# Patient Record
Sex: FEMALE | Race: BLACK OR AFRICAN AMERICAN | Employment: UNEMPLOYED | URBAN - METROPOLITAN AREA
[De-identification: names, ages, dates, MRNs, and addresses within clinical notes are randomized per-mention and may not be internally consistent; named-entity substitution may affect disease eponyms.]

---

## 2017-01-13 ENCOUNTER — HOSPITAL ENCOUNTER (OUTPATIENT)
Dept: BONE DENSITY | Age: 62
Discharge: HOME | End: 2017-01-13
Attending: ORTHOPAEDIC SURGERY
Payer: MEDICAID

## 2017-01-13 DIAGNOSIS — M81.0 OSTEOPOROSIS: ICD-10-CM

## 2017-01-13 PROCEDURE — 77080 DXA BONE DENSITY AXIAL: CPT

## 2017-02-02 ENCOUNTER — OFFICE VISIT (OUTPATIENT)
Dept: VASCULAR SURGERY | Age: 62
End: 2017-02-02

## 2017-02-02 VITALS
HEIGHT: 64 IN | DIASTOLIC BLOOD PRESSURE: 62 MMHG | RESPIRATION RATE: 20 BRPM | SYSTOLIC BLOOD PRESSURE: 140 MMHG | BODY MASS INDEX: 27.66 KG/M2 | WEIGHT: 162 LBS | HEART RATE: 78 BPM

## 2017-02-02 DIAGNOSIS — I82.220 INFERIOR VENA CAVA OCCLUSION (HCC): Primary | ICD-10-CM

## 2017-02-02 DIAGNOSIS — M79.605 PAIN IN BOTH LOWER EXTREMITIES: ICD-10-CM

## 2017-02-02 DIAGNOSIS — R60.0 BILATERAL EDEMA OF LOWER EXTREMITY: ICD-10-CM

## 2017-02-02 DIAGNOSIS — M79.604 PAIN IN BOTH LOWER EXTREMITIES: ICD-10-CM

## 2017-02-02 DIAGNOSIS — I82.4Y3 DEEP VEIN THROMBOSIS (DVT) OF PROXIMAL VEIN OF BOTH LOWER EXTREMITIES, UNSPECIFIED CHRONICITY (HCC): ICD-10-CM

## 2017-02-02 DIAGNOSIS — S22.000B: ICD-10-CM

## 2017-02-02 DIAGNOSIS — Z86.711 HISTORY OF PULMONARY EMBOLISM: ICD-10-CM

## 2017-02-02 RX ORDER — GABAPENTIN 400 MG/1
300 CAPSULE ORAL 3 TIMES DAILY
COMMUNITY

## 2017-02-02 NOTE — PROGRESS NOTES
Reviewed and agree with PA note. Eventually performing removal of IVC filter and stenting could be beneficial but need to make sure far enough out from spine surgery that it will be okay to perform. Also want to make sure that she is mobile prior to performing any other procedures.

## 2017-02-02 NOTE — MR AVS SNAPSHOT
Visit Information Date & Time Provider Department Dept. Phone Encounter #  
 2/2/2017  9:15  [address], [address] Vein/Vascular [address] [facility] [id number] Your Appointments 3/31/2017  1:10 PM  
Follow Up with Rohan Turner MD  
[facility] [address]) Appt Note: 1 yr sx fu sx was4/2016 no copay [address] [facility] [address]  
[phone]  
  
   
 [address] [facility] [address] 5/3/2017 10:15 AM  
Office Visit with [address] PA  
[facility] ([address]) Appt Note: ov  3 month follow up with no studies per arminda [address] [facility] [address] [address]  
[phone]  
  
   
 [address] [facility] [address] [place] [id number] Upcoming Health Maintenance Date Due Hepatitis C Screening 1955 DTaP/Tdap/Td series (1 - Tdap) 12/16/1974 PAP AKA CERVICAL CYTOLOGY 12/16/1974 FOBT Q 1 YEAR AGE 50-75 12/16/2003 ZOSTER VACCINE AGE 60> 12/16/2013 BREAST CANCER SCRN MAMMOGRAM 6/11/2015 INFLUENZA AGE 9 TO ADULT 8/1/2016 Allergies as of 2/2/2017  Review Complete On: 12/13/2016 By: Em Thapa LPN Severity Noted Reaction Type Reactions Iodinated Contrast Media - Oral And Iv Dye  02/02/2017    Other (comments) Patient states will pass out. Current Immunizations  Reviewed on 5/29/2016 No immunizations on file. Not reviewed this visit Vitals BP Pulse Resp Height(growth percentile) Weight(growth percentile) BMI  
 140/62 (BP 1 Location: Right arm, BP Patient Position: Sitting) 78 20 5' 4\" (1.626 m) 162 lb (73.5 kg) 27.81 kg/m2 Smoking Status Former Smoker Vitals History BMI and BSA Data Body Mass Index Body Surface Area  
 27.81 kg/m 2 1.82 m 2 Preferred Pharmacy Pharmacy Name Phone [address] [place] [address] Drive, [address] [phone] Your Updated Medication List  
  
   
This list is accurate as of: 2/2/17  9:59 AM.  Always use your most recent med list. amLODIPine 10 mg tablet Commonly known as:  Lissette Croon Take 1 Tab by mouth daily. atorvastatin 40 mg tablet Commonly known as:  LIPITOR Take 1 Tab by mouth nightly. bisacodyl 10 mg suppository Commonly known as:  DULCOLAX Insert 10 mg into rectum as needed. enoxaparin 80 mg/0.8 mL injection Commonly known as:  LOVENOX 80 mg by SubCUTAneous route every twelve (12) hours every twelve (12) hours. famotidine 20 mg tablet Commonly known as:  PEPCID Take 1 Tab by mouth every twelve (12) hours every twelve (12) hours. ferrous sulfate 325 mg (65 mg iron) tablet Take 1 Tab by mouth two (2) times daily (with meals). folic acid 1 mg tablet Commonly known as:  Google Take 1 Tab by mouth daily. gabapentin 400 mg capsule Commonly known as:  NEURONTIN Take 400 mg by mouth three (3) times daily. hydrALAZINE 100 mg tablet Commonly known as:  APRESOLINE Take 1 Tab by mouth three (3) times daily. isoniazid 300 mg tablet Commonly known as:  NYDRAZID Take 1 Tab by mouth daily. LASIX 20 mg tablet Generic drug:  furosemide Take  by mouth daily. mirtazapine 7.5 mg tablet Commonly known as:  Wilnette Lawrence Take 1 Tab by mouth nightly as needed. nystatin powder Commonly known as:  MYCOSTATIN Apply  to affected area two (2) times a day. ondansetron 4 mg disintegrating tablet Commonly known as:  ZOFRAN ODT Take 1 Tab by mouth every eight (8) hours as needed. oxybutynin 5 mg tablet Commonly known as:  KXHQEGQI Take 1 Tab by mouth three (3) times daily. oxyCODONE-acetaminophen  mg per tablet Commonly known as:  PERCOCET 10  
 Take 1 Tab by mouth every four (4) hours as needed. Max Daily Amount: 6 Tabs. polyethylene glycol 17 gram packet Commonly known as:  Abbie Marjorie Take 1 Packet by mouth daily. potassium chloride 20 mEq packet Commonly known as:  KLOR-CON Take 2 Packets by mouth daily. pyridoxine (vitamin B6) 25 mg tablet Commonly known as:  VITAMIN B-6 Take 1 Tab by mouth daily. Indications: DRUG-INDUCED PYRIDOXINE DEFICIENCY  
  
 rifAMPin 300 mg capsule Commonly known as:  RIFADIN Take 1 Cap by mouth every twelve (12) hours. senna-docusate 8.6-50 mg per tablet Commonly known as:  Zack Rogelio Take 2 Tabs by mouth daily. warfarin 7.5 mg tablet Commonly known as:  COUMADIN Take 1 Tab by mouth daily. Introducing [facility]! Andria Garcia introduces [employer] patient portal. Now you can access parts of your medical record, email your doctor's office, and request medication refills online. 1. In your internet browser, go to [email or web address] 2. Click on the First Time User? Click Here link in the Sign In box. You will see the New Member Sign Up page. 3. Enter your [employer] Access Code exactly as it appears below. You will not need to use this code after youve completed the sign-up process. If you do not sign up before the expiration date, you must request a new code. · [employer] Access Code: [email or web address] Expires: 4/11/2017  4:01 PM 
 
4. Enter the last four digits of your Social Security Number (xxxx) and Date of Birth (mm/dd/yyyy) as indicated and click Submit. You will be taken to the next sign-up page. 5. Create a [employer] ID. This will be your [employer] login ID and cannot be changed, so think of one that is secure and easy to remember. 6. Create a [employer] password. You can change your password at any time. 7. Enter your Password Reset Question and Answer. This can be used at a later time if you forget your password. 8. Enter your e-mail address. You will receive e-mail notification when new information is available in [address]. 9. Click Sign Up. You can now view and download portions of your medical record. 10. Click the Download Summary menu link to download a portable copy of your medical information. If you have questions, please visit the Frequently Asked Questions section of the [employer] website. Remember, [employer] is NOT to be used for urgent needs. For medical emergencies, dial 911. Now available from your iPhone and Android! Please provide this summary of care documentation to your next provider. Your primary care clinician is listed as TESSA BARRIOS. If you have any questions after today's visit, please call [phone].

## 2017-02-02 NOTE — PROGRESS NOTES
Zahraa Deras    Chief Complaint   Patient presents with    Leg Pain       History and Physical    Zahraa Deras is a 59 y.o. female who presents today in follow-up. She was admitted to [facility] back in April of last year after a fall resulting in spinal fracture. She was down for approximately 36 hours prior to EMS being called and admitted to the hospital.  She did develop a pulmonary embolism and an IVC filter was placed prior to her spine surgery. 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 She remains on Coumadin for anticoagulation. She states she remains at a nursing facility due to her pain and persistent weakness. She does participate with physical therapy and states that she is starting to mobilize more and her pain continues to improve. Unfortunately she still has significant swelling of the bilateral lower extremities. She does state that she elevates her legs as often as able and this helps but only minimally. She does have gentle compression with teds hose on bilaterally which are knee-high. She did have a recent ultrasound of her right lower extremity which does show persistent thrombus which is nonocclusive. She denies any skin changes or ulcers. She does have bilateral leg pain which she states she has been told this is due to neuropathy. No fevers or chills.         Past Medical History   Diagnosis Date    Arthritis      Past Surgical History   Procedure Laterality Date    Pr neurological procedure unlisted  04/27/2016     T2-L2 Decomprssion and Fusion/T12 Corpetomy     Patient Active Problem List   Diagnosis Code    Thoracic vertebral fracture ([facility] [address] S22.009A    Abnormal CT of the abdomen R93.5    S/P thoracic spinal fusion Z98.1    Osteoporosis M81.0     Current Outpatient Prescriptions Medication Sig Dispense Refill    gabapentin (NEURONTIN) 400 mg capsule Take 400 mg by mouth three (3) times daily.  furosemide (LASIX) 20 mg tablet Take  by mouth daily.  amLODIPine (NORVASC) 10 mg tablet Take 1 Tab by mouth daily. 30 Tab 1    atorvastatin (LIPITOR) 40 mg tablet Take 1 Tab by mouth nightly. 30 Tab 1    bisacodyl (DULCOLAX) 10 mg suppository Insert 10 mg into rectum as needed. 30 Suppository 1    enoxaparin (LOVENOX) 80 mg/0.8 mL injection 80 mg by SubCUTAneous route every twelve (12) hours every twelve (12) hours. 10 Syringe 0    famotidine (PEPCID) 20 mg tablet Take 1 Tab by mouth every twelve (12) hours every twelve (12) hours. 60 Tab 1    ferrous sulfate 325 mg (65 mg iron) tablet Take 1 Tab by mouth two (2) times daily (with meals). 60 Tab 1    folic acid (FOLVITE) 1 mg tablet Take 1 Tab by mouth daily. 30 Tab 3    hydrALAZINE (APRESOLINE) 100 mg tablet Take 1 Tab by mouth three (3) times daily. 90 Tab 3    isoniazid (NYDRAZID) 300 mg tablet Take 1 Tab by mouth daily. 30 Tab 3    mirtazapine (REMERON) 7.5 mg tablet Take 1 Tab by mouth nightly as needed. 30 Tab 1    nystatin (MYCOSTATIN) powder Apply  to affected area two (2) times a day. 1 Bottle 3    ondansetron (ZOFRAN ODT) 4 mg disintegrating tablet Take 1 Tab by mouth every eight (8) hours as needed. 40 Tab 0    oxybutynin (DITROPAN) 5 mg tablet Take 1 Tab by mouth three (3) times daily. 90 Tab 1    polyethylene glycol (MIRALAX) 17 gram packet Take 1 Packet by mouth daily. 30 Packet 1    potassium chloride (KLOR-CON) 20 mEq packet Take 2 Packets by mouth daily. 60 Packet 1    pyridoxine, vitamin B6, (VITAMIN B-6) 25 mg tablet Take 1 Tab by mouth daily. Indications: DRUG-INDUCED PYRIDOXINE DEFICIENCY 30 Tab 3    rifampin (RIFADIN) 300 mg capsule Take 1 Cap by mouth every twelve (12) hours. 60 Cap 1    senna-docusate (PERICOLACE) 8.6-50 mg per tablet Take 2 Tabs by mouth daily.  60 Tab 1    warfarin (COUMADIN) 7.5 mg tablet Take 1 Tab by mouth daily. 30 Tab 1    oxyCODONE-acetaminophen (PERCOCET 10)  mg per tablet Take 1 Tab by mouth every four (4) hours as needed. Max Daily Amount: 6 Tabs. 40 Tab 0     Allergies   Allergen Reactions    Iodinated Contrast Media - Oral And Iv Dye Other (comments)     Patient states will pass out. Physical Exam:    Visit Vitals    /62 (BP 1 Location: Right arm, BP Patient Position: Sitting)    Pulse 78    Resp 20    Ht 5' 4\" (1.626 m)    Wt 162 lb (73.5 kg)    BMI 27.81 kg/m2      General: Well-appearing female in no acute distress . Patient is in a wheelchair  HEENT: EOMI, no scleral icterus is noted. Pulmonary: No increased work or breathing is noted. Abdomen:  nondistended. Extremities: Warm and well perfused bilaterally. Pt has significant 3-4+ pitting edema in the bilateral lower extremities. Lower legs are tender to palpation. Neuro: Cranial nerves II through XII are grossly intact   Integument: No ulcerations are identified visibly      Impression and Plan:  Mary Mi is a 59 y.o. 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 She did undergo AngioJet with lysis procedure. She does have persistent bilateral lower extremity edema. I discussed the nature of DVT and that after her extensive clot burden she will likely always have a certain degree of leg swelling. I also discussed the nature of post thrombophlebitic syndrome. I also discussed the importance of compression therapy and leg elevation. I discussed that although some compression is better than nothing she would certainly benefit from a higher level compression stocking of at least 20-30 mmHg but would likely best benefit from a 30-40 mmHg compression stocking as her issues are in the deep system of veins. I also discussed that she would best benefit from a thigh-high stocking.   She is understanding and states that she believes thigh-high stockings have been ordered per her nursing home. I also encouraged her to continue to participate with physical therapy and mobilize as much as able. She will continue anticoagulation with Coumadin. She will follow-up in 2-3 months to reassess. Educational material was given today in the office as well as prescription for 20-30 thigh-high compression stockings. Plan was discussed. Patient expresses understanding and agrees. Rolena  Maynor  [phone]        PLEASE NOTE:  This document has been produced using voice recognition software. Unrecognized errors in transcription may be present.

## 2017-03-31 ENCOUNTER — OFFICE VISIT (OUTPATIENT)
Dept: ORTHOPEDIC SURGERY | Age: 62
End: 2017-03-31

## 2017-03-31 VITALS
BODY MASS INDEX: 31.92 KG/M2 | SYSTOLIC BLOOD PRESSURE: 120 MMHG | RESPIRATION RATE: 18 BRPM | HEIGHT: 64 IN | DIASTOLIC BLOOD PRESSURE: 68 MMHG | HEART RATE: 60 BPM | WEIGHT: 187 LBS | TEMPERATURE: 98.2 F

## 2017-03-31 DIAGNOSIS — Z98.1 S/P SPINAL FUSION: Primary | ICD-10-CM

## 2017-03-31 NOTE — Clinical Note
[facility] [address] 
[address], [address] Phone: [phone] Fax: [phone] PROGRESS NOTE Patient: Jose Whitaker                MRN: [id number]       SSN: [id number] YOB: 1955        AGE: 59 y.o. SEX: female Body mass index is 32.1 kg/(m^2). PCP: Luis Tay MD 
03/31/17 Chief Complaint Patient presents with  Back Pain  
  follow up HISTORY OF PRESENT ILLNESS, RADIOGRAPHS, and PLAN:  
 
HISTORY:  Ms. Kate Garza returns today. She is about one year status post her fusion of T7 to L2 for pathologic fracture, cord compression, and paraplegia. She uses a wheelchair when she is out and about in the community needing to get around long distances, but she is a household ambulator without a walker and gets around the nursing home with a walker. She has minimal pain requiring no pain medications. She is quite pleased with her progress. ASSESSMENT/PLAN: At this point, one year out from surgery, I am quite pleased by her outcome. I have no further interventions I would recommend I would be happy to see her again as-needed but given her stability to date, I do not need to followup with her on a routine basis. She has no further complaints of pain or issues. She is not requiring medications. I will see her back again as-needed. cc: [address] [place] [address],[address] Rohan Mohan M.D. Past Medical History:  
Diagnosis Date  Arthritis History reviewed. No pertinent family history. Current Outpatient Prescriptions Medication Sig Dispense Refill  gabapentin (NEURONTIN) 400 mg capsule Take 400 mg by mouth three (3) times daily.  furosemide (LASIX) 20 mg tablet Take  by mouth daily.  amLODIPine (NORVASC) 10 mg tablet Take 1 Tab by mouth daily. 30 Tab 1  
 atorvastatin (LIPITOR) 40 mg tablet Take 1 Tab by mouth nightly.  30 Tab 1  
  bisacodyl (DULCOLAX) 10 mg suppository Insert 10 mg into rectum as needed. 30 Suppository 1  
 famotidine (PEPCID) 20 mg tablet Take 1 Tab by mouth every twelve (12) hours every twelve (12) hours. 60 Tab 1  
 ferrous sulfate 325 mg (65 mg iron) tablet Take 1 Tab by mouth two (2) times daily (with meals). 60 Tab 1  
 folic acid (FOLVITE) 1 mg tablet Take 1 Tab by mouth daily. 30 Tab 3  
 hydrALAZINE (APRESOLINE) 100 mg tablet Take 1 Tab by mouth three (3) times daily. 90 Tab 3  
 isoniazid (NYDRAZID) 300 mg tablet Take 1 Tab by mouth daily. 30 Tab 3  
 mirtazapine (REMERON) 7.5 mg tablet Take 1 Tab by mouth nightly as needed. 30 Tab 1  
 nystatin (MYCOSTATIN) powder Apply  to affected area two (2) times a day. 1 Bottle 3  
 ondansetron (ZOFRAN ODT) 4 mg disintegrating tablet Take 1 Tab by mouth every eight (8) hours as needed. 40 Tab 0  
 oxybutynin (DITROPAN) 5 mg tablet Take 1 Tab by mouth three (3) times daily. 90 Tab 1  polyethylene glycol (MIRALAX) 17 gram packet Take 1 Packet by mouth daily. 30 Packet 1  
 potassium chloride (KLOR-CON) 20 mEq packet Take 2 Packets by mouth daily. [address]  pyridoxine, vitamin B6, (VITAMIN B-6) 25 mg tablet Take 1 Tab by mouth daily. Indications: DRUG-INDUCED PYRIDOXINE DEFICIENCY 30 Tab 3  
 rifampin (RIFADIN) 300 mg capsule Take 1 Cap by mouth every twelve (12) hours. 60 Cap 1  
 senna-docusate (PERICOLACE) 8.6-50 mg per tablet Take 2 Tabs by mouth daily. 60 Tab 1  
 warfarin (COUMADIN) 7.5 mg tablet Take 1 Tab by mouth daily. 30 Tab 1  
 oxyCODONE-acetaminophen (PERCOCET 10)  mg per tablet Take 1 Tab by mouth every four (4) hours as needed. Max Daily Amount: 6 Tabs. 40 Tab 0  
 enoxaparin (LOVENOX) 80 mg/0.8 mL injection 80 mg by SubCUTAneous route every twelve (12) hours every twelve (12) hours. 10 Syringe 0 Allergies Allergen Reactions  Iodinated Contrast Media - Oral And Iv Dye Other (comments) Patient states will pass out. Past Surgical History:  
Procedure Laterality Date  NEUROLOGICAL PROCEDURE UNLISTED  04/27/2016 T2-L2 Decomprssion and Fusion/T12 Corpetomy Past Medical History:  
Diagnosis Date  Arthritis Social History Social History  Marital status: SINGLE Spouse name: N/A  
 Number of children: N/A  
 Years of education: N/A Occupational History  Not on file. Social History Main Topics  Smoking status: Former Smoker  Smokeless tobacco: Former User Quit date: 4/1/2016  Alcohol use Yes  Drug use: No  
 Sexual activity: Not on file Other Topics Concern  Not on file Social History Narrative WORK STATUS: Pt is not currently employed. REVIEW OF SYSTEMS: 
 CONSTITUTIONAL SYMPTOMS:  Negative. EYES:  Negative. EARS, NOSE, THROAT AND MOUTH:  Negative. CARDIOVASCULAR:  Negative. RESPIRATORY:  Negative. GENITOURINARY: Negative. GASTROINTESTINAL:  Negative. INTEGUMENTARY (SKIN AND/OR BREAST):  Negative. MUSCULOSKELETAL: Per HPI. ENDOCRINE/RHEUMATOLOGIC:  Negative. NEUROLOGICAL:  Per HPI. HEMATOLOGIC/LYMPHATIC:  Negative. ALLERGIC/IMMUNOLOGIC:  Negative. PSYCHIATRIC:  Negative. PHYSICAL EXAMINATION:  
Visit Vitals  /68  Pulse 60  Temp 98.2 °F (36.8 °C) (Oral)  Resp 18  Ht 5' 4\" (1.626 m)  Wt 187 lb (84.8 kg)  BMI 32.1 kg/m2 PAIN SCALE: 5/10 CONSTITUTIONAL: The patient is in no apparent distress and is alert and oriented x 3. Overweight. HEENT: Normocephalic. Hearing grossly intact. NECK: Supple and symmetric. no tenderness, or masses were felt. RESPIRATORY: No labored breathing. CARDIOVASCULAR: The carotid pulses were normal. Peripheral pulses were 2+. CHEST: Normal AP diameter and normal contour without any kyphoscoliosis. LYMPHATIC: No lymphadenopathy was appreciated in the neck, axillae or groin.  
SKIN: Negative for scars, rashes, lesions, or ulcers on the right upper, right lower, left upper, left lower and trunk. NEUROLOGICAL: Alert and oriented x 3. Pt presents to the office today using a wheelchair. Ambulation with walker. FWB. EXTREMITIES: See musculoskeletal. 
MUSCULOSKELETAL: 
? Spine, Ribs and Pelvis: Inspection, percussion and palpation preformed. Negative for misalignment, asymmetry, crepitation, defects, tenderness masses or effusions. ? Left Lower Extremity: Inspection, percussion and palpation preformed. Negative straight leg raise. ? Right Lower Extremity: Inspection, percussion and palpation preformed. Negative straight leg raise. SPINE EXAM:  
 
Lumbar spine: No rash, ecchymosis, or gross obliquity. No focal atrophy is noted. ASSESSMENT 
  ICD-10-CM ICD-9-CM 1. S/P thoracic spinal fusion Z98.1 V45.4 Written by Dariela Turner, as dictated by Odalis Galvez MD.

## 2017-03-31 NOTE — PROGRESS NOTES
[facility]  [address], [address] [facility] [address] 200  [place] [address] [address]  Phone: [phone]  Fax: [phone]  PROGRESS NOTE  Patient: Boo Ledesma                MRN: [id number]       SSN: [id number]  YOB: 1955        AGE: 59 y.o. SEX: female  Body mass index is 32.1 kg/(m^2). PCP: Nichelle Villarreal MD  03/31/17    Chief Complaint   Patient presents with    Back Pain     follow up       [facility], RADIOGRAPHS, and PLAN:     Dictation #1  [id number]  ANC:[id number]      Past Medical History:   Diagnosis Date    Arthritis        History reviewed. No pertinent family history. Current Outpatient Prescriptions   Medication Sig Dispense Refill    gabapentin (NEURONTIN) 400 mg capsule Take 400 mg by mouth three (3) times daily.  furosemide (LASIX) 20 mg tablet Take  by mouth daily.  amLODIPine (NORVASC) 10 mg tablet Take 1 Tab by mouth daily. 30 Tab 1    atorvastatin (LIPITOR) 40 mg tablet Take 1 Tab by mouth nightly. 30 Tab 1    bisacodyl (DULCOLAX) 10 mg suppository Insert 10 mg into rectum as needed. 30 Suppository 1    famotidine (PEPCID) 20 mg tablet Take 1 Tab by mouth every twelve (12) hours every twelve (12) hours. 60 Tab 1    ferrous sulfate 325 mg (65 mg iron) tablet Take 1 Tab by mouth two (2) times daily (with meals). 60 Tab 1    folic acid (FOLVITE) 1 mg tablet Take 1 Tab by mouth daily. 30 Tab 3    hydrALAZINE (APRESOLINE) 100 mg tablet Take 1 Tab by mouth three (3) times daily. 90 Tab 3    isoniazid (NYDRAZID) 300 mg tablet Take 1 Tab by mouth daily. 30 Tab 3    mirtazapine (REMERON) 7.5 mg tablet Take 1 Tab by mouth nightly as needed. 30 Tab 1    nystatin (MYCOSTATIN) powder Apply  to affected area two (2) times a day. 1 Bottle 3    ondansetron (ZOFRAN ODT) 4 mg disintegrating tablet Take 1 Tab by mouth every eight (8) hours as needed.  40 Tab 0    oxybutynin (DITROPAN) 5 mg tablet Take 1 Tab by mouth three (3) times daily. 90 Tab 1    polyethylene glycol (MIRALAX) 17 gram packet Take 1 Packet by mouth daily. 30 Packet 1    potassium chloride (KLOR-CON) 20 mEq packet Take 2 Packets by mouth daily. 60 Packet 1    pyridoxine, vitamin B6, (VITAMIN B-6) 25 mg tablet Take 1 Tab by mouth daily. Indications: DRUG-INDUCED PYRIDOXINE DEFICIENCY 30 Tab 3    rifampin (RIFADIN) 300 mg capsule Take 1 Cap by mouth every twelve (12) hours. 60 Cap 1    senna-docusate (PERICOLACE) 8.6-50 mg per tablet Take 2 Tabs by mouth daily. 60 Tab 1    warfarin (COUMADIN) 7.5 mg tablet Take 1 Tab by mouth daily. 30 Tab 1    oxyCODONE-acetaminophen (PERCOCET 10)  mg per tablet Take 1 Tab by mouth every four (4) hours as needed. Max Daily Amount: 6 Tabs. 40 Tab 0    enoxaparin (LOVENOX) 80 mg/0.8 mL injection 80 mg by SubCUTAneous route every twelve (12) hours every twelve (12) hours. 10 Syringe 0       Allergies   Allergen Reactions    Iodinated Contrast Media - Oral And Iv Dye Other (comments)     Patient states will pass out. Past Surgical History:   Procedure Laterality Date    NEUROLOGICAL PROCEDURE UNLISTED  04/27/2016    T2-L2 Decomprssion and Fusion/T12 Corpetomy       Past Medical History:   Diagnosis Date    Arthritis        Social History     Social History    Marital status: SINGLE     Spouse name: N/A    Number of children: N/A    Years of education: N/A     Occupational History    Not on file. Social History Main Topics    Smoking status: Former Smoker    Smokeless tobacco: Former User     Quit date: 4/1/2016    Alcohol use Yes    Drug use: No    Sexual activity: Not on file     Other Topics Concern    Not on file     Social History Narrative       WORK STATUS: Pt is not currently employed. REVIEW OF SYSTEMS:   CONSTITUTIONAL SYMPTOMS:  Negative. EYES:  Negative. EARS, NOSE, THROAT AND MOUTH:  Negative. CARDIOVASCULAR:  Negative. RESPIRATORY:  Negative.    GENITOURINARY: Negative. GASTROINTESTINAL:  Negative. INTEGUMENTARY (SKIN AND/OR BREAST):  Negative. MUSCULOSKELETAL: Per HPI.   ENDOCRINE/RHEUMATOLOGIC:  Negative. NEUROLOGICAL:  Per HPI. HEMATOLOGIC/LYMPHATIC:  Negative. ALLERGIC/IMMUNOLOGIC:  Negative. PSYCHIATRIC:  Negative. PHYSICAL EXAMINATION:   Visit Vitals    /68    Pulse 60    Temp 98.2 °F (36.8 °C) (Oral)    Resp 18    Ht 5' 4\" (1.626 m)    Wt 187 lb (84.8 kg)    BMI 32.1 kg/m2    PAIN SCALE: 5/10    CONSTITUTIONAL: The patient is in no apparent distress and is alert and oriented x 3. Overweight. HEENT: Normocephalic. Hearing grossly intact. NECK: Supple and symmetric. no tenderness, or masses were felt. RESPIRATORY: No labored breathing. CARDIOVASCULAR: The carotid pulses were normal. Peripheral pulses were 2+. CHEST: Normal AP diameter and normal contour without any kyphoscoliosis. LYMPHATIC: No lymphadenopathy was appreciated in the neck, axillae or groin. SKIN: Negative for scars, rashes, lesions, or ulcers on the right upper, right lower, left upper, left lower and trunk. NEUROLOGICAL: Alert and oriented x 3. Pt presents to the office today using a wheelchair. Ambulation with walker. FWB. EXTREMITIES: See musculoskeletal.  MUSCULOSKELETAL:   Spine, Ribs and Pelvis: Inspection, percussion and palpation preformed. Negative for misalignment, asymmetry, crepitation, defects, tenderness masses or effusions.  Left Lower Extremity: Inspection, percussion and palpation preformed. Negative straight leg raise.  Right Lower Extremity: Inspection, percussion and palpation preformed. Negative straight leg raise. SPINE EXAM:     Lumbar spine: No rash, ecchymosis, or gross obliquity. No focal atrophy is noted. ASSESSMENT    ICD-10-CM ICD-9-CM    1.  S/P thoracic spinal fusion Z98.1 V45.4        Written by Anson Briggs, as dictated by Jose Juan Snow MD.

## 2017-03-31 NOTE — MR AVS SNAPSHOT
Visit Information Date & Time Provider Department Dept. Phone Encounter #  
 3/31/2017  1:10 PM Kevin Cervantes MD [facility] [id number] Follow-up Instructions Return if symptoms worsen or fail to improve. Follow-up and Disposition History Your Appointments 5/3/2017 10:15 AM  
Office Visit with [address] [place] MICHELLE Mojica  
[facility] ([facility]) Appt Note: ov  3 month follow up with no studies per arminda [address] [facility] [address] [address]  
[phone]  
  
   
 [address] [facility] [address] [address] Upcoming Health Maintenance Date Due Hepatitis C Screening 1955 DTaP/Tdap/Td series (1 - Tdap) 12/16/1974 PAP AKA CERVICAL CYTOLOGY 12/16/1974 FOBT Q 1 YEAR AGE 50-75 12/16/2003 ZOSTER VACCINE AGE 60> 12/16/2013 BREAST CANCER SCRN MAMMOGRAM 6/11/2015 INFLUENZA AGE 9 TO ADULT 8/1/2016 Allergies as of 3/31/2017  Review Complete On: 3/31/2017 By: Kevin Cervantes MD  
  
 Severity Noted Reaction Type Reactions Iodinated Contrast Media - Oral And Iv Dye  02/02/2017    Other (comments) Patient states will pass out. Current Immunizations  Reviewed on 5/29/2016 No immunizations on file. Not reviewed this visit You Were Diagnosed With   
  
 Codes Comments S/P spinal fusion    -  Primary ICD-10-CM: Z98.1 ICD-9-CM: V45.4 Vitals BP Pulse Temp Resp Height(growth percentile) Weight(growth percentile) 120/68 60 98.2 °F (36.8 °C) (Oral) 18 5' 4\" (1.626 m) 187 lb (84.8 kg) BMI Smoking Status 32.1 kg/m2 Former Smoker BMI and BSA Data Body Mass Index Body Surface Area  
 32.1 kg/m 2 1.96 m 2 Preferred Pharmacy Pharmacy Name Phone [address], [address] [phone] Your Updated Medication List  
  
   
 This list is accurate as of: 3/31/17  3:19 PM.  Always use your most recent med list. amLODIPine 10 mg tablet Commonly known as:  Akil Alstrom Take 1 Tab by mouth daily. atorvastatin 40 mg tablet Commonly known as:  LIPITOR Take 1 Tab by mouth nightly. bisacodyl 10 mg suppository Commonly known as:  DULCOLAX Insert 10 mg into rectum as needed. enoxaparin 80 mg/0.8 mL injection Commonly known as:  LOVENOX 80 mg by SubCUTAneous route every twelve (12) hours every twelve (12) hours. famotidine 20 mg tablet Commonly known as:  PEPCID Take 1 Tab by mouth every twelve (12) hours every twelve (12) hours. ferrous sulfate 325 mg (65 mg iron) tablet Take 1 Tab by mouth two (2) times daily (with meals). folic acid 1 mg tablet Commonly known as:  Google Take 1 Tab by mouth daily. gabapentin 400 mg capsule Commonly known as:  NEURONTIN Take 400 mg by mouth three (3) times daily. hydrALAZINE 100 mg tablet Commonly known as:  APRESOLINE Take 1 Tab by mouth three (3) times daily. isoniazid 300 mg tablet Commonly known as:  NYDRAZID Take 1 Tab by mouth daily. LASIX 20 mg tablet Generic drug:  furosemide Take  by mouth daily. mirtazapine 7.5 mg tablet Commonly known as:  Cooper Pleva Take 1 Tab by mouth nightly as needed. nystatin powder Commonly known as:  MYCOSTATIN Apply  to affected area two (2) times a day. ondansetron 4 mg disintegrating tablet Commonly known as:  ZOFRAN ODT Take 1 Tab by mouth every eight (8) hours as needed. oxybutynin 5 mg tablet Commonly known as:  DJYPUPET Take 1 Tab by mouth three (3) times daily. oxyCODONE-acetaminophen  mg per tablet Commonly known as:  PERCOCET 10 Take 1 Tab by mouth every four (4) hours as needed. Max Daily Amount: 6 Tabs. polyethylene glycol 17 gram packet Commonly known as:  Mazin Sanchez Take 1 Packet by mouth daily. potassium chloride 20 mEq packet Commonly known as:  KLOR-CON Take 2 Packets by mouth daily. pyridoxine (vitamin B6) 25 mg tablet Commonly known as:  VITAMIN B-6 Take 1 Tab by mouth daily. Indications: DRUG-INDUCED PYRIDOXINE DEFICIENCY  
  
 rifAMPin 300 mg capsule Commonly known as:  RIFADIN Take 1 Cap by mouth every twelve (12) hours. senna-docusate 8.6-50 mg per tablet Commonly known as:  Tamra Anon Take 2 Tabs by mouth daily. warfarin 7.5 mg tablet Commonly known as:  COUMADIN Take 1 Tab by mouth daily. Follow-up Instructions Return if symptoms worsen or fail to improve. Introducing [facility]! [employer] introduces [employer] patient portal. Now you can access parts of your medical record, email your doctor's office, and request medication refills online. 1. In your internet browser, go to [email or web address] 2. Click on the First Time User? Click Here link in the Sign In box. You will see the New Member Sign Up page. 3. Enter your [employer] Access Code exactly as it appears below. You will not need to use this code after youve completed the sign-up process. If you do not sign up before the expiration date, you must request a new code. · [employer] Access Code: [email or web address] Expires: 4/11/2017  5:01 PM 
 
4. Enter the last four digits of your Social Security Number (xxxx) and Date of Birth (mm/dd/yyyy) as indicated and click Submit. You will be taken to the next sign-up page. 5. Create a [employer] ID. This will be your [employer] login ID and cannot be changed, so think of one that is secure and easy to remember. 6. Create a [employer] password. You can change your password at any time. 7. Enter your Password Reset Question and Answer. This can be used at a later time if you forget your password. 8. Enter your e-mail address.  You will receive e-mail notification when new information is available in [employer]. 9. Click Sign Up. You can now view and download portions of your medical record. 10. Click the Download Summary menu link to download a portable copy of your medical information. If you have questions, please visit the Frequently Asked Questions section of the [employer] website. Remember, [employer] is NOT to be used for urgent needs. For medical emergencies, dial 911. Now available from your iPhone and Android! Please provide this summary of care documentation to your next provider. Your primary care clinician is listed as TESSA BARRIOS. If you have any questions after today's visit, please call [phone].

## 2017-03-31 NOTE — PROGRESS NOTES
HISTORY:  Ms. Nichol Mccall returns today. She is about one year status post her fusion of T7 to L2 for pathologic fracture, cord compression, and paraplegia. She uses a wheelchair when she is out and about in the community needing to get around long distances, but she is a household ambulator without a walker and gets around the nursing home with a walker. She has minimal pain requiring no pain medications. She is quite pleased with her progress. ASSESSMENT/PLAN: At this point, one year out from surgery, I am quite pleased by her outcome. I have no further interventions I would recommend I would be happy to see her again as-needed but given her stability to date, I do not need to followup with her on a routine basis. She has no further complaints of pain or issues. She is not requiring medications. I will see her back again as-needed.      cc: Lori Ravi M.D.

## 2017-05-03 ENCOUNTER — OFFICE VISIT (OUTPATIENT)
Dept: VASCULAR SURGERY | Age: 62
End: 2017-05-03

## 2017-05-03 VITALS
RESPIRATION RATE: 18 BRPM | SYSTOLIC BLOOD PRESSURE: 124 MMHG | HEART RATE: 68 BPM | BODY MASS INDEX: 31.92 KG/M2 | HEIGHT: 64 IN | DIASTOLIC BLOOD PRESSURE: 72 MMHG | WEIGHT: 187 LBS

## 2017-05-03 DIAGNOSIS — Z95.828 PRESENCE OF IVC FILTER: ICD-10-CM

## 2017-05-03 DIAGNOSIS — R60.0 BILATERAL EDEMA OF LOWER EXTREMITY: Primary | ICD-10-CM

## 2017-05-03 DIAGNOSIS — Z98.1 S/P SPINAL FUSION: ICD-10-CM

## 2017-05-03 DIAGNOSIS — I82.4Y3 DEEP VEIN THROMBOSIS (DVT) OF PROXIMAL VEIN OF BOTH LOWER EXTREMITIES, UNSPECIFIED CHRONICITY (HCC): ICD-10-CM

## 2017-05-03 DIAGNOSIS — R29.898 LEG FATIGUE: ICD-10-CM

## 2017-05-03 NOTE — MR AVS SNAPSHOT
Visit Information Date & Time Provider Department Dept. Phone Encounter #  
 5/3/2017 10:15 AM MARIO Martell [facility] [phone] [id number] Upcoming Health Maintenance Date Due Hepatitis C Screening 1955 DTaP/Tdap/Td series (1 - Tdap) 12/16/1974 PAP AKA CERVICAL CYTOLOGY 12/16/1974 FOBT Q 1 YEAR AGE 50-75 12/16/2003 ZOSTER VACCINE AGE 60> 12/16/2013 BREAST CANCER SCRN MAMMOGRAM 6/11/2015 INFLUENZA AGE 9 TO ADULT 8/1/2017 Allergies as of 5/3/2017  Review Complete On: 5/3/2017 By: Jammie Cage RN Severity Noted Reaction Type Reactions Iodinated Contrast Media - Oral And Iv Dye  02/02/2017    Other (comments) Patient states will pass out. Current Immunizations  Reviewed on 5/29/2016 No immunizations on file. Not reviewed this visit You Were Diagnosed With   
  
 Codes Comments Bilateral edema of lower extremity    -  Primary ICD-10-CM: R60.0 ICD-9-CM: 832. 3 Deep vein thrombosis (DVT) of proximal vein of both lower extremities, unspecified chronicity (HCC)     ICD-10-CM: I82.4Y3 ICD-9-CM: 453.41 Presence of IVC filter     ICD-10-CM: D39.133 ICD-9-CM: V45.89 Vitals BP Pulse Resp Height(growth percentile) Weight(growth percentile) BMI  
 124/72 (BP 1 Location: Left arm, BP Patient Position: Sitting) 68 18 5' 4\" (1.626 m) 187 lb (84.8 kg) 32.1 kg/m2 Smoking Status Former Smoker BMI and BSA Data Body Mass Index Body Surface Area  
 32.1 kg/m 2 1.96 m 2 Preferred Pharmacy Pharmacy Name Phone [address], [address] [place] [address] [phone] Your Updated Medication List  
  
   
This list is accurate as of: 5/3/17 12:19 PM.  Always use your most recent med list. amLODIPine 10 mg tablet Commonly known as:  Midland Cradle Take 1 Tab by mouth daily. atorvastatin 40 mg tablet Commonly known as:  LIPITOR Take 1 Tab by mouth nightly. bisacodyl 10 mg suppository Commonly known as:  DULCOLAX Insert 10 mg into rectum as needed. enoxaparin 80 mg/0.8 mL injection Commonly known as:  LOVENOX 80 mg by SubCUTAneous route every twelve (12) hours every twelve (12) hours. famotidine 20 mg tablet Commonly known as:  PEPCID Take 1 Tab by mouth every twelve (12) hours every twelve (12) hours. ferrous sulfate 325 mg (65 mg iron) tablet Take 1 Tab by mouth two (2) times daily (with meals). folic acid 1 mg tablet Commonly known as:  Google Take 1 Tab by mouth daily. gabapentin 400 mg capsule Commonly known as:  NEURONTIN Take 400 mg by mouth three (3) times daily. hydrALAZINE 100 mg tablet Commonly known as:  APRESOLINE Take 1 Tab by mouth three (3) times daily. isoniazid 300 mg tablet Commonly known as:  NYDRAZID Take 1 Tab by mouth daily. LASIX 20 mg tablet Generic drug:  furosemide Take  by mouth daily. mirtazapine 7.5 mg tablet Commonly known as:  Olga Pierini Take 1 Tab by mouth nightly as needed. nystatin powder Commonly known as:  MYCOSTATIN Apply  to affected area two (2) times a day. ondansetron 4 mg disintegrating tablet Commonly known as:  ZOFRAN ODT Take 1 Tab by mouth every eight (8) hours as needed. oxybutynin 5 mg tablet Commonly known as:  GAWMYTXM Take 1 Tab by mouth three (3) times daily. oxyCODONE-acetaminophen  mg per tablet Commonly known as:  PERCOCET 10 Take 1 Tab by mouth every four (4) hours as needed. Max Daily Amount: 6 Tabs. polyethylene glycol 17 gram packet Commonly known as:  Layla Bentley Take 1 Packet by mouth daily. potassium chloride 20 mEq packet Commonly known as:  KLOR-CON Take 2 Packets by mouth daily. pyridoxine (vitamin B6) 25 mg tablet Commonly known as:  VITAMIN B-6  
 Take 1 Tab by mouth daily. Indications: DRUG-INDUCED PYRIDOXINE DEFICIENCY  
  
 rifAMPin 300 mg capsule Commonly known as:  RIFADIN Take 1 Cap by mouth every twelve (12) hours. senna-docusate 8.6-50 mg per tablet Commonly known as:  Vero Riches Take 2 Tabs by mouth daily. warfarin 7.5 mg tablet Commonly known as:  COUMADIN Take 1 Tab by mouth daily. To-Do List   
 05/19/2017 Imaging:  DUPLEX IVC COMPLETE AMB   
  
 05/19/2017 Imaging:  DUPLEX LOWER EXT VENOUS BILAT AMB Please provide this summary of care documentation to your next provider. Your primary care clinician is listed as TESSA BARRIOS. If you have any questions after today's visit, please call [phone].

## 2017-05-03 NOTE — PROGRESS NOTES
Angeline Ortega    Chief Complaint   Patient presents with    Leg Pain    Swelling       History and Physical    Angeline Ortega is a 59 y.o. female who presents today in follow-up. She was admitted to [facility] back in April of last year after a fall resulting in spinal fracture. She was down for approximately 36 hours prior to EMS being called and admitted to the hospital.  She did develop a pulmonary embolism and an IVC filter was placed prior to her spine surgery. 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 She remains on Coumadin for anticoagulation. She remains at a nursing facility due to her pain and persistent weakness. She does participate with physical therapy and states that she was doing very well and starting to mobilize more, unfortunately she states that her BLE swelling has recently started worsening. She c/o edema up to her thighs bilaterally and states that her legs are so heavy and uncomfortable that she is unable to walk and feels like she is sliding backwards with her therapy. She is very frustrated with her situation. She believes that her INR is mostly therapeutic but does report that it was 1.7 last week. She remains compliant with compression therapy and has SONALI hose on in office today. She denies any skin changes or ulcers. She does also have bilateral lower extremity neuropathy as well. No fevers or chills.         Past Medical History:   Diagnosis Date    Arthritis      Past Surgical History:   Procedure Laterality Date    NEUROLOGICAL PROCEDURE UNLISTED  04/27/2016    T2-L2 Decomprssion and Fusion/T12 Corpetomy     Patient Active Problem List   Diagnosis Code    Thoracic vertebral fracture ([facility] [address] S22.009A    Abnormal CT of the abdomen R93.5    S/P thoracic spinal fusion Z98.1    Osteoporosis M81.0     Current Outpatient Prescriptions   Medication Sig Dispense Refill    gabapentin (NEURONTIN) 400 mg capsule Take 400 mg by mouth three (3) times daily.  furosemide (LASIX) 20 mg tablet Take  by mouth daily.  amLODIPine (NORVASC) 10 mg tablet Take 1 Tab by mouth daily. 30 Tab 1    atorvastatin (LIPITOR) 40 mg tablet Take 1 Tab by mouth nightly. 30 Tab 1    bisacodyl (DULCOLAX) 10 mg suppository Insert 10 mg into rectum as needed. 30 Suppository 1    enoxaparin (LOVENOX) 80 mg/0.8 mL injection 80 mg by SubCUTAneous route every twelve (12) hours every twelve (12) hours. 10 Syringe 0    famotidine (PEPCID) 20 mg tablet Take 1 Tab by mouth every twelve (12) hours every twelve (12) hours. 60 Tab 1    ferrous sulfate 325 mg (65 mg iron) tablet Take 1 Tab by mouth two (2) times daily (with meals). 60 Tab 1    folic acid (FOLVITE) 1 mg tablet Take 1 Tab by mouth daily. 30 Tab 3    hydrALAZINE (APRESOLINE) 100 mg tablet Take 1 Tab by mouth three (3) times daily. 90 Tab 3    isoniazid (NYDRAZID) 300 mg tablet Take 1 Tab by mouth daily. 30 Tab 3    mirtazapine (REMERON) 7.5 mg tablet Take 1 Tab by mouth nightly as needed. 30 Tab 1    nystatin (MYCOSTATIN) powder Apply  to affected area two (2) times a day. 1 Bottle 3    ondansetron (ZOFRAN ODT) 4 mg disintegrating tablet Take 1 Tab by mouth every eight (8) hours as needed. 40 Tab 0    oxybutynin (DITROPAN) 5 mg tablet Take 1 Tab by mouth three (3) times daily. 90 Tab 1    polyethylene glycol (MIRALAX) 17 gram packet Take 1 Packet by mouth daily. 30 Packet 1    potassium chloride (KLOR-CON) 20 mEq packet Take 2 Packets by mouth daily. 60 Packet 1    pyridoxine, vitamin B6, (VITAMIN B-6) 25 mg tablet Take 1 Tab by mouth daily. Indications: DRUG-INDUCED PYRIDOXINE DEFICIENCY 30 Tab 3    rifampin (RIFADIN) 300 mg capsule Take 1 Cap by mouth every twelve (12) hours.  60 Cap 1    senna-docusate (PERICOLACE) 8.6-50 mg per tablet Take 2 Tabs by mouth daily. 60 Tab 1    warfarin (COUMADIN) 7.5 mg tablet Take 1 Tab by mouth daily. 30 Tab 1    oxyCODONE-acetaminophen (PERCOCET 10)  mg per tablet Take 1 Tab by mouth every four (4) hours as needed. Max Daily Amount: 6 Tabs. 40 Tab 0     Allergies   Allergen Reactions    Iodinated Contrast Media - Oral And Iv Dye Other (comments)     Patient states will pass out. Physical Exam:    Visit Vitals    /72 (BP 1 Location: Left arm, BP Patient Position: Sitting)    Pulse 68    Resp 18    Ht 5' 4\" (1.626 m)    Wt 187 lb (84.8 kg)    BMI 32.1 kg/m2      General: Well-appearing female in no acute distress . Patient is in a wheelchair  HEENT: EOMI, no scleral icterus is noted. Pulmonary: No increased work or breathing is noted. Abdomen:  nondistended. Extremities: Warm and well perfused bilaterally. Pt has significant 3-4+ pitting edema in the bilateral lower extremities up to the thighs. Lower legs are tender to palpation. Neuro: Cranial nerves II through XII are grossly intact   Integument: No ulcerations are identified visibly      Impression and Plan:  Toya Parra is a 59 y.o. 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 She did undergo AngioJet with lysis procedure. She does have persistent bilateral lower extremity edema. I again discussed the nature of DVT and that after her extensive clot burden she will likely always have a certain degree of leg swelling. I also discussed the nature of post thrombophlebitic syndrome. I also discussed the importance of compression therapy and leg elevation. I discussed that although some compression is better than nothing she would certainly benefit from a higher level compression stocking of at least 20-30 mmHg but would likely best benefit from a 30-40 mmHg compression stocking as her issues are in the deep system of veins. I also discussed that she would best benefit from a thigh-high stocking.   She expresses understanding but states that her symptoms are worsening and she is scared she may have developed new clot. Discussed that we will obtain bilateral venous reflux studies as well as duplex to assess the IVC and iliac veins. She will f/u afterwards. I also discussed possibly trying to obtain a lymphedema pump pending her US results. I also encouraged her to continue to participate with physical therapy and mobilize as much as able. Eventually performing removal of IVC filter and stenting could be beneficial. Plan was discussed. Patient expresses understanding and agrees. Iman Mcguire  [phone]        PLEASE NOTE:  This document has been produced using voice recognition software. Unrecognized errors in transcription may be present.

## 2017-05-12 ENCOUNTER — OFFICE VISIT (OUTPATIENT)
Dept: VASCULAR SURGERY | Age: 62
End: 2017-05-12

## 2017-05-12 DIAGNOSIS — Z95.828 PRESENCE OF IVC FILTER: ICD-10-CM

## 2017-05-12 DIAGNOSIS — R60.0 BILATERAL EDEMA OF LOWER EXTREMITY: ICD-10-CM

## 2017-05-12 DIAGNOSIS — I82.4Y3 DEEP VEIN THROMBOSIS (DVT) OF PROXIMAL VEIN OF BOTH LOWER EXTREMITIES, UNSPECIFIED CHRONICITY (HCC): ICD-10-CM

## 2017-05-12 NOTE — PROCEDURES
[facility]   *** FINAL REPORT ***    Name: Surya Jaime  MRN: [id number]       Outpatient  : 16 Dec 1955  HIS Order #: [id number]  [id number] [facility] Visit #: [id number]  Date: 12 May 2017    TYPE OF TEST: Abdominal Venous Duplex    REASON FOR TEST  Caval thrombosis    Inferior Vena Cava: Normal  Portal vein:          Diameter:  mm  Splenic vein:  Superior mesenteric    Hepatic vein: Right                Mid                Left    Hepatic artery        PSV:  cm/s  Right renal vein:  Left renal vein:                        Right                Left   Common Iliac:  External Iliac:  Common Femoral:    INTERPRETATION/FINDINGS  Duplex images were obtained using 2-D gray scale, color flow and  spectral doppler analysis. Duplex ultrasound of the inferior vena cava:  1. Patent inferior vena cava in the segments that could be assessed  proximal and mid. 2. Excessive bowel gas prevented imaging of the iliac veins and distal   IVC. 3. Inferior vena cava filter could not be visualized. ADDITIONAL COMMENTS    I have personally reviewed the data relevant to the interpretation of  this  study. TECHNOLOGIST: Roxanne Dai RVT, NIRALIMS  Signed: 2017 10:01 AM    PHYSICIAN: Tracey Long D.O.   Signed: 2017 02:13 PM

## 2017-05-12 NOTE — PROCEDURES
[facility]   *** FINAL REPORT ***    Name: Rohan Lemus  MRN: [id number]       Outpatient  : 16 Dec 1955  HIS Order #: [id number]  [id number] [facility] Visit #: [id number]  Date: 12 May 2017    TYPE OF TEST: Peripheral Venous Testing    REASON FOR TEST  Venous Insufficiency    Right Leg:-  Deep venous thrombosis:           Yes  Proximal extent of thrombus:      Mid Femoral  Superficial venous thrombosis:    No  Deep venous insufficiency:        Yes  Superficial venous insufficiency: Yes    Left Leg:-  Deep venous thrombosis:           No  Superficial venous thrombosis:    No  Deep venous insufficiency:        Yes  Superficial venous insufficiency: No    Abnormal Valve Closure Times (seconds):    Right Common Femoral: 1.0    Right Femoral:        3.0    Right SFJ:            2.8    Right GSV proximal:   0.9    Left Common Femoral:  1.0    Left Femoral:         0.6    Left Deep Femoral:    2.2    Vein Mapping:    Diam.   Depth  (mm)    Right Great Saphenous Vein:    High Thigh:      9.4    Mid Thigh:       3.5    Low Thigh:       3.7    Knee:            3.4    High Calf:    Low Calf: Ankle:    Right Small Saphenous Vein:    SPJ:    Mid Calf: Ankle:    Giacomini:  Accessory saph.:  Malik :  Manning :    Left Great Saphenous Vein:    High Thigh:    Mid Thigh:    Low Thigh:    Knee:    High Calf:    Low Calf: Ankle:    Left Small Saphenous Vein:    SPJ:    Mid Calf: Ankle:    Giacomini:  Accessory saph.:  Malik :  Manning :      INTERPRETATION/FINDINGS  Duplex images were obtained using 2-D gray scale, color flow and  spectral doppler analysis. The reflux exam was performed in the reverse   trendelenburg position. Bilateral reflux:  1. Chronic nonocclusive thrombus in the right mid femoral vein. 2. No evidence of acute deep vein thrombosis in the common femoral,  proximal deep femoral, femoral, popliteal, posterior tibial and  peroneal veins bilaterally.   3. Deep venous reflux in the common femoral veins bilaterally, femoral   veins bilaterally and in the left deep femoral and left peroneal  veins. 4. Right great saphenous vein reflux at the sapheno-femoral junction  and proximal thigh. 5. No evidence of  left great saphenous vein reflux from the  sapheno-femoral junction to proximal calf. 6. No evidence of small saphenous vein reflux bilaterally in the  proximal, mid and distal segments. 7. Biphasic right dorsalis pedis artery and biphasic left posterior  tibial artery flow. ADDITIONAL COMMENTS    I have personally reviewed the data relevant to the interpretation of  this  study. TECHNOLOGIST: Danielle Kumar RVT, RDMS  Signed: 05/12/2017 10:10 AM    PHYSICIAN: Viktoria Lehman D.O.   Signed: 05/12/2017 02:13 PM

## 2017-05-15 ENCOUNTER — OFFICE VISIT (OUTPATIENT)
Dept: VASCULAR SURGERY | Age: 62
End: 2017-05-15

## 2017-05-15 VITALS
BODY MASS INDEX: 31.92 KG/M2 | HEIGHT: 64 IN | RESPIRATION RATE: 18 BRPM | HEART RATE: 66 BPM | DIASTOLIC BLOOD PRESSURE: 74 MMHG | SYSTOLIC BLOOD PRESSURE: 128 MMHG | WEIGHT: 187 LBS

## 2017-05-15 DIAGNOSIS — Z98.1 S/P SPINAL FUSION: ICD-10-CM

## 2017-05-15 DIAGNOSIS — Z95.828 S/P IVC FILTER: ICD-10-CM

## 2017-05-15 DIAGNOSIS — I82.220 IVC THROMBOSIS (HCC): ICD-10-CM

## 2017-05-15 DIAGNOSIS — I87.2 VENOUS (PERIPHERAL) INSUFFICIENCY: ICD-10-CM

## 2017-05-15 DIAGNOSIS — I82.512 CHRONIC DEEP VEIN THROMBOSIS (DVT) OF FEMORAL VEIN OF LEFT LOWER EXTREMITY (HCC): Primary | ICD-10-CM

## 2017-05-15 NOTE — PROGRESS NOTES
The following are bilateral leg measurements this visit:  Right Leg  Inseam: 81.0 cm  Ankle: 25.0 cm  Calf: 46.0 cm  Knee: 55.0 cm  Thigh: 62.0 cm    Left Leg  Inseam: 81.0 cm  Ankle: 26.0 cm  Calf: 45.0 cm  Knee: 53.0 cm  Thigh: 60.5 cm

## 2017-05-15 NOTE — PROGRESS NOTES
Irina Gaytan    Chief Complaint   Patient presents with    Leg Pain    Swelling       History and Physical    Irina Gaytan is a 59 y.o. female who presents today in follow-up after venous reflux study. She was admitted to [facility] back in April of last year after a fall resulting in spinal fracture. She was down for approximately 36 hours prior to EMS being called and admitted to the hospital.  She developed a pulmonary embolism and an IVC filter was placed prior to her spine surgery. 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 She remains on chronic Coumadin for anticoagulation. She remains at a nursing facility due to her pain and persistent weakness. She does participate with physical therapy but states this is very difficult as her BLE swelling has recently worsened. She c/o edema up to her thighs bilaterally and states that her legs are so heavy and uncomfortable that she is unable to walk and feels like she is sliding backwards with her therapy. She has been compliant with compression therapy using SONALI hose since April of last year. Unfortunately she has difficulty tolerating the stockings as they are so tight and cause pain. She states that she can wear the stockings for a few hours at a time and then must take a break. She does elevate the legs as often as able. She also takes Lasix for diuretic which does not seem to help too much with her swelling. She denies any skin changes or ulcers. She does also have bilateral lower extremity neuropathy as well. No fevers or chills.         Past Medical History:   Diagnosis Date    Arthritis      Past Surgical History:   Procedure Laterality Date    NEUROLOGICAL PROCEDURE UNLISTED  04/27/2016    T2-L2 Decomprssion and Fusion/T12 Corpetomy     Patient Active Problem List Diagnosis Code    Thoracic vertebral fracture (HCC) S22.009A    Abnormal CT of the abdomen R93.5    S/P thoracic spinal fusion Z98.1    Osteoporosis M81.0     Current Outpatient Prescriptions   Medication Sig Dispense Refill    gabapentin (NEURONTIN) 400 mg capsule Take 400 mg by mouth three (3) times daily.  furosemide (LASIX) 20 mg tablet Take  by mouth daily.  amLODIPine (NORVASC) 10 mg tablet Take 1 Tab by mouth daily. 30 Tab 1    atorvastatin (LIPITOR) 40 mg tablet Take 1 Tab by mouth nightly. 30 Tab 1    bisacodyl (DULCOLAX) 10 mg suppository Insert 10 mg into rectum as needed. 30 Suppository 1    enoxaparin (LOVENOX) 80 mg/0.8 mL injection 80 mg by SubCUTAneous route every twelve (12) hours every twelve (12) hours. 10 Syringe 0    famotidine (PEPCID) 20 mg tablet Take 1 Tab by mouth every twelve (12) hours every twelve (12) hours. 60 Tab 1    ferrous sulfate 325 mg (65 mg iron) tablet Take 1 Tab by mouth two (2) times daily (with meals). 60 Tab 1    folic acid (FOLVITE) 1 mg tablet Take 1 Tab by mouth daily. 30 Tab 3    hydrALAZINE (APRESOLINE) 100 mg tablet Take 1 Tab by mouth three (3) times daily. 90 Tab 3    isoniazid (NYDRAZID) 300 mg tablet Take 1 Tab by mouth daily. 30 Tab 3    mirtazapine (REMERON) 7.5 mg tablet Take 1 Tab by mouth nightly as needed. 30 Tab 1    nystatin (MYCOSTATIN) powder Apply  to affected area two (2) times a day. 1 Bottle 3    ondansetron (ZOFRAN ODT) 4 mg disintegrating tablet Take 1 Tab by mouth every eight (8) hours as needed. 40 Tab 0    oxybutynin (DITROPAN) 5 mg tablet Take 1 Tab by mouth three (3) times daily. 90 Tab 1    polyethylene glycol (MIRALAX) 17 gram packet Take 1 Packet by mouth daily. 30 Packet 1    potassium chloride (KLOR-CON) 20 mEq packet Take 2 Packets by mouth daily. 60 Packet 1    pyridoxine, vitamin B6, (VITAMIN B-6) 25 mg tablet Take 1 Tab by mouth daily.  Indications: DRUG-INDUCED PYRIDOXINE DEFICIENCY 30 Tab 3    rifampin (RIFADIN) 300 mg capsule Take 1 Cap by mouth every twelve (12) hours. 60 Cap 1    senna-docusate (PERICOLACE) 8.6-50 mg per tablet Take 2 Tabs by mouth daily. 60 Tab 1    warfarin (COUMADIN) 7.5 mg tablet Take 1 Tab by mouth daily. 30 Tab 1    oxyCODONE-acetaminophen (PERCOCET 10)  mg per tablet Take 1 Tab by mouth every four (4) hours as needed. Max Daily Amount: 6 Tabs. 40 Tab 0     Allergies   Allergen Reactions    Iodinated Contrast Media - Oral And Iv Dye Other (comments)     Patient states will pass out. Physical Exam:    Visit Vitals    /74 (BP 1 Location: Left arm, BP Patient Position: Sitting)    Pulse 66    Resp 18    Ht 5' 4\" (1.626 m)    Wt 187 lb (84.8 kg)    BMI 32.1 kg/m2      General: Well-appearing female in no acute distress . Patient is in a wheelchair  HEENT: EOMI, no scleral icterus is noted. Pulmonary: No increased work or breathing is noted. Abdomen:  nondistended. Extremities: Warm and well perfused bilaterally. +BLE edema    Neuro: Cranial nerves II through XII are grossly intact   Integument: No ulcerations are identified visibly    INTERPRETATION/FINDINGS  Duplex images were obtained using 2-D gray scale, color flow and  spectral doppler analysis. Duplex ultrasound of the inferior vena cava:  1. Patent inferior vena cava in the segments that could be assessed  proximal and mid. 2. Excessive bowel gas prevented imaging of the iliac veins and distal  IVC. 3. Inferior vena cava filter could not be visualized.       INTERPRETATION/FINDINGS  Duplex images were obtained using 2-D gray scale, color flow and  spectral doppler analysis. The reflux exam was performed in the reverse  trendelenburg position. Bilateral reflux:  1. Chronic nonocclusive thrombus in the right mid femoral vein. 2. No evidence of acute deep vein thrombosis in the common femoral,  proximal deep femoral, femoral, popliteal, posterior tibial and  peroneal veins bilaterally.   3. Deep venous reflux in the common femoral veins bilaterally, femoral  veins bilaterally and in the left deep femoral and left peroneal  veins. 4. Right great saphenous vein reflux at the sapheno-femoral junction  and proximal thigh. 5. No evidence of left great saphenous vein reflux from the  sapheno-femoral junction to proximal calf. 6. No evidence of small saphenous vein reflux bilaterally in the  proximal, mid and distal segments. 7. Biphasic right dorsalis pedis artery and biphasic left posterior  tibial artery flow.       Impression and Plan:  Raysa Rodríguez is a 59 y.o. 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 She did undergo AngioJet with lysis procedure. She does have persistent bilateral lower extremity edema. Imaging was reviewed with patient in office today. No acute/new thrombus. She does have significant deep system reflux likely secondary to her history of DVT. Imaging reviewed along with Dr Nancy De La Fuente and patient in office today. We have recommended removal of filter to see how she does. If she has good improvement may not need any further intervention however if swelling does not improve would recommend following up with venogram and possible stenting. I again discussed the nature of DVT and that after her extensive clot burden she will likely always have a certain degree of leg swelling. I also discussed the nature of post thrombophlebitic syndrome. I also discussed the importance of compression therapy and leg elevation. I discussed that although some compression is better than nothing she would certainly benefit from a higher level compression stocking of at least 20-30 mmHg but would likely best benefit from a 30-40 mmHg compression stocking as her issues are in the deep system of veins. I also discussed that she would best benefit from a thigh-high stocking.  I also discussed possibly trying to obtain a lymphedema pump and measurements were taken in the office today. I also encouraged her to continue to participate with physical therapy and mobilize as much as able. Plan was discussed. Patient expresses understanding and agrees. [facility]  [phone]        PLEASE NOTE:  This document has been produced using voice recognition software. Unrecognized errors in transcription may be present.

## 2017-05-15 NOTE — MR AVS SNAPSHOT
Visit Information Date & Time Provider Department Dept. Phone Encounter #  
 5/15/2017  1:00 PM [facility] [phone] [id number] Your Appointments 6/15/2017 11:00 AM  
Office Visit with [facility] ([facility]) Appt Note: 4 week office visit [address] [facility] [place] [address] [facility] [address]  
[phone] [address] [facility] [place], [place] [address] [facility] [address] Upcoming Health Maintenance Date Due Hepatitis C Screening 1955 DTaP/Tdap/Td series (1 - Tdap) 12/16/1974 PAP AKA CERVICAL CYTOLOGY 12/16/1974 FOBT Q 1 YEAR AGE 50-75 12/16/2003 ZOSTER VACCINE AGE 60> 12/16/2013 BREAST CANCER SCRN MAMMOGRAM 6/11/2015 INFLUENZA AGE 9 TO ADULT 8/1/2017 Allergies as of 5/15/2017  Review Complete On: 5/15/2017 By: MICHELLE Pennington Severity Noted Reaction Type Reactions Iodinated Contrast Media - Oral And Iv Dye  02/02/2017    Other (comments) Patient states will pass out. Current Immunizations  Reviewed on 5/29/2016 No immunizations on file. Not reviewed this visit Vitals BP Pulse Resp Height(growth percentile) Weight(growth percentile) BMI  
 128/74 (BP 1 Location: Left arm, BP Patient Position: Sitting) 66 18 5' 4\" (1.626 m) 187 lb (84.8 kg) 32.1 kg/m2 Smoking Status Former Smoker Vitals History BMI and BSA Data Body Mass Index Body Surface Area  
 32.1 kg/m 2 1.96 m 2 Preferred Pharmacy Pharmacy Name Phone [address] [facility], [address] [place] [address] [phone] Your Updated Medication List  
  
   
This list is accurate as of: 5/15/17  1:35 PM.  Always use your most recent med list. amLODIPine 10 mg tablet Commonly known as:  [employer] Take 1 Tab by mouth daily. atorvastatin 40 mg tablet Commonly known as:  LIPITOR Take 1 Tab by mouth nightly. bisacodyl 10 mg suppository Commonly known as:  DULCOLAX Insert 10 mg into rectum as needed. enoxaparin 80 mg/0.8 mL injection Commonly known as:  LOVENOX 80 mg by SubCUTAneous route every twelve (12) hours every twelve (12) hours. famotidine 20 mg tablet Commonly known as:  PEPCID Take 1 Tab by mouth every twelve (12) hours every twelve (12) hours. ferrous sulfate 325 mg (65 mg iron) tablet Take 1 Tab by mouth two (2) times daily (with meals). folic acid 1 mg tablet Commonly known as:  [employer] Take 1 Tab by mouth daily. gabapentin 400 mg capsule Commonly known as:  NEURONTIN Take 400 mg by mouth three (3) times daily. hydrALAZINE 100 mg tablet Commonly known as:  APRESOLINE Take 1 Tab by mouth three (3) times daily. isoniazid 300 mg tablet Commonly known as:  NYDRAZID Take 1 Tab by mouth daily. LASIX 20 mg tablet Generic drug:  furosemide Take  by mouth daily. mirtazapine 7.5 mg tablet Commonly known as:  Sharyle Henriquez Take 1 Tab by mouth nightly as needed. nystatin powder Commonly known as:  MYCOSTATIN Apply  to affected area two (2) times a day. ondansetron 4 mg disintegrating tablet Commonly known as:  ZOFRAN ODT Take 1 Tab by mouth every eight (8) hours as needed. oxybutynin 5 mg tablet Commonly known as:  YPSLYHRN Take 1 Tab by mouth three (3) times daily. oxyCODONE-acetaminophen  mg per tablet Commonly known as:  PERCOCET 10 Take 1 Tab by mouth every four (4) hours as needed. Max Daily Amount: 6 Tabs. polyethylene glycol 17 gram packet Commonly known as:  [place] Take 1 Packet by mouth daily. potassium chloride 20 mEq packet Commonly known as:  KLOR-CON Take 2 Packets by mouth daily. pyridoxine (vitamin B6) 25 mg tablet Commonly known as:  VITAMIN B-6  
 Take 1 Tab by mouth daily. Indications: DRUG-INDUCED PYRIDOXINE DEFICIENCY  
  
 rifAMPin 300 mg capsule Commonly known as:  RIFADIN Take 1 Cap by mouth every twelve (12) hours. senna-docusate 8.6-50 mg per tablet Commonly known as:  Rosie Romero Take 2 Tabs by mouth daily. warfarin 7.5 mg tablet Commonly known as:  COUMADIN Take 1 Tab by mouth daily. Please provide this summary of care documentation to your next provider. Your primary care clinician is listed as TESSA BARRIOS. If you have any questions after today's visit, please call [phone].

## 2017-05-18 ENCOUNTER — HOSPITAL ENCOUNTER (OUTPATIENT)
Dept: CARDIAC CATH/INVASIVE PROCEDURES | Age: 62
Discharge: HOME | End: 2017-05-18
Attending: SURGERY | Admitting: SURGERY
Payer: MEDICAID

## 2017-05-18 VITALS
WEIGHT: 197 LBS | HEART RATE: 54 BPM | OXYGEN SATURATION: 97 % | DIASTOLIC BLOOD PRESSURE: 77 MMHG | HEIGHT: 64 IN | BODY MASS INDEX: 33.63 KG/M2 | SYSTOLIC BLOOD PRESSURE: 157 MMHG | RESPIRATION RATE: 14 BRPM

## 2017-05-18 PROCEDURE — 77030004565 HC CATH ANGI DX TMPO CARD -B

## 2017-05-18 PROCEDURE — 77030004530 HC CATH ANGI DX IMGR BSC -A

## 2017-05-18 PROCEDURE — 37193 REM ENDOVAS VENA CAVA FILTER: CPT

## 2017-05-18 PROCEDURE — C1769 GUIDE WIRE: HCPCS

## 2017-05-18 PROCEDURE — 74011000250 HC RX REV CODE- 250: Performed by: SURGERY

## 2017-05-18 PROCEDURE — C1773 RET DEV, INSERTABLE: HCPCS

## 2017-05-18 PROCEDURE — 76937 US GUIDE VASCULAR ACCESS: CPT

## 2017-05-18 PROCEDURE — 74011250636 HC RX REV CODE- 250/636

## 2017-05-18 PROCEDURE — 74011250636 HC RX REV CODE- 250/636: Performed by: SURGERY

## 2017-05-18 PROCEDURE — 99152 MOD SED SAME PHYS/QHP 5/>YRS: CPT

## 2017-05-18 PROCEDURE — C1713 ANCHOR/SCREW BN/BN,TIS/BN: HCPCS

## 2017-05-18 PROCEDURE — 99153 MOD SED SAME PHYS/QHP EA: CPT

## 2017-05-18 PROCEDURE — 74011636320 HC RX REV CODE- 636/320: Performed by: SURGERY

## 2017-05-18 RX ORDER — SODIUM CHLORIDE 0.9 % (FLUSH) 0.9 %
5-10 SYRINGE (ML) INJECTION AS NEEDED
Status: DISCONTINUED | OUTPATIENT
Start: 2017-05-18 | End: 2017-05-18 | Stop reason: HOSPADM

## 2017-05-18 RX ORDER — MIDAZOLAM HYDROCHLORIDE 1 MG/ML
.5-2 INJECTION, SOLUTION INTRAMUSCULAR; INTRAVENOUS
Status: DISCONTINUED | OUTPATIENT
Start: 2017-05-18 | End: 2017-05-18 | Stop reason: HOSPADM

## 2017-05-18 RX ORDER — ONDANSETRON 2 MG/ML
4 INJECTION INTRAMUSCULAR; INTRAVENOUS
Status: DISCONTINUED | OUTPATIENT
Start: 2017-05-18 | End: 2017-05-18 | Stop reason: HOSPADM

## 2017-05-18 RX ORDER — SODIUM CHLORIDE 0.9 % (FLUSH) 0.9 %
5-10 SYRINGE (ML) INJECTION EVERY 8 HOURS
Status: DISCONTINUED | OUTPATIENT
Start: 2017-05-18 | End: 2017-05-18 | Stop reason: HOSPADM

## 2017-05-18 RX ORDER — ACETAMINOPHEN 325 MG/1
650 TABLET ORAL
Status: DISCONTINUED | OUTPATIENT
Start: 2017-05-18 | End: 2017-05-18 | Stop reason: HOSPADM

## 2017-05-18 RX ORDER — HEPARIN SODIUM 200 [USP'U]/100ML
500 INJECTION, SOLUTION INTRAVENOUS ONCE
Status: COMPLETED | OUTPATIENT
Start: 2017-05-18 | End: 2017-05-18

## 2017-05-18 RX ORDER — DIPHENHYDRAMINE HYDROCHLORIDE 50 MG/ML
12.5 INJECTION, SOLUTION INTRAMUSCULAR; INTRAVENOUS
Status: DISCONTINUED | OUTPATIENT
Start: 2017-05-18 | End: 2017-05-18 | Stop reason: HOSPADM

## 2017-05-18 RX ORDER — FENTANYL CITRATE 50 UG/ML
12.5-1 INJECTION, SOLUTION INTRAMUSCULAR; INTRAVENOUS
Status: DISCONTINUED | OUTPATIENT
Start: 2017-05-18 | End: 2017-05-18 | Stop reason: HOSPADM

## 2017-05-18 RX ORDER — OXYCODONE AND ACETAMINOPHEN 5; 325 MG/1; MG/1
1 TABLET ORAL
Status: DISCONTINUED | OUTPATIENT
Start: 2017-05-18 | End: 2017-05-18 | Stop reason: HOSPADM

## 2017-05-18 RX ORDER — LIDOCAINE HYDROCHLORIDE 10 MG/ML
1-30 INJECTION, SOLUTION EPIDURAL; INFILTRATION; INTRACAUDAL; PERINEURAL
Status: DISCONTINUED | OUTPATIENT
Start: 2017-05-18 | End: 2017-05-18 | Stop reason: HOSPADM

## 2017-05-18 RX ORDER — DIPHENHYDRAMINE HYDROCHLORIDE 50 MG/ML
INJECTION, SOLUTION INTRAMUSCULAR; INTRAVENOUS
Status: COMPLETED
Start: 2017-05-18 | End: 2017-05-18

## 2017-05-18 RX ORDER — DIPHENHYDRAMINE HYDROCHLORIDE 50 MG/ML
25 INJECTION, SOLUTION INTRAMUSCULAR; INTRAVENOUS ONCE
Status: COMPLETED | OUTPATIENT
Start: 2017-05-18 | End: 2017-05-18

## 2017-05-18 RX ORDER — MORPHINE SULFATE 10 MG/ML
1 INJECTION, SOLUTION INTRAMUSCULAR; INTRAVENOUS
Status: DISCONTINUED | OUTPATIENT
Start: 2017-05-18 | End: 2017-05-18 | Stop reason: HOSPADM

## 2017-05-18 RX ADMIN — HEPARIN SODIUM 1000 UNITS: 200 INJECTION, SOLUTION INTRAVENOUS at 12:50

## 2017-05-18 RX ADMIN — LIDOCAINE HYDROCHLORIDE 6 ML: 10 INJECTION, SOLUTION EPIDURAL; INFILTRATION; INTRACAUDAL; PERINEURAL at 12:48

## 2017-05-18 RX ADMIN — DIPHENHYDRAMINE HYDROCHLORIDE 25 MG: 50 INJECTION, SOLUTION INTRAMUSCULAR; INTRAVENOUS at 12:27

## 2017-05-18 RX ADMIN — FENTANYL CITRATE 50 MCG: 50 INJECTION INTRAMUSCULAR; INTRAVENOUS at 12:43

## 2017-05-18 RX ADMIN — IOPAMIDOL 10 ML: 612 INJECTION, SOLUTION INTRAVENOUS at 13:12

## 2017-05-18 RX ADMIN — METHYLPREDNISOLONE SODIUM SUCCINATE 125 MG: 125 INJECTION, POWDER, FOR SOLUTION INTRAMUSCULAR; INTRAVENOUS at 12:26

## 2017-05-18 RX ADMIN — MIDAZOLAM HYDROCHLORIDE 1 MG: 1 INJECTION, SOLUTION INTRAMUSCULAR; INTRAVENOUS at 12:43

## 2017-05-18 NOTE — ROUTINE PROCESS
TRANSFER - OUT REPORT:    Verbal report given to CARLOS DIAZ RN(name) on Angeline Ortega  being transferred to [facility](unit) for routine progression of care       Report consisted of patients Situation, Background, Assessment and   Recommendations(SBAR). Information from the following report(s) SBAR, Procedure Summary and MAR was reviewed with the receiving nurse. Lines:       Opportunity for questions and clarification was provided.       Patient transported with:   [employer]

## 2017-05-18 NOTE — INTERVAL H&P NOTE
H&P Update:  Angeline Ortega was seen and examined. History and physical has been reviewed. The patient has been examined.  There have been no significant clinical changes since the completion of the originally dated History and Physical.    Signed By: Jose Nicolas MD     May 18, 2017 12:05 PM

## 2017-05-18 NOTE — PROGRESS NOTES
Cath holding summary    Patient escorted to cath holding from waiting area in her personal wheelchair, alert and oriented x 4, voicing no complaints. Changed into gown and placed on monitor. NPO since MN. Lab results, med rec and H&P reviewed on chart. PIV x 1 inserted without difficulty.

## 2017-05-18 NOTE — ROUTINE PROCESS
TRANSFER - IN REPORT:    Verbal report received from Jacqueline Mirandalolita Burris (name) on Irma Coumann  being received from [facility] (unit) for routine progression of care      Report consisted of patients Situation, Background, Assessment and   Recommendations(SBAR). Information from the following report(s) SBAR, Procedure Summary and MAR was reviewed with the receiving nurse. Opportunity for questions and clarification was provided. Assessment completed upon patients arrival to unit and care assumed.

## 2017-05-18 NOTE — DISCHARGE INSTRUCTIONS
[facility] DISCHARGE SUMMARY from Nurse    The following personal items are in your possession at time of discharge:       Visual Aid: Glasses                            PATIENT INSTRUCTIONS:    After general anesthesia or intravenous sedation, for 24 hours or while taking prescription Narcotics:  · Limit your activities  · Do not drive and operate hazardous machinery  · Do not make important personal or business decisions  · Do  not drink alcoholic beverages  · If you have not urinated within 8 hours after discharge, please contact your surgeon on call. Report the following to your surgeon:  · Excessive pain, swelling, redness or odor of or around the surgical area  · Temperature over 100.5  · Nausea and vomiting lasting longer than 4 hours or if unable to take medications  · Any signs of decreased circulation or nerve impairment to extremity: change in color, persistent  numbness, tingling, coldness or increase pain  · Any questions        What to do at Home:  Recommended activity: Activity as tolerated and no driving for today,     These are general instructions for a healthy lifestyle:    No smoking/ No tobacco products/ Avoid exposure to second hand smoke    Surgeon General's Warning:  Quitting smoking now greatly reduces serious risk to your health. Obesity, smoking, and sedentary lifestyle greatly increases your risk for illness    A healthy diet, regular physical exercise & weight monitoring are important for maintaining a healthy lifestyle    You may be retaining fluid if you have a history of heart failure or if you experience any of the following symptoms:  Weight gain of 3 pounds or more overnight or 5 pounds in a week, increased swelling in our hands or feet or shortness of breath while lying flat in bed. Please call your doctor as soon as you notice any of these symptoms; do not wait until your next office visit.     Recognize signs and symptoms of STROKE:    F-face looks uneven    A-arms unable to move or move unevenly    S-speech slurred or non-existent    T-time-call 911 as soon as signs and symptoms begin-DO NOT go       Back to bed or wait to see if you get better-TIME IS BRAIN. Warning Signs of HEART ATTACK     Call 911 if you have these symptoms:   Chest discomfort. Most heart attacks involve discomfort in the center of the chest that lasts more than a few minutes, or that goes away and comes back. It can feel like uncomfortable pressure, squeezing, fullness, or pain.  Discomfort in other areas of the upper body. Symptoms can include pain or discomfort in one or both arms, the back, neck, jaw, or stomach.  Shortness of breath with or without chest discomfort.  Other signs may include breaking out in a cold sweat, nausea, or lightheadedness. Don't wait more than five minutes to call 911 - MINUTES MATTER! Fast action can save your life. Calling 911 is almost always the fastest way to get lifesaving treatment. Emergency Medical Services staff can begin treatment when they arrive -- up to an hour sooner than if someone gets to the hospital by car. The discharge information has been reviewed with the patient. The patient verbalized understanding. Discharge medications reviewed with the patient and appropriate educational materials and side effects teaching were provided. Inferior Vena Cava Filter Removal Discharge Instructions    General Information:   Today you had an inferior vena cava filter removed. This was removed because it was determined that you no longer needed this filter. Home Care Instructions: You can resume your regular diet and medication regimen. Do not drink alcohol, drive, or make any important legal decisions in the next 24 hours. Do not lift anything heavier than a gallon of milk, or do anything strenuous for the next 24 hours. You will notice a dressing on your neck or groin.  This was the insertion site for the retrieval of the device. Keep the site covered until the puncture site has totally healed. You make take a shower with the bandage, but do cover it with plastic wrap. Do not immerse yourself in water until the wound has totally healed. After your puncture wound heals, there is no special care that is needed. You may resume your normal level of activity slowly, after about one week of light activity. Call If:   You should call your Physician and/or the Radiology Nurse if you have any bleeding other than a small spot on your bandage. Please call if you have any signs of infection; fever, swelling, or increased pain at the site. Call if you have any questions of how to take care of your wound. Follow-Up Instructions: Please see your ordering doctor as he/she has requested.      To Reach Us:      Patient Signature:  Date: 5/18/2017  Discharging Nurse: Barbara Membreno RN

## 2017-05-18 NOTE — IP AVS SNAPSHOT
Roula Zuniga 
 
 
 [address] Patient: Hai Allen MRN: [id number] :1955 You are allergic to the following Allergen Reactions Iodinated Contrast Media - Oral And Iv Dye Other (comments) Patient states will pass out. Recent Documentation Height Weight Breastfeeding? BMI Smoking Status 1.626 m 89.4 kg No 33.81 kg/m2 Former Smoker Emergency Contacts Name Discharge Info Relation Home Work Mobile Jose Cardenas  Other Relative [6] [phone] [facility] Franck CAREGIVER [3] Sister [23] [phone] About your hospitalization You were admitted on:  May 18, 2017 You last received care in the:  [facility] You were discharged on:  May 18, 2017 Unit phone number:  [phone] Why you were hospitalized Your primary diagnosis was:  Not on File Providers Seen During Your Hospitalizations Provider Role Specialty Primary office phone Ren Juan MD Attending Provider Vascular Surgery [phone] Your Primary Care Physician (PCP) Primary Care Physician Office Phone Office Fax N[facility] [phone] Follow-up Information Follow up With Details Comments Contact Info Leopoldo Fend, MD   [facility] [address] [facility] [id number] 
[phone] Your Appointments Thursday Rachel 15, 2017 11:00 AM EDT Office Visit with [facility] ([facility]) [address] [place] [address] 239 126 [facility]  
[phone] Current Discharge Medication List  
  
CONTINUE these medications which have NOT CHANGED Dose & Instructions Dispensing Information Comments Morning Noon Evening Bedtime  
 amLODIPine 10 mg tablet Commonly known as:  Sharita Medici Your last dose was: Your next dose is:    
   
   
 Dose:  10 mg Take 1 Tab by mouth daily. Quantity:  30 Tab Refills:  1  
     
   
   
   
  
 atorvastatin 40 mg tablet Commonly known as:  LIPITOR Your last dose was: Your next dose is:    
   
   
 Dose:  40 mg Take 1 Tab by mouth nightly. Quantity:  30 Tab Refills:  1  
     
   
   
   
  
 bisacodyl 10 mg suppository Commonly known as:  DULCOLAX Your last dose was: Your next dose is:    
   
   
 Dose:  10 mg Insert 10 mg into rectum as needed. Quantity:  30 Suppository Refills:  1  
     
   
   
   
  
 enoxaparin 80 mg/0.8 mL injection Commonly known as:  LOVENOX Your last dose was: Your next dose is:    
   
   
 Dose:  1 mg/kg 80 mg by SubCUTAneous route every twelve (12) hours every twelve (12) hours. Quantity:  10 Syringe Refills:  0 To stop lovenox when inr = > 2.0  
    
   
   
   
  
 famotidine 20 mg tablet Commonly known as:  PEPCID Your last dose was: Your next dose is:    
   
   
 Dose:  20 mg Take 1 Tab by mouth every twelve (12) hours every twelve (12) hours. Quantity:  60 Tab Refills:  1  
     
   
   
   
  
 ferrous sulfate 325 mg (65 mg iron) tablet Your last dose was: Your next dose is:    
   
   
 Dose:  325 mg Take 1 Tab by mouth two (2) times daily (with meals). Quantity:  60 Tab Refills:  1  
     
   
   
   
  
 folic acid 1 mg tablet Commonly known as:  Google Your last dose was: Your next dose is:    
   
   
 Dose:  1 mg Take 1 Tab by mouth daily. Quantity:  30 Tab Refills:  3  
     
   
   
   
  
 gabapentin 400 mg capsule Commonly known as:  NEURONTIN Your last dose was: Your next dose is:    
   
   
 Dose:  400 mg Take 400 mg by mouth three (3) times daily. Refills:  0 hydrALAZINE 100 mg tablet Commonly known as:  APRESOLINE Your last dose was: Your next dose is:    
   
   
 Dose:  100 mg Take 1 Tab by mouth three (3) times daily. Quantity:  90 Tab Refills:  3  
     
   
   
   
  
 isoniazid 300 mg tablet Commonly known as:  NYDRAZID Your last dose was: Your next dose is:    
   
   
 Dose:  300 mg Take 1 Tab by mouth daily. Quantity:  30 Tab Refills:  3 LASIX 20 mg tablet Generic drug:  furosemide Your last dose was: Your next dose is: Take  by mouth daily. Refills:  0  
     
   
   
   
  
 mirtazapine 7.5 mg tablet Commonly known as:  Cleatus Tinajero Your last dose was: Your next dose is:    
   
   
 Dose:  7.5 mg Take 1 Tab by mouth nightly as needed. Quantity:  30 Tab Refills:  1  
     
   
   
   
  
 nystatin powder Commonly known as:  MYCOSTATIN Your last dose was: Your next dose is:    
   
   
 Apply  to affected area two (2) times a day. Quantity:  1 Bottle Refills:  3  
     
   
   
   
  
 ondansetron 4 mg disintegrating tablet Commonly known as:  ZOFRAN ODT Your last dose was: Your next dose is:    
   
   
 Dose:  4 mg Take 1 Tab by mouth every eight (8) hours as needed. Quantity:  40 Tab Refills:  0  
     
   
   
   
  
 oxybutynin 5 mg tablet Commonly known as:  SFTDZFNR Your last dose was: Your next dose is:    
   
   
 Dose:  5 mg Take 1 Tab by mouth three (3) times daily. Quantity:  90 Tab Refills:  1  
     
   
   
   
  
 oxyCODONE-acetaminophen  mg per tablet Commonly known as:  PERCOCET 10 Your last dose was: Your next dose is:    
   
   
 Dose:  1 Tab Take 1 Tab by mouth every four (4) hours as needed. Max Daily Amount: 6 Tabs. Quantity:  40 Tab Refills:  0  
     
   
   
   
  
 polyethylene glycol 17 gram packet Commonly known as:  Conniepriyanka Shiva Your last dose was: Your next dose is:    
   
   
 Dose:  17 g Take 1 Packet by mouth daily. Quantity:  30 Packet Refills:  1  
     
   
   
   
  
 potassium chloride 20 mEq packet Commonly known as:  KLOR-CON Your last dose was: Your next dose is:    
   
   
 Dose:  40 mEq Take 2 Packets by mouth daily. Quantity:  60 Packet Refills:  1  
     
   
   
   
  
 pyridoxine (vitamin B6) 25 mg tablet Commonly known as:  VITAMIN B-6 Your last dose was: Your next dose is:    
   
   
 Dose:  25 mg Take 1 Tab by mouth daily. Indications: DRUG-INDUCED PYRIDOXINE DEFICIENCY Quantity:  30 Tab Refills:  3  
     
   
   
   
  
 rifAMPin 300 mg capsule Commonly known as:  RIFADIN Your last dose was: Your next dose is:    
   
   
 Dose:  300 mg Take 1 Cap by mouth every twelve (12) hours. Quantity:  60 Cap Refills:  1  
     
   
   
   
  
 senna-docusate 8.6-50 mg per tablet Commonly known as:  Wava Dun Your last dose was: Your next dose is:    
   
   
 Dose:  2 Tab Take 2 Tabs by mouth daily. Quantity:  60 Tab Refills:  1  
     
   
   
   
  
 warfarin 7.5 mg tablet Commonly known as:  COUMADIN Your last dose was: Your next dose is:    
   
   
 Dose:  7.5 mg Take 1 Tab by mouth daily. Quantity:  30 Tab Refills:  1 Discharge Instructions Isra 34 DISCHARGE SUMMARY from Nurse The following personal items are in your possession at time of discharge: 
 
  
Visual Aid: Glasses PATIENT INSTRUCTIONS: 
 
After general anesthesia or intravenous sedation, for 24 hours or while taking prescription Narcotics: · Limit your activities · Do not drive and operate hazardous machinery · Do not make important personal or business decisions · Do  not drink alcoholic beverages · If you have not urinated within 8 hours after discharge, please contact your surgeon on call. Report the following to your surgeon: 
· Excessive pain, swelling, redness or odor of or around the surgical area · Temperature over 100.5 · Nausea and vomiting lasting longer than 4 hours or if unable to take medications · Any signs of decreased circulation or nerve impairment to extremity: change in color, persistent  numbness, tingling, coldness or increase pain · Any questions What to do at Home: 
Recommended activity: Activity as tolerated and no driving for today, These are general instructions for a healthy lifestyle: No smoking/ No tobacco products/ Avoid exposure to second hand smoke Surgeon General's Warning:  Quitting smoking now greatly reduces serious risk to your health. Obesity, smoking, and sedentary lifestyle greatly increases your risk for illness A healthy diet, regular physical exercise & weight monitoring are important for maintaining a healthy lifestyle You may be retaining fluid if you have a history of heart failure or if you experience any of the following symptoms:  Weight gain of 3 pounds or more overnight or 5 pounds in a week, increased swelling in our hands or feet or shortness of breath while lying flat in bed. Please call your doctor as soon as you notice any of these symptoms; do not wait until your next office visit. Recognize signs and symptoms of STROKE: 
 
F-face looks uneven A-arms unable to move or move unevenly S-speech slurred or non-existent T-time-call 911 as soon as signs and symptoms begin-DO NOT go Back to bed or wait to see if you get better-TIME IS BRAIN. Warning Signs of HEART ATTACK Call 911 if you have these symptoms: 
? Chest discomfort.  Most heart attacks involve discomfort in the center of the chest that lasts more than a few minutes, or that goes away and comes back. It can feel like uncomfortable pressure, squeezing, fullness, or pain. ? Discomfort in other areas of the upper body. Symptoms can include pain or discomfort in one or both arms, the back, neck, jaw, or stomach. ? Shortness of breath with or without chest discomfort. ? Other signs may include breaking out in a cold sweat, nausea, or lightheadedness. Don't wait more than five minutes to call [address]! Fast action can save your life. Calling 911 is almost always the fastest way to get lifesaving treatment. Emergency Medical Services staff can begin treatment when they arrive  up to an hour sooner than if someone gets to the hospital by car. The discharge information has been reviewed with the patient. The patient verbalized understanding. Discharge medications reviewed with the patient and appropriate educational materials and side effects teaching were provided. Inferior Vena Cava Filter Removal Discharge Instructions General Information: Today you had an inferior vena cava filter removed. This was removed because it was determined that you no longer needed this filter. Home Care Instructions: You can resume your regular diet and medication regimen. Do not drink alcohol, drive, or make any important legal decisions in the next 24 hours. Do not lift anything heavier than a gallon of milk, or do anything strenuous for the next 24 hours. You will notice a dressing on your neck or groin. This was the insertion site for the retrieval of the device. Keep the site covered until the puncture site has totally healed. You make take a shower with the bandage, but do cover it with plastic wrap. Do not immerse yourself in water until the wound has totally healed. After your puncture wound heals, there is no special care that is needed. You may resume your normal level of activity slowly, after about one week of light activity.   
 
Call If: 
 You should call your Physician and/or the Radiology Nurse if you have any bleeding other than a small spot on your bandage. Please call if you have any signs of infection; fever, swelling, or increased pain at the site. Call if you have any questions of how to take care of your wound. Follow-Up Instructions: Please see your ordering doctor as he/she has requested. To Reach Us:   
 
Patient Signature: 
Date: 5/18/2017 Discharging Nurse: Saundra Lake RN Discharge Orders None General Information Please provide this summary of care documentation to your next provider. Patient Signature:  ____________________________________________________________ Date:  ____________________________________________________________  
  
[facility] Provider Signature:  ____________________________________________________________ Date:  ____________________________________________________________

## 2017-05-18 NOTE — OP NOTES
[address]    Name:  Maximo Hogue  MR#:  [id number]  :  1955  Account #:  [de-identified]  Date of Adm:  2017  Date of Surgery:  2017      ATTENDING: Tom Hanna MD.    PREOPERATIVE DIAGNOSES: Inferior vena cava filter, no longer  required. POSTOPERATIVE DIAGNOSES: Inferior vena cava filter, no longer  required. PROCEDURES PERFORMED:  1. Ultrasound-guided access of right internal jugular vein. 2. Catheter in inferior vena cava. 3. Vena cavogram.  4. Inferior vena cava filter removal.  5. Moderate conscious sedation of 28 minutes. CULTURES: None. SPECIMENS REMOVED: None. DRAINS: None. ESTIMATED BLOOD LOSS: Less than 50 mL. ANESTHESIA: Moderate conscious sedation of 28 minutes. This was  provided by an independent provider given the medications per my  discussion and monitoring of vital signs throughout the case. INDICATIONS FOR PROCEDURE: The patient is a 75-year-old  female with inferior vena cava filter no longer required. The patient was  given risks and benefits of the procedure included, but not limited to  bleeding, infection, damage to adjacent structures, MI, stroke, and  death, as well as inability to remove filter. The patient was  understanding of all the risks and underwent the procedure. OPERATIVE FINDINGS: Filter is in appropriate position. There is no  thrombus within the filter. Cava is widely patent. DESCRIPTION OF PROCEDURE: The patient was correctly identified  in the precath area and taken to the cath lab in stable condition. The  patient had a pre-incision timeout prior to any incision. The patient  prepped and draped in normal sterile fashion according to  CDC guidelines aseptic technique. We then were able to numb her up  appropriately using 1% lidocaine. While we were visualizing the right  internal jugular vein. A picture was taken and kept with the patient's  chart.  We then were able to take a single wall entry needle and guide  this into the internal jugular vein. Once the needle tip was identified  within the vein, there was positive blood return. A Wood wire was then  placed. A small skin incision was created using an 11 blade and then  we were able to attempt to get the [facility] wire all the way down past  the filter with the ContraFlush catheter. We had a lot of difficulty doing  this, so we switched to a Ya catheter and an Advantage wire. Then, once we got through we then placed our ContraFlush catheter,  performed a venacavogram, showing the above findings, it was  decided to move forward with the procedure. We then dilated the tract,  times 1, and placed our filter retrieval sheath in place and then using a  gooseneck snare we were able to snare the filter and remove it in its  entirety. All legs were present and everything was removed. Five  minutes of manual compression was held with good hemostasis. The  patient tolerated the procedure well without any issues.         MD GUSTAVO Perez / Pipo.Mariola  D:  05/18/2017   14:38  T:  05/18/2017   15:53  Job #:  [id number]

## 2017-05-18 NOTE — H&P (VIEW-ONLY)
Edita Winn    Chief Complaint   Patient presents with    Leg Pain    Swelling       History and Physical    Edita Winn is a 59 y.o. female who presents today in follow-up after venous reflux study. She was admitted to [facility] back in April of last year after a fall resulting in spinal fracture. She was down for approximately 36 hours prior to EMS being called and admitted to the hospital.  She developed a pulmonary embolism and an IVC filter was placed prior to her spine surgery. 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 She remains on chronic Coumadin for anticoagulation. She remains at a nursing facility due to her pain and persistent weakness. She does participate with physical therapy but states this is very difficult as her BLE swelling has recently worsened. She c/o edema up to her thighs bilaterally and states that her legs are so heavy and uncomfortable that she is unable to walk and feels like she is sliding backwards with her therapy. She has been compliant with compression therapy using SONALI hose since April of last year. Unfortunately she has difficulty tolerating the stockings as they are so tight and cause pain. She states that she can wear the stockings for a few hours at a time and then must take a break. She does elevate the legs as often as able. She also takes Lasix for diuretic which does not seem to help too much with her swelling. She denies any skin changes or ulcers. She does also have bilateral lower extremity neuropathy as well. No fevers or chills.         Past Medical History:   Diagnosis Date    Arthritis      Past Surgical History:   Procedure Laterality Date    NEUROLOGICAL PROCEDURE UNLISTED  04/27/2016    T2-L2 Decomprssion and Fusion/T12 Corpetomy     Patient Active Problem List Diagnosis Code    Thoracic vertebral fracture (HCC) S22.009A    Abnormal CT of the abdomen R93.5    S/P thoracic spinal fusion Z98.1    Osteoporosis M81.0     Current Outpatient Prescriptions   Medication Sig Dispense Refill    gabapentin (NEURONTIN) 400 mg capsule Take 400 mg by mouth three (3) times daily.  furosemide (LASIX) 20 mg tablet Take  by mouth daily.  amLODIPine (NORVASC) 10 mg tablet Take 1 Tab by mouth daily. 30 Tab 1    atorvastatin (LIPITOR) 40 mg tablet Take 1 Tab by mouth nightly. 30 Tab 1    bisacodyl (DULCOLAX) 10 mg suppository Insert 10 mg into rectum as needed. 30 Suppository 1    enoxaparin (LOVENOX) 80 mg/0.8 mL injection 80 mg by SubCUTAneous route every twelve (12) hours every twelve (12) hours. 10 Syringe 0    famotidine (PEPCID) 20 mg tablet Take 1 Tab by mouth every twelve (12) hours every twelve (12) hours. 60 Tab 1    ferrous sulfate 325 mg (65 mg iron) tablet Take 1 Tab by mouth two (2) times daily (with meals). 60 Tab 1    folic acid (FOLVITE) 1 mg tablet Take 1 Tab by mouth daily. 30 Tab 3    hydrALAZINE (APRESOLINE) 100 mg tablet Take 1 Tab by mouth three (3) times daily. 90 Tab 3    isoniazid (NYDRAZID) 300 mg tablet Take 1 Tab by mouth daily. 30 Tab 3    mirtazapine (REMERON) 7.5 mg tablet Take 1 Tab by mouth nightly as needed. 30 Tab 1    nystatin (MYCOSTATIN) powder Apply  to affected area two (2) times a day. 1 Bottle 3    ondansetron (ZOFRAN ODT) 4 mg disintegrating tablet Take 1 Tab by mouth every eight (8) hours as needed. 40 Tab 0    oxybutynin (DITROPAN) 5 mg tablet Take 1 Tab by mouth three (3) times daily. 90 Tab 1    polyethylene glycol (MIRALAX) 17 gram packet Take 1 Packet by mouth daily. 30 Packet 1    potassium chloride (KLOR-CON) 20 mEq packet Take 2 Packets by mouth daily. 60 Packet 1    pyridoxine, vitamin B6, (VITAMIN B-6) 25 mg tablet Take 1 Tab by mouth daily.  Indications: DRUG-INDUCED PYRIDOXINE DEFICIENCY 30 Tab 3    rifampin (RIFADIN) 300 mg capsule Take 1 Cap by mouth every twelve (12) hours. 60 Cap 1    senna-docusate (PERICOLACE) 8.6-50 mg per tablet Take 2 Tabs by mouth daily. 60 Tab 1    warfarin (COUMADIN) 7.5 mg tablet Take 1 Tab by mouth daily. 30 Tab 1    oxyCODONE-acetaminophen (PERCOCET 10)  mg per tablet Take 1 Tab by mouth every four (4) hours as needed. Max Daily Amount: 6 Tabs. 40 Tab 0     Allergies   Allergen Reactions    Iodinated Contrast Media - Oral And Iv Dye Other (comments)     Patient states will pass out. Physical Exam:    Visit Vitals    /74 (BP 1 Location: Left arm, BP Patient Position: Sitting)    Pulse 66    Resp 18    Ht 5' 4\" (1.626 m)    Wt 187 lb (84.8 kg)    BMI 32.1 kg/m2      General: Well-appearing female in no acute distress . Patient is in a wheelchair  HEENT: EOMI, no scleral icterus is noted. Pulmonary: No increased work or breathing is noted. Abdomen:  nondistended. Extremities: Warm and well perfused bilaterally. +BLE edema    Neuro: Cranial nerves II through XII are grossly intact   Integument: No ulcerations are identified visibly    INTERPRETATION/FINDINGS  Duplex images were obtained using 2-D gray scale, color flow and  spectral doppler analysis. Duplex ultrasound of the inferior vena cava:  1. Patent inferior vena cava in the segments that could be assessed  proximal and mid. 2. Excessive bowel gas prevented imaging of the iliac veins and distal  IVC. 3. Inferior vena cava filter could not be visualized.       INTERPRETATION/FINDINGS  Duplex images were obtained using 2-D gray scale, color flow and  spectral doppler analysis. The reflux exam was performed in the reverse  trendelenburg position. Bilateral reflux:  1. Chronic nonocclusive thrombus in the right mid femoral vein. 2. No evidence of acute deep vein thrombosis in the common femoral,  proximal deep femoral, femoral, popliteal, posterior tibial and  peroneal veins bilaterally.   3. Deep venous reflux in the common femoral veins bilaterally, femoral  veins bilaterally and in the left deep femoral and left peroneal  veins. 4. Right great saphenous vein reflux at the sapheno-femoral junction  and proximal thigh. 5. No evidence of left great saphenous vein reflux from the  sapheno-femoral junction to proximal calf. 6. No evidence of small saphenous vein reflux bilaterally in the  proximal, mid and distal segments. 7. Biphasic right dorsalis pedis artery and biphasic left posterior  tibial artery flow.       Impression and Plan:  Tatiana Damon is a 59 y.o. 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 She did undergo AngioJet with lysis procedure. She does have persistent bilateral lower extremity edema. Imaging was reviewed with patient in office today. No acute/new thrombus. She does have significant deep system reflux likely secondary to her history of DVT. Imaging reviewed along with Dr Edrick Bernheim and patient in office today. We have recommended removal of filter to see how she does. If she has good improvement may not need any further intervention however if swelling does not improve would recommend following up with venogram and possible stenting. I again discussed the nature of DVT and that after her extensive clot burden she will likely always have a certain degree of leg swelling. I also discussed the nature of post thrombophlebitic syndrome. I also discussed the importance of compression therapy and leg elevation. I discussed that although some compression is better than nothing she would certainly benefit from a higher level compression stocking of at least 20-30 mmHg but would likely best benefit from a 30-40 mmHg compression stocking as her issues are in the deep system of veins. I also discussed that she would best benefit from a thigh-high stocking.  I also discussed possibly trying to obtain a lymphedema pump and measurements were taken in the office today. I also encouraged her to continue to participate with physical therapy and mobilize as much as able. Plan was discussed. Patient expresses understanding and agrees. Charlie Mcguire  [phone]        PLEASE NOTE:  This document has been produced using voice recognition software. Unrecognized errors in transcription may be present.

## 2017-05-25 ENCOUNTER — DOCUMENTATION ONLY (OUTPATIENT)
Dept: VASCULAR SURGERY | Age: 62
End: 2017-05-25

## 2017-05-25 NOTE — PROGRESS NOTES
Patient is unable to be provided with a lymphedema pump at this time due to living in a skilled nursing facility per [employer] pumps cannot be billed or distributed to a patient in skilled nursing.

## 2017-06-15 ENCOUNTER — OFFICE VISIT (OUTPATIENT)
Dept: VASCULAR SURGERY | Age: 62
End: 2017-06-15

## 2017-06-15 VITALS
DIASTOLIC BLOOD PRESSURE: 88 MMHG | SYSTOLIC BLOOD PRESSURE: 162 MMHG | RESPIRATION RATE: 8 BRPM | WEIGHT: 197 LBS | HEART RATE: 79 BPM | HEIGHT: 64 IN | BODY MASS INDEX: 33.63 KG/M2

## 2017-06-15 DIAGNOSIS — I82.220 INFERIOR VENA CAVAL OCCLUSION (HCC): ICD-10-CM

## 2017-06-15 DIAGNOSIS — I82.5Y3 CHRONIC DEEP VEIN THROMBOSIS (DVT) OF PROXIMAL VEIN OF BOTH LOWER EXTREMITIES (HCC): Primary | ICD-10-CM

## 2017-06-15 DIAGNOSIS — I87.2 VENOUS (PERIPHERAL) INSUFFICIENCY: ICD-10-CM

## 2017-06-15 DIAGNOSIS — Z98.1 S/P SPINAL FUSION: ICD-10-CM

## 2017-06-15 NOTE — MR AVS SNAPSHOT
Visit Information Date & Time Provider Department Dept. Phone Encounter #  
 6/15/2017 11:00 AM [facility] [phone] Upcoming Health Maintenance Date Due Hepatitis C Screening 1955 DTaP/Tdap/Td series (1 - Tdap) 12/16/1974 PAP AKA CERVICAL CYTOLOGY 12/16/1974 FOBT Q 1 YEAR AGE 50-75 12/16/2003 ZOSTER VACCINE AGE 60> 12/16/2013 BREAST CANCER SCRN MAMMOGRAM 6/11/2015 INFLUENZA AGE 9 TO ADULT 8/1/2017 Allergies as of 6/15/2017  Review Complete On: 6/15/2017 By: Anjali Watlon Severity Noted Reaction Type Reactions Iodinated Contrast- Oral And Iv Dye  02/02/2017    Other (comments) Patient states will pass out. Current Immunizations  Reviewed on 5/29/2016 No immunizations on file. Not reviewed this visit Vitals BP Pulse Resp Height(growth percentile) Weight(growth percentile) BMI  
 162/88 (BP 1 Location: Left arm, BP Patient Position: Sitting) 79 8 5' 4\" (1.626 m) 197 lb (89.4 kg) 33.81 kg/m2 Smoking Status Former Smoker Vitals History BMI and BSA Data Body Mass Index Body Surface Area  
 33.81 kg/m 2 2.01 m 2 Preferred Pharmacy Pharmacy Name Phone [facility], [address] [place] [address] [phone] Your Updated Medication List  
  
   
This list is accurate as of: 6/15/17  1:06 PM.  Always use your most recent med list. amLODIPine 10 mg tablet Commonly known as:  Corinne Carlotta Take 1 Tab by mouth daily. AMOXICILLIN PO Take 500 mg/day by mouth. atorvastatin 40 mg tablet Commonly known as:  LIPITOR Take 1 Tab by mouth nightly. bisacodyl 10 mg suppository Commonly known as:  DULCOLAX Insert 10 mg into rectum as needed. enoxaparin 80 mg/0.8 mL injection Commonly known as:  LOVENOX 80 mg by SubCUTAneous route every twelve (12) hours every twelve (12) hours. famotidine 20 mg tablet Commonly known as:  PEPCID Take 1 Tab by mouth every twelve (12) hours every twelve (12) hours. ferrous sulfate 325 mg (65 mg iron) tablet Take 1 Tab by mouth two (2) times daily (with meals). folic acid 1 mg tablet Commonly known as:  Google Take 1 Tab by mouth daily. gabapentin 400 mg capsule Commonly known as:  NEURONTIN Take 400 mg by mouth three (3) times daily. hydrALAZINE 100 mg tablet Commonly known as:  APRESOLINE Take 1 Tab by mouth three (3) times daily. isoniazid 300 mg tablet Commonly known as:  NYDRAZID Take 1 Tab by mouth daily. LASIX 20 mg tablet Generic drug:  furosemide Take  by mouth daily. mirtazapine 7.5 mg tablet Commonly known as:  Cynthia Elpidio Take 1 Tab by mouth nightly as needed. nystatin powder Commonly known as:  MYCOSTATIN Apply  to affected area two (2) times a day. ondansetron 4 mg disintegrating tablet Commonly known as:  ZOFRAN ODT Take 1 Tab by mouth every eight (8) hours as needed. oxybutynin 5 mg tablet Commonly known as:  DCRDHZWN Take 1 Tab by mouth three (3) times daily. oxyCODONE-acetaminophen  mg per tablet Commonly known as:  PERCOCET 10 Take 1 Tab by mouth every four (4) hours as needed. Max Daily Amount: 6 Tabs. polyethylene glycol 17 gram packet Commonly known as:  Seabron Cunning Take 1 Packet by mouth daily. potassium chloride 20 mEq packet Commonly known as:  KLOR-CON Take 2 Packets by mouth daily. pyridoxine (vitamin B6) 25 mg tablet Commonly known as:  VITAMIN B-6 Take 1 Tab by mouth daily. Indications: DRUG-INDUCED PYRIDOXINE DEFICIENCY  
  
 rifAMPin 300 mg capsule Commonly known as:  RIFADIN Take 1 Cap by mouth every twelve (12) hours. senna-docusate 8.6-50 mg per tablet Commonly known as:  Luca Calderón  
 Take 2 Tabs by mouth daily. warfarin 7.5 mg tablet Commonly known as:  COUMADIN Take 1 Tab by mouth daily. XARELTO 15 mg Tab tablet Generic drug:  rivaroxaban Take  by mouth daily. Please provide this summary of care documentation to your next provider. Your primary care clinician is listed as TESSA BARRIOS. If you have any questions after today's visit, please call [phone].

## 2017-06-15 NOTE — PROGRESS NOTES
Fili England    Chief Complaint   Patient presents with    Blood Clot       History and Physical    Fili England is a 59 y.o. female with history of DVT and IVC filter placement. She was admitted to [facility] back in April of 2016 after a fall resulting in spinal fracture. She was down for approximately 36 hours prior to EMS being called and admitted to the hospital. She developed a pulmonary embolism and an IVC filter was placed prior to her spine surgery. 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 She remains on chronic Coumadin for anticoagulation. She presents to the office today in follow-up after her IVC filter removal.  She is doing well from a postop perspective. She denies any fever or chills. Puncture site with no issues or pain. She does have chronic bilateral lower extremity edema and remains compliant with compression therapy and leg elevation as able. She does feel like her swelling is mildly improved since removal of her filter and she also feels that the legs feel slightly less heavy. She remains resident at local nursing facility. She states that she has not been participating in physical therapy and she has Medicaid for her insurance and Medicaid will not cover the physical therapy. We did also try to obtain a lymphedema pump for her however this was denied due to patient being a resident at the nursing facility.     Past Medical History:   Diagnosis Date    Arthritis      Past Surgical History:   Procedure Laterality Date    NEUROLOGICAL PROCEDURE UNLISTED  04/27/2016    T2-L2 Decomprssion and Fusion/T12 Corpetomy     Patient Active Problem List   Diagnosis Code    Thoracic vertebral fracture ([address] S22.009A    Abnormal CT of the abdomen R93.5    S/P thoracic spinal fusion Z98.1    Osteoporosis M81.0     Current Outpatient Prescriptions   Medication Sig Dispense Refill    rivaroxaban (XARELTO) 15 mg tab tablet Take  by mouth daily.  AMOXICILLIN PO Take 500 mg/day by mouth.  gabapentin (NEURONTIN) 400 mg capsule Take 400 mg by mouth three (3) times daily.  furosemide (LASIX) 20 mg tablet Take  by mouth daily.  amLODIPine (NORVASC) 10 mg tablet Take 1 Tab by mouth daily. 30 Tab 1    atorvastatin (LIPITOR) 40 mg tablet Take 1 Tab by mouth nightly. 30 Tab 1    bisacodyl (DULCOLAX) 10 mg suppository Insert 10 mg into rectum as needed. 30 Suppository 1    enoxaparin (LOVENOX) 80 mg/0.8 mL injection 80 mg by SubCUTAneous route every twelve (12) hours every twelve (12) hours. 10 Syringe 0    famotidine (PEPCID) 20 mg tablet Take 1 Tab by mouth every twelve (12) hours every twelve (12) hours. 60 Tab 1    ferrous sulfate 325 mg (65 mg iron) tablet Take 1 Tab by mouth two (2) times daily (with meals). 60 Tab 1    folic acid (FOLVITE) 1 mg tablet Take 1 Tab by mouth daily. 30 Tab 3    hydrALAZINE (APRESOLINE) 100 mg tablet Take 1 Tab by mouth three (3) times daily. 90 Tab 3    isoniazid (NYDRAZID) 300 mg tablet Take 1 Tab by mouth daily. 30 Tab 3    mirtazapine (REMERON) 7.5 mg tablet Take 1 Tab by mouth nightly as needed. 30 Tab 1    nystatin (MYCOSTATIN) powder Apply  to affected area two (2) times a day. 1 Bottle 3    ondansetron (ZOFRAN ODT) 4 mg disintegrating tablet Take 1 Tab by mouth every eight (8) hours as needed. 40 Tab 0    oxybutynin (DITROPAN) 5 mg tablet Take 1 Tab by mouth three (3) times daily. 90 Tab 1    polyethylene glycol (MIRALAX) 17 gram packet Take 1 Packet by mouth daily. 30 Packet 1    potassium chloride (KLOR-CON) 20 mEq packet Take 2 Packets by mouth daily. 60 Packet 1    pyridoxine, vitamin B6, (VITAMIN B-6) 25 mg tablet Take 1 Tab by mouth daily.  Indications: DRUG-INDUCED PYRIDOXINE DEFICIENCY 30 Tab 3    rifampin (RIFADIN) 300 mg capsule Take 1 Cap by mouth every twelve (12) hours. 60 Cap 1    senna-docusate (PERICOLACE) 8.6-50 mg per tablet Take 2 Tabs by mouth daily. 60 Tab 1    oxyCODONE-acetaminophen (PERCOCET 10)  mg per tablet Take 1 Tab by mouth every four (4) hours as needed. Max Daily Amount: 6 Tabs. 40 Tab 0    warfarin (COUMADIN) 7.5 mg tablet Take 1 Tab by mouth daily. 30 Tab 1     Allergies   Allergen Reactions    Iodinated Contrast- Oral And Iv Dye Other (comments)     Patient states will pass out. Physical Exam:    Visit Vitals    /88 (BP 1 Location: Left arm, BP Patient Position: Sitting)    Pulse 79    Resp 8    Ht 5' 4\" (1.626 m)    Wt 197 lb (89.4 kg)    BMI 33.81 kg/m2      General: Well-appearing female in no acute distress . Patient is a wheelchair  HEENT: EOMI, no scleral icterus is noted. Pulmonary: No increased work or breathing is noted. Abdomen: obese, nondistended. Extremities: Warm and well perfused bilaterally. Pt has +BLE edema which does seem to be improved. She does have SONALI hose in place today in the office. Neuro: Cranial nerves II through XII are grossly intact   Integument: No ulcerations are identified visibly      Impression and Plan:  Callum Shaffer is a 59 y.o. female with history of DVT and IVC filter occlusion as above. She presents today in follow-up after filter removal.  She is doing quite well. She states that her leg swelling does seem to be somewhat improved and legs feel less heavy. Discussed that she will have a lifetime of treatment with compression and elevation. She will also remain on long-term anticoagulation. Patient is understanding of all of this. I did also discuss that we will continue our efforts at trying to obtain a lymphedema pump as I believe this will be of great benefit in prevention of worsening swelling and possible venous stasis ulcers in the future. Plan was discussed. Patient expresses understanding and agrees.         Juan Daniel Sosa MIGUEL ÁNGEL  [phone]        PLEASE NOTE:  This document has been produced using voice recognition software. Unrecognized errors in transcription may be present.

## 2017-10-11 ENCOUNTER — OFFICE VISIT (OUTPATIENT)
Dept: VASCULAR SURGERY | Age: 62
End: 2017-10-11

## 2017-10-11 VITALS
BODY MASS INDEX: 33.63 KG/M2 | DIASTOLIC BLOOD PRESSURE: 62 MMHG | RESPIRATION RATE: 16 BRPM | WEIGHT: 197 LBS | HEIGHT: 64 IN | SYSTOLIC BLOOD PRESSURE: 110 MMHG | HEART RATE: 60 BPM

## 2017-10-11 DIAGNOSIS — Z98.1 S/P SPINAL FUSION: ICD-10-CM

## 2017-10-11 DIAGNOSIS — M79.89 LEG SWELLING: ICD-10-CM

## 2017-10-11 DIAGNOSIS — I87.2 VENOUS (PERIPHERAL) INSUFFICIENCY: ICD-10-CM

## 2017-10-11 DIAGNOSIS — Z01.818 PREOP TESTING: ICD-10-CM

## 2017-10-11 DIAGNOSIS — I82.493 DEEP VEIN THROMBOSIS (DVT) OF OTHER VEIN OF BOTH LOWER EXTREMITIES, UNSPECIFIED CHRONICITY (HCC): ICD-10-CM

## 2017-10-11 DIAGNOSIS — I82.220 INFERIOR VENA CAVA OCCLUSION (HCC): Primary | ICD-10-CM

## 2017-10-11 NOTE — PROGRESS NOTES
Jaime Stoll    Chief Complaint   Patient presents with    Swelling       History and Physical    Jaime Stoll is a 59 y.o. female with history of DVT and IVC filter placement. She was admitted to [facility] back in April of 2016 after a fall resulting in spinal fracture. She was down for approximately 36 hours prior to EMS being called and admitted to the hospital. She developed a pulmonary embolism and an IVC filter was placed prior to her spine surgery. 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 She remains on chronic anticoagulation and has been switched from Coumadin to Xarelto. Her IVC filter has since been removed. She remains resident at local nursing facility. She states that she has increased her mobility and states that she is walking quite a bit recently. She does state that the walking can be quite difficult at times however as her leg swelling is worsening. She states that the legs feel very heavy and tired at times. She also complains of pain mainly on the right leg as well. She denies any ulcers or skin changes. She feels that her leg swelling is worsening and she states that her swelling extends all the way up to the tops of her thighs. She does elevate her legs as often as able but states that wearing her compression stockings is becoming increasingly difficult due to the increased swelling. She states that the stockings are incredibly painful and she has to take them off. She denies any fevers or chills. Unfortunately due to the fact that she is resident at a nursing facility she is not a candidate for lymphedema pump.       Past Medical History:   Diagnosis Date    Arthritis      Past Surgical History:   Procedure Laterality Date    NEUROLOGICAL PROCEDURE UNLISTED  04/27/2016    T2-L2 Decomprssion and Fusion/T12 Corpetomy     Patient Active Problem List   Diagnosis Code    Thoracic vertebral fracture (H[facility]) S22.009A    Abnormal CT of the abdomen R93.5    S/P thoracic spinal fusion Z98.1    Osteoporosis M81.0     Current Outpatient Prescriptions   Medication Sig Dispense Refill    rivaroxaban (XARELTO) 15 mg tab tablet Take  by mouth daily.  gabapentin (NEURONTIN) 400 mg capsule Take 400 mg by mouth three (3) times daily.  atorvastatin (LIPITOR) 40 mg tablet Take 1 Tab by mouth nightly. 30 Tab 1    bisacodyl (DULCOLAX) 10 mg suppository Insert 10 mg into rectum as needed. 30 Suppository 1    ferrous sulfate 325 mg (65 mg iron) tablet Take 1 Tab by mouth two (2) times daily (with meals). 60 Tab 1    isoniazid (NYDRAZID) 300 mg tablet Take 1 Tab by mouth daily. 30 Tab 3    nystatin (MYCOSTATIN) powder Apply  to affected area two (2) times a day. 1 Bottle 3    ondansetron (ZOFRAN ODT) 4 mg disintegrating tablet Take 1 Tab by mouth every eight (8) hours as needed. 40 Tab 0    polyethylene glycol (MIRALAX) 17 gram packet Take 1 Packet by mouth daily. 30 Packet 1    pyridoxine, vitamin B6, (VITAMIN B-6) 25 mg tablet Take 1 Tab by mouth daily. Indications: DRUG-INDUCED PYRIDOXINE DEFICIENCY 30 Tab 3    senna-docusate (PERICOLACE) 8.6-50 mg per tablet Take 2 Tabs by mouth daily. 60 Tab 1    oxyCODONE-acetaminophen (PERCOCET 10)  mg per tablet Take 1 Tab by mouth every four (4) hours as needed. Max Daily Amount: 6 Tabs. 40 Tab 0     Allergies   Allergen Reactions    Iodinated Contrast- Oral And Iv Dye Other (comments)     Patient states will pass out. Physical Exam:    Visit Vitals    /62 (BP 1 Location: Left arm, BP Patient Position: Sitting)    Pulse 60    Resp 16    Ht 5' 4\" (1.626 m)    Wt 197 lb (89.4 kg)    BMI 33.81 kg/m2      General: Well-appearing female in no acute distress . Patient is a wheelchair  HEENT: EOMI, no scleral icterus is noted.    Pulmonary: No increased work or breathing is noted. Abdomen: obese, nondistended. Extremities: Warm and well perfused bilaterally. Pt has +BLE edema which does extend up above the knees. No ulcers. Neuro: Cranial nerves II through XII are grossly intact   Integument: No ulcerations are identified visibly      Impression and Plan:  Mary Mi is a 59 y.o. female with history of DVT and IVC filter occlusion as above. Her filter has been removed. Remains on lifelong anticoagulation is currently on Xarelto. Unfortunately she states that her leg swelling has worsened despite better compliance with leg elevation and increasing her activity level. She states that she tries to walk as much as she possibly can but that this is becoming quite difficult due to the swelling. Patient was discussed with Dr. Lona Felix. I discussed that we could perform venogram with possible stenting. Discussed that this would not be recommended unless she is continuing to increase her activity and walking. She states that she is doing this and is trying her hardest to increase her walking but she is struggling and needs help with her edema. She will continue good compliance with her compression therapy as tolerates and leg elevation. She was scheduled for venogram will follow-up afterwards as scheduled. Risks versus benefits of procedure were discussed and all questions answered to the best of my ability. Patient is understanding that she will likely have a lifetime of some degree of swelling in the legs and that she will need a life time of compression and leg elevation. [facility]  [phone]        PLEASE NOTE:  This document has been produced using voice recognition software. Unrecognized errors in transcription may be present.

## 2017-10-11 NOTE — MR AVS SNAPSHOT
Visit Information Date & Time Provider Department Dept. Phone Encounter #  
 10/11/2017 11:30 AM [facility] [phone] [id number] Your Appointments 11/1/2017  1:15 PM  
HOSPITAL DISCHARGE with [address] [place] [address] [facility] (Keith Kraft) Appt Note: DC Venogram  
 [facility] [place] [address] [address] [facility]  
[phone] [facility] [place] [address] [facility] [address] Upcoming Health Maintenance Date Due Hepatitis C Screening 1955 DTaP/Tdap/Td series (1 - Tdap) 12/16/1974 PAP AKA CERVICAL CYTOLOGY 12/16/1974 FOBT Q 1 YEAR AGE 50-75 12/16/2003 ZOSTER VACCINE AGE 60> 10/16/2013 BREAST CANCER SCRN MAMMOGRAM 6/11/2015 INFLUENZA AGE 9 TO ADULT 8/1/2017 Allergies as of 10/11/2017  Review Complete On: 10/11/2017 By: Kd Serrano LPN Severity Noted Reaction Type Reactions Iodinated Contrast- Oral And Iv Dye  02/02/2017    Other (comments) Patient states will pass out. Current Immunizations  Reviewed on 5/29/2016 No immunizations on file. Not reviewed this visit Vitals BP Pulse Resp Height(growth percentile) Weight(growth percentile) BMI  
 110/62 (BP 1 Location: Left arm, BP Patient Position: Sitting) 60 16 5' 4\" (1.626 m) 197 lb (89.4 kg) 33.81 kg/m2 Smoking Status Former Smoker Vitals History BMI and BSA Data Body Mass Index Body Surface Area  
 33.81 kg/m 2 2.01 m 2 Your Updated Medication List  
  
   
This list is accurate as of: 10/11/17 12:14 PM.  Always use your most recent med list.  
  
  
  
  
 atorvastatin 40 mg tablet Commonly known as:  LIPITOR Take 1 Tab by mouth nightly. bisacodyl 10 mg suppository Commonly known as:  DULCOLAX Insert 10 mg into rectum as needed. ferrous sulfate 325 mg (65 mg iron) tablet Take 1 Tab by mouth two (2) times daily (with meals). gabapentin 400 mg capsule Commonly known as:  NEURONTIN Take 400 mg by mouth three (3) times daily. isoniazid 300 mg tablet Commonly known as:  NYDRAZID Take 1 Tab by mouth daily. nystatin powder Commonly known as:  MYCOSTATIN Apply  to affected area two (2) times a day. ondansetron 4 mg disintegrating tablet Commonly known as:  ZOFRAN ODT Take 1 Tab by mouth every eight (8) hours as needed. oxyCODONE-acetaminophen  mg per tablet Commonly known as:  PERCOCET 10 Take 1 Tab by mouth every four (4) hours as needed. Max Daily Amount: 6 Tabs. polyethylene glycol 17 gram packet Commonly known as:  Bozena Floor Take 1 Packet by mouth daily. pyridoxine (vitamin B6) 25 mg tablet Commonly known as:  VITAMIN B-6 Take 1 Tab by mouth daily. Indications: DRUG-INDUCED PYRIDOXINE DEFICIENCY  
  
 senna-docusate 8.6-50 mg per tablet Commonly known as:  Cammie Lock Take 2 Tabs by mouth daily. XARELTO 15 mg Tab tablet Generic drug:  rivaroxaban Take  by mouth daily. Please provide this summary of care documentation to your next provider. Your primary care clinician is listed as TESSA BARRIOS. If you have any questions after today's visit, please call [phone].

## 2017-10-18 RX ORDER — SODIUM CHLORIDE 0.9 % (FLUSH) 0.9 %
5-10 SYRINGE (ML) INJECTION EVERY 8 HOURS
Status: CANCELLED | OUTPATIENT
Start: 2017-10-18

## 2017-10-18 RX ORDER — SODIUM CHLORIDE 0.9 % (FLUSH) 0.9 %
5-10 SYRINGE (ML) INJECTION AS NEEDED
Status: CANCELLED | OUTPATIENT
Start: 2017-10-18

## 2017-10-19 ENCOUNTER — HOSPITAL ENCOUNTER (OUTPATIENT)
Dept: CARDIAC CATH/INVASIVE PROCEDURES | Age: 62
Discharge: HOME | End: 2017-10-19
Attending: SURGERY | Admitting: SURGERY
Payer: MEDICAID

## 2017-10-19 VITALS
WEIGHT: 200 LBS | BODY MASS INDEX: 34.15 KG/M2 | HEART RATE: 70 BPM | DIASTOLIC BLOOD PRESSURE: 93 MMHG | RESPIRATION RATE: 15 BRPM | OXYGEN SATURATION: 98 % | SYSTOLIC BLOOD PRESSURE: 154 MMHG | HEIGHT: 64 IN

## 2017-10-19 LAB
ANION GAP BLD CALC-SCNC: 17 MMOL/L (ref 10–20)
BUN BLD-MCNC: 16 MG/DL (ref 7–18)
CA-I BLD-MCNC: 1.34 MMOL/L (ref 1.12–1.32)
CHLORIDE BLD-SCNC: 101 MMOL/L (ref 100–108)
CO2 BLD-SCNC: 28 MMOL/L (ref 19–24)
CREAT UR-MCNC: 0.8 MG/DL (ref 0.6–1.3)
GLUCOSE BLD STRIP.AUTO-MCNC: 137 MG/DL (ref 74–106)
HCT VFR BLD CALC: 40 % (ref 36–49)
HGB BLD-MCNC: 13.6 G/DL (ref 12–16)
POTASSIUM BLD-SCNC: 4.3 MMOL/L (ref 3.5–5.5)
SODIUM BLD-SCNC: 141 MMOL/L (ref 136–145)

## 2017-10-19 PROCEDURE — 74011250637 HC RX REV CODE- 250/637: Performed by: SURGERY

## 2017-10-19 PROCEDURE — 37252 INTRVASC US NONCORONARY 1ST: CPT

## 2017-10-19 PROCEDURE — 80047 BASIC METABLC PNL IONIZED CA: CPT

## 2017-10-19 PROCEDURE — 74011250636 HC RX REV CODE- 250/636: Performed by: SURGERY

## 2017-10-19 PROCEDURE — 37253 INTRVASC US NONCORONARY ADDL: CPT

## 2017-10-19 PROCEDURE — C1894 INTRO/SHEATH, NON-LASER: HCPCS

## 2017-10-19 PROCEDURE — 74011636320 HC RX REV CODE- 636/320: Performed by: SURGERY

## 2017-10-19 PROCEDURE — 76937 US GUIDE VASCULAR ACCESS: CPT

## 2017-10-19 PROCEDURE — 99152 MOD SED SAME PHYS/QHP 5/>YRS: CPT

## 2017-10-19 PROCEDURE — 75822 VEIN X-RAY ARMS/LEGS: CPT

## 2017-10-19 PROCEDURE — 74011000250 HC RX REV CODE- 250: Performed by: SURGERY

## 2017-10-19 PROCEDURE — C1753 CATH, INTRAVAS ULTRASOUND: HCPCS

## 2017-10-19 RX ORDER — ACETAMINOPHEN 325 MG/1
650 TABLET ORAL
Status: DISCONTINUED | OUTPATIENT
Start: 2017-10-19 | End: 2017-10-19 | Stop reason: HOSPADM

## 2017-10-19 RX ORDER — HEPARIN SODIUM 200 [USP'U]/100ML
1000 INJECTION, SOLUTION INTRAVENOUS ONCE
Status: COMPLETED | OUTPATIENT
Start: 2017-10-19 | End: 2017-10-19

## 2017-10-19 RX ORDER — LIDOCAINE HYDROCHLORIDE 10 MG/ML
1-30 INJECTION, SOLUTION EPIDURAL; INFILTRATION; INTRACAUDAL; PERINEURAL
Status: DISCONTINUED | OUTPATIENT
Start: 2017-10-19 | End: 2017-10-19 | Stop reason: HOSPADM

## 2017-10-19 RX ORDER — MIDAZOLAM HYDROCHLORIDE 1 MG/ML
1-4 INJECTION, SOLUTION INTRAMUSCULAR; INTRAVENOUS
Status: DISCONTINUED | OUTPATIENT
Start: 2017-10-19 | End: 2017-10-19 | Stop reason: HOSPADM

## 2017-10-19 RX ORDER — HEPARIN SODIUM 1000 [USP'U]/ML
1000-10000 INJECTION, SOLUTION INTRAVENOUS; SUBCUTANEOUS
Status: DISCONTINUED | OUTPATIENT
Start: 2017-10-19 | End: 2017-10-19 | Stop reason: HOSPADM

## 2017-10-19 RX ORDER — MORPHINE SULFATE 10 MG/ML
1 INJECTION, SOLUTION INTRAMUSCULAR; INTRAVENOUS
Status: DISCONTINUED | OUTPATIENT
Start: 2017-10-19 | End: 2017-10-19 | Stop reason: HOSPADM

## 2017-10-19 RX ORDER — FENTANYL CITRATE 50 UG/ML
25-100 INJECTION, SOLUTION INTRAMUSCULAR; INTRAVENOUS
Status: DISCONTINUED | OUTPATIENT
Start: 2017-10-19 | End: 2017-10-19 | Stop reason: HOSPADM

## 2017-10-19 RX ORDER — OXYCODONE AND ACETAMINOPHEN 5; 325 MG/1; MG/1
1 TABLET ORAL
Status: DISCONTINUED | OUTPATIENT
Start: 2017-10-19 | End: 2017-10-19 | Stop reason: HOSPADM

## 2017-10-19 RX ORDER — ONDANSETRON 2 MG/ML
4 INJECTION INTRAMUSCULAR; INTRAVENOUS
Status: DISCONTINUED | OUTPATIENT
Start: 2017-10-19 | End: 2017-10-19 | Stop reason: HOSPADM

## 2017-10-19 RX ORDER — DIPHENHYDRAMINE HYDROCHLORIDE 50 MG/ML
12.5 INJECTION, SOLUTION INTRAMUSCULAR; INTRAVENOUS
Status: DISCONTINUED | OUTPATIENT
Start: 2017-10-19 | End: 2017-10-19 | Stop reason: HOSPADM

## 2017-10-19 RX ADMIN — HEPARIN SODIUM 1000 UNITS: 200 INJECTION, SOLUTION INTRAVENOUS at 11:09

## 2017-10-19 RX ADMIN — MIDAZOLAM HYDROCHLORIDE 1 MG: 1 INJECTION, SOLUTION INTRAMUSCULAR; INTRAVENOUS at 10:53

## 2017-10-19 RX ADMIN — FENTANYL CITRATE 50 MCG: 50 INJECTION INTRAMUSCULAR; INTRAVENOUS at 10:54

## 2017-10-19 RX ADMIN — MORPHINE SULFATE 1 MG: 10 INJECTION INTRAMUSCULAR; INTRAVENOUS; SUBCUTANEOUS at 11:26

## 2017-10-19 RX ADMIN — LIDOCAINE HYDROCHLORIDE 9 ML: 10 INJECTION, SOLUTION EPIDURAL; INFILTRATION; INTRACAUDAL; PERINEURAL at 10:56

## 2017-10-19 RX ADMIN — OXYCODONE HYDROCHLORIDE AND ACETAMINOPHEN 1 TABLET: 5; 325 TABLET ORAL at 12:49

## 2017-10-19 RX ADMIN — IOPAMIDOL 6 ML: 612 INJECTION, SOLUTION INTRAVENOUS at 11:09

## 2017-10-19 RX ADMIN — LIDOCAINE HYDROCHLORIDE 8 ML: 10 INJECTION, SOLUTION EPIDURAL; INFILTRATION; INTRACAUDAL; PERINEURAL at 10:55

## 2017-10-19 NOTE — H&P (VIEW-ONLY)
Bonnie Vicente    Chief Complaint   Patient presents with    Swelling       History and Physical    Bonnie Vicente is a 59 y.o. female with history of DVT and IVC filter placement. She was admitted to [facility] back in April of 2016 after a fall resulting in spinal fracture. She was down for approximately 36 hours prior to EMS being called and admitted to the hospital. She developed a pulmonary embolism and an IVC filter was placed prior to her spine surgery. 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 She remains on chronic anticoagulation and has been switched from Coumadin to Xarelto. Her IVC filter has since been removed. She remains resident at local nursing facility. She states that she has increased her mobility and states that she is walking quite a bit recently. She does state that the walking can be quite difficult at times however as her leg swelling is worsening. She states that the legs feel very heavy and tired at times. She also complains of pain mainly on the right leg as well. She denies any ulcers or skin changes. She feels that her leg swelling is worsening and she states that her swelling extends all the way up to the tops of her thighs. She does elevate her legs as often as able but states that wearing her compression stockings is becoming increasingly difficult due to the increased swelling. She states that the stockings are incredibly painful and she has to take them off. She denies any fevers or chills. Unfortunately due to the fact that she is resident at a nursing facility she is not a candidate for lymphedema pump.       Past Medical History:   Diagnosis Date    Arthritis      Past Surgical History:   Procedure Laterality Date    NEUROLOGICAL PROCEDURE UNLISTED  04/27/2016    T2-L2 Decomprssion and Fusion/T12 Corpetomy     Patient Active Problem List   Diagnosis Code    Thoracic vertebral fracture (HCC) S22.009A    Abnormal CT of the abdomen R93.5    S/P thoracic spinal fusion Z98.1    Osteoporosis M81.0     Current Outpatient Prescriptions   Medication Sig Dispense Refill    rivaroxaban (XARELTO) 15 mg tab tablet Take  by mouth daily.  gabapentin (NEURONTIN) 400 mg capsule Take 400 mg by mouth three (3) times daily.  atorvastatin (LIPITOR) 40 mg tablet Take 1 Tab by mouth nightly. 30 Tab 1    bisacodyl (DULCOLAX) 10 mg suppository Insert 10 mg into rectum as needed. 30 Suppository 1    ferrous sulfate 325 mg (65 mg iron) tablet Take 1 Tab by mouth two (2) times daily (with meals). 60 Tab 1    isoniazid (NYDRAZID) 300 mg tablet Take 1 Tab by mouth daily. 30 Tab 3    nystatin (MYCOSTATIN) powder Apply  to affected area two (2) times a day. 1 Bottle 3    ondansetron (ZOFRAN ODT) 4 mg disintegrating tablet Take 1 Tab by mouth every eight (8) hours as needed. 40 Tab 0    polyethylene glycol (MIRALAX) 17 gram packet Take 1 Packet by mouth daily. 30 Packet 1    pyridoxine, vitamin B6, (VITAMIN B-6) 25 mg tablet Take 1 Tab by mouth daily. Indications: DRUG-INDUCED PYRIDOXINE DEFICIENCY 30 Tab 3    senna-docusate (PERICOLACE) 8.6-50 mg per tablet Take 2 Tabs by mouth daily. 60 Tab 1    oxyCODONE-acetaminophen (PERCOCET 10)  mg per tablet Take 1 Tab by mouth every four (4) hours as needed. Max Daily Amount: 6 Tabs. 40 Tab 0     Allergies   Allergen Reactions    Iodinated Contrast- Oral And Iv Dye Other (comments)     Patient states will pass out. Physical Exam:    Visit Vitals    /62 (BP 1 Location: Left arm, BP Patient Position: Sitting)    Pulse 60    Resp 16    Ht 5' 4\" (1.626 m)    Wt 197 lb (89.4 kg)    BMI 33.81 kg/m2      General: Well-appearing female in no acute distress . Patient is a wheelchair  HEENT: EOMI, no scleral icterus is noted.    Pulmonary: No increased work or breathing is noted. Abdomen: obese, nondistended. Extremities: Warm and well perfused bilaterally. Pt has +BLE edema which does extend up above the knees. No ulcers. Neuro: Cranial nerves II through XII are grossly intact   Integument: No ulcerations are identified visibly      Impression and Plan:  Ara Mcdonald is a 59 y.o. female with history of DVT and IVC filter occlusion as above. Her filter has been removed. Remains on lifelong anticoagulation is currently on Xarelto. Unfortunately she states that her leg swelling has worsened despite better compliance with leg elevation and increasing her activity level. She states that she tries to walk as much as she possibly can but that this is becoming quite difficult due to the swelling. Patient was discussed with Dr. Serena Lawrence. I discussed that we could perform venogram with possible stenting. Discussed that this would not be recommended unless she is continuing to increase her activity and walking. She states that she is doing this and is trying her hardest to increase her walking but she is struggling and needs help with her edema. She will continue good compliance with her compression therapy as tolerates and leg elevation. She was scheduled for venogram will follow-up afterwards as scheduled. Risks versus benefits of procedure were discussed and all questions answered to the best of my ability. Patient is understanding that she will likely have a lifetime of some degree of swelling in the legs and that she will need a life time of compression and leg elevation. Hyun Mcguire  [phone]        PLEASE NOTE:  This document has been produced using voice recognition software. Unrecognized errors in transcription may be present.

## 2017-10-19 NOTE — IP AVS SNAPSHOT
[address] Patient: Julius Aly MRN: [id number] :1955 You are allergic to the following Allergen Reactions Iodinated Contrast- Oral And Iv Dye Other (comments) Patient states will pass out. Recent Documentation Height Weight Breastfeeding? BMI Smoking Status 1.626 m 90.7 kg No 34.33 kg/m2 Former Smoker Emergency Contacts Name Discharge Info Relation Home Work Mobile Elena Kate  Other Relative [6] [phone] Jackie Gomes  Sister [23] [phone] About your hospitalization You were admitted on:  2017 You last received care in the:  [facility] CATH HOLDING You were discharged on:  2017 Unit phone number:  [phone] Why you were hospitalized Your primary diagnosis was:  Not on File Providers Seen During Your Hospitalizations Provider Role Specialty Primary office phone Cheryle Aguillon MD Attending Provider Vascular Surgery [phone] Your Primary Care Physician (PCP) Primary Care Physician Office Phone Office Fax [place] Hamman [phone] Follow-up Information Follow up With Details Comments Contact Info Hillary Tian MD   [id number] [place] [address] Internal Medicine [facility] [phone] 
[phone] Your Appointments 2017  1:15 PM EDT HOSPITAL DISCHARGE with [address] [place], [place] Piyush Velas[facility] ([facility]) [address] [place] [address] [phone] [facility]  
[phone] Current Discharge Medication List  
  
CONTINUE these medications which have NOT CHANGED Dose & Instructions Dispensing Information Comments Morning Noon Evening Bedtime  
 atorvastatin 40 mg tablet Commonly known as:  LIPITOR Your last dose was: Your next dose is:    
   
   
 Dose:  40 mg Take 1 Tab by mouth nightly. Quantity:  30 Tab Refills:  1  
     
   
   
   
  
 bisacodyl 10 mg suppository Commonly known as:  DULCOLAX Your last dose was: Your next dose is:    
   
   
 Dose:  10 mg Insert 10 mg into rectum as needed. Quantity:  30 Suppository Refills:  1  
     
   
   
   
  
 ferrous sulfate 325 mg (65 mg iron) tablet Your last dose was: Your next dose is:    
   
   
 Dose:  325 mg Take 1 Tab by mouth two (2) times daily (with meals). Quantity:  60 Tab Refills:  1  
     
   
   
   
  
 gabapentin 400 mg capsule Commonly known as:  NEURONTIN Your last dose was: Your next dose is:    
   
   
 Dose:  400 mg Take 400 mg by mouth three (3) times daily. Refills:  0  
     
   
   
   
  
 isoniazid 300 mg tablet Commonly known as:  NYDRAZID Your last dose was: Your next dose is:    
   
   
 Dose:  300 mg Take 1 Tab by mouth daily. Quantity:  30 Tab Refills:  3  
     
   
   
   
  
 nystatin powder Commonly known as:  MYCOSTATIN Your last dose was: Your next dose is:    
   
   
 Apply  to affected area two (2) times a day. Quantity:  1 Bottle Refills:  3  
     
   
   
   
  
 ondansetron 4 mg disintegrating tablet Commonly known as:  ZOFRAN ODT Your last dose was: Your next dose is:    
   
   
 Dose:  4 mg Take 1 Tab by mouth every eight (8) hours as needed. Quantity:  40 Tab Refills:  0  
     
   
   
   
  
 oxyCODONE-acetaminophen  mg per tablet Commonly known as:  PERCOCET 10 Your last dose was: Your next dose is:    
   
   
 Dose:  1 Tab Take 1 Tab by mouth every four (4) hours as needed. Max Daily Amount: 6 Tabs. Quantity:  40 Tab Refills:  0  
     
   
   
   
  
 polyethylene glycol 17 gram packet Commonly known as:  Gina Avila Your last dose was: Your next dose is:    
   
   
 Dose:  17 g Take 1 Packet by mouth daily. Quantity:  30 Packet Refills:  1  
     
   
   
   
  
 pyridoxine (vitamin B6) 25 mg tablet Commonly known as:  VITAMIN B-6 Your last dose was: Your next dose is:    
   
   
 Dose:  25 mg Take 1 Tab by mouth daily. Indications: DRUG-INDUCED PYRIDOXINE DEFICIENCY Quantity:  30 Tab Refills:  3  
     
   
   
   
  
 senna-docusate 8.6-50 mg per tablet Commonly known as:  Patience Distad Your last dose was: Your next dose is:    
   
   
 Dose:  2 Tab Take 2 Tabs by mouth daily. Quantity:  60 Tab Refills:  1 XARELTO 15 mg Tab tablet Generic drug:  rivaroxaban Your last dose was: Your next dose is: Take  by mouth daily. Refills:  0 ZyrTEC 10 mg Cap Generic drug:  Cetirizine Your last dose was: Your next dose is: Take  by mouth. Refills:  0 Discharge Instructions [address] Venogram Discharge Instructions General Information: A venogram is a procedure that allows the interventional radiologist to take pictures of the blood flow in the vascular system. A radiology contrast (or dye) is injected into the veins so that the condition of the veins and valves may be visualized. This procedure can be used to diagnose narrowing of the veins or the presence of a blood clot in the deep veins of the body. During this process, a stent, filter or a special intravenous line could be placed. Home Care Instructions: You can resume your regular diet. Do not shower, bathe, swim, drink alcohol, or make any important legal decisions in the next 48 hours. Do not lift anything heavier than a gallon of milk for 5 days.  If you take Glucophage (metformin) for diabetes, do not take it for the next 48 hours. If you were asked to hold any blood thinners prior to the procedure, you may restart that medicine the day after the procedure is completed. Recline your car seat for the ride home. Call If: 
 You should call your Physician and/or the Radiology Nurse if you have any signs of infection; fever, drainage, redness, and/or swelling. If the puncture site should ooze, please call. Also call if you have any pain, decreased sensation, numbness, tingling, swelling, or change in color to the affected extremity. SEEK IMMEDIATE MEDICAL CARE IF YOUR PUNCTURE SITE STARTS TO BLEED. APPLY ENOUGH FIRM PRESSURE TO THE SITE WITH THE TIPS OF YOUR FINGERS TO STOP THE BLEEDING. Follow-Up Instructions:  Please see your ordering doctor as he/she has requested. DISCHARGE SUMMARY from Nurse The following personal items are in your possession at time of discharge: 
 
  
Visual Aid: At bedside PATIENT INSTRUCTIONS: 
 
 
F-face looks uneven A-arms unable to move or move unevenly S-speech slurred or non-existent T-time-call 911 as soon as signs and symptoms begin-DO NOT go Back to bed or wait to see if you get better-TIME IS BRAIN. Warning Signs of HEART ATTACK Call 911 if you have these symptoms: 
? Chest discomfort. Most heart attacks involve discomfort in the center of the chest that lasts more than a few minutes, or that goes away and comes back. It can feel like uncomfortable pressure, squeezing, fullness, or pain. ? Discomfort in other areas of the upper body. Symptoms can include pain or discomfort in one or both arms, the back, neck, jaw, or stomach. ? Shortness of breath with or without chest discomfort. ? Other signs may include breaking out in a cold sweat, nausea, or lightheadedness. Don't wait more than five minutes to call [address]! Fast action can save your life. Calling 911 is almost always the fastest way to get lifesaving treatment. Emergency Medical Services staff can begin treatment when they arrive  up to an hour sooner than if someone gets to the hospital by car. The discharge information has been reviewed with the patient and caregiver. The patient and caregiver verbalized understanding. Discharge medications reviewed with the patient and caregiver and appropriate educational materials and side effects teaching were provided. Patient armband removed and shredded To Reach Us:  
 
Patient Signature: 
Date: 10/19/2017 Discharging Nurse: Sofie Carrion RN Discharge Orders None General Information Please provide this summary of care documentation to your next provider. Patient Signature:  ____________________________________________________________ Date:  ____________________________________________________________  
  
Dori Garcia Provider Signature:  ____________________________________________________________ Date:  ____________________________________________________________

## 2017-10-19 NOTE — DISCHARGE INSTRUCTIONS
[facility] Venogram Discharge Instructions    General Information:   A venogram is a procedure that allows the interventional radiologist to take pictures of the blood flow in the vascular system. A radiology contrast (or dye) is injected into the veins so that the condition of the veins and valves may be visualized. This procedure can be used to diagnose narrowing of the veins or the presence of a blood clot in the deep veins of the body. During this process, a stent, filter or a special intravenous line could be placed. Home Care Instructions: You can resume your regular diet. Do not shower, bathe, swim, drink alcohol, or make any important legal decisions in the next 48 hours. Do not lift anything heavier than a gallon of milk for 5 days. If you take Glucophage (metformin) for diabetes, do not take it for the next 48 hours. If you were asked to hold any blood thinners prior to the procedure, you may restart that medicine the day after the procedure is completed. Recline your car seat for the ride home. Call If:   You should call your Physician and/or the Radiology Nurse if you have any signs of infection; fever, drainage, redness, and/or swelling. If the puncture site should ooze, please call. Also call if you have any pain, decreased sensation, numbness, tingling, swelling, or change in color to the affected extremity. SEEK IMMEDIATE MEDICAL CARE IF YOUR PUNCTURE SITE STARTS TO BLEED. APPLY ENOUGH FIRM PRESSURE TO THE SITE WITH THE TIPS OF YOUR FINGERS TO STOP THE BLEEDING. Follow-Up Instructions:  Please see your ordering doctor as he/she has requested. DISCHARGE SUMMARY from Nurse    The following personal items are in your possession at time of discharge:       Visual Aid:  At bedside                            PATIENT INSTRUCTIONS:    After general anesthesia or intravenous sedation, for 24 hours or while taking prescription Narcotics:  · Limit your activities  · Do not drive and operate hazardous machinery  · Do not make important personal or business decisions  · Do  not drink alcoholic beverages  · If you have not urinated within 8 hours after discharge, please contact your surgeon on call. Report the following to your surgeon:  · Excessive pain, swelling, redness or odor of or around the surgical area  · Temperature over 100.5  · Nausea and vomiting lasting longer than 4 hours or if unable to take medications  · Any signs of decreased circulation or nerve impairment to extremity: change in color, persistent  numbness, tingling, coldness or increase pain  · Any questions        What to do at Home:  Recommended activity: Activity as tolerated,       *  Please give a list of your current medications to your Primary Care Provider. *  Please update this list whenever your medications are discontinued, doses are      changed, or new medications (including over-the-counter products) are added. *  Please carry medication information at all times in case of emergency situations. These are general instructions for a healthy lifestyle:    No smoking/ No tobacco products/ Avoid exposure to second hand smoke    Surgeon General's Warning:  Quitting smoking now greatly reduces serious risk to your health. Obesity, smoking, and sedentary lifestyle greatly increases your risk for illness    A healthy diet, regular physical exercise & weight monitoring are important for maintaining a healthy lifestyle    You may be retaining fluid if you have a history of heart failure or if you experience any of the following symptoms:  Weight gain of 3 pounds or more overnight or 5 pounds in a week, increased swelling in our hands or feet or shortness of breath while lying flat in bed. Please call your doctor as soon as you notice any of these symptoms; do not wait until your next office visit.     Recognize signs and symptoms of STROKE:    F-face looks uneven    A-arms unable to move or move unevenly    S-speech slurred or non-existent    T-time-call 911 as soon as signs and symptoms begin-DO NOT go       Back to bed or wait to see if you get better-TIME IS BRAIN. Warning Signs of HEART ATTACK     Call 911 if you have these symptoms:   Chest discomfort. Most heart attacks involve discomfort in the center of the chest that lasts more than a few minutes, or that goes away and comes back. It can feel like uncomfortable pressure, squeezing, fullness, or pain.  Discomfort in other areas of the upper body. Symptoms can include pain or discomfort in one or both arms, the back, neck, jaw, or stomach.  Shortness of breath with or without chest discomfort.  Other signs may include breaking out in a cold sweat, nausea, or lightheadedness. Don't wait more than five minutes to call 911 - MINUTES MATTER! Fast action can save your life. Calling 911 is almost always the fastest way to get lifesaving treatment. Emergency Medical Services staff can begin treatment when they arrive -- up to an hour sooner than if someone gets to the hospital by car. The discharge information has been reviewed with the patient and caregiver. The patient and caregiver verbalized understanding. Discharge medications reviewed with the patient and caregiver and appropriate educational materials and side effects teaching were provided.   Patient armband removed and shredded            To Reach Us:     Patient Signature:  Date: 10/19/2017  Discharging Nurse: Perico Dc RN

## 2017-10-19 NOTE — OP NOTES
[address]    Name:  Judith Baron  MR#:  [id number]  :  1955  Account #:  [de-identified]  Date of Adm:  10/19/2017  Date of Surgery:  10/19/2017      ATTENDING: Charlotte Batres MD    PREOPERATIVE DIAGNOSIS:  Class 3 chronic venous insufficiency of  bilateral lower extremities. POSTOPERATIVE DIAGNOSIS:  Class 3 chronic venous insufficiency  of bilateral lower extremities. PROCEDURES PERFORMED:  1. Moderate conscious sedation of 10 minutes. 2. Ultrasound-guided access, bilateral common femoral veins. 3. Bilateral lower extremity central venogram.  4. Intravascular ultrasound of the inferior vena cava, bilateral common  iliac veins and bilateral external iliac veins. CULTURES: None. SPECIMENS REMOVED: None. DRAINS: None. ESTIMATED BLOOD LOSS:  Less than 50 mL. ANESTHESIA:  Moderate conscious sedation of 10 minutes. This was  provided by an independent provider giving the medications per my  discretion and monitoring the vital signs throughout the case. OPERATIVE FINDINGS: Bilateral lower extremity venogram shows  appropriate sized external and common iliac veins bilaterally and  inferior vena cava. No collaterals are identified. IVUS imaging shows  mild narrowing of the left common iliac vein of approximately 30% and  an inferior vena cava midportion of the cava does show a 30%  narrowing, but otherwise is completely patent. PROCEDURE: The patient was correctly identified in the precath area  and taken to the cath lab in stable condition. The patient had a  preincision time-out prior to any incision. The patient was prepped and  draped in normal sterile fashion according the CDC guidelines for  aseptic technique. Ultrasound was used to visualize the bilateral  common femoral veins. A picture was taken and kept with the patient's  chart.  We numbed her up appropriate using 1% lidocaine, took a single  wall entry needle to gain access into the common femoral vein. Once  the needle tip was identified within the vein and there was positive  blood return, a wire was then placed. A small skin incision was created  using an 11 blade. An 8-[place]izen [place] sheath was then placed. Once this  was done on both sides, a venogram was performed on both sides. We  then took a Bentson wire and IVUS imaging and performed IVUS  imaging bilaterally. Once this was done, we identified the fact that there  was no greater than 50% narrowing within her venous system and it  was decided not to perform any stenting or balloon venoplasty at this  time. We then were able to remove all of our access for the sheaths to  be pulled in holding.         MD Tessy Logan / Fidelina Greenberg  D:  10/19/2017   11:18  T:  10/19/2017   14:10  Job #:  [id number]

## 2017-10-19 NOTE — INTERVAL H&P NOTE
H&P Update:  Zahraa Deras was seen and examined. History and physical has been reviewed. The patient has been examined.  There have been no significant clinical changes since the completion of the originally dated History and Physical.    Signed By: Rodriguez Whitfield MD     October 19, 2017 9:38 AM

## 2017-10-19 NOTE — PROGRESS NOTES
Cath holding summary    Patient escorted to cath holding from waiting area ambulatory, alert and oriented x 4, voicing no complaints. Changed into gown and placed on monitor. NPO since MN. Lab results, med rec and H&P reviewed on chart. PIV x 1 inserted without difficulty. Family to bedside. 1110 patient arrived to cath holding awake and alert, vital signs stable, 8F sheath in the LFV and the RFV, clean dry and intact. [place] NOTE:    Patient informed of procedure and questions answered with review. 8F in RFV and 8F in LFV pulled at 1120 by Miya Romero hold and good hemostasis, with manual compression to site. Handhold for 15 minutes each. Gauze and transparent dressing applied to site. No bleeding, no hematoma, no pain/discomfort at site. Groin instructions provided with review. Patient verbalized understanding. 1400 patient discharged home with family, vital signs stable, right and left groin sites clean dry and intact.

## 2017-10-19 NOTE — ROUTINE PROCESS
TRANSFER - OUT REPORT:    Verbal report given to TATYANA COTA. (name) on Katrinka Ahr  being transferred to [facility] [place] progression of care       Report consisted of patients Situation, Background, Assessment and   Recommendations(SBAR). Information from the following report(s) SBAR, Procedure Summary and MAR was reviewed with the receiving nurse. Lines:   Peripheral IV 10/19/17 Left Arm (Active)        Opportunity for questions and clarification was provided.       Patient transported with:   [employer]

## 2017-11-01 ENCOUNTER — OFFICE VISIT (OUTPATIENT)
Dept: VASCULAR SURGERY | Age: 62
End: 2017-11-01

## 2017-11-01 VITALS
DIASTOLIC BLOOD PRESSURE: 74 MMHG | SYSTOLIC BLOOD PRESSURE: 122 MMHG | BODY MASS INDEX: 34.15 KG/M2 | WEIGHT: 200 LBS | HEART RATE: 84 BPM | HEIGHT: 64 IN

## 2017-11-01 DIAGNOSIS — I87.2 CHRONIC VENOUS INSUFFICIENCY: Primary | ICD-10-CM

## 2017-11-01 DIAGNOSIS — I82.523 CHRONIC DEEP VEIN THROMBOSIS (DVT) OF ILIAC VEIN OF BOTH LOWER EXTREMITIES (HCC): ICD-10-CM

## 2017-11-01 DIAGNOSIS — I82.220 INFERIOR VENA CAVA OCCLUSION (HCC): ICD-10-CM

## 2017-11-01 DIAGNOSIS — Z98.1 S/P SPINAL FUSION: ICD-10-CM

## 2017-11-01 NOTE — MR AVS SNAPSHOT
Visit Information Date & Time Provider Department Dept. Phone Encounter #  
 11/1/2017  1:15 PM Alvin Foster, [address] [facility] [phone] [id number] Your Appointments 11/1/2017  1:15 PM  
HOSPITAL DISCHARGE with Alvin Foster, [address] [facility] ([address]) Appt Note: DC Venogram  
 [address] [facility] [address] [facility] [address]  
[phone] [address] [facility] [address] [facility] [address] Upcoming Health Maintenance Date Due Hepatitis C Screening 1955 DTaP/Tdap/Td series (1 - Tdap) 12/16/1974 PAP AKA CERVICAL CYTOLOGY 12/16/1974 FOBT Q 1 YEAR AGE 50-75 12/16/2003 ZOSTER VACCINE AGE 60> 10/16/2013 BREAST CANCER SCRN MAMMOGRAM 6/11/2015 INFLUENZA AGE 9 TO ADULT 8/1/2017 Allergies as of 11/1/2017  Review Complete On: 11/1/2017 By: Tracie Erickson LPN Severity Noted Reaction Type Reactions Iodinated Contrast- Oral And Iv Dye  02/02/2017    Other (comments) Patient states will pass out. Current Immunizations  Reviewed on 5/29/2016 No immunizations on file. Not reviewed this visit Vitals BP Pulse Height(growth percentile) Weight(growth percentile) BMI Smoking Status 122/74 (BP 1 Location: Left arm, BP Patient Position: Sitting) 84 5' 4\" (1.626 m) 200 lb (90.7 kg) 34.33 kg/m2 Former Smoker BMI and BSA Data Body Mass Index Body Surface Area  
 34.33 kg/m 2 2.02 m 2 Your Updated Medication List  
  
   
This list is accurate as of: 11/1/17 11:30 AM.  Always use your most recent med list.  
  
  
  
  
 atorvastatin 40 mg tablet Commonly known as:  LIPITOR Take 1 Tab by mouth nightly. bisacodyl 10 mg suppository Commonly known as:  DULCOLAX Insert 10 mg into rectum as needed. ferrous sulfate 325 mg (65 mg iron) tablet Take 1 Tab by mouth two (2) times daily (with meals). gabapentin 400 mg capsule Commonly known as:  NEURONTIN Take 400 mg by mouth three (3) times daily. isoniazid 300 mg tablet Commonly known as:  NYDRAZID Take 1 Tab by mouth daily. nystatin powder Commonly known as:  MYCOSTATIN Apply  to affected area two (2) times a day. ondansetron 4 mg disintegrating tablet Commonly known as:  ZOFRAN ODT Take 1 Tab by mouth every eight (8) hours as needed. oxyCODONE-acetaminophen  mg per tablet Commonly known as:  PERCOCET 10 Take 1 Tab by mouth every four (4) hours as needed. Max Daily Amount: 6 Tabs. polyethylene glycol 17 gram packet Commonly known as:  Charity Maykel Take 1 Packet by mouth daily. pyridoxine (vitamin B6) 25 mg tablet Commonly known as:  VITAMIN B-6 Take 1 Tab by mouth daily. Indications: DRUG-INDUCED PYRIDOXINE DEFICIENCY  
  
 senna-docusate 8.6-50 mg per tablet Commonly known as:  Reji Owen Take 2 Tabs by mouth daily. XARELTO 15 mg Tab tablet Generic drug:  rivaroxaban Take  by mouth daily. ZyrTEC 10 mg Cap Generic drug:  Cetirizine Take  by mouth. Please provide this summary of care documentation to your next provider. Your primary care clinician is listed as TESSA BARRIOS. If you have any questions after today's visit, please call [phone].

## 2017-11-01 NOTE — PROGRESS NOTES
Chelsi Cortez    Chief Complaint   Patient presents with    Swelling       History and Physical    Chelsi Cortez is a 59 y.o. female with history of DVT and IVC filter placement. She was admitted to [facility] back in April of 2016 after a fall resulting in spinal fracture. She was down for approximately 36 hours prior to EMS being called and admitted to the hospital. She developed a pulmonary embolism and an IVC filter was placed prior to her spine surgery. 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 She is on lifelong anticoagulation with Xarelto. Her IVC filter has been removed. She remains resident at local nursing facility. She continues to increase her mobility. She does state that walking can be quite difficult at times due to the leg swelling. She states that the legs feel very heavy and tired. She states she is unable to tolerate compression stockings as they are too tight and cause worsening pain on top of her current chronic pain. She denies any ulcers or skin changes. She does elevate her legs as often as able. She denies any fevers or chills. Unfortunately due to the fact that she is resident at a nursing facility she is not a candidate for lymphedema pump. She presents today after venogram. No intervention was performed as there was only mild narrowing of the left common iliac vein of approximately 30% and an inferior vena cava midportion of the cava does show a 30% narrowing, but otherwise is completely patent.     Past Medical History:   Diagnosis Date    Arthritis      Past Surgical History:   Procedure Laterality Date    NEUROLOGICAL PROCEDURE UNLISTED  04/27/2016    T2-L2 Decomprssion and Fusion/T12 Corpetomy     Patient Active Problem List   Diagnosis Code    Thoracic vertebral fracture ([facility] [address] S22.009A    Abnormal CT of the abdomen R93.5    S/P thoracic spinal fusion Z98.1    Osteoporosis M81.0     Current Outpatient Prescriptions   Medication Sig Dispense Refill    Cetirizine (ZYRTEC) 10 mg cap Take  by mouth.  rivaroxaban (XARELTO) 15 mg tab tablet Take  by mouth daily.  gabapentin (NEURONTIN) 400 mg capsule Take 400 mg by mouth three (3) times daily.  atorvastatin (LIPITOR) 40 mg tablet Take 1 Tab by mouth nightly. 30 Tab 1    bisacodyl (DULCOLAX) 10 mg suppository Insert 10 mg into rectum as needed. 30 Suppository 1    ferrous sulfate 325 mg (65 mg iron) tablet Take 1 Tab by mouth two (2) times daily (with meals). 60 Tab 1    isoniazid (NYDRAZID) 300 mg tablet Take 1 Tab by mouth daily. 30 Tab 3    nystatin (MYCOSTATIN) powder Apply  to affected area two (2) times a day. 1 Bottle 3    ondansetron (ZOFRAN ODT) 4 mg disintegrating tablet Take 1 Tab by mouth every eight (8) hours as needed. 40 Tab 0    polyethylene glycol (MIRALAX) 17 gram packet Take 1 Packet by mouth daily. 30 Packet 1    pyridoxine, vitamin B6, (VITAMIN B-6) 25 mg tablet Take 1 Tab by mouth daily. Indications: DRUG-INDUCED PYRIDOXINE DEFICIENCY 30 Tab 3    senna-docusate (PERICOLACE) 8.6-50 mg per tablet Take 2 Tabs by mouth daily. 60 Tab 1    oxyCODONE-acetaminophen (PERCOCET 10)  mg per tablet Take 1 Tab by mouth every four (4) hours as needed. Max Daily Amount: 6 Tabs. 40 Tab 0     Allergies   Allergen Reactions    Iodinated Contrast- Oral And Iv Dye Other (comments)     Patient states will pass out. Physical Exam:    Visit Vitals    /74 (BP 1 Location: Left arm, BP Patient Position: Sitting)    Pulse 84    Ht 5' 4\" (1.626 m)    Wt 200 lb (90.7 kg)    BMI 34.33 kg/m2      General: Well-appearing female in no acute distress . Patient is a wheelchair  HEENT: EOMI, no scleral icterus is noted. Pulmonary: No increased work or breathing is noted. Abdomen: obese, nondistended.    Extremities: Warm and well perfused bilaterally. Pt has +BLE edema. No ulcers. Neuro: Cranial nerves II through XII are grossly intact   Integument: No ulcerations are identified visibly      Impression and Plan:  Ronen Johnston is a 59 y.o. female with history of DVT and IVC filter occlusion as above. Her filter has been removed. Remains on lifelong anticoagulation is currently on Xarelto. She presents today after venogram. Results of her venogram were discussed in the office today. There was no greater than 50% narrowing within her venous system and it was decided not to perform any stenting or balloon venoplasty. She did have venous reflux studies which showed significant deep system reflux likely secondary to her history of DVT. I did encourage her to continue to increase her activity level and mobilize as much as possible. Discussed that she can try ACE wraps for gentle compression as she is unable to tolerate compression stockings which can be done at her nursing facility. She will continue with leg elevation as well. Patient is understanding that she will likely have a lifetime of some degree of swelling in the legs and that she will need a life time of compression and leg elevation. Discussed that she can follow-up in our office as needed at this point. Discussed that we are here for her at any time but unfortunately we do not have much more to offer her at this point. She expresses understanding and agrees to plan. Amita Mcguire  [phone]        PLEASE NOTE:  This document has been produced using voice recognition software. Unrecognized errors in transcription may be present.

## 2017-12-06 ENCOUNTER — TELEPHONE (OUTPATIENT)
Dept: VASCULAR SURGERY | Age: 62
End: 2017-12-06

## 2017-12-06 NOTE — TELEPHONE ENCOUNTER
Addie from [facility] called, she states that the pt is complaining about calf pain, numbness and tingling in ankles and feet. Did you need to order test for her or just bring her into the office?

## 2017-12-07 NOTE — TELEPHONE ENCOUNTER
Reviewed chart with [facility] and have decided patient will need to be seen by PCP or possibly neurologist and will last visit note to Nursing home so this can be set up.

## 2018-02-20 ENCOUNTER — APPOINTMENT (OUTPATIENT)
Dept: GENERAL RADIOLOGY | Age: 63
End: 2018-02-20
Attending: EMERGENCY MEDICINE
Payer: MEDICAID

## 2018-02-20 ENCOUNTER — HOSPITAL ENCOUNTER (EMERGENCY)
Age: 63
Discharge: HOME | End: 2018-02-21
Attending: EMERGENCY MEDICINE
Payer: MEDICAID

## 2018-02-20 VITALS
HEART RATE: 85 BPM | SYSTOLIC BLOOD PRESSURE: 115 MMHG | TEMPERATURE: 98.8 F | DIASTOLIC BLOOD PRESSURE: 90 MMHG | OXYGEN SATURATION: 99 % | RESPIRATION RATE: 14 BRPM

## 2018-02-20 DIAGNOSIS — J11.1 INFLUENZA: ICD-10-CM

## 2018-02-20 DIAGNOSIS — E86.0 DEHYDRATION: ICD-10-CM

## 2018-02-20 DIAGNOSIS — B34.9 VIRAL SYNDROME: Primary | ICD-10-CM

## 2018-02-20 DIAGNOSIS — R11.2 NAUSEA AND VOMITING, INTRACTABILITY OF VOMITING NOT SPECIFIED, UNSPECIFIED VOMITING TYPE: ICD-10-CM

## 2018-02-20 DIAGNOSIS — J20.9 ACUTE BRONCHITIS, UNSPECIFIED ORGANISM: ICD-10-CM

## 2018-02-20 LAB
ALBUMIN SERPL-MCNC: 3.8 G/DL (ref 3.4–5)
ALBUMIN/GLOB SERPL: 0.9 {RATIO} (ref 0.8–1.7)
ALP SERPL-CCNC: 101 U/L (ref 45–117)
ALT SERPL-CCNC: 12 U/L (ref 13–56)
ANION GAP SERPL CALC-SCNC: 7 MMOL/L (ref 3–18)
APPEARANCE UR: CLEAR
APTT PPP: 43.4 SEC (ref 23–36.4)
AST SERPL-CCNC: 35 U/L (ref 15–37)
BACTERIA URNS QL MICRO: ABNORMAL /HPF
BASOPHILS # BLD: 0 K/UL (ref 0–0.06)
BASOPHILS NFR BLD: 1 % (ref 0–2)
BILIRUB SERPL-MCNC: 0.7 MG/DL (ref 0.2–1)
BILIRUB UR QL: ABNORMAL
BUN SERPL-MCNC: 16 MG/DL (ref 7–18)
BUN/CREAT SERPL: 17 (ref 12–20)
CALCIUM SERPL-MCNC: 9.2 MG/DL (ref 8.5–10.1)
CHLORIDE SERPL-SCNC: 97 MMOL/L (ref 100–108)
CO2 SERPL-SCNC: 31 MMOL/L (ref 21–32)
COLOR UR: YELLOW
CREAT SERPL-MCNC: 0.96 MG/DL (ref 0.6–1.3)
DIFFERENTIAL METHOD BLD: ABNORMAL
EOSINOPHIL # BLD: 0 K/UL (ref 0–0.4)
EOSINOPHIL NFR BLD: 0 % (ref 0–5)
EPITH CASTS URNS QL MICRO: ABNORMAL /LPF (ref 0–5)
ERYTHROCYTE [DISTWIDTH] IN BLOOD BY AUTOMATED COUNT: 14.4 % (ref 11.6–14.5)
GLOBULIN SER CALC-MCNC: 4.2 G/DL (ref 2–4)
GLUCOSE SERPL-MCNC: 96 MG/DL (ref 74–99)
GLUCOSE UR STRIP.AUTO-MCNC: NEGATIVE MG/DL
HCT VFR BLD AUTO: 37 % (ref 35–45)
HGB BLD-MCNC: 12.5 G/DL (ref 12–16)
HGB UR QL STRIP: ABNORMAL
INR PPP: 2.2 (ref 0.8–1.2)
KETONES UR QL STRIP.AUTO: >80 MG/DL
LEUKOCYTE ESTERASE UR QL STRIP.AUTO: NEGATIVE
LIPASE SERPL-CCNC: 86 U/L (ref 73–393)
LYMPHOCYTES # BLD: 0.9 K/UL (ref 0.9–3.6)
LYMPHOCYTES NFR BLD: 20 % (ref 21–52)
MAGNESIUM SERPL-MCNC: 2.5 MG/DL (ref 1.6–2.6)
MCH RBC QN AUTO: 28.2 PG (ref 24–34)
MCHC RBC AUTO-ENTMCNC: 33.8 G/DL (ref 31–37)
MCV RBC AUTO: 83.5 FL (ref 74–97)
MONOCYTES # BLD: 0.4 K/UL (ref 0.05–1.2)
MONOCYTES NFR BLD: 9 % (ref 3–10)
NEUTS SEG # BLD: 3.3 K/UL (ref 1.8–8)
NEUTS SEG NFR BLD: 70 % (ref 40–73)
NITRITE UR QL STRIP.AUTO: NEGATIVE
PH UR STRIP: 5.5 [PH] (ref 5–8)
PLATELET # BLD AUTO: 153 K/UL (ref 135–420)
PMV BLD AUTO: 10.7 FL (ref 9.2–11.8)
POTASSIUM SERPL-SCNC: 4.4 MMOL/L (ref 3.5–5.5)
PROT SERPL-MCNC: 8 G/DL (ref 6.4–8.2)
PROT UR STRIP-MCNC: 30 MG/DL
PROTHROMBIN TIME: 23.5 SEC (ref 11.5–15.2)
RBC # BLD AUTO: 4.43 M/UL (ref 4.2–5.3)
RBC #/AREA URNS HPF: ABNORMAL /HPF (ref 0–5)
SODIUM SERPL-SCNC: 135 MMOL/L (ref 136–145)
SP GR UR REFRACTOMETRY: 1.02 (ref 1–1.03)
TROPONIN I SERPL-MCNC: <0.02 NG/ML (ref 0–0.04)
UROBILINOGEN UR QL STRIP.AUTO: 0.2 EU/DL (ref 0.2–1)
WBC # BLD AUTO: 4.6 K/UL (ref 4.6–13.2)
WBC URNS QL MICRO: ABNORMAL /HPF (ref 0–4)

## 2018-02-20 PROCEDURE — 84484 ASSAY OF TROPONIN QUANT: CPT | Performed by: EMERGENCY MEDICINE

## 2018-02-20 PROCEDURE — 94640 AIRWAY INHALATION TREATMENT: CPT

## 2018-02-20 PROCEDURE — 81001 URINALYSIS AUTO W/SCOPE: CPT | Performed by: EMERGENCY MEDICINE

## 2018-02-20 PROCEDURE — 85730 THROMBOPLASTIN TIME PARTIAL: CPT | Performed by: EMERGENCY MEDICINE

## 2018-02-20 PROCEDURE — 99285 EMERGENCY DEPT VISIT HI MDM: CPT

## 2018-02-20 PROCEDURE — 85610 PROTHROMBIN TIME: CPT | Performed by: EMERGENCY MEDICINE

## 2018-02-20 PROCEDURE — 74011250637 HC RX REV CODE- 250/637: Performed by: EMERGENCY MEDICINE

## 2018-02-20 PROCEDURE — 71045 X-RAY EXAM CHEST 1 VIEW: CPT

## 2018-02-20 PROCEDURE — 80053 COMPREHEN METABOLIC PANEL: CPT | Performed by: EMERGENCY MEDICINE

## 2018-02-20 PROCEDURE — 96374 THER/PROPH/DIAG INJ IV PUSH: CPT

## 2018-02-20 PROCEDURE — 77030029684 HC NEB SM VOL KT MONA -A

## 2018-02-20 PROCEDURE — 96361 HYDRATE IV INFUSION ADD-ON: CPT

## 2018-02-20 PROCEDURE — 85025 COMPLETE CBC W/AUTO DIFF WBC: CPT | Performed by: EMERGENCY MEDICINE

## 2018-02-20 PROCEDURE — 74011000250 HC RX REV CODE- 250: Performed by: EMERGENCY MEDICINE

## 2018-02-20 PROCEDURE — 83735 ASSAY OF MAGNESIUM: CPT | Performed by: EMERGENCY MEDICINE

## 2018-02-20 PROCEDURE — 83690 ASSAY OF LIPASE: CPT | Performed by: EMERGENCY MEDICINE

## 2018-02-20 PROCEDURE — 93005 ELECTROCARDIOGRAM TRACING: CPT

## 2018-02-20 PROCEDURE — 74011250636 HC RX REV CODE- 250/636: Performed by: EMERGENCY MEDICINE

## 2018-02-20 RX ORDER — DIPHENOXYLATE HYDROCHLORIDE AND ATROPINE SULFATE 2.5; .025 MG/1; MG/1
1 TABLET ORAL
Qty: 20 TAB | Refills: 0 | Status: SHIPPED | OUTPATIENT
Start: 2018-02-20 | End: 2021-02-16 | Stop reason: CLARIF

## 2018-02-20 RX ORDER — SODIUM CHLORIDE 9 MG/ML
125 INJECTION, SOLUTION INTRAVENOUS CONTINUOUS
Status: DISCONTINUED | OUTPATIENT
Start: 2018-02-20 | End: 2018-02-21 | Stop reason: HOSPADM

## 2018-02-20 RX ORDER — ALBUTEROL SULFATE 0.83 MG/ML
2.5 SOLUTION RESPIRATORY (INHALATION)
Qty: 24 EACH | Refills: 0 | Status: SHIPPED | OUTPATIENT
Start: 2018-02-20

## 2018-02-20 RX ORDER — OSELTAMIVIR PHOSPHATE 75 MG/1
75 CAPSULE ORAL
Status: COMPLETED | OUTPATIENT
Start: 2018-02-20 | End: 2018-02-20

## 2018-02-20 RX ORDER — OSELTAMIVIR PHOSPHATE 75 MG/1
75 CAPSULE ORAL 2 TIMES DAILY
Qty: 10 CAP | Refills: 0 | Status: SHIPPED | OUTPATIENT
Start: 2018-02-20 | End: 2018-02-25

## 2018-02-20 RX ORDER — IPRATROPIUM BROMIDE AND ALBUTEROL SULFATE 2.5; .5 MG/3ML; MG/3ML
3 SOLUTION RESPIRATORY (INHALATION)
Status: COMPLETED | OUTPATIENT
Start: 2018-02-20 | End: 2018-02-20

## 2018-02-20 RX ORDER — ONDANSETRON 2 MG/ML
4 INJECTION INTRAMUSCULAR; INTRAVENOUS
Status: COMPLETED | OUTPATIENT
Start: 2018-02-20 | End: 2018-02-20

## 2018-02-20 RX ORDER — DIPHENOXYLATE HYDROCHLORIDE AND ATROPINE SULFATE 2.5; .025 MG/1; MG/1
1 TABLET ORAL
Status: DISCONTINUED | OUTPATIENT
Start: 2018-02-20 | End: 2018-02-21 | Stop reason: HOSPADM

## 2018-02-20 RX ADMIN — IPRATROPIUM BROMIDE AND ALBUTEROL SULFATE 3 ML: .5; 3 SOLUTION RESPIRATORY (INHALATION) at 23:11

## 2018-02-20 RX ADMIN — OSELTAMIVIR PHOSPHATE 75 MG: 75 CAPSULE ORAL at 23:52

## 2018-02-20 RX ADMIN — SODIUM CHLORIDE 125 ML/HR: 900 INJECTION, SOLUTION INTRAVENOUS at 23:11

## 2018-02-20 RX ADMIN — SODIUM CHLORIDE 500 ML: 900 INJECTION, SOLUTION INTRAVENOUS at 20:50

## 2018-02-20 RX ADMIN — ONDANSETRON 4 MG: 2 INJECTION INTRAMUSCULAR; INTRAVENOUS at 23:11

## 2018-02-20 RX ADMIN — SODIUM CHLORIDE 1000 ML: 900 INJECTION, SOLUTION INTRAVENOUS at 21:41

## 2018-02-21 LAB
ATRIAL RATE: 85 BPM
CALCULATED P AXIS, ECG09: 43 DEGREES
CALCULATED R AXIS, ECG10: -11 DEGREES
CALCULATED T AXIS, ECG11: -23 DEGREES
DIAGNOSIS, 93000: NORMAL
P-R INTERVAL, ECG05: 142 MS
Q-T INTERVAL, ECG07: 366 MS
QRS DURATION, ECG06: 88 MS
QTC CALCULATION (BEZET), ECG08: 435 MS
VENTRICULAR RATE, ECG03: 85 BPM

## 2018-02-21 NOTE — ED TRIAGE NOTES
Pt presents to ED via [facility] for diarrhea, fever and body aches x2 days. Pt had recent back surgery with Dr. Martin Reulas at since that time has been at [facility] for rehab. Sister at bedside. Pt A/Ox3 and states that she has dizzy today and increased incont with diarrhea.

## 2018-02-21 NOTE — DISCHARGE INSTRUCTIONS
Dehydration: Care Instructions  Your Care Instructions  Dehydration happens when your body loses too much fluid. This might happen when you do not drink enough water or you lose large amounts of fluids from your body because of diarrhea, vomiting, or sweating. Severe dehydration can be life-threatening. Water and minerals called electrolytes help put your body fluids back in balance. Learn the early signs of fluid loss, and drink more fluids to prevent dehydration. Follow-up care is a key part of your treatment and safety. Be sure to make and go to all appointments, and call your doctor if you are having problems. It's also a good idea to know your test results and keep a list of the medicines you take. How can you care for yourself at home? · To prevent dehydration, drink plenty of fluids, enough so that your urine is light yellow or clear like water. Choose water and other caffeine-free clear liquids until you feel better. If you have kidney, heart, or liver disease and have to limit fluids, talk with your doctor before you increase the amount of fluids you drink. · If you do not feel like eating or drinking, try taking small sips of water, sports drinks, or other rehydration drinks. · Get plenty of rest.  To prevent dehydration  · Add more fluids to your diet and daily routine, unless your doctor has told you not to. · During hot weather, drink more fluids. Drink even more fluids if you exercise a lot. Stay away from drinks with alcohol or caffeine. · Watch for the symptoms of dehydration. These include:  ¨ A dry, sticky mouth. ¨ Dark yellow urine, and not much of it. ¨ Dry and sunken eyes. ¨ Feeling very tired. · Learn what problems can lead to dehydration. These include:  ¨ Diarrhea, fever, and vomiting. ¨ Any illness with a fever, such as pneumonia or the flu. ¨ Activities that cause heavy sweating, such as endurance races and heavy outdoor work in hot or humid weather.   ¨ Alcohol or drug abuse or withdrawal.  ¨ Certain medicines, such as cold and allergy pills (antihistamines), diet pills (diuretics), and laxatives. ¨ Certain diseases, such as diabetes, cancer, and heart or kidney disease. When should you call for help? Call 911 anytime you think you may need emergency care. For example, call if:  ? · You passed out (lost consciousness). ?Call your doctor now or seek immediate medical care if:  ? · You are confused and cannot think clearly. ? · You are dizzy or lightheaded, or you feel like you may faint. ? · You have signs of needing more fluids. You have sunken eyes and a dry mouth, and you pass only a little dark urine. ? · You cannot keep fluids down. ? Watch closely for changes in your health, and be sure to contact your doctor if:  ? · You are not making tears. ? · Your skin is very dry and sags slowly back into place after you pinch it. ? · Your mouth and eyes are very dry. Where can you learn more? Go to http://wyatt-phillip.info/. Enter Z929 in the search box to learn more about \"Dehydration: Care Instructions. \"  Current as of: March 20, 2017  Content Version: 11.4  © 1134-3482 [employer]. Care instructions adapted under license by [employer] (which disclaims liability or warranty for this information). If you have questions about a medical condition or this instruction, always ask your healthcare professional. [place] [address] any warranty or liability for your use of this information. Bronchitis: Care Instructions  Your Care Instructions    Bronchitis is inflammation of the bronchial tubes, which carry air to the lungs. The tubes swell and produce mucus, or phlegm. The mucus and inflamed bronchial tubes make you cough. You may have trouble breathing. Most cases of bronchitis are caused by viruses like those that cause colds. Antibiotics usually do not help and they may be harmful.   Bronchitis usually develops rapidly and lasts about 2 to 3 weeks in otherwise healthy people. Follow-up care is a key part of your treatment and safety. Be sure to make and go to all appointments, and call your doctor if you are having problems. It's also a good idea to know your test results and keep a list of the medicines you take. How can you care for yourself at home? · Take all medicines exactly as prescribed. Call your doctor if you think you are having a problem with your medicine. · Get some extra rest.  · Take an over-the-counter pain medicine, such as acetaminophen (Tylenol), ibuprofen (Advil, Motrin), or naproxen (Aleve) to reduce fever and relieve body aches. Read and follow all instructions on the label. · Do not take two or more pain medicines at the same time unless the doctor told you to. Many pain medicines have acetaminophen, which is Tylenol. Too much acetaminophen (Tylenol) can be harmful. · Take an over-the-counter cough medicine that contains dextromethorphan to help quiet a dry, hacking cough so that you can sleep. Avoid cough medicines that have more than one active ingredient. Read and follow all instructions on the label. · Breathe moist air from a humidifier, hot shower, or sink filled with hot water. The heat and moisture will thin mucus so you can cough it out. · Do not smoke. Smoking can make bronchitis worse. If you need help quitting, talk to your doctor about stop-smoking programs and medicines. These can increase your chances of quitting for good. When should you call for help? Call 911 anytime you think you may need emergency care. For example, call if:  ? · You have severe trouble breathing. ?Call your doctor now or seek immediate medical care if:  ? · You have new or worse trouble breathing. ? · You cough up dark brown or bloody mucus (sputum). ? · You have a new or higher fever. ? · You have a new rash. ? Watch closely for changes in your health, and be sure to contact your doctor if:  ? · You cough more deeply or more often, especially if you notice more mucus or a change in the color of your mucus. ? · You are not getting better as expected. Where can you learn more? Go to http://wyatt-phillip.info/. Enter H333 in the search box to learn more about \"Bronchitis: Care Instructions. \"  Current as of: May 12, 2017  Content Version: 11.4  © 3936-4754 [employer]. Care instructions adapted under license by [employer] (which disclaims liability or warranty for this information). If you have questions about a medical condition or this instruction, always ask your healthcare professional. [place] [address] any warranty or liability for your use of this information. Influenza (Flu): Care Instructions  Your Care Instructions    Influenza (flu) is an infection in the lungs and breathing passages. It is caused by the influenza virus. There are different strains, or types, of the flu virus from year to year. Unlike the common cold, the flu comes on suddenly and the symptoms, such as a cough, congestion, fever, chills, fatigue, aches, and pains, are more severe. These symptoms may last up to 10 days. Although the flu can make you feel very sick, it usually doesn't cause serious health problems. Home treatment is usually all you need for flu symptoms. But your doctor may prescribe antiviral medicine to prevent other health problems, such as pneumonia, from developing. Older people and those who have a long-term health condition, such as lung disease, are most at risk for having pneumonia or other health problems. Follow-up care is a key part of your treatment and safety. Be sure to make and go to all appointments, and call your doctor if you are having problems. It's also a good idea to know your test results and keep a list of the medicines you take. How can you care for yourself at home?   · Get plenty of rest.  · Drink plenty of fluids, enough so that your urine is light yellow or clear like water. If you have kidney, heart, or liver disease and have to limit fluids, talk with your doctor before you increase the amount of fluids you drink. · Take an over-the-counter pain medicine if needed, such as acetaminophen (Tylenol), ibuprofen (Advil, Motrin), or naproxen (Aleve), to relieve fever, headache, and muscle aches. Read and follow all instructions on the label. No one younger than 20 should take aspirin. It has been linked to Reye syndrome, a serious illness. · Do not smoke. Smoking can make the flu worse. If you need help quitting, talk to your doctor about stop-smoking programs and medicines. These can increase your chances of quitting for good. · Breathe moist air from a hot shower or from a sink filled with hot water to help clear a stuffy nose. · Before you use cough and cold medicines, check the label. These medicines may not be safe for young children or for people with certain health problems. · If the skin around your nose and lips becomes sore, put some petroleum jelly on the area. · To ease coughing:  ¨ Drink fluids to soothe a scratchy throat. ¨ Suck on cough drops or plain hard candy. ¨ Take an over-the-counter cough medicine that contains dextromethorphan to help you get some sleep. Read and follow all instructions on the label. ¨ Raise your head at night with an extra pillow. This may help you rest if coughing keeps you awake. · Take any prescribed medicine exactly as directed. Call your doctor if you think you are having a problem with your medicine. To avoid spreading the flu  · Wash your hands regularly, and keep your hands away from your face. · Stay home from school, work, and other public places until you are feeling better and your fever has been gone for at least 24 hours. The fever needs to have gone away on its own without the help of medicine.   · Ask people living with you to talk to their doctors about preventing the flu. They may get antiviral medicine to keep from getting the flu from you. · To prevent the flu in the future, get a flu vaccine every fall. Encourage people living with you to get the vaccine. · Cover your mouth when you cough or sneeze. When should you call for help? Call 911 anytime you think you may need emergency care. For example, call if:  ? · You have severe trouble breathing. ?Call your doctor now or seek immediate medical care if:  ? · You have new or worse trouble breathing. ? · You seem to be getting much sicker. ? · You feel very sleepy or confused. ? · You have a new or higher fever. ? · You get a new rash. ? Watch closely for changes in your health, and be sure to contact your doctor if:  ? · You begin to get better and then get worse. ? · You are not getting better after 1 week. Where can you learn more? Go to http://wyattLTG Exam Prep Platformphillip.info/. Enter U239 in the search box to learn more about \"Influenza (Flu): Care Instructions. \"  Current as of: May 12, 2017  Content Version: 11.4  © 1242-6964 [employer]. Care instructions adapted under license by [employer] (which disclaims liability or warranty for this information). If you have questions about a medical condition or this instruction, always ask your healthcare professional. [address] any warranty or liability for your use of this information. Nausea and Vomiting: Care Instructions  Your Care Instructions    When you are nauseated, you may feel weak and sweaty and notice a lot of saliva in your mouth. Nausea often leads to vomiting. Most of the time you do not need to worry about nausea and vomiting, but they can be signs of other illnesses. Two common causes of nausea and vomiting are stomach flu and food poisoning. Nausea and vomiting from viral stomach flu will usually start to improve within 24 hours.  Nausea and vomiting from food poisoning may last from 12 to 48 hours. The doctor has checked you carefully, but problems can develop later. If you notice any problems or new symptoms, get medical treatment right away. Follow-up care is a key part of your treatment and safety. Be sure to make and go to all appointments, and call your doctor if you are having problems. It's also a good idea to know your test results and keep a list of the medicines you take. How can you care for yourself at home? · To prevent dehydration, drink plenty of fluids, enough so that your urine is light yellow or clear like water. Choose water and other caffeine-free clear liquids until you feel better. If you have kidney, heart, or liver disease and have to limit fluids, talk with your doctor before you increase the amount of fluids you drink. · Rest in bed until you feel better. · When you are able to eat, try clear soups, mild foods, and liquids until all symptoms are gone for 12 to 48 hours. Other good choices include dry toast, crackers, cooked cereal, and gelatin dessert, such as Jell-O. When should you call for help? Call 911 anytime you think you may need emergency care. For example, call if:  ? · You passed out (lost consciousness). ?Call your doctor now or seek immediate medical care if:  ? · You have symptoms of dehydration, such as:  ¨ Dry eyes and a dry mouth. ¨ Passing only a little dark urine. ¨ Feeling thirstier than usual.   ? · You have new or worsening belly pain. ? · You have a new or higher fever. ? · You vomit blood or what looks like coffee grounds. ? Watch closely for changes in your health, and be sure to contact your doctor if:  ? · You have ongoing nausea and vomiting. ? · Your vomiting is getting worse. ? · Your vomiting lasts longer than 2 days. ? · You are not getting better as expected. Where can you learn more? Go to http://wyatt-phillip.info/.   Enter [id number] in the search box to learn more about \"Nausea and Vomiting: Care Instructions. \"  Current as of: March 20, 2017  Content Version: 11.4  © 8111-7904 [employer]. Care instructions adapted under license by [employer] (which disclaims liability or warranty for this information). If you have questions about a medical condition or this instruction, always ask your healthcare professional. [address] any warranty or liability for your use of this information. Viral Infections: Care Instructions  Your Care Instructions    You don't feel well, but it's not clear what's causing it. You may have a viral infection. Viruses cause many illnesses, such as the common cold, influenza, fever, rashes, and the diarrhea, nausea, and vomiting that are often called \"stomach flu. \" You may wonder if antibiotic medicines could make you feel better. But antibiotics only treat infections caused by bacteria. They don't work on viruses. The good news is that viral infections usually aren't serious. Most will go away in a few days without medical treatment. In the meantime, there are a few things you can do to make yourself more comfortable. Follow-up care is a key part of your treatment and safety. Be sure to make and go to all appointments, and call your doctor if you are having problems. It's also a good idea to know your test results and keep a list of the medicines you take. How can you care for yourself at home? · Get plenty of rest if you feel tired. · Take an over-the-counter pain medicine if needed, such as acetaminophen (Tylenol), ibuprofen (Advil, Motrin), or naproxen (Aleve). Read and follow all instructions on the label. · Be careful when taking over-the-counter cold or flu medicines and Tylenol at the same time. Many of these medicines have acetaminophen, which is Tylenol. Read the labels to make sure that you are not taking more than the recommended dose.  Too much acetaminophen (Tylenol) can be harmful. · Drink plenty of fluids, enough so that your urine is light yellow or clear like water. If you have kidney, heart, or liver disease and have to limit fluids, talk with your doctor before you increase the amount of fluids you drink. · Stay home from work, school, and other public places while you have a fever. When should you call for help? Call 911 anytime you think you may need emergency care. For example, call if:  ? · You have severe trouble breathing. ? · You passed out (lost consciousness). ?Call your doctor now or seek immediate medical care if:  ? · You seem to be getting much sicker. ? · You have a new or higher fever. ? · You have blood in your stools. ? · You have new belly pain, or your pain gets worse. ? · You have a new rash. ? Watch closely for changes in your health, and be sure to contact your doctor if:  ? · You start to get better and then get worse. ? · You do not get better as expected. Where can you learn more? Go to http://wyatt-phillip.info/. Enter D016 in the search box to learn more about \"Viral Infections: Care Instructions. \"  Current as of: March 3, 2017  Content Version: 11.4  © 9412-4376 [employer]. Care instructions adapted under license by [employer] (which disclaims liability or warranty for this information). If you have questions about a medical condition or this instruction, always ask your healthcare professional. [place] [address] any warranty or liability for your use of this information.

## 2018-02-21 NOTE — ED NOTES
I have reviewed discharge instructions with the patient. The patient verbalized understanding. Patient armband removed and shredded PT awaiting medical transport.

## 2018-02-21 NOTE — ED PROVIDER NOTES
EMERGENCY DEPARTMENT HISTORY AND PHYSICAL EXAM    8:20 PM      Date: 2/20/2018  Patient Name: Vic Cline    History of Presenting Illness     Chief Complaint   Patient presents with    Generalized Body Aches    Fatigue         History Provided By: Patient    Chief Complaint: diarrhea  Duration: 1 day  Timing:  Worsening  Location:   Quality: dark, \"muddy\"  Severity: Moderate  Modifying Factors:   Associated Symptoms: nausea, vomiting, sore throat, chills, fever, dizziness      Additional History (Context): Vic Cline is a 57 y.o. female with history of arthritis, blood clots who presents from [facility] [facility] with diarrhea, vomiting and dizziness that began today. She denies hematemesis, but notes dark stool. She notes 1.5 weeks of sore throat, chills and fever. She also notes elevation of BP and subsequent change in medication last week. She reports compliance with all medication, including Xarelto. She notes a recent course of antibiotics. She denies changes to chronic leg swelling. No other symptoms or concerns were expressed. PCP: Kannan Young MD    Current Facility-Administered Medications   Medication Dose Route Frequency Provider Last Rate Last Dose    ondansetron (ZOFRAN) injection 4 mg  4 mg IntraVENous NOW Zara Spencer MD        0.9% sodium chloride infusion  125 mL/hr IntraVENous CONTINUOUS Zara Spencer MD        sodium chloride 0.9 % bolus infusion 500 mL  500 mL IntraVENous ONCE Zara Spencer MD        albuterol-ipratropium (DUO-NEB) 2.5 MG-0.5 MG/3 ML  3 mL Nebulization NOW Zara Spencer MD        sodium chloride 0.9 % bolus infusion 1,000 mL  1,000 mL IntraVENous ONCE Zara Spencer MD         Current Outpatient Prescriptions   Medication Sig Dispense Refill    oseltamivir (TAMIFLU) 75 mg capsule Take 1 Cap by mouth two (2) times a day for 5 days.  10 Cap 0    diphenoxylate-atropine (LOMOTIL) 2.5-0.025 mg per tablet Take 1 Tab by mouth four (4) times daily as needed for Diarrhea (1 tab after each stool for max 8 per day). Max Daily Amount: 4 Tabs. Take after each stool for a maximum of 8 tablets daily 20 Tab 0    albuterol (PROVENTIL VENTOLIN) 2.5 mg /3 mL (0.083 %) nebulizer solution 3 mL by Nebulization route every four (4) hours as needed for Wheezing. 24 Each 0    Cetirizine (ZYRTEC) 10 mg cap Take  by mouth.  rivaroxaban (XARELTO) 15 mg tab tablet Take  by mouth daily.  gabapentin (NEURONTIN) 400 mg capsule Take 400 mg by mouth three (3) times daily.  atorvastatin (LIPITOR) 40 mg tablet Take 1 Tab by mouth nightly. 30 Tab 1    bisacodyl (DULCOLAX) 10 mg suppository Insert 10 mg into rectum as needed. 30 Suppository 1    ferrous sulfate 325 mg (65 mg iron) tablet Take 1 Tab by mouth two (2) times daily (with meals). 60 Tab 1    isoniazid (NYDRAZID) 300 mg tablet Take 1 Tab by mouth daily. 30 Tab 3    nystatin (MYCOSTATIN) powder Apply  to affected area two (2) times a day. 1 Bottle 3    polyethylene glycol (MIRALAX) 17 gram packet Take 1 Packet by mouth daily. 30 Packet 1    senna-docusate (PERICOLACE) 8.6-50 mg per tablet Take 2 Tabs by mouth daily. 60 Tab 1    oxyCODONE-acetaminophen (PERCOCET 10)  mg per tablet Take 1 Tab by mouth every four (4) hours as needed. Max Daily Amount: 6 Tabs. 40 Tab 0    ondansetron (ZOFRAN ODT) 4 mg disintegrating tablet Take 1 Tab by mouth every eight (8) hours as needed. 40 Tab 0    pyridoxine, vitamin B6, (VITAMIN B-6) 25 mg tablet Take 1 Tab by mouth daily. Indications: DRUG-INDUCED PYRIDOXINE DEFICIENCY 30 Tab 3       Past History     Past Medical History:  Past Medical History:   Diagnosis Date    Arthritis        Past Surgical History:  Past Surgical History:   Procedure Laterality Date    NEUROLOGICAL PROCEDURE UNLISTED  04/27/2016    T2-L2 Decomprssion and Fusion/T12 Corpetomy       Family History:  History reviewed. No pertinent family history.     Social History:  Social History   Substance Use Topics    Smoking status: Former Smoker    Smokeless tobacco: Former User     Quit date: 4/1/2016    Alcohol use Yes       Allergies: Allergies   Allergen Reactions    Iodinated Contrast- Oral And Iv Dye Other (comments)     Patient states will pass out. Review of Systems       Review of Systems   Constitutional: Positive for chills and fever. Negative for activity change and fatigue. HENT: Positive for sore throat. Negative for congestion and rhinorrhea. Eyes: Negative for visual disturbance. Respiratory: Negative for shortness of breath. Cardiovascular: Negative for chest pain and palpitations. Gastrointestinal: Positive for diarrhea, nausea and vomiting. Negative for abdominal pain. Genitourinary: Negative for dysuria and hematuria. Musculoskeletal: Negative for back pain. Skin: Negative for rash. Neurological: Positive for dizziness and light-headedness. Negative for weakness. Physical Exam     Visit Vitals    /80    Pulse 90    Temp 98.8 °F (37.1 °C)    Resp 21    SpO2 94%         Physical Exam   Constitutional: She is oriented to person, place, and time. She appears well-developed and well-nourished. No distress. HENT:   Head: Normocephalic and atraumatic. Right Ear: External ear normal.   Left Ear: External ear normal.   Nose: Nose normal.   Mouth/Throat: Oropharynx is clear and moist.   Eyes: Conjunctivae and EOM are normal. Pupils are equal, round, and reactive to light. No scleral icterus. Neck: Normal range of motion. Neck supple. No JVD present. No tracheal deviation present. No thyromegaly present. Cardiovascular: Normal rate, regular rhythm, normal heart sounds and intact distal pulses. Exam reveals no gallop and no friction rub. No murmur heard. Pulmonary/Chest: Effort normal. She has wheezes. She exhibits no tenderness. Rhonchi    Abdominal: Soft. Bowel sounds are normal. She exhibits no distension.  There is no tenderness. There is no rebound and no guarding. Musculoskeletal: She exhibits edema and tenderness. Pitting edema    Lymphadenopathy:     She has no cervical adenopathy. Neurological: She is alert and oriented to person, place, and time. No cranial nerve deficit. Coordination normal.   No sensory loss, Gait normal, Motor 5/5   Skin: Skin is warm and dry. Psychiatric: She has a normal mood and affect. Her behavior is normal. Judgment and thought content normal.   Nursing note and vitals reviewed. Diagnostic Study Results     Labs -  Recent Results (from the past 12 hour(s))   CBC WITH AUTOMATED DIFF    Collection Time: 02/20/18  8:20 PM   Result Value Ref Range    WBC 4.6 4.6 - 13.2 K/uL    RBC 4.43 4.20 - 5.30 M/uL    HGB 12.5 12.0 - 16.0 g/dL    HCT 37.0 35.0 - 45.0 %    MCV 83.5 74.0 - 97.0 FL    MCH 28.2 24.0 - 34.0 PG    MCHC 33.8 31.0 - 37.0 g/dL    RDW 14.4 11.6 - 14.5 %    PLATELET 207 515 - 629 K/uL    MPV 10.7 9.2 - 11.8 FL    NEUTROPHILS 70 40 - 73 %    LYMPHOCYTES 20 (L) 21 - 52 %    MONOCYTES 9 3 - 10 %    EOSINOPHILS 0 0 - 5 %    BASOPHILS 1 0 - 2 %    ABS. NEUTROPHILS 3.3 1.8 - 8.0 K/UL    ABS. LYMPHOCYTES 0.9 0.9 - 3.6 K/UL    ABS. MONOCYTES 0.4 0.05 - 1.2 K/UL    ABS. EOSINOPHILS 0.0 0.0 - 0.4 K/UL    ABS. BASOPHILS 0.0 0.0 - 0.06 K/UL    DF AUTOMATED     METABOLIC PANEL, COMPREHENSIVE    Collection Time: 02/20/18  8:20 PM   Result Value Ref Range    Sodium 135 (L) 136 - 145 mmol/L    Potassium 4.4 3.5 - 5.5 mmol/L    Chloride 97 (L) 100 - 108 mmol/L    CO2 31 21 - 32 mmol/L    Anion gap 7 3.0 - 18 mmol/L    Glucose 96 74 - 99 mg/dL    BUN 16 7.0 - 18 MG/DL    Creatinine 0.96 0.6 - 1.3 MG/DL    BUN/Creatinine ratio 17 12 - 20      GFR est AA >60 >60 ml/min/1.73m2    GFR est non-AA 59 (L) >60 ml/min/1.73m2    Calcium 9.2 8.5 - 10.1 MG/DL    Bilirubin, total 0.7 0.2 - 1.0 MG/DL    ALT (SGPT) 12 (L) 13 - 56 U/L    AST (SGOT) 35 15 - 37 U/L    Alk.  phosphatase 101 45 - 117 U/L    Protein, total 8.0 6.4 - 8.2 g/dL    Albumin 3.8 3.4 - 5.0 g/dL    Globulin 4.2 (H) 2.0 - 4.0 g/dL    A-G Ratio 0.9 0.8 - 1.7     LIPASE    Collection Time: 02/20/18  8:20 PM   Result Value Ref Range    Lipase 86 73 - 393 U/L   MAGNESIUM    Collection Time: 02/20/18  8:20 PM   Result Value Ref Range    Magnesium 2.5 1.6 - 2.6 mg/dL   TROPONIN I    Collection Time: 02/20/18  8:20 PM   Result Value Ref Range    Troponin-I, Qt. <0.02 0.0 - 0.045 NG/ML   PROTHROMBIN TIME + INR    Collection Time: 02/20/18  8:20 PM   Result Value Ref Range    Prothrombin time 23.5 (H) 11.5 - 15.2 sec    INR 2.2 (H) 0.8 - 1.2     PTT    Collection Time: 02/20/18  8:20 PM   Result Value Ref Range    aPTT 43.4 (H) 23.0 - 36.4 SEC   EKG, 12 LEAD, INITIAL    Collection Time: 02/20/18  8:25 PM   Result Value Ref Range    Ventricular Rate 85 BPM    Atrial Rate 85 BPM    P-R Interval 142 ms    QRS Duration 88 ms    Q-T Interval 366 ms    QTC Calculation (Bezet) 435 ms    Calculated P Axis 43 degrees    Calculated R Axis -11 degrees    Calculated T Axis -23 degrees    Diagnosis       Normal sinus rhythm  ST & T wave abnormality, consider anterior ischemia  Abnormal ECG  When compared with ECG of 23-APR-2016 12:47,  No significant change was found     URINALYSIS W/ RFLX MICROSCOPIC    Collection Time: 02/20/18 10:22 PM   Result Value Ref Range    Color YELLOW      Appearance CLEAR      Specific gravity 1.020 1.003 - 1.030      pH (UA) 5.5 5.0 - 8.0      Protein 30 (A) NEG mg/dL    Glucose NEGATIVE  NEG mg/dL    Ketone >80 (A) NEG mg/dL    Bilirubin SMALL (A) NEG      Blood TRACE (A) NEG      Urobilinogen 0.2 0.2 - 1.0 EU/dL    Nitrites NEGATIVE  NEG      Leukocyte Esterase NEGATIVE  NEG     URINE MICROSCOPIC ONLY    Collection Time: 02/20/18 10:22 PM   Result Value Ref Range    WBC 0 to 2 0 - 4 /hpf    RBC 0 to 2 0 - 5 /hpf    Epithelial cells 1+ 0 - 5 /lpf    Bacteria 1+ (A) NEG /hpf       Radiologic Studies -   XR CHEST SNGL V   No acute cardiopulmonary process   As interpreted by radiology. Medical Decision Making   I am the first provider for this patient. I reviewed the vital signs, available nursing notes, past medical history, past surgical history, family history and social history. Vital Signs-Reviewed the patient's vital signs. Pulse Oximetry Analysis -  98% on room air (Interpretation)    EKG: Interpreted by the EP. Time Interpreted: 20:25   Rate: 85   Rhythm: Normal Sinus Rhythm    Interpretation: T wave inversions in leads V1-3, III and aVF. No STEMI. Comparison: No change. Records Reviewed: Nursing Notes, Old Medical Records and Previous electrocardiograms (Time of Review: 8:20 PM)    ED Course: Progress Notes, Reevaluation, and Consults:    10:57 PM  Update: labs are suggestive of dehydration. Chest XR shows no infiltrate. Cardiac labs and H/H are reassuring. Patient refused flu swab, but will be treated empirically. She will be prescribed Tamiflu, Albuterol and Lamotil. Will proceed with outpatient follow up with her PCP at [facility] tomorrow. 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 Suspect viral syndrome but today she was feeling lightheaded as well as noting some dark stools. Will hydrate, flu, infectious eval, cardiac labs, h/h, then reevaluate. Lu Gomez DO 8:35 PM    Diagnosis     Clinical Impression:   1. Viral syndrome    2. Dehydration    3. Influenza    4. Nausea and vomiting, intractability of vomiting not specified, unspecified vomiting type    5.  Acute bronchitis, unspecified organism        Disposition: Discharge    Follow-up Information     Follow up With Details Comments John Whittaker MD Go in 1 day ED visit follow-up [id number] [address] Internal Medicine [place] [id number] [phone] [facility] EMERGENCY DEPT Go to As needed, If symptoms worsen [address] [place] [address]  [phone]           Patient's Medications   Start Taking    ALBUTEROL (PROVENTIL VENTOLIN) 2.5 MG /3 ML (0.083 %) NEBULIZER SOLUTION    3 mL by Nebulization route every four (4) hours as needed for Wheezing. DIPHENOXYLATE-ATROPINE (LOMOTIL) 2.5-0.025 MG PER TABLET    Take 1 Tab by mouth four (4) times daily as needed for Diarrhea (1 tab after each stool for max 8 per day). Max Daily Amount: 4 Tabs. Take after each stool for a maximum of 8 tablets daily    OSELTAMIVIR (TAMIFLU) 75 MG CAPSULE    Take 1 Cap by mouth two (2) times a day for 5 days. Continue Taking    ATORVASTATIN (LIPITOR) 40 MG TABLET    Take 1 Tab by mouth nightly. BISACODYL (DULCOLAX) 10 MG SUPPOSITORY    Insert 10 mg into rectum as needed. CETIRIZINE (ZYRTEC) 10 MG CAP    Take  by mouth. FERROUS SULFATE 325 MG (65 MG IRON) TABLET    Take 1 Tab by mouth two (2) times daily (with meals). GABAPENTIN (NEURONTIN) 400 MG CAPSULE    Take 400 mg by mouth three (3) times daily. ISONIAZID (NYDRAZID) 300 MG TABLET    Take 1 Tab by mouth daily. NYSTATIN (MYCOSTATIN) POWDER    Apply  to affected area two (2) times a day. ONDANSETRON (ZOFRAN ODT) 4 MG DISINTEGRATING TABLET    Take 1 Tab by mouth every eight (8) hours as needed. OXYCODONE-ACETAMINOPHEN (PERCOCET 10)  MG PER TABLET    Take 1 Tab by mouth every four (4) hours as needed. Max Daily Amount: 6 Tabs. POLYETHYLENE GLYCOL (MIRALAX) 17 GRAM PACKET    Take 1 Packet by mouth daily. PYRIDOXINE, VITAMIN B6, (VITAMIN B-6) 25 MG TABLET    Take 1 Tab by mouth daily. Indications: DRUG-INDUCED PYRIDOXINE DEFICIENCY    RIVAROXABAN (XARELTO) 15 MG TAB TABLET    Take  by mouth daily. SENNA-DOCUSATE (PERICOLACE) 8.6-50 MG PER TABLET    Take 2 Tabs by mouth daily.    These Medications have changed    No medications on file   Stop Taking    No medications on file _______________________________    Attestations:  Orlando Salazar acting as a scribe for and in the presence of Patricio Darnell MD      February 20, 2018 at [facility] PM       Provider Attestation:      I personally performed the services described in the documentation, reviewed the documentation, as recorded by the scribe in my presence, and it accurately and completely records my words and actions.  February 20, 2018 at 8:20 PM - Patricio Darnell MD    _______________________________

## 2018-03-05 ENCOUNTER — HOSPITAL ENCOUNTER (EMERGENCY)
Age: 63
Discharge: NURSING, REHAB OR LONG-TERM CARE | End: 2018-03-06
Attending: EMERGENCY MEDICINE
Payer: MEDICAID

## 2018-03-05 DIAGNOSIS — N30.00 ACUTE CYSTITIS WITHOUT HEMATURIA: ICD-10-CM

## 2018-03-05 DIAGNOSIS — K92.1 BLOOD IN STOOL: ICD-10-CM

## 2018-03-05 DIAGNOSIS — K52.9 NONINFECTIOUS GASTROENTERITIS, UNSPECIFIED TYPE: Primary | ICD-10-CM

## 2018-03-05 LAB
ANION GAP SERPL CALC-SCNC: 7 MMOL/L (ref 3–18)
BASOPHILS # BLD: 0 K/UL (ref 0–0.1)
BASOPHILS NFR BLD: 0 % (ref 0–2)
BUN SERPL-MCNC: 8 MG/DL (ref 7–18)
BUN/CREAT SERPL: 10 (ref 12–20)
CALCIUM SERPL-MCNC: 9.2 MG/DL (ref 8.5–10.1)
CHLORIDE SERPL-SCNC: 99 MMOL/L (ref 100–108)
CO2 SERPL-SCNC: 30 MMOL/L (ref 21–32)
CREAT SERPL-MCNC: 0.81 MG/DL (ref 0.6–1.3)
DIFFERENTIAL METHOD BLD: ABNORMAL
EOSINOPHIL # BLD: 0.1 K/UL (ref 0–0.4)
EOSINOPHIL NFR BLD: 1 % (ref 0–5)
ERYTHROCYTE [DISTWIDTH] IN BLOOD BY AUTOMATED COUNT: 15 % (ref 11.6–14.5)
GLUCOSE SERPL-MCNC: 101 MG/DL (ref 74–99)
HCT VFR BLD AUTO: 35.9 % (ref 35–45)
HGB BLD-MCNC: 11.5 G/DL (ref 12–16)
INR PPP: 2.5 (ref 0.8–1.2)
LYMPHOCYTES # BLD: 1.8 K/UL (ref 0.9–3.6)
LYMPHOCYTES NFR BLD: 15 % (ref 21–52)
MAGNESIUM SERPL-MCNC: 2.1 MG/DL (ref 1.6–2.6)
MCH RBC QN AUTO: 27.4 PG (ref 24–34)
MCHC RBC AUTO-ENTMCNC: 32 G/DL (ref 31–37)
MCV RBC AUTO: 85.7 FL (ref 74–97)
MONOCYTES # BLD: 0.9 K/UL (ref 0.05–1.2)
MONOCYTES NFR BLD: 8 % (ref 3–10)
NEUTS SEG # BLD: 9.3 K/UL (ref 1.8–8)
NEUTS SEG NFR BLD: 76 % (ref 40–73)
PLATELET # BLD AUTO: 246 K/UL (ref 135–420)
PMV BLD AUTO: 10 FL (ref 9.2–11.8)
POTASSIUM SERPL-SCNC: 3.3 MMOL/L (ref 3.5–5.5)
PROTHROMBIN TIME: 26.5 SEC (ref 11.5–15.2)
RBC # BLD AUTO: 4.19 M/UL (ref 4.2–5.3)
SODIUM SERPL-SCNC: 136 MMOL/L (ref 136–145)
WBC # BLD AUTO: 12.1 K/UL (ref 4.6–13.2)

## 2018-03-05 PROCEDURE — 96374 THER/PROPH/DIAG INJ IV PUSH: CPT

## 2018-03-05 PROCEDURE — 96375 TX/PRO/DX INJ NEW DRUG ADDON: CPT

## 2018-03-05 PROCEDURE — 99285 EMERGENCY DEPT VISIT HI MDM: CPT

## 2018-03-05 PROCEDURE — 96361 HYDRATE IV INFUSION ADD-ON: CPT

## 2018-03-05 PROCEDURE — 74011250636 HC RX REV CODE- 250/636: Performed by: NURSE PRACTITIONER

## 2018-03-05 PROCEDURE — 80048 BASIC METABOLIC PNL TOTAL CA: CPT | Performed by: EMERGENCY MEDICINE

## 2018-03-05 PROCEDURE — 85025 COMPLETE CBC W/AUTO DIFF WBC: CPT | Performed by: EMERGENCY MEDICINE

## 2018-03-05 PROCEDURE — 83735 ASSAY OF MAGNESIUM: CPT | Performed by: EMERGENCY MEDICINE

## 2018-03-05 PROCEDURE — 85610 PROTHROMBIN TIME: CPT | Performed by: NURSE PRACTITIONER

## 2018-03-05 RX ORDER — ONDANSETRON 2 MG/ML
8 INJECTION INTRAMUSCULAR; INTRAVENOUS
Status: COMPLETED | OUTPATIENT
Start: 2018-03-05 | End: 2018-03-05

## 2018-03-05 RX ADMIN — SODIUM CHLORIDE 500 ML: 900 INJECTION, SOLUTION INTRAVENOUS at 23:46

## 2018-03-05 RX ADMIN — ONDANSETRON 4 MG: 2 INJECTION INTRAMUSCULAR; INTRAVENOUS at 23:45

## 2018-03-06 ENCOUNTER — APPOINTMENT (OUTPATIENT)
Dept: CT IMAGING | Age: 63
End: 2018-03-06
Attending: NURSE PRACTITIONER
Payer: MEDICAID

## 2018-03-06 VITALS
TEMPERATURE: 98.9 F | RESPIRATION RATE: 23 BRPM | OXYGEN SATURATION: 98 % | DIASTOLIC BLOOD PRESSURE: 82 MMHG | SYSTOLIC BLOOD PRESSURE: 108 MMHG | HEART RATE: 79 BPM

## 2018-03-06 LAB
APPEARANCE UR: CLEAR
BACTERIA URNS QL MICRO: ABNORMAL /HPF
BILIRUB UR QL: NEGATIVE
COLOR UR: YELLOW
EPITH CASTS URNS QL MICRO: ABNORMAL /LPF (ref 0–5)
GLUCOSE UR STRIP.AUTO-MCNC: NEGATIVE MG/DL
HGB UR QL STRIP: ABNORMAL
KETONES UR QL STRIP.AUTO: NEGATIVE MG/DL
LEUKOCYTE ESTERASE UR QL STRIP.AUTO: ABNORMAL
NITRITE UR QL STRIP.AUTO: NEGATIVE
PH UR STRIP: 7 [PH] (ref 5–8)
PROT UR STRIP-MCNC: 30 MG/DL
RBC #/AREA URNS HPF: ABNORMAL /HPF (ref 0–5)
SP GR UR REFRACTOMETRY: 1.01 (ref 1–1.03)
UROBILINOGEN UR QL STRIP.AUTO: 0.2 EU/DL (ref 0.2–1)
WBC URNS QL MICRO: ABNORMAL /HPF (ref 0–4)

## 2018-03-06 PROCEDURE — 74011250637 HC RX REV CODE- 250/637: Performed by: NURSE PRACTITIONER

## 2018-03-06 PROCEDURE — 74011250636 HC RX REV CODE- 250/636: Performed by: NURSE PRACTITIONER

## 2018-03-06 PROCEDURE — 96375 TX/PRO/DX INJ NEW DRUG ADDON: CPT

## 2018-03-06 PROCEDURE — 74176 CT ABD & PELVIS W/O CONTRAST: CPT

## 2018-03-06 PROCEDURE — 81001 URINALYSIS AUTO W/SCOPE: CPT | Performed by: NURSE PRACTITIONER

## 2018-03-06 PROCEDURE — 74011000250 HC RX REV CODE- 250: Performed by: NURSE PRACTITIONER

## 2018-03-06 RX ORDER — METRONIDAZOLE 500 MG/1
500 TABLET ORAL 2 TIMES DAILY
Qty: 20 TAB | Refills: 0 | Status: SHIPPED | OUTPATIENT
Start: 2018-03-06 | End: 2018-03-16

## 2018-03-06 RX ORDER — ONDANSETRON 8 MG/1
8 TABLET, ORALLY DISINTEGRATING ORAL
Qty: 20 TAB | Refills: 0 | Status: SHIPPED | OUTPATIENT
Start: 2018-03-06 | End: 2022-07-05 | Stop reason: ALTCHOICE

## 2018-03-06 RX ORDER — CIPROFLOXACIN 500 MG/1
500 TABLET ORAL
Status: COMPLETED | OUTPATIENT
Start: 2018-03-06 | End: 2018-03-06

## 2018-03-06 RX ORDER — DICYCLOMINE HYDROCHLORIDE 20 MG/1
20 TABLET ORAL EVERY 6 HOURS
Qty: 20 TAB | Refills: 0 | Status: SHIPPED | OUTPATIENT
Start: 2018-03-06 | End: 2018-03-11

## 2018-03-06 RX ORDER — METRONIDAZOLE 500 MG/1
500 TABLET ORAL
Status: COMPLETED | OUTPATIENT
Start: 2018-03-06 | End: 2018-03-06

## 2018-03-06 RX ORDER — CIPROFLOXACIN 500 MG/1
500 TABLET ORAL 2 TIMES DAILY
Qty: 14 TAB | Refills: 0 | Status: SHIPPED | OUTPATIENT
Start: 2018-03-06 | End: 2018-03-13

## 2018-03-06 RX ORDER — POTASSIUM CHLORIDE 20 MEQ/1
40 TABLET, EXTENDED RELEASE ORAL
Status: COMPLETED | OUTPATIENT
Start: 2018-03-06 | End: 2018-03-06

## 2018-03-06 RX ADMIN — CIPROFLOXACIN HYDROCHLORIDE 500 MG: 500 TABLET, FILM COATED ORAL at 03:28

## 2018-03-06 RX ADMIN — WATER 2 G: 1 INJECTION INTRAMUSCULAR; INTRAVENOUS; SUBCUTANEOUS at 02:34

## 2018-03-06 RX ADMIN — POTASSIUM CHLORIDE 40 MEQ: 20 TABLET, EXTENDED RELEASE ORAL at 03:26

## 2018-03-06 RX ADMIN — METRONIDAZOLE 500 MG: 500 TABLET ORAL at 03:28

## 2018-03-06 NOTE — ED NOTES
I have reviewed discharge instructions with the patient. The patient verbalized understanding. Discharge medications reviewed with patient and appropriate educational materials and side effects teaching were provided. Pt is [facility]a, NAD. Pt left by [facility] transport.

## 2018-03-06 NOTE — DISCHARGE INSTRUCTIONS
Colitis: Care Instructions  Your Care Instructions  Colitis is the medical term for swelling (inflammation) of the intestine. It can be caused by different things, such as an infection or loss of blood flow in the intestine. Other causes are problems like Crohn's disease or ulcerative colitis. Symptoms may include fever, diarrhea that may be bloody, or belly pain. Sometimes symptoms go away without treatment. But you may need treatment or more tests, such as blood tests or a stool test. Or you may need imaging tests like a CT scan or a colonoscopy. In some cases, the doctor may want to test a sample of tissue from the intestine. This test is called a biopsy. The doctor has checked you carefully, but problems can develop later. If you notice any problems or new symptoms, get medical treatment right away. Follow-up care is a key part of your treatment and safety. Be sure to make and go to all appointments, and call your doctor if you are having problems. It's also a good idea to know your test results and keep a list of the medicines you take. How can you care for yourself at home? · Rest until you feel better. · Your doctor may recommend that you eat bland foods. These include rice, dry toast or crackers, bananas, and applesauce. · To prevent dehydration, drink plenty of fluids. Choose water and other caffeine-free clear liquids until you feel better. If you have kidney, heart, or liver disease and have to limit fluids, talk with your doctor before you increase the amount of fluids you drink. · Be safe with medicines. Take your medicines exactly as prescribed. Call your doctor if you think you are having a problem with your medicine. You will get more details on the specific medicines your doctor prescribes. When should you call for help? Call 911 anytime you think you may need emergency care. For example, call if:  ? · You passed out (lost consciousness). ? · Your stools are maroon or very bloody. ?Call your doctor now or seek immediate medical care if:  ? · You have new or worse belly pain. ? · You have a fever. ? · You are vomiting. ? · You cannot pass stools or gas. ? · You have new or more blood in your stools. ? Watch closely for changes in your health, and be sure to contact your doctor if:  ? · You have new or worse symptoms. ? · You are losing weight. ? · You do not get better as expected. Where can you learn more? Go to http://wyatt-phillip.info/. Mayra Morataya in the search box to learn more about \"Colitis: Care Instructions. \"  Current as of: May 12, 2017  Content Version: 11.4  © 9532-0653 [employer]. Care instructions adapted under license by [employer] (which disclaims liability or warranty for this information). If you have questions about a medical condition or this instruction, always ask your healthcare professional. [place] [address] any warranty or liability for your use of this information. Lower Gastrointestinal Bleeding: Care Instructions  Your Care Instructions    The digestive or gastrointestinal tract goes from the mouth to the anus. It is often called the GI tract. Bleeding in the lower GI tract can happen anywhere in your small or large intestine. It can also happen in your rectum or anus. In some cases, it is caused by an infection, cancer, or inflammatory bowel disease. Or it may be caused by hemorrhoids, diverticulitis, or clotting problems. Light bleeding may not cause any symptoms at first. But if you continue to bleed for a while, you may feel very weak or tired. Sudden, heavy bleeding means you need to see a doctor right away. This kind of bleeding can be very dangerous. But it can usually be cured or controlled. The doctor may do some tests to find the cause of your bleeding. Follow-up care is a key part of your treatment and safety.  Be sure to make and go to all appointments, and call your doctor if you are having problems. It's also a good idea to know your test results and keep a list of the medicines you take. How can you care for yourself at home? · Be safe with medicines. Take your medicines exactly as prescribed. Call your doctor if you think you are having a problem with your medicine. You will get more details on the specific medicines your doctor prescribes. · Do not take aspirin or other anti-inflammatory medicines, such as naproxen (Aleve) or ibuprofen (Advil, Motrin), without talking to your doctor first. Ask your doctor if it is okay to use acetaminophen (Tylenol). · Do not drink alcohol. · The bleeding may make you lose iron. So it's important to eat foods that have a lot of iron. These include red meat, shellfish, poultry, and eggs. They also include beans, raisins, whole-grain breads, and leafy green vegetables. If you want help planning meals, you can meet with a dietitian. When should you call for help? Call 911 anytime you think you may need emergency care. For example, call if:  ? · You have sudden, severe belly pain. ? · You vomit blood or what looks like coffee grounds. ? · You passed out (lost consciousness). ? · Your stools are maroon or very bloody. ?Call your doctor now or seek immediate medical care if:  ? · You are dizzy or lightheaded, or you feel like you may faint. ? · Your stools are black and look like tar, or they have streaks of blood. ? · You have belly pain. ? · You vomit or have nausea. ? Watch closely for changes in your health, and be sure to contact your doctor if you do not get better as expected. Where can you learn more? Go to http://wyatt-phillip.info/. Enter D031 in the search box to learn more about \"Lower Gastrointestinal Bleeding: Care Instructions. \"  Current as of: March 20, 2017  Content Version: 11.4  © 8682-9421 [employer], [employer]  Care instructions adapted under license by [employer] (which disclaims liability or warranty for this information). If you have questions about a medical condition or this instruction, always ask your healthcare professional. [place] [address] any warranty or liability for your use of this information. Urinary Tract Infection in Women: Care Instructions  Your Care Instructions    A urinary tract infection, or UTI, is a general term for an infection anywhere between the kidneys and the urethra (where urine comes out). Most UTIs are bladder infections. They often cause pain or burning when you urinate. UTIs are caused by bacteria and can be cured with antibiotics. Be sure to complete your treatment so that the infection goes away. Follow-up care is a key part of your treatment and safety. Be sure to make and go to all appointments, and call your doctor if you are having problems. It's also a good idea to know your test results and keep a list of the medicines you take. How can you care for yourself at home? · Take your antibiotics as directed. Do not stop taking them just because you feel better. You need to take the full course of antibiotics. · Drink extra water and other fluids for the next day or two. This may help wash out the bacteria that are causing the infection. (If you have kidney, heart, or liver disease and have to limit fluids, talk with your doctor before you increase your fluid intake.)  · Avoid drinks that are carbonated or have caffeine. They can irritate the bladder. · Urinate often. Try to empty your bladder each time. · To relieve pain, take a hot bath or lay a heating pad set on low over your lower belly or genital area. Never go to sleep with a heating pad in place. To prevent UTIs  · Drink plenty of water each day. This helps you urinate often, which clears bacteria from your system.  (If you have kidney, heart, or liver disease and have to limit fluids, talk with your doctor before you increase your fluid intake.)  · Urinate when you need to. · Urinate right after you have sex. · Change sanitary pads often. · Avoid douches, bubble baths, feminine hygiene sprays, and other feminine hygiene products that have deodorants. · After going to the bathroom, wipe from front to back. When should you call for help? Call your doctor now or seek immediate medical care if:  ? · Symptoms such as fever, chills, nausea, or vomiting get worse or appear for the first time. ? · You have new pain in your back just below your rib cage. This is called flank pain. ? · There is new blood or pus in your urine. ? · You have any problems with your antibiotic medicine. ? Watch closely for changes in your health, and be sure to contact your doctor if:  ? · You are not getting better after taking an antibiotic for 2 days. ? · Your symptoms go away but then come back. Where can you learn more? Go to http://wyatt-phillip.info/. Enter D470 in the search box to learn more about \"Urinary Tract Infection in Women: Care Instructions. \"  Current as of: May 12, 2017  Content Version: 11.4  © 6233-1182 [employer]. Care instructions adapted under license by [employer] (which disclaims liability or warranty for this information). If you have questions about a medical condition or this instruction, always ask your healthcare professional. [place] [address] any warranty or liability for your use of this information.

## 2018-03-06 NOTE — ED PROVIDER NOTES
HPI Comments: Pt presents with rectal bleeding,  States for the past few days she has had bleeding when she has had a bm, mostly on her paper when wiping. States also abd cramping occasionally. No fever has been reported, states has had one episode of vomiting and is nauseous in waves when the nausea comes so does the abd cramping. No fever has been reported    Patient is a 57 y.o. female presenting with anal bleeding. The history is provided by the patient. No  was used. Rectal Bleeding   This is a new problem. The current episode started 12 to 24 hours ago. The problem occurs daily. The problem has not changed since onset. Associated symptoms include abdominal pain. Pertinent negatives include no chest pain, no headaches and no shortness of breath. Nothing aggravates the symptoms. Nothing relieves the symptoms. She has tried nothing for the symptoms. Past Medical History:   Diagnosis Date    Arthritis        Past Surgical History:   Procedure Laterality Date    NEUROLOGICAL PROCEDURE UNLISTED  04/27/2016    T2-L2 Decomprssion and Fusion/T12 Corpetomy         History reviewed. No pertinent family history. Social History     Social History    Marital status: SINGLE     Spouse name: N/A    Number of children: N/A    Years of education: N/A     Occupational History    Not on file. Social History Main Topics    Smoking status: Former Smoker    Smokeless tobacco: Former User     Quit date: 4/1/2016    Alcohol use Yes    Drug use: No    Sexual activity: Not on file     Other Topics Concern    Not on file     Social History Narrative         ALLERGIES: Iodinated contrast- oral and iv dye    Review of Systems   Constitutional: Negative for fever. Respiratory: Negative for shortness of breath. Cardiovascular: Negative for chest pain. Gastrointestinal: Positive for abdominal pain and anal bleeding. Neurological: Negative for headaches.    All other systems reviewed and are negative. Vitals:    03/05/18 2151   BP: 143/71   Pulse: 88   Resp: 18   Temp: 98.9 °F (37.2 °C)   SpO2: 96%            Physical Exam   Constitutional: She is oriented to person, place, and time. She appears well-developed and well-nourished. HENT:   Head: Normocephalic and atraumatic. Eyes: Conjunctivae and EOM are normal. Pupils are equal, round, and reactive to light. Neck: Normal range of motion. Neck supple. Cardiovascular: Normal rate and regular rhythm. Pulmonary/Chest: Effort normal and breath sounds normal.   Abdominal: Soft. Bowel sounds are normal. There is tenderness. LLQ   Genitourinary: Rectal exam shows guaiac positive stool. Genitourinary Comments: No bethany blood noted. Brown stool guaiac positive, no external hemorrhoids noted. Musculoskeletal: Normal range of motion. Chronic bilateral lymphedema. Neurological: She is alert and oriented to person, place, and time. She has normal reflexes. Skin: Skin is warm and dry. Psychiatric: She has a normal mood and affect. Her behavior is normal. Judgment and thought content normal.   Nursing note and vitals reviewed. MDM  Number of Diagnoses or Management Options  Acute cystitis without hematuria: established and improving  Blood in stool: established and improving  Noninfectious gastroenteritis, unspecified type: established and improving  Diagnosis management comments: I suspect possible colitis, diverticulitis, or internal hemorrhoids.   I will give iv fluids, nausea  Med, and ct scan ordered    0130 treated for uti    0200  Pt states she feels better we are waiting on ct results    0300 pt treated for colitis and will be d/cd to nursing home to continue meds and a bland diet       Amount and/or Complexity of Data Reviewed  Clinical lab tests: ordered and reviewed  Tests in the radiology section of CPT®: ordered and reviewed  Review and summarize past medical records: yes  Independent visualization of images, tracings, or specimens: yes    Risk of Complications, Morbidity, and/or Mortality  Presenting problems: moderate  Diagnostic procedures: moderate  Management options: moderate    Patient Progress  Patient progress: improved        ED Course       Procedures            Vitals:  Patient Vitals for the past 12 hrs:   Temp Pulse Resp BP SpO2   03/05/18 2151 98.9 °F (37.2 °C) 88 18 143/71 96 %       Medications ordered:   Medications   ciprofloxacin HCl (CIPRO) tablet 500 mg (not administered)   metroNIDAZOLE (FLAGYL) tablet 500 mg (not administered)   potassium chloride (K-DUR, KLOR-CON) SR tablet 40 mEq (not administered)   sodium chloride 0.9 % bolus infusion 500 mL (500 mL IntraVENous New Bag 3/5/18 1692)   ondansetron (ZOFRAN) injection 8 mg (4 mg IntraVENous Given 3/5/18 1793)   cefTRIAXone (ROCEPHIN) 2 g in sterile water (preservative free) 20 mL IV syringe (2 g IntraVENous Given 3/6/18 6236)         Lab findings:  Recent Results (from the past 12 hour(s))   CBC WITH AUTOMATED DIFF    Collection Time: 03/05/18  9:54 PM   Result Value Ref Range    WBC 12.1 4.6 - 13.2 K/uL    RBC 4.19 (L) 4.20 - 5.30 M/uL    HGB 11.5 (L) 12.0 - 16.0 g/dL    HCT 35.9 35.0 - 45.0 %    MCV 85.7 74.0 - 97.0 FL    MCH 27.4 24.0 - 34.0 PG    MCHC 32.0 31.0 - 37.0 g/dL    RDW 15.0 (H) 11.6 - 14.5 %    PLATELET 644 769 - 889 K/uL    MPV 10.0 9.2 - 11.8 FL    NEUTROPHILS 76 (H) 40 - 73 %    LYMPHOCYTES 15 (L) 21 - 52 %    MONOCYTES 8 3 - 10 %    EOSINOPHILS 1 0 - 5 %    BASOPHILS 0 0 - 2 %    ABS. NEUTROPHILS 9.3 (H) 1.8 - 8.0 K/UL    ABS. LYMPHOCYTES 1.8 0.9 - 3.6 K/UL    ABS. MONOCYTES 0.9 0.05 - 1.2 K/UL    ABS. EOSINOPHILS 0.1 0.0 - 0.4 K/UL    ABS.  BASOPHILS 0.0 0.0 - 0.1 K/UL    DF AUTOMATED     METABOLIC PANEL, BASIC    Collection Time: 03/05/18  9:54 PM   Result Value Ref Range    Sodium 136 136 - 145 mmol/L    Potassium 3.3 (L) 3.5 - 5.5 mmol/L    Chloride 99 (L) 100 - 108 mmol/L    CO2 30 21 - 32 mmol/L    Anion gap 7 3.0 - 18 mmol/L    Glucose 101 (H) 74 - 99 mg/dL    BUN 8 7.0 - 18 MG/DL    Creatinine 0.81 0.6 - 1.3 MG/DL    BUN/Creatinine ratio 10 (L) 12 - 20      GFR est AA >60 >60 ml/min/1.73m2    GFR est non-AA >60 >60 ml/min/1.73m2    Calcium 9.2 8.5 - 10.1 MG/DL   PROTHROMBIN TIME + INR    Collection Time: 03/05/18  9:54 PM   Result Value Ref Range    Prothrombin time 26.5 (H) 11.5 - 15.2 sec    INR 2.5 (H) 0.8 - 1.2     MAGNESIUM    Collection Time: 03/05/18  9:54 PM   Result Value Ref Range    Magnesium 2.1 1.6 - 2.6 mg/dL   URINALYSIS W/ RFLX MICROSCOPIC    Collection Time: 03/06/18 12:45 AM   Result Value Ref Range    Color YELLOW      Appearance CLEAR      Specific gravity 1.010 1.003 - 1.030      pH (UA) 7.0 5.0 - 8.0      Protein 30 (A) NEG mg/dL    Glucose NEGATIVE  NEG mg/dL    Ketone NEGATIVE  NEG mg/dL    Bilirubin NEGATIVE  NEG      Blood MODERATE (A) NEG      Urobilinogen 0.2 0.2 - 1.0 EU/dL    Nitrites NEGATIVE  NEG      Leukocyte Esterase LARGE (A) NEG     URINE MICROSCOPIC ONLY    Collection Time: 03/06/18 12:45 AM   Result Value Ref Range    WBC 10 to 14 0 - 4 /hpf    RBC 5 to 9 0 - 5 /hpf    Epithelial cells 1+ 0 - 5 /lpf    Bacteria 2+ (A) NEG /hpf          X-Ray, CT or other radiology findings or impressions:  CT ABD PELV WO CONT   Final Result        CT scan of abdomen and pelvis, without intravenous contrast:           INDICATION:     Abdominal pain.  Rectal bleeding.           TECHNIQUE:     Multidetector CT scan, with 5 mm slice thickness, without intravenous contrast,  without oral contrast, including abdomen and pelvis.     Coronal and sagittal images are reformatted.     All CT scans at this facility are performed using dose optimization technique as  appropriate to a  performed  examination, to include automated exposer control,  adjustment mA and / or  KV according to patient size (including appropriate  matching  for site specific examination), or use  of iterative reconstruction  technique.     COMPARISON STUDY: CT scan of chest, abdomen and pelvis on 4/20/2016.           FINDINGS:           CT ABDOMEN:           Bases of lungs: Minimal atelectatic/fibrotic changes at base of left lung. Subtle atelectasis at right lung base. No pleural effusion or hiatal hernia.     Pneumoperitoneum: No demonstrated.     Liver: No definable focal abnormality.     Gallbladder: No definable stone or diagnostic finding.     Pancreas: No diagnostic finding. No inflammatory process.     Spleen: Normal.     Bilateral adrenal glands:  Right adrenal gland appears to be normal.  In the left adrenal gland there is a nodule with average CT number of +15,  measuring 1.4 cm AP, 1.2 cm transverse, unchanged as compared to previous study.     Bilateral kidneys, ureters and bladder:  No demonstrable stone, mass or hydronephrosis in both kidneys. Ureters of both  sides appear to be grossly of normal size without definable stone or  hydroureter. No demonstrable abnormality in bladder. There are multiple phleboliths in lower posterior pelvis bilaterally as before.     Abdominal aorta and retroperitoneum:  Distal abdominal aorta is ectatic with diameter of 2.67 cm, similar to previous  study. Moderate calcified plaques in the aortic walls. No evidence of periaortic or mesenteric pathologic lymphadenopathy. No evidence  of retroperitoneal mass or hematoma.     Ascites:  No demonstrated.     GI tract:     [Is significantly contracted. In the distal stoma there is no grossly definable  abnormality.     The study shows small to moderate amount of gas scattered in multiple loops of  small bowel with a few small fluid levels without obvious small bowel  thickening. Cecum is in low position on in right side of pelvis. Appendix is not identified  and there is no secondary sign of acute appendicitis. Moderate to large amount  of stool present in cecum and ascending colon.  Transverse colon contains  moderate amount of gas and small amount of stool. The study demonstrates inflammatory process surrounding the splenic flexure of  colon and most of the descending colon. At the splenic flexure of colon there is  no obvious diverticulosis. In the lower descending colon without evidence of  inflammation there is single small diverticulum noted. The walls of the splenic  flexure of colon and descending colon appear to be thickened, indicating  colitis. Minimal gas can be identified in these segments of colon. Small amount of stool and gas are present in sigmoid colon and rectum. Mild  diverticulosis of sigmoid colon, without evidence of diverticulitis. There is no evidence of perirectal or ischiorectal abnormality.           CT PELVIS:     Evidence of previous hysterectomy. There are multiple surgical clips present in  the presacral area and periaortic areas around aortic bifurcation. There is no  abnormal fluid collection or inflammatory process in pelvis. No diagnostic  finding in bladder. Mild sigmoid diverticulosis without diverticulitis.     No evidence of inguinal, femoral or ventral hernia.     In the bony structures of pelvis and lumbar spine there is no focal lesion. Noted made of findings of previous posterior spinal fusion from T9 level through  L2 level. Evidence of previous vertebroplasty in T11 vertebral body. Evidence of  previous corpectomy and graft placement in the compressed T12 vertebral body.           IMPRESSION  IMPRESSION:     The study shows spastic with thickened wall involving left end of transverse  colon, the splenic flexure of colon and most of descending colon with pericolic  inflammatory changes, consistent with colitis. There is no definite  diverticulosis of splenic flexure of colon or the affected portion of the  descending colon and therefore diverticulitis is less likely possibility.  No  evidence of colonic perforation or abscess formation.     Mild diverticulosis of sigmoid colon, without diverticulitis and without  colitis.     Moderate to large amount of retained stool in proximal colon.     Small to moderate amount of gas scattered in small bowel, probably indicating  mild ileus.     No obvious abnormality involving rectum or perirectal areas. Progress notes, Consult notes or additional Procedure notes:         Reevaluation of patient:   I have reassessed the patient. Patient is feeling better and is asking to go home      Disposition:    Diagnosis:   1. Noninfectious gastroenteritis, unspecified type    2. Acute cystitis without hematuria    3. Blood in stool        Disposition: to Home      Follow-up Information     Follow up With Details Comments 2017 Salty Walters MD In 2 days  [address] [facility] [id number]  [phone]             Patient's Medications   Start Taking    CIPROFLOXACIN HCL (CIPRO) 500 MG TABLET    Take 1 Tab by mouth two (2) times a day for 7 days. DICYCLOMINE (BENTYL) 20 MG TABLET    Take 1 Tab by mouth every six (6) hours for 20 doses. METRONIDAZOLE (FLAGYL) 500 MG TABLET    Take 1 Tab by mouth two (2) times a day for 10 days. ONDANSETRON (ZOFRAN ODT) 8 MG DISINTEGRATING TABLET    Take 1 Tab by mouth every eight (8) hours as needed for Nausea for up to 12 doses. Continue Taking    ALBUTEROL (PROVENTIL VENTOLIN) 2.5 MG /3 ML (0.083 %) NEBULIZER SOLUTION    3 mL by Nebulization route every four (4) hours as needed for Wheezing. ATORVASTATIN (LIPITOR) 40 MG TABLET    Take 1 Tab by mouth nightly. BISACODYL (DULCOLAX) 10 MG SUPPOSITORY    Insert 10 mg into rectum as needed. CETIRIZINE (ZYRTEC) 10 MG CAP    Take  by mouth. DIPHENOXYLATE-ATROPINE (LOMOTIL) 2.5-0.025 MG PER TABLET    Take 1 Tab by mouth four (4) times daily as needed for Diarrhea (1 tab after each stool for max 8 per day). Max Daily Amount: 4 Tabs.  Take after each stool for a maximum of 8 tablets daily    FERROUS SULFATE 325 MG (65 MG IRON) TABLET    Take 1 Tab by mouth two (2) times daily (with meals). GABAPENTIN (NEURONTIN) 400 MG CAPSULE    Take 400 mg by mouth three (3) times daily. ISONIAZID (NYDRAZID) 300 MG TABLET    Take 1 Tab by mouth daily. NYSTATIN (MYCOSTATIN) POWDER    Apply  to affected area two (2) times a day. OXYCODONE-ACETAMINOPHEN (PERCOCET 10)  MG PER TABLET    Take 1 Tab by mouth every four (4) hours as needed. Max Daily Amount: 6 Tabs. POLYETHYLENE GLYCOL (MIRALAX) 17 GRAM PACKET    Take 1 Packet by mouth daily. PYRIDOXINE, VITAMIN B6, (VITAMIN B-6) 25 MG TABLET    Take 1 Tab by mouth daily. Indications: DRUG-INDUCED PYRIDOXINE DEFICIENCY    RIVAROXABAN (XARELTO) 15 MG TAB TABLET    Take  by mouth daily. SENNA-DOCUSATE (PERICOLACE) 8.6-50 MG PER TABLET    Take 2 Tabs by mouth daily. These Medications have changed    No medications on file   Stop Taking    ONDANSETRON (ZOFRAN ODT) 4 MG DISINTEGRATING TABLET    Take 1 Tab by mouth every eight (8) hours as needed. Return to the ER if you are unable to obtain referral as directed. Cathy Manifold  results have been reviewed with her. She has been counseled regarding her diagnosis, treatment, and plan. She verbally conveys understanding and agreement of the signs, symptoms, diagnosis, treatment and prognosis and additionally agrees to follow up as discussed. She also agrees with the care-plan and conveys that all of her questions have been answered. I have also provided discharge instructions for her that include: educational information regarding their diagnosis and treatment, and list of reasons why they would want to return to the ED prior to their follow-up appointment, should her condition change.       Piyush JUAREZ

## 2018-03-06 NOTE — ED TRIAGE NOTES
Pt arrived by EMS for c/o rectal bleeding that started yesterday from [address] [facility]. Pt has had nausea, vomiting, diarrhea that started Saturday. Pt states that she had \"a lot of bright red blood\" from her rectum.

## 2018-05-31 ENCOUNTER — APPOINTMENT (OUTPATIENT)
Dept: CT IMAGING | Age: 63
DRG: 253 | End: 2018-05-31
Attending: EMERGENCY MEDICINE
Payer: MEDICAID

## 2018-05-31 ENCOUNTER — HOSPITAL ENCOUNTER (INPATIENT)
Age: 63
LOS: 2 days | Discharge: NURSING, REHAB OR LONG-TERM CARE | DRG: 253 | End: 2018-06-02
Attending: EMERGENCY MEDICINE | Admitting: INTERNAL MEDICINE
Payer: MEDICAID

## 2018-05-31 DIAGNOSIS — Z79.01 ANTICOAGULANT LONG-TERM USE: ICD-10-CM

## 2018-05-31 DIAGNOSIS — K92.2 ACUTE LOWER GI HEMORRHAGE: Primary | ICD-10-CM

## 2018-05-31 LAB
ABO + RH BLD: NORMAL
ALBUMIN SERPL-MCNC: 3.7 G/DL (ref 3.4–5)
ALBUMIN/GLOB SERPL: 0.8 {RATIO} (ref 0.8–1.7)
ALP SERPL-CCNC: 118 U/L (ref 45–117)
ALT SERPL-CCNC: 14 U/L (ref 13–56)
ANION GAP SERPL CALC-SCNC: 4 MMOL/L (ref 3–18)
APTT PPP: 36.2 SEC (ref 23–36.4)
AST SERPL-CCNC: 14 U/L (ref 15–37)
BASOPHILS # BLD: 0 K/UL (ref 0–0.06)
BASOPHILS NFR BLD: 0 % (ref 0–2)
BILIRUB SERPL-MCNC: 0.4 MG/DL (ref 0.2–1)
BLOOD GROUP ANTIBODIES SERPL: NORMAL
BUN SERPL-MCNC: 13 MG/DL (ref 7–18)
BUN/CREAT SERPL: 18 (ref 12–20)
CALCIUM SERPL-MCNC: 9.4 MG/DL (ref 8.5–10.1)
CHLORIDE SERPL-SCNC: 104 MMOL/L (ref 100–108)
CO2 SERPL-SCNC: 31 MMOL/L (ref 21–32)
CREAT SERPL-MCNC: 0.74 MG/DL (ref 0.6–1.3)
DIFFERENTIAL METHOD BLD: NORMAL
EOSINOPHIL # BLD: 0.1 K/UL (ref 0–0.4)
EOSINOPHIL NFR BLD: 2 % (ref 0–5)
ERYTHROCYTE [DISTWIDTH] IN BLOOD BY AUTOMATED COUNT: 14.5 % (ref 11.6–14.5)
GLOBULIN SER CALC-MCNC: 4.5 G/DL (ref 2–4)
GLUCOSE SERPL-MCNC: 89 MG/DL (ref 74–99)
HCT VFR BLD AUTO: 35.2 % (ref 35–45)
HCT VFR BLD AUTO: 37.8 % (ref 35–45)
HGB BLD-MCNC: 11.5 G/DL (ref 12–16)
HGB BLD-MCNC: 12.3 G/DL (ref 12–16)
INR PPP: 1.1 (ref 0.8–1.2)
LIPASE SERPL-CCNC: 117 U/L (ref 73–393)
LYMPHOCYTES # BLD: 1.9 K/UL (ref 0.9–3.6)
LYMPHOCYTES NFR BLD: 30 % (ref 21–52)
MCH RBC QN AUTO: 28.1 PG (ref 24–34)
MCHC RBC AUTO-ENTMCNC: 32.5 G/DL (ref 31–37)
MCV RBC AUTO: 86.3 FL (ref 74–97)
MONOCYTES # BLD: 0.4 K/UL (ref 0.05–1.2)
MONOCYTES NFR BLD: 6 % (ref 3–10)
NEUTS SEG # BLD: 3.8 K/UL (ref 1.8–8)
NEUTS SEG NFR BLD: 62 % (ref 40–73)
PLATELET # BLD AUTO: 190 K/UL (ref 135–420)
PMV BLD AUTO: 9.6 FL (ref 9.2–11.8)
POTASSIUM SERPL-SCNC: 3.8 MMOL/L (ref 3.5–5.5)
PROT SERPL-MCNC: 8.2 G/DL (ref 6.4–8.2)
PROTHROMBIN TIME: 13.7 SEC (ref 11.5–15.2)
RBC # BLD AUTO: 4.38 M/UL (ref 4.2–5.3)
SODIUM SERPL-SCNC: 139 MMOL/L (ref 136–145)
SPECIMEN EXP DATE BLD: NORMAL
TROPONIN I SERPL-MCNC: <0.02 NG/ML (ref 0–0.04)
WBC # BLD AUTO: 6.1 K/UL (ref 4.6–13.2)

## 2018-05-31 PROCEDURE — 74011250637 HC RX REV CODE- 250/637: Performed by: INTERNAL MEDICINE

## 2018-05-31 PROCEDURE — 96360 HYDRATION IV INFUSION INIT: CPT

## 2018-05-31 PROCEDURE — 85610 PROTHROMBIN TIME: CPT | Performed by: EMERGENCY MEDICINE

## 2018-05-31 PROCEDURE — 94761 N-INVAS EAR/PLS OXIMETRY MLT: CPT

## 2018-05-31 PROCEDURE — 74011250636 HC RX REV CODE- 250/636: Performed by: INTERNAL MEDICINE

## 2018-05-31 PROCEDURE — 84484 ASSAY OF TROPONIN QUANT: CPT | Performed by: EMERGENCY MEDICINE

## 2018-05-31 PROCEDURE — 93005 ELECTROCARDIOGRAM TRACING: CPT

## 2018-05-31 PROCEDURE — 85025 COMPLETE CBC W/AUTO DIFF WBC: CPT | Performed by: EMERGENCY MEDICINE

## 2018-05-31 PROCEDURE — 80053 COMPREHEN METABOLIC PANEL: CPT | Performed by: EMERGENCY MEDICINE

## 2018-05-31 PROCEDURE — 99284 EMERGENCY DEPT VISIT MOD MDM: CPT

## 2018-05-31 PROCEDURE — 85730 THROMBOPLASTIN TIME PARTIAL: CPT | Performed by: EMERGENCY MEDICINE

## 2018-05-31 PROCEDURE — 85018 HEMOGLOBIN: CPT | Performed by: INTERNAL MEDICINE

## 2018-05-31 PROCEDURE — 65660000000 HC RM CCU STEPDOWN

## 2018-05-31 PROCEDURE — 36415 COLL VENOUS BLD VENIPUNCTURE: CPT | Performed by: INTERNAL MEDICINE

## 2018-05-31 PROCEDURE — 74176 CT ABD & PELVIS W/O CONTRAST: CPT

## 2018-05-31 PROCEDURE — 83690 ASSAY OF LIPASE: CPT | Performed by: EMERGENCY MEDICINE

## 2018-05-31 PROCEDURE — 74011250636 HC RX REV CODE- 250/636: Performed by: EMERGENCY MEDICINE

## 2018-05-31 PROCEDURE — 86850 RBC ANTIBODY SCREEN: CPT | Performed by: EMERGENCY MEDICINE

## 2018-05-31 PROCEDURE — 96361 HYDRATE IV INFUSION ADD-ON: CPT

## 2018-05-31 RX ORDER — MORPHINE SULFATE 4 MG/ML
2 INJECTION INTRAVENOUS
Status: DISCONTINUED | OUTPATIENT
Start: 2018-05-31 | End: 2018-06-02 | Stop reason: HOSPADM

## 2018-05-31 RX ORDER — ONDANSETRON 2 MG/ML
4 INJECTION INTRAMUSCULAR; INTRAVENOUS
Status: DISCONTINUED | OUTPATIENT
Start: 2018-05-31 | End: 2018-06-02 | Stop reason: HOSPADM

## 2018-05-31 RX ORDER — ATORVASTATIN CALCIUM 40 MG/1
40 TABLET, FILM COATED ORAL
Status: DISCONTINUED | OUTPATIENT
Start: 2018-05-31 | End: 2018-06-02 | Stop reason: HOSPADM

## 2018-05-31 RX ORDER — OXYCODONE AND ACETAMINOPHEN 5; 325 MG/1; MG/1
1 TABLET ORAL
Status: DISCONTINUED | OUTPATIENT
Start: 2018-05-31 | End: 2018-06-02 | Stop reason: HOSPADM

## 2018-05-31 RX ORDER — ACETAMINOPHEN 325 MG/1
650 TABLET ORAL
Status: DISCONTINUED | OUTPATIENT
Start: 2018-05-31 | End: 2018-06-02 | Stop reason: HOSPADM

## 2018-05-31 RX ORDER — NALOXONE HYDROCHLORIDE 0.4 MG/ML
0.4 INJECTION, SOLUTION INTRAMUSCULAR; INTRAVENOUS; SUBCUTANEOUS AS NEEDED
Status: DISCONTINUED | OUTPATIENT
Start: 2018-05-31 | End: 2018-06-02 | Stop reason: HOSPADM

## 2018-05-31 RX ORDER — DOCUSATE SODIUM 100 MG/1
100 CAPSULE, LIQUID FILLED ORAL
Status: DISCONTINUED | OUTPATIENT
Start: 2018-05-31 | End: 2018-06-02 | Stop reason: HOSPADM

## 2018-05-31 RX ORDER — ENOXAPARIN SODIUM 100 MG/ML
40 INJECTION SUBCUTANEOUS EVERY 24 HOURS
Status: DISCONTINUED | OUTPATIENT
Start: 2018-05-31 | End: 2018-05-31

## 2018-05-31 RX ORDER — GABAPENTIN 400 MG/1
400 CAPSULE ORAL 3 TIMES DAILY
Status: DISCONTINUED | OUTPATIENT
Start: 2018-05-31 | End: 2018-06-02 | Stop reason: HOSPADM

## 2018-05-31 RX ORDER — SODIUM CHLORIDE 9 MG/ML
75 INJECTION, SOLUTION INTRAVENOUS CONTINUOUS
Status: DISCONTINUED | OUTPATIENT
Start: 2018-05-31 | End: 2018-06-01

## 2018-05-31 RX ORDER — ALBUTEROL SULFATE 0.83 MG/ML
2.5 SOLUTION RESPIRATORY (INHALATION)
Status: DISCONTINUED | OUTPATIENT
Start: 2018-05-31 | End: 2018-06-02 | Stop reason: HOSPADM

## 2018-05-31 RX ADMIN — OXYCODONE HYDROCHLORIDE AND ACETAMINOPHEN 1 TABLET: 5; 325 TABLET ORAL at 21:50

## 2018-05-31 RX ADMIN — GABAPENTIN 400 MG: 400 CAPSULE ORAL at 21:51

## 2018-05-31 RX ADMIN — ATORVASTATIN CALCIUM 40 MG: 40 TABLET, FILM COATED ORAL at 21:50

## 2018-05-31 RX ADMIN — SODIUM CHLORIDE 75 ML/HR: 900 INJECTION, SOLUTION INTRAVENOUS at 23:37

## 2018-05-31 RX ADMIN — SODIUM CHLORIDE 500 ML: 900 INJECTION, SOLUTION INTRAVENOUS at 14:30

## 2018-05-31 RX ADMIN — ENOXAPARIN SODIUM 40 MG: 40 INJECTION SUBCUTANEOUS at 21:51

## 2018-05-31 NOTE — ED NOTES
TRANSFER - OUT REPORT:    Verbal report given to THE [facility] SURGICAL \[facility] [place] [facility]\"" RN (name) on Crissy Wilson  being transferred to 206(unit) for routine progression of care       Report consisted of patients Situation, Background, Assessment and   Recommendations(SBAR). Information from the following report(s) ED Summary, Intake/Output and MAR was reviewed with the receiving nurse. Lines:   Peripheral IV 05/31/18 Right Forearm (Active)   Site Assessment Clean, dry, & intact 5/31/2018  1:59 PM   Dressing Status Clean, dry, & intact 5/31/2018  1:59 PM        Opportunity for questions and clarification was provided.       Patient transported with:   Registered Nurse

## 2018-05-31 NOTE — ROUTINE PROCESS
Bedside shift change report given to Me (oncoming nurse) by Hany Chavez RN (offgoing nurse). Report included the following information SBAR, Kardex, MAR and Recent Results. Patient received to room 206. Voiced no complaints. Rails up x2. Oriented patient to the room, including call bell system. Patient voiced understanding. 1180 Patient's potassium is 3.2. Telephone orders received from Dr Mikel Altamirano for 40 meq Hollis Skipper now, and repeat in 4 hours. 0715  Bedside shift change report given to 5 [facility] (oncoming nurse) by Me (offgoing nurse). Report included the following information SBAR, Kardex, MAR, Recent Results and Cardiac Rhythm NSR.

## 2018-05-31 NOTE — IP AVS SNAPSHOT
[facility] 
 
 
 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 
[phone] Discharge Orders None A check annemarie indicates which time of day the medication should be taken. My Medications START taking these medications Instructions Each Dose to Equal  
 Morning Noon Evening Bedtime  
 rivaroxaban 10 mg tablet Commonly known as:  Lonzell Lust Your last dose was: Your next dose is: Take 1 Tab by mouth daily (with breakfast). 10 mg CONTINUE taking these medications Instructions Each Dose to Equal  
 Morning Noon Evening Bedtime  
 albuterol 2.5 mg /3 mL (0.083 %) nebulizer solution Commonly known as:  PROVENTIL VENTOLIN Your last dose was: Your next dose is:    
   
   
 3 mL by Nebulization route every four (4) hours as needed for Wheezing. 2.5 mg  
    
   
   
   
  
 atorvastatin 40 mg tablet Commonly known as:  LIPITOR Your last dose was: Your next dose is: Take 1 Tab by mouth nightly. 40 mg  
    
   
   
   
  
 bisacodyl 10 mg suppository Commonly known as:  DULCOLAX Your last dose was: Your next dose is: Insert 10 mg into rectum as needed.   
 10 mg  
    
   
   
   
 diphenoxylate-atropine 2.5-0.025 mg per tablet Commonly known as:  LOMOTIL Your last dose was: Your next dose is: Take 1 Tab by mouth four (4) times daily as needed for Diarrhea (1 tab after each stool for max 8 per day). Max Daily Amount: 4 Tabs. Take after each stool for a maximum of 8 tablets daily 1 Tab  
    
   
   
   
  
 ferrous sulfate 325 mg (65 mg iron) tablet Your last dose was: Your next dose is: Take 1 Tab by mouth two (2) times daily (with meals). 325 mg  
    
   
   
   
  
 gabapentin 400 mg capsule Commonly known as:  NEURONTIN Your last dose was: Your next dose is: Take 400 mg by mouth three (3) times daily. 400 mg  
    
   
   
   
  
 nystatin powder Commonly known as:  MYCOSTATIN Your last dose was: Your next dose is:    
   
   
 Apply  to affected area two (2) times a day. ondansetron 8 mg disintegrating tablet Commonly known as:  ZOFRAN ODT Your last dose was: Your next dose is: Take 1 Tab by mouth every eight (8) hours as needed for Nausea for up to 12 doses. 8 mg  
    
   
   
   
  
 oxyCODONE-acetaminophen  mg per tablet Commonly known as:  PERCOCET 10 Your last dose was: Your next dose is: Take 1 Tab by mouth every four (4) hours as needed. Max Daily Amount: 6 Tabs. 1 Tab  
    
   
   
   
  
 polyethylene glycol 17 gram packet Commonly known as:  Juan Baronten Your last dose was: Your next dose is: Take 1 Packet by mouth daily. 17 g  
    
   
   
   
  
 senna-docusate 8.6-50 mg per tablet Commonly known as:  Brigid Hensley Your last dose was: Your next dose is: Take 2 Tabs by mouth daily. 2 Tab ZyrTEC 10 mg Cap Generic drug:  Cetirizine Your last dose was: Your next dose is: Take  by mouth. ASK your doctor about these medications Instructions Each Dose to Equal  
 Morning Noon Evening Bedtime  
 isoniazid 300 mg tablet Commonly known as:  NYDRAZID Your last dose was: Your next dose is: Take 1 Tab by mouth daily. 300 mg  
    
   
   
   
  
 pyridoxine (vitamin B6) 25 mg tablet Commonly known as:  VITAMIN B-6 Your last dose was: Your next dose is: Take 1 Tab by mouth daily. Indications: DRUG-INDUCED PYRIDOXINE DEFICIENCY  
 25 mg Where to Get Your Medications Information on where to get these meds will be given to you by the nurse or doctor. ! Ask your nurse or doctor about these medications  
  rivaroxaban 10 mg tablet Opioid Education Prescription Opioids: What You Need to Know: 
 
 
 
F-face looks uneven A-arms unable to move or move unevenly S-speech slurred or non-existent T-time-call 911 as soon as signs and symptoms begin-DO NOT go Back to bed or wait to see if you get better-TIME IS BRAIN. Warning Signs of HEART ATTACK Call 911 if you have these symptoms: 
? Chest discomfort.  Most heart attacks involve discomfort in the center of the chest that lasts more than a few minutes, or that goes away and comes back. It can feel like uncomfortable pressure, squeezing, fullness, or pain. ? Discomfort in other areas of the upper body. Symptoms can include pain or discomfort in one or both arms, the back, neck, jaw, or stomach. ? Shortness of breath with or without chest discomfort. ? Other signs may include breaking out in a cold sweat, nausea, or lightheadedness. Don't wait more than five minutes to call [address]! Fast action can save your life. Calling 911 is almost always the fastest way to get lifesaving treatment. Emergency Medical Services staff can begin treatment when they arrive  up to an hour sooner than if someone gets to the hospital by car. The discharge information has been reviewed with the patient. The patient verbalized understanding. Discharge medications reviewed with the patient and appropriate educational materials and side effects teaching were provided. [employer] Activation Thank you for requesting access to [employer]. Please follow the instructions below to securely access and download your online medical record. [employer] allows you to send messages to your doctor, view your test results, renew your prescriptions, schedule appointments, and more. How Do I Sign Up? 1. In your internet browser, go to www.[place] SPARK 
2. Click on the First Time User? Click Here link in the Sign In box. You will be redirect to the New Member Sign Up page. 3. Enter your [employer] Access Code exactly as it appears below. You will not need to use this code after youve completed the sign-up process. If you do not sign up before the expiration date, you must request a new code. [employer] Access Code: [id number] Expires: 7/15/2018  1:14 PM (This is the date your [employer] access code will ) 4. Enter the last four digits of your Social Security Number (xxxx) and Date of Birth (mm/dd/yyyy) as indicated and click Submit. You will be taken to the next sign-up page. 5. Create a Imalogixt ID. This will be your [employer] login ID and cannot be changed, so think of one that is secure and easy to remember. 6. Create a [employer] password. You can change your password at any time. 7. Enter your Password Reset Question and Answer. This can be used at a later time if you forget your password. 8. Enter your e-mail address. You will receive e-mail notification when new information is available in [address] 9. Click Sign Up. You can now view and download portions of your medical record. 10. Click the Download Summary menu link to download a portable copy of your medical information. Additional Information If you have questions, please visit the Frequently Asked Questions section of the [employer] website at [email or web address] Remember, [employer] is NOT to be used for urgent needs. For medical emergencies, dial 911. Patient armband removed and shredded. ___________________________________________________________________________________________________________________________________ Med[employer]eth[employer] Announcement We are excited to announce that we are making your provider's discharge notes available to you in [employer]. You will see these notes when they are completed and signed by the physician that discharged you from your recent hospital stay. If you have any questions or concerns about any information you see in [employer], please call the Health Information Department where you were seen or reach out to your Primary Care Provider for more information about your plan of care. Introducing [facility]! Dom Portillo introduces [employer] patient portal. Now you can access parts of your medical record, email your doctor's office, and request medication refills online. 1. In your internet browser, go to [email or web address] 2. Click on the First Time User? Click Here link in the Sign In box.  You will see the New Member Sign Up page. 3. Enter your [employer] Access Code exactly as it appears below. You will not need to use this code after youve completed the sign-up process. If you do not sign up before the expiration date, you must request a new code. · [employer] Access Code: [id number] Expires: 7/15/2018  1:14 PM 
 
4. Enter the last four digits of your Social Security Number (xxxx) and Date of Birth (mm/dd/yyyy) as indicated and click Submit. You will be taken to the next sign-up page. 5. Create a [employer] ID. This will be your [employer] login ID and cannot be changed, so think of one that is secure and easy to remember. 6. Create a Lot18t password. You can change your password at any time. 7. Enter your Password Reset Question and Answer. This can be used at a later time if you forget your password. 8. Enter your e-mail address. You will receive e-mail notification when new information is available in [address] 9. Click Sign Up. You can now view and download portions of your medical record. 10. Click the Download Summary menu link to download a portable copy of your medical information. If you have questions, please visit the Frequently Asked Questions section of the [employer] website. Remember, [employer] is NOT to be used for urgent needs. For medical emergencies, dial 911. Now available from your iPhone and Android! Introducing José Jackson As a [facility] patient, I wanted to make you aware of our electronic visit tool called José Jackson. [facility] 24/7 allows you to connect within minutes with a medical provider 24 hours a day, seven days a week via a mobile device or tablet or logging into a secure website from your computer. You can access José Anujstanislawfin from anywhere in the [place].  
 
A virtual visit might be right for you when you have a simple condition and feel like you just dont want to get out of bed, or cant get away from work for an appointment, when your regular [employer] provider is not available (evenings, weekends or holidays), or when youre out of town and need minor care. Electronic visits cost only $49 and if the [employer] 24/7 provider determines a prescription is needed to treat your condition, one can be electronically transmitted to a nearby pharmacy*. Please take a moment to enroll today if you have not already done so. The enrollment process is free and takes just a few minutes. To enroll, please download the [employer] 24/7 masood to your tablet or phone, or visit [email or web address] to enroll on your computer. And, as an [address] [place] [address] patient with a KeepGo account, the results of your visits will be scanned into your electronic medical record and your primary care provider will be able to view the scanned results. We urge you to continue to see your regular [employer] provider for your ongoing medical care. And while your primary care provider may not be the one available when you seek a [employer] virtual visit, the peace of mind you get from getting a real diagnosis real time can be priceless. For more information on [employer], view our Frequently Asked Questions (FAQs) at [email or web address]. Sincerely, 
 
Nasir Nguyễn MD 
Chief Medical Officer [address] *:  certain medications cannot be prescribed via [employer] Unresulted Labs-Please follow up with your PCP about these lab tests Order Current Status DUPLEX LOWER EXT VENOUS BILAT Preliminary result Providers Seen During Your Hospitalization Provider Specialty Primary office phone Sallyann Sandhoff, MD Emergency Medicine [phone] Gerson Curtis MD Hospitalist [phone] Jakub Chester MD Internal Medicine [phone] Your Primary Care Physician (PCP) Primary Care Physician Office Phone Office Fax [facility] [phone] You are allergic to the following Allergen Reactions Iodinated Contrast- Oral And Iv Dye Other (comments) Patient states will pass out. Recent Documentation Height Weight Breastfeeding? BMI Smoking Status 1.626 m 111.8 kg No 42.31 kg/m2 Former Smoker Emergency Contacts Name Discharge Info Relation Home Work Mobile [address] CAREGIVER [3] Other Relative [6] [phone] [facility] Franck CAREGIVER [3] Sister [23] [phone] Patient Belongings The following personal items are in your possession at time of discharge: 
  Dental Appliances: None  Visual Aid: Glasses      Home Medications: None   Jewelry: Watch  Clothing: Bathrobe, Pajamas, Undergarments    Other Valuables: Cell Phone () Discharge Instructions Attachments/References GI BLEED (ENGLISH) RIVAROXABAN (BY MOUTH) (ENGLISH) Patient Handouts Gastrointestinal Bleeding: Care Instructions Your Care Instructions The digestive or gastrointestinal tract goes from the mouth to the anus. It is often called the GI tract. Bleeding can happen anywhere in the GI tract. It may be caused by an ulcer, an infection, or cancer. It may also be caused by medicines such as aspirin or ibuprofen. Light bleeding may not cause any symptoms at first. But if you continue to bleed for a while, you may feel very weak or tired. Sudden, heavy bleeding means you need to see a doctor right away. This kind of bleeding can be very dangerous. But it can usually be cured or controlled. The doctor may do some tests to find the cause of your bleeding. Follow-up care is a key part of your treatment and safety. Be sure to make and go to all appointments, and call your doctor if you are having problems. It's also a good idea to know your test results and keep a list of the medicines you take. How can you care for yourself at home? · Be safe with medicines. Take your medicines exactly as prescribed. Call your doctor if you think you are having a problem with your medicine. You will get more details on the specific medicines your doctor prescribes. · Do not take aspirin or other anti-inflammatory medicines, such as naproxen (Aleve) or ibuprofen (Advil, Motrin), without talking to your doctor first. Ask your doctor if it is okay to use acetaminophen (Tylenol). · Do not drink alcohol. · The bleeding may make you lose iron. So it's important to eat foods that have a lot of iron. These include red meat, shellfish, poultry, and eggs. They also include beans, raisins, whole-grain breads, and leafy green vegetables. If you want help planning meals, you can make an appointment with a dietitian. When should you call for help? Call 911 anytime you think you may need emergency care. For example, call if: 
? · You have sudden, severe belly pain. ? · You vomit blood or what looks like coffee grounds. ? · You passed out (lost consciousness). ? · Your stools are maroon or very bloody. ?Call your doctor now or seek immediate medical care if: 
? · You are dizzy or lightheaded, or you feel like you may faint. ? · Your stools are black and look like tar, or they have streaks of blood. ? · You have belly pain. ? · You vomit or have nausea. ? · You have trouble swallowing, or it hurts when you swallow. ? Watch closely for changes in your health, and be sure to contact your doctor if: 
? · You do not get better as expected. Where can you learn more? Go to http://wyatt-phillip.info/. Enter O435 in the search box to learn more about \"Gastrointestinal Bleeding: Care Instructions. \" Current as of: March 20, 2017 Content Version: 11.4 © 0162-8190 [employer].  Care instructions adapted under license by [employer] (which disclaims liability or warranty for this information). If you have questions about a medical condition or this instruction, always ask your healthcare professional. [address] any warranty or liability for your use of this information. Rivaroxaban (By mouth) Rivaroxaban (iwv-i-MUH-a-ban) Treats and prevents blood clots, which lowers the risk of stroke, deep vein thrombosis (DVT), pulmonary embolism (PE), and similar conditions. This medicine is a blood thinner. Brand Name(s): Xarelto, Xarelto Starter Pack There may be other brand names for this medicine. When This Medicine Should Not Be Used: This medicine is not right for everyone. Do not use it if you had an allergic reaction to rivaroxaban, or you have severe bleeding. How to Use This Medicine:  
Tablet · Take this medicine as directed, and take it at the same time each day. · 10-milligram (mg) tablet: Take with or without food. · 15-mg or 20-mg tablet: Take with food. · If you cannot swallow the tablets, you may crush the tablet and mix it with applesauce. Eat some food after you swallow the mixture. · Tube feeding: You may crush the tablet and mix the medicine in 50 milliliters (mL) of water before giving it via the tube. This must be followed by a feeding. · This medicine should come with a Medication Guide. Ask your pharmacist for a copy if you do not have one. · Missed dose: ¨ Ask your doctor or pharmacist if you are not sure what to do if you miss a dose. ¨ Once-daily dose: If you miss a dose or forget to use your medicine, use it as soon as you can on the same day. Do not use extra medicine to make up for a missed dose. ¨ Twice-daily dose to treat a blood clot (15-mg tablet): If you miss a dose or forget to use your medicine, use it as soon as you can on the same day. You may take 2 doses at the same time to make up for the missed dose. This is only for people who take a total of 30 mg per day. · Store the medicine in a closed container at room temperature, away from heat, moisture, and direct light. Drugs and Foods to Avoid: Ask your doctor or pharmacist before using any other medicine, including over-the-counter medicines, vitamins, and herbal products. · Some foods and medicines can affect how rivaroxaban works. Tell your doctor if you are using any of the following: ¨ NSAID medicine (including aspirin, celecoxib, diclofenac, ibuprofen, naproxen) ¨ Ketoconazole, itraconazole, lopinavir, ritonavir, indinavir, conivaptan, carbamazepine, phenytoin, rifampin, Charline's wort ¨ Another blood thinner (including clopidogrel, enoxaparin, heparin, warfarin) Warnings While Using This Medicine: · Tell your doctor if you are pregnant or breastfeeding, or if you have kidney disease, liver disease, bleeding problems, or an artificial heart valve. · This medicine may increase your risk of bleeding. Be careful to avoid injuries that could cause bleeding. Stay away from rough sports or other situations where you could be bruised, cut, or hurt. Brush and floss your teeth gently. Be careful when using sharp objects, including razors and fingernail clippers. Avoid picking your nose. If you need to blow your nose, blow it gently. · This medicine may cause nerve damage if you have a medical procedure done to your back, including anesthesia or a spinal puncture. This is more likely to happen if you have a history of back injury, back surgery, problems with your spine, or procedures or punctures to your back. Tell your doctor if you are also taking another blood thinner, because this also increases the risk. · Do not stop using this medicine suddenly without asking your doctor. You might have a higher risk of stroke for a short time after you stop using this medicine. · Tell any doctor or dentist who treats you that you are using this medicine. · Your doctor will do lab tests at regular visits to check on the effects of this medicine. Keep all appointments. · Keep all medicine out of the reach of children. Never share your medicine with anyone. Possible Side Effects While Using This Medicine:  
Call your doctor right away if you notice any of these side effects: · Allergic reaction: Itching or hives, swelling in your face or hands, swelling or tingling in your mouth or throat, chest tightness, trouble breathing · Blistering, peeling, or red skin rash · Decrease in how much or how often you urinate · Heavy menstrual bleeding, or vaginal bleeding · Red or brown urine, bloody or black, tarry stools · Unusual bleeding or bruising, including frequent nosebleeds · Vomiting blood or material that looks like coffee grounds If you notice other side effects that you think are caused by this medicine, tell your doctor. Call your doctor for medical advice about side effects. You may report side effects to FDA at 5-703-FDA-5931 © 2017 [address] Information is for End User's use only and may not be sold, redistributed or otherwise used for commercial purposes. The above information is an  only. It is not intended as medical advice for individual conditions or treatments. Talk to your doctor, nurse or pharmacist before following any medical regimen to see if it is safe and effective for you. Please provide this summary of care documentation to your next provider. Signatures-by signing, you are acknowledging that this After Visit Summary has been reviewed with you and you have received a copy. Patient Signature:  ____________________________________________________________ Date:  ____________________________________________________________  
  
[facility] Provider Signature:  ____________________________________________________________ Date:  ____________________________________________________________

## 2018-05-31 NOTE — ED TRIAGE NOTES
Patient arrived via medical transport, with complaint of rectal bleeding that is bright blood in nature. Patient has a hx of hemrroids. Patent denies pain.  She is alert and oriented X 4 call bell within reach no additional wants or needs and this time

## 2018-05-31 NOTE — ED PROVIDER NOTES
EMERGENCY DEPARTMENT HISTORY AND PHYSICAL EXAM    1:18 PM      Date: 5/31/2018  Patient Name: Libby Chapa    History of Presenting Illness     Chief Complaint   Patient presents with    Rectal Bleeding     History Provided By: Patient and EMS    Chief Complaint: Rectal bleeding  Duration: ~2.5 Hours  Timing:  Acute  Location: Rectum  Quality: \"Bright red\"  Severity: Heavy  Modifying Factors: No relieving or worsening factors   Associated Symptoms: Generalized weakness, abdominal pain, and back pain      Additional History (Context): Libby Chapa is a 57 y.o. female with PMHX of arthritis, DVTs, and HTN who presents via EMS with acute \"bright red\" and heavy rectal bleeding, onset ~2.5 hours ago. Associated sxs include 8/10 LUQ abdominal pain, generalized weakness, and lower back pain. Pt states she went to use the bathroom and \"bright red blood began flowing out of her. \" Pt had another episode ~1 hour after the initial episode with a mixture of blood and nml stool. Pt had a colonoscopy on 5/15/2018, had \"internal bleeding hemorrhoids\" removed, and was also told she had diverticulitis. Pt currently takes Xarelto. Pt notes hx of back surgery (Dr. Luann Payan) and several blood transfusions. Pt has an follow up appointment with Dr. Hank Stiles, GI, on 6/21/2018. No modifying or aggravating factors were reported. Denies CP, SOB, nausea, vomiting, decreased appetite, and hx of heart problems. No other symptoms or concerns were expressed. PCP: Luis Marks MD    Current Outpatient Prescriptions   Medication Sig Dispense Refill    ondansetron (ZOFRAN ODT) 8 mg disintegrating tablet Take 1 Tab by mouth every eight (8) hours as needed for Nausea for up to 12 doses. 20 Tab 0    diphenoxylate-atropine (LOMOTIL) 2.5-0.025 mg per tablet Take 1 Tab by mouth four (4) times daily as needed for Diarrhea (1 tab after each stool for max 8 per day). Max Daily Amount: 4 Tabs.  Take after each stool for a maximum of 8 tablets daily 20 Tab 0    albuterol (PROVENTIL VENTOLIN) 2.5 mg /3 mL (0.083 %) nebulizer solution 3 mL by Nebulization route every four (4) hours as needed for Wheezing. 24 Each 0    Cetirizine (ZYRTEC) 10 mg cap Take  by mouth.  gabapentin (NEURONTIN) 400 mg capsule Take 400 mg by mouth three (3) times daily.  atorvastatin (LIPITOR) 40 mg tablet Take 1 Tab by mouth nightly. 30 Tab 1    bisacodyl (DULCOLAX) 10 mg suppository Insert 10 mg into rectum as needed. 30 Suppository 1    ferrous sulfate 325 mg (65 mg iron) tablet Take 1 Tab by mouth two (2) times daily (with meals). 60 Tab 1    isoniazid (NYDRAZID) 300 mg tablet Take 1 Tab by mouth daily. 30 Tab 3    nystatin (MYCOSTATIN) powder Apply  to affected area two (2) times a day. 1 Bottle 3    polyethylene glycol (MIRALAX) 17 gram packet Take 1 Packet by mouth daily. 30 Packet 1    pyridoxine, vitamin B6, (VITAMIN B-6) 25 mg tablet Take 1 Tab by mouth daily. Indications: DRUG-INDUCED PYRIDOXINE DEFICIENCY 30 Tab 3    senna-docusate (PERICOLACE) 8.6-50 mg per tablet Take 2 Tabs by mouth daily. 60 Tab 1    oxyCODONE-acetaminophen (PERCOCET 10)  mg per tablet Take 1 Tab by mouth every four (4) hours as needed. Max Daily Amount: 6 Tabs.  40 Tab 0       Past History     Past Medical History:  Past Medical History:   Diagnosis Date    Arthritis        Past Surgical History:  Past Surgical History:   Procedure Laterality Date    COLONOSCOPY N/A 5/15/2018    COLONOSCOPY, DIAGNOSTIC  performed by Manuel Lock MD at [place] [address]  04/27/2016    T2-L2 [facility] and Fusion/T12 [facility]       Family History:  Family History   Problem Relation Age of Onset    Hypertension Mother     Diabetes Mother     Heart Disease Mother     Heart Disease Father     Diabetes Maternal Aunt     Cancer Maternal Uncle      preostate cancer    Diabetes Maternal Uncle        Social History:  Social History   Substance Use Topics  Smoking status: Former Smoker    Smokeless tobacco: Former User     Quit date: 4/1/2016    Alcohol use Yes       Allergies: Allergies   Allergen Reactions    Iodinated Contrast- Oral And Iv Dye Other (comments)     Patient states will pass out. Review of Systems     Review of Systems   Constitutional: Negative for activity change, appetite change, chills, diaphoresis, fatigue, fever and unexpected weight change. HENT: Negative for congestion, dental problem, drooling, ear discharge, ear pain, facial swelling, hearing loss, mouth sores, nosebleeds, postnasal drip, rhinorrhea, sinus pressure, sneezing, sore throat, tinnitus and trouble swallowing. Eyes: Negative for photophobia, pain, discharge, redness, itching and visual disturbance. Respiratory: Negative for apnea, cough, choking, chest tightness, shortness of breath, wheezing and stridor. Cardiovascular: Negative for chest pain, palpitations and leg swelling. Gastrointestinal: Positive for abdominal pain (LUQ), anal bleeding and diarrhea. Negative for abdominal distention, blood in stool, constipation, nausea, rectal pain and vomiting. Endocrine: Negative for cold intolerance, heat intolerance, polydipsia, polyphagia and polyuria. Genitourinary: Negative for decreased urine volume, difficulty urinating, dysuria, enuresis, flank pain, frequency, genital sores, hematuria and urgency. Musculoskeletal: Positive for back pain. Negative for arthralgias, gait problem, joint swelling, myalgias, neck pain and neck stiffness. Skin: Negative for color change, pallor, rash and wound. Allergic/Immunologic: Negative for environmental allergies, food allergies and immunocompromised state. Neurological: Positive for weakness (generalized). Negative for dizziness, tremors, seizures, syncope, facial asymmetry, speech difficulty, light-headedness, numbness and headaches. Hematological: Negative for adenopathy. Does not bruise/bleed easily. Psychiatric/Behavioral: Negative for agitation, behavioral problems, confusion, decreased concentration, dysphoric mood, hallucinations, self-injury, sleep disturbance and suicidal ideas. The patient is not nervous/anxious and is not hyperactive. Physical Exam     Visit Vitals    /87 (BP 1 Location: Right arm, BP Patient Position: At rest)    Pulse (!) 59    Temp 98.3 °F (36.8 °C)    Resp 20    Ht 5' 4\" (1.626 m)    Wt 116.1 kg (256 lb)    SpO2 100%    BMI 43.94 kg/m2         Physical Exam   Constitutional: She is oriented to person, place, and time. She appears well-developed and well-nourished. Alert and appropriate, pale in appearance in no apparent marked discomfort or acute respiratory distress   HENT:   Head: Normocephalic and atraumatic. Right Ear: External ear normal.   Left Ear: External ear normal.   Mouth/Throat: Oropharynx is clear and moist. No oropharyngeal exudate. Eyes: Conjunctivae and EOM are normal. Pupils are equal, round, and reactive to light. Right eye exhibits no discharge. Left eye exhibits no discharge. No scleral icterus. Neck: Normal range of motion. Neck supple. No JVD present. No tracheal deviation present. No thyromegaly present. Cardiovascular: Normal rate, regular rhythm and normal heart sounds. No murmur heard. Pulmonary/Chest: Effort normal and breath sounds normal. No respiratory distress. She has no wheezes. She has no rales. She exhibits no tenderness. Abdominal: Soft. Bowel sounds are normal. She exhibits no distension. There is no tenderness. There is no rebound and no guarding. Genitourinary: Rectal exam shows guaiac positive stool. Genitourinary Comments: Dark brown stool without gross blood without masses on AGA   Musculoskeletal: Normal range of motion. She exhibits no edema or tenderness. Lymphadenopathy:     She has no cervical adenopathy. Neurological: She is alert and oriented to person, place, and time.  No cranial nerve deficit. Coordination normal.   Skin: Skin is warm. No erythema. Psychiatric: She has a normal mood and affect. Her behavior is normal. Judgment and thought content normal.   Nursing note and vitals reviewed. Diagnostic Study Results     Labs -  Recent Results (from the past 12 hour(s))   CBC WITH AUTOMATED DIFF    Collection Time: 05/31/18  2:10 PM   Result Value Ref Range    WBC 6.1 4.6 - 13.2 K/uL    RBC 4.38 4.20 - 5.30 M/uL    HGB 12.3 12.0 - 16.0 g/dL    HCT 37.8 35.0 - 45.0 %    MCV 86.3 74.0 - 97.0 FL    MCH 28.1 24.0 - 34.0 PG    MCHC 32.5 31.0 - 37.0 g/dL    RDW 14.5 11.6 - 14.5 %    PLATELET 704 186 - 140 K/uL    MPV 9.6 9.2 - 11.8 FL    NEUTROPHILS 62 40 - 73 %    LYMPHOCYTES 30 21 - 52 %    MONOCYTES 6 3 - 10 %    EOSINOPHILS 2 0 - 5 %    BASOPHILS 0 0 - 2 %    ABS. NEUTROPHILS 3.8 1.8 - 8.0 K/UL    ABS. LYMPHOCYTES 1.9 0.9 - 3.6 K/UL    ABS. MONOCYTES 0.4 0.05 - 1.2 K/UL    ABS. EOSINOPHILS 0.1 0.0 - 0.4 K/UL    ABS. BASOPHILS 0.0 0.0 - 0.06 K/UL    DF AUTOMATED     TYPE & SCREEN    Collection Time: 05/31/18  2:10 PM   Result Value Ref Range    Crossmatch Expiration 06/03/2018     ABO/Rh(D) O POSITIVE     Antibody screen NEG    METABOLIC PANEL, COMPREHENSIVE    Collection Time: 05/31/18  2:10 PM   Result Value Ref Range    Sodium 139 136 - 145 mmol/L    Potassium 3.8 3.5 - 5.5 mmol/L    Chloride 104 100 - 108 mmol/L    CO2 31 21 - 32 mmol/L    Anion gap 4 3.0 - 18 mmol/L    Glucose 89 74 - 99 mg/dL    BUN 13 7.0 - 18 MG/DL    Creatinine 0.74 0.6 - 1.3 MG/DL    BUN/Creatinine ratio 18 12 - 20      GFR est AA >60 >60 ml/min/1.73m2    GFR est non-AA >60 >60 ml/min/1.73m2    Calcium 9.4 8.5 - 10.1 MG/DL    Bilirubin, total 0.4 0.2 - 1.0 MG/DL    ALT (SGPT) 14 13 - 56 U/L    AST (SGOT) 14 (L) 15 - 37 U/L    Alk.  phosphatase 118 (H) 45 - 117 U/L    Protein, total 8.2 6.4 - 8.2 g/dL    Albumin 3.7 3.4 - 5.0 g/dL    Globulin 4.5 (H) 2.0 - 4.0 g/dL    A-G Ratio 0.8 0.8 - 1.7     LIPASE    Collection Time: 05/31/18  2:10 PM   Result Value Ref Range    Lipase 117 73 - 393 U/L   TROPONIN I    Collection Time: 05/31/18  2:10 PM   Result Value Ref Range    Troponin-I, Qt. <0.02 0.0 - 0.045 NG/ML   PROTHROMBIN TIME + INR    Collection Time: 05/31/18  2:10 PM   Result Value Ref Range    Prothrombin time 13.7 11.5 - 15.2 sec    INR 1.1 0.8 - 1.2     PTT    Collection Time: 05/31/18  2:10 PM   Result Value Ref Range    aPTT 36.2 23.0 - 36.4 SEC   EKG, 12 LEAD, INITIAL    Collection Time: 05/31/18  2:43 PM   Result Value Ref Range    Ventricular Rate 56 BPM    Atrial Rate 56 BPM    P-R Interval 162 ms    QRS Duration 90 ms    Q-T Interval 426 ms    QTC Calculation (Bezet) 411 ms    Calculated P Axis 45 degrees    Calculated R Axis -14 degrees    Calculated T Axis -17 degrees    Diagnosis       Sinus bradycardia  Minimal voltage criteria for LVH, may be normal variant  ST & T wave abnormality, consider anterolateral ischemia  Abnormal ECG  When compared with ECG of 20-FEB-2018 20:25,  Vent. rate has decreased BY  29 BPM         Radiologic Studies -   CT ABD PELV WO CONT   Final Result   IMPRESSION:      1. Diverticulosis coli without diverticulitis. No bowel inflammation or  obstruction.     2. Small hiatal hernia.     3. Tortuous abdominal aorta with moderate atherosclerotic disease. As read by the radiologist.         Medical Decision Making   I am the first provider for this patient. I reviewed the vital signs, available nursing notes, past medical history, past surgical history, family history and social history. Vital Signs-Reviewed the patient's vital signs. Pulse Oximetry Analysis -  100% on room air     Cardiac Monitor:  Rate:   Rhythm:  Normal Sinus Rhythm 63    EKG: Interpreted by the EP.    Time Interpreted: 1451   Rate: 56   Rhythm: Sinus Bradycardia, normal axis, t-wave inversions in anterolateral leads, normal intervals   Interpretation:Abnormal   Comparison: Feb 2018    Records Reviewed: Nursing Notes and Old Medical Records (Time of Review: 1:18 PM)    ED Course: Progress Notes, Reevaluation, and Consults:  Patient remained hemodynamically stable without signs of recurrent hemorrhage and reassuring labs. Will admit for further evaluation and treatment in conjunction with Dr. Yasmany Elizalde of GI. Patient agrees with this plan. Provider Notes (Medical Decision Making): Patient with new onset BRBPR with prior history of hemorrhage and on Xarelto for DVT/PE. No signs of hemodynamically instability or severe active hemorrhage. Will evaluate for need for transfusion and monitor for ongoing hemorrhage. Likely will need admission due to anticoagulated state. Will check for end-organ ischemia and colitis due to hemorrhage with prior episode. Also has recent internal hemorrhoid procedure. For Hospitalized Patients:    1. Hospitalization Decision Time:  The decision to hospitalize the patient was made by Dr. Rhea Lane at [phone] on 5/31/2018     2. Aspirin: Aspirin was not given because the patient is a bleeding risk    Diagnosis     Clinical Impression:   1. Acute lower GI hemorrhage    2. Anticoagulant long-term use        Disposition: Admission    Follow-up Information     None           Patient's Medications   Start Taking    No medications on file   Continue Taking    ALBUTEROL (PROVENTIL VENTOLIN) 2.5 MG /3 ML (0.083 %) NEBULIZER SOLUTION    3 mL by Nebulization route every four (4) hours as needed for Wheezing. ATORVASTATIN (LIPITOR) 40 MG TABLET    Take 1 Tab by mouth nightly. BISACODYL (DULCOLAX) 10 MG SUPPOSITORY    Insert 10 mg into rectum as needed. CETIRIZINE (ZYRTEC) 10 MG CAP    Take  by mouth. DIPHENOXYLATE-ATROPINE (LOMOTIL) 2.5-0.025 MG PER TABLET    Take 1 Tab by mouth four (4) times daily as needed for Diarrhea (1 tab after each stool for max 8 per day). Max Daily Amount: 4 Tabs.  Take after each stool for a maximum of 8 tablets daily    FERROUS SULFATE 325 MG (65 MG IRON) TABLET    Take 1 Tab by mouth two (2) times daily (with meals). GABAPENTIN (NEURONTIN) 400 MG CAPSULE    Take 400 mg by mouth three (3) times daily. ISONIAZID (NYDRAZID) 300 MG TABLET    Take 1 Tab by mouth daily. NYSTATIN (MYCOSTATIN) POWDER    Apply  to affected area two (2) times a day. ONDANSETRON (ZOFRAN ODT) 8 MG DISINTEGRATING TABLET    Take 1 Tab by mouth every eight (8) hours as needed for Nausea for up to 12 doses. OXYCODONE-ACETAMINOPHEN (PERCOCET 10)  MG PER TABLET    Take 1 Tab by mouth every four (4) hours as needed. Max Daily Amount: 6 Tabs. POLYETHYLENE GLYCOL (MIRALAX) 17 GRAM PACKET    Take 1 Packet by mouth daily. PYRIDOXINE, VITAMIN B6, (VITAMIN B-6) 25 MG TABLET    Take 1 Tab by mouth daily. Indications: DRUG-INDUCED PYRIDOXINE DEFICIENCY    SENNA-DOCUSATE (PERICOLACE) 8.6-50 MG PER TABLET    Take 2 Tabs by mouth daily. These Medications have changed    No medications on file   Stop Taking    No medications on file     _______________________________    Attestations:  1 [facility] acting as a scribe for and in the presence of Iman Valentine MD      May 31, 2018 at 5:47 PM       Provider Attestation:      I personally performed the services described in the documentation, reviewed the documentation, as recorded by the scribe in my presence, and it accurately and completely records my words and actions.  May 31, 2018 at 5:47 PM - Iman Valentine MD    _______________________________

## 2018-05-31 NOTE — H&P
History & Physical    Patient: Vivien Dorado MRN: [id number]  CSN: [id number]    YOB: 1955  Age: 57 y.o. Sex: female      DOA: 5/31/2018    Chief Complaint:   Chief Complaint   Patient presents with    Rectal Bleeding          HPI:     Vivien Dorado is a 57 y.o.  female who has PMH of RA, Vertebral Fx and spinal cord compression, Vertebral TB s/p Rx x 12 months. Also Hx of Rt LE DVT s/p IVC filter placement and then removal 2 ry to complication and currently on Xarelto for at least 9 months 2 ry to developing a second DVT episode in her Rt leg. Pt presents with abdominal pain in LLQ with diarrheal episode of bright red blood per rectum x 1 episode at home. Pt states that she had another episode ion her arrival to ER but was not small amount of blood and was mixed with stools. Pt states that she did not have any further episodes since.   CT abdomen came back -ve for Diverticulitis   Pt will be admitted for LGIB and further work up to r/o DVT       Past Medical History:   Diagnosis Date    Arthritis        Past Surgical History:   Procedure Laterality Date    COLONOSCOPY N/A 5/15/2018    COLONOSCOPY, DIAGNOSTIC  performed by Paulie Daniels MD at [place] [address]  04/27/2016    T2-L2 Decomprssion and Fusion/T12 Corpetomy       Family History   Problem Relation Age of Onset    Hypertension Mother     Diabetes Mother     Heart Disease Mother     Heart Disease Father     Diabetes Maternal Aunt     Cancer Maternal Uncle      preostate cancer    Diabetes Maternal Uncle        Social History     Social History    Marital status: SINGLE     Spouse name: N/A    Number of children: N/A    Years of education: N/A     Social History Main Topics    Smoking status: Former Smoker    Smokeless tobacco: Former User     Quit date: 4/1/2016    Alcohol use Yes    Drug use: No    Sexual activity: Not on file     Other Topics Concern    Not on file     Social History Narrative       Prior to Admission medications    Medication Sig Start Date End Date Taking? Authorizing Provider   gabapentin (NEURONTIN) 400 mg capsule Take 400 mg by mouth three (3) times daily. Yes Historical Provider   atorvastatin (LIPITOR) 40 mg tablet Take 1 Tab by mouth nightly. 7/13/16  Yes Fredo Davis MD   ferrous sulfate 325 mg (65 mg iron) tablet Take 1 Tab by mouth two (2) times daily (with meals). 7/13/16  Yes Fredo Davis MD   nystatin (MYCOSTATIN) powder Apply  to affected area two (2) times a day. 7/13/16  Yes Fredo Davis MD   pyridoxine, vitamin B6, (VITAMIN B-6) 25 mg tablet Take 1 Tab by mouth daily. Indications: DRUG-INDUCED PYRIDOXINE DEFICIENCY 7/13/16  Yes Fredo Davis MD   senna-docusate (PERICOLACE) 8.6-50 mg per tablet Take 2 Tabs by mouth daily. 7/13/16  Yes Fredo Davis MD   oxyCODONE-acetaminophen (PERCOCET 10)  mg per tablet Take 1 Tab by mouth every four (4) hours as needed. Max Daily Amount: 6 Tabs. 7/13/16  Yes Fredo Davis MD   ondansetron (ZOFRAN ODT) 8 mg disintegrating tablet Take 1 Tab by mouth every eight (8) hours as needed for Nausea for up to 12 doses. 3/6/18   Cleopatra Adams NP   diphenoxylate-atropine (LOMOTIL) 2.5-0.025 mg per tablet Take 1 Tab by mouth four (4) times daily as needed for Diarrhea (1 tab after each stool for max 8 per day). Max Daily Amount: 4 Tabs. Take after each stool for a maximum of 8 tablets daily 2/20/18   John Garcia MD   albuterol (PROVENTIL VENTOLIN) 2.5 mg /3 mL (0.083 %) nebulizer solution 3 mL by Nebulization route every four (4) hours as needed for Wheezing. 2/20/18   John Garcia MD   Cetirizine (ZYRTEC) 10 mg cap Take  by mouth. Historical Provider   bisacodyl (DULCOLAX) 10 mg suppository Insert 10 mg into rectum as needed. 7/13/16   Fredo Davis MD   isoniazid (NYDRAZID) 300 mg tablet Take 1 Tab by mouth daily.  7/13/16   Fredo Davis MD   polyethylene glycol (MIRALAX) 17 gram packet Take 1 Packet by mouth daily. 16   Pati Gama MD       Allergies   Allergen Reactions    Iodinated Contrast- Oral And Iv Dye Other (comments)     Patient states will pass out. Review of Systems  GENERAL: Patient alert, awake and oriented times 3, able to communicate full sentences and not in distress. HEENT: No change in vision, no earache, tinnitus, sore throat or sinus congestion. NECK: No pain or stiffness. PULMONARY: No shortness of breath, cough or wheeze. Cardiovascular: no pnd / orthopnea, no CP  GASTROINTESTINAL: +ve abdominal pain, nausea, vomiting .+ve diarrhea, and bright red blood per rectum x 2  GENITOURINARY: No urinary frequency, urgency, hesitancy or dysuria. MUSCULOSKELETAL:Has LE weakness   DERMATOLOGIC: No rash, no itching, no lesions. ENDOCRINE: No polyuria, polydipsia, no heat or cold intolerance. No recent change in weight. HEMATOLOGICAL: No anemia or easy bruising or bleeding. NEUROLOGIC: No headache, seizures, numbness, tingling or weakness. Physical Exam:     Physical Exam:  Visit Vitals    /85 (BP 1 Location: Left arm, BP Patient Position: At rest)    Pulse 60    Temp 97.9 °F (36.6 °C)    Resp 20    Ht 5' 4\" (1.626 m)    Wt 111.1 kg (244 lb 14.4 oz)    SpO2 98%    Breastfeeding No    BMI 42.04 kg/m2      O2 Device: Room air    Temp (24hrs), Av.9 °F (36.6 °C), Min:97.4 °F (36.3 °C), Max:98.3 °F (36.8 °C)             General:  Alert, cooperative, no distress, appears stated age. Head: Normocephalic, without obvious abnormality, atraumatic. Eyes:  Conjunctivae/corneas clear. PERRL, EOMs intact. Nose: Nares normal. No drainage or sinus tenderness. Neck: Supple, symmetrical, trachea midline, no adenopathy, thyroid: no enlargement, no carotid bruit and no JVD. Lungs:   Clear to auscultation bilaterally. Heart:  Regular rate and rhythm, S1, S2 normal.     Abdomen: Soft, LLQ-tenderness.  Bowel sounds normal.    Extremities: Extremities normal, atraumatic, no cyanosis or edema. Pulses: 2+ and symmetric all extremities. Skin:  No rashes or lesions   Neurologic: AAOx3, No focal motor or sensory deficit. LE weakness        Labs Reviewed: All lab results for the last 24 hours reviewed. CT and EKG    Procedures/imaging: see electronic medical records for all procedures/Xrays and details which were not copied into this note but were reviewed prior to creation of Plan      Assessment/Plan     Principal Problem:    Acute lower GI hemorrhage (5/31/2018)    Active Problems:    Chronic anticoagulation (6/1/2018)      Mixed hyperlipidemia (6/1/2018)      Neuropathy (6/1/2018)      History of deep vein thrombosis (DVT) of lower extremity (6/1/2018)         Pt is admitted for Acute lower GI bleed x 2 episodes on Chronic anticoagulation for Hx of recurrent Rt LE DVTs (Last episode is > 9 months ago)  Pt is s/p IVC filter placement and had to be removed for its side effect and pt developed her 2 nd DVT after for which she was placed on Xarelto    GI consult is called  Monitor H/H  No further episodes except for small one on arrival to ER  Clear liquid diet  IVF  Will get US LE in AM to check on DVT status   CT abdomen -ve for Diverticulitis     Added GI and vascular consults to Rx team >> please call to confirm     Pt has remote Hx of RA 2006and was immunocompromised on methotrexate and steroids. Had Vertebral Fx with suspicion/Dx of vertebral TB and received 12 months therapy for TB    S/p vertebral Compression fx and spinal cord compression leading to paraplegia  s/p Intervention by Dr Larry Urena 03/2017.  Currently uses a wheel chair for ambulation    DVT/GI Prophylaxis: SCD's    Plan of care is discussed in details with Patient/Family at bedside and agreed upon    Rekha Cartagena MD  6/1/2018 6:24 PM

## 2018-05-31 NOTE — IP AVS SNAPSHOT
[address] Patient: Fausto Guardado MRN: [id number] :1955 A check annemarie indicates which time of day the medication should be taken. My Medications START taking these medications Instructions Each Dose to Equal  
 Morning Noon Evening Bedtime  
 rivaroxaban 10 mg tablet Commonly known as:  Darryl Mcghee Your last dose was: Your next dose is: Take 1 Tab by mouth daily (with breakfast). 10 mg CONTINUE taking these medications Instructions Each Dose to Equal  
 Morning Noon Evening Bedtime  
 albuterol 2.5 mg /3 mL (0.083 %) nebulizer solution Commonly known as:  PROVENTIL VENTOLIN Your last dose was: Your next dose is:    
   
   
 3 mL by Nebulization route every four (4) hours as needed for Wheezing. 2.5 mg  
    
   
   
   
  
 atorvastatin 40 mg tablet Commonly known as:  LIPITOR Your last dose was: Your next dose is: Take 1 Tab by mouth nightly. 40 mg  
    
   
   
   
  
 bisacodyl 10 mg suppository Commonly known as:  DULCOLAX Your last dose was: Your next dose is: Insert 10 mg into rectum as needed. 10 mg  
    
   
   
   
  
 diphenoxylate-atropine 2.5-0.025 mg per tablet Commonly known as:  LOMOTIL Your last dose was: Your next dose is: Take 1 Tab by mouth four (4) times daily as needed for Diarrhea (1 tab after each stool for max 8 per day). Max Daily Amount: 4 Tabs. Take after each stool for a maximum of 8 tablets daily 1 Tab  
    
   
   
   
  
 ferrous sulfate 325 mg (65 mg iron) tablet Your last dose was: Your next dose is: Take 1 Tab by mouth two (2) times daily (with meals). 325 mg  
    
   
   
   
  
 gabapentin 400 mg capsule Commonly known as:  NEURONTIN  
   
 Your last dose was: Your next dose is: Take 400 mg by mouth three (3) times daily. 400 mg  
    
   
   
   
  
 nystatin powder Commonly known as:  MYCOSTATIN Your last dose was: Your next dose is:    
   
   
 Apply  to affected area two (2) times a day. ondansetron 8 mg disintegrating tablet Commonly known as:  ZOFRAN ODT Your last dose was: Your next dose is: Take 1 Tab by mouth every eight (8) hours as needed for Nausea for up to 12 doses. 8 mg  
    
   
   
   
  
 oxyCODONE-acetaminophen  mg per tablet Commonly known as:  PERCOCET 10 Your last dose was: Your next dose is: Take 1 Tab by mouth every four (4) hours as needed. Max Daily Amount: 6 Tabs. 1 Tab  
    
   
   
   
  
 polyethylene glycol 17 gram packet Commonly known as:  Afsaneh Muzzy Your last dose was: Your next dose is: Take 1 Packet by mouth daily. 17 g  
    
   
   
   
  
 senna-docusate 8.6-50 mg per tablet Commonly known as:  Arnoldo Pont Your last dose was: Your next dose is: Take 2 Tabs by mouth daily. 2 Tab ZyrTEC 10 mg Cap Generic drug:  Cetirizine Your last dose was: Your next dose is: Take  by mouth. ASK your doctor about these medications Instructions Each Dose to Equal  
 Morning Noon Evening Bedtime  
 isoniazid 300 mg tablet Commonly known as:  NYDRAZID Your last dose was: Your next dose is: Take 1 Tab by mouth daily. 300 mg  
    
   
   
   
  
 pyridoxine (vitamin B6) 25 mg tablet Commonly known as:  VITAMIN B-6 Your last dose was: Your next dose is: Take 1 Tab by mouth daily. Indications: DRUG-INDUCED PYRIDOXINE DEFICIENCY  
 25 mg Where to Get Your Medications Information on where to get these meds will be given to you by the nurse or doctor. ! Ask your nurse or doctor about these medications  
  rivaroxaban 10 mg tablet

## 2018-05-31 NOTE — ED NOTES
Patient back in room from CT patient is placed on monitor call bell within reach and family at the bedside.

## 2018-06-01 PROBLEM — G62.9 NEUROPATHY: Status: ACTIVE | Noted: 2018-06-01

## 2018-06-01 PROBLEM — Z79.01 CHRONIC ANTICOAGULATION: Status: ACTIVE | Noted: 2018-06-01

## 2018-06-01 PROBLEM — E78.2 MIXED HYPERLIPIDEMIA: Status: ACTIVE | Noted: 2018-06-01

## 2018-06-01 PROBLEM — Z86.718 HISTORY OF DEEP VEIN THROMBOSIS (DVT) OF LOWER EXTREMITY: Status: ACTIVE | Noted: 2018-06-01

## 2018-06-01 LAB
ANION GAP SERPL CALC-SCNC: 5 MMOL/L (ref 3–18)
APPEARANCE UR: CLEAR
ATRIAL RATE: 56 BPM
BACTERIA URNS QL MICRO: ABNORMAL /HPF
BASOPHILS # BLD: 0 K/UL (ref 0–0.1)
BASOPHILS NFR BLD: 0 % (ref 0–2)
BILIRUB UR QL: NEGATIVE
BUN SERPL-MCNC: 12 MG/DL (ref 7–18)
BUN/CREAT SERPL: 19 (ref 12–20)
CALCIUM SERPL-MCNC: 9.1 MG/DL (ref 8.5–10.1)
CALCULATED P AXIS, ECG09: 45 DEGREES
CALCULATED R AXIS, ECG10: -14 DEGREES
CALCULATED T AXIS, ECG11: -17 DEGREES
CHLORIDE SERPL-SCNC: 106 MMOL/L (ref 100–108)
CO2 SERPL-SCNC: 30 MMOL/L (ref 21–32)
COLOR UR: YELLOW
CREAT SERPL-MCNC: 0.64 MG/DL (ref 0.6–1.3)
DIAGNOSIS, 93000: NORMAL
DIFFERENTIAL METHOD BLD: ABNORMAL
EOSINOPHIL # BLD: 0.2 K/UL (ref 0–0.4)
EOSINOPHIL NFR BLD: 3 % (ref 0–5)
EPITH CASTS URNS QL MICRO: ABNORMAL /LPF (ref 0–5)
ERYTHROCYTE [DISTWIDTH] IN BLOOD BY AUTOMATED COUNT: 14.6 % (ref 11.6–14.5)
GLUCOSE SERPL-MCNC: 88 MG/DL (ref 74–99)
GLUCOSE UR STRIP.AUTO-MCNC: NEGATIVE MG/DL
HCT VFR BLD AUTO: 34.3 % (ref 35–45)
HCT VFR BLD AUTO: 35.9 % (ref 35–45)
HGB BLD-MCNC: 11 G/DL (ref 12–16)
HGB BLD-MCNC: 11.6 G/DL (ref 12–16)
HGB UR QL STRIP: NEGATIVE
KETONES UR QL STRIP.AUTO: NEGATIVE MG/DL
LEUKOCYTE ESTERASE UR QL STRIP.AUTO: ABNORMAL
LYMPHOCYTES # BLD: 2 K/UL (ref 0.9–3.6)
LYMPHOCYTES NFR BLD: 35 % (ref 21–52)
MAGNESIUM SERPL-MCNC: 2.1 MG/DL (ref 1.6–2.6)
MCH RBC QN AUTO: 27 PG (ref 24–34)
MCHC RBC AUTO-ENTMCNC: 32.1 G/DL (ref 31–37)
MCV RBC AUTO: 84.3 FL (ref 74–97)
MONOCYTES # BLD: 0.4 K/UL (ref 0.05–1.2)
MONOCYTES NFR BLD: 8 % (ref 3–10)
NEUTS SEG # BLD: 3.1 K/UL (ref 1.8–8)
NEUTS SEG NFR BLD: 54 % (ref 40–73)
NITRITE UR QL STRIP.AUTO: NEGATIVE
P-R INTERVAL, ECG05: 162 MS
PH UR STRIP: 6.5 [PH] (ref 5–8)
PHOSPHATE SERPL-MCNC: 3.3 MG/DL (ref 2.5–4.9)
PLATELET # BLD AUTO: 189 K/UL (ref 135–420)
PMV BLD AUTO: 10.2 FL (ref 9.2–11.8)
POTASSIUM SERPL-SCNC: 3.2 MMOL/L (ref 3.5–5.5)
PROT UR STRIP-MCNC: NEGATIVE MG/DL
Q-T INTERVAL, ECG07: 426 MS
QRS DURATION, ECG06: 90 MS
QTC CALCULATION (BEZET), ECG08: 411 MS
RBC # BLD AUTO: 4.07 M/UL (ref 4.2–5.3)
RBC #/AREA URNS HPF: ABNORMAL /HPF (ref 0–5)
SODIUM SERPL-SCNC: 141 MMOL/L (ref 136–145)
SP GR UR REFRACTOMETRY: 1.02 (ref 1–1.03)
UROBILINOGEN UR QL STRIP.AUTO: 1 EU/DL (ref 0.2–1)
VENTRICULAR RATE, ECG03: 56 BPM
WBC # BLD AUTO: 5.7 K/UL (ref 4.6–13.2)
WBC URNS QL MICRO: ABNORMAL /HPF (ref 0–4)

## 2018-06-01 PROCEDURE — 83735 ASSAY OF MAGNESIUM: CPT | Performed by: INTERNAL MEDICINE

## 2018-06-01 PROCEDURE — 74011250636 HC RX REV CODE- 250/636: Performed by: INTERNAL MEDICINE

## 2018-06-01 PROCEDURE — 85018 HEMOGLOBIN: CPT | Performed by: INTERNAL MEDICINE

## 2018-06-01 PROCEDURE — 65660000000 HC RM CCU STEPDOWN

## 2018-06-01 PROCEDURE — 36415 COLL VENOUS BLD VENIPUNCTURE: CPT | Performed by: INTERNAL MEDICINE

## 2018-06-01 PROCEDURE — 74011250637 HC RX REV CODE- 250/637: Performed by: INTERNAL MEDICINE

## 2018-06-01 PROCEDURE — 84100 ASSAY OF PHOSPHORUS: CPT | Performed by: INTERNAL MEDICINE

## 2018-06-01 PROCEDURE — 93970 EXTREMITY STUDY: CPT

## 2018-06-01 PROCEDURE — 81001 URINALYSIS AUTO W/SCOPE: CPT | Performed by: INTERNAL MEDICINE

## 2018-06-01 PROCEDURE — 80048 BASIC METABOLIC PNL TOTAL CA: CPT | Performed by: INTERNAL MEDICINE

## 2018-06-01 PROCEDURE — 85025 COMPLETE CBC W/AUTO DIFF WBC: CPT | Performed by: INTERNAL MEDICINE

## 2018-06-01 RX ORDER — POTASSIUM CHLORIDE 20 MEQ/1
40 TABLET, EXTENDED RELEASE ORAL
Status: COMPLETED | OUTPATIENT
Start: 2018-06-01 | End: 2018-06-01

## 2018-06-01 RX ORDER — POTASSIUM CHLORIDE 20 MEQ/1
40 TABLET, EXTENDED RELEASE ORAL ONCE
Status: COMPLETED | OUTPATIENT
Start: 2018-06-01 | End: 2018-06-01

## 2018-06-01 RX ADMIN — OXYCODONE HYDROCHLORIDE AND ACETAMINOPHEN 1 TABLET: 5; 325 TABLET ORAL at 15:48

## 2018-06-01 RX ADMIN — OXYCODONE HYDROCHLORIDE AND ACETAMINOPHEN 1 TABLET: 5; 325 TABLET ORAL at 07:19

## 2018-06-01 RX ADMIN — GABAPENTIN 400 MG: 400 CAPSULE ORAL at 21:27

## 2018-06-01 RX ADMIN — GABAPENTIN 400 MG: 400 CAPSULE ORAL at 15:48

## 2018-06-01 RX ADMIN — OXYCODONE HYDROCHLORIDE AND ACETAMINOPHEN 1 TABLET: 5; 325 TABLET ORAL at 21:27

## 2018-06-01 RX ADMIN — POTASSIUM CHLORIDE 40 MEQ: 1500 TABLET, EXTENDED RELEASE ORAL at 09:24

## 2018-06-01 RX ADMIN — GABAPENTIN 400 MG: 400 CAPSULE ORAL at 09:24

## 2018-06-01 RX ADMIN — ATORVASTATIN CALCIUM 40 MG: 40 TABLET, FILM COATED ORAL at 21:27

## 2018-06-01 RX ADMIN — SODIUM CHLORIDE 75 ML/HR: 900 INJECTION, SOLUTION INTRAVENOUS at 14:04

## 2018-06-01 RX ADMIN — POTASSIUM CHLORIDE 40 MEQ: 1500 TABLET, EXTENDED RELEASE ORAL at 07:19

## 2018-06-01 NOTE — PROGRESS NOTES
conducted an initial consultation and Spiritual Assessment for Leopoldo Cha, who is a 57 y.o.,female. Patients Primary Language is: [place]. According to the patients EMR Religion Affiliation is: [facility].     The reason the Patient came to the hospital is:   Patient Active Problem List    Diagnosis Date Noted    Chronic anticoagulation 06/01/2018    Mixed hyperlipidemia 06/01/2018    Neuropathy 06/01/2018    History of deep vein thrombosis (DVT) of lower extremity 06/01/2018    Acute lower GI hemorrhage 05/31/2018    Osteoporosis 12/13/2016    S/P thoracic spinal fusion 09/13/2016    Abnormal CT of the abdomen 04/25/2016    Thoracic vertebral fracture ([facility] [address] 04/24/2016        The  provided the following Interventions:  Initiated a relationship of care and support. Provided information about Spiritual Care Services. Chart reviewed. The following outcomes where achieved:  Patient expressed gratitude for 's visit. Assessment:  Patient does not have any Buddhism/cultural needs that will affect patients preferences in health care. There are no spiritual or Buddhism issues which require intervention at this time. Plan:  Chaplains will continue to follow and will provide pastoral care on an as needed/requested basis.  recommends bedside caregivers page  on duty if patient shows signs of acute spiritual or emotional distress.     [address]  [phone])

## 2018-06-01 NOTE — PROGRESS NOTES
[facility] Hospitalist Group  Progress Note    Patient: Moses Mota Age: 57 y.o. : 1955 MR#: [id number] SSN: [id number]  Date: 2018     Subjective:   Pt reports that since her last GI bleed in ED, she has not had rectal bleed. She has had small bm this am w/o blood/melena. Assessment/Plan:   -GI bleed in this pt on xarelto for rx of DVT. Recent colo 05/15 w/ hemorrhoid ligation, noted to have diverticulosis and findings suspected to be condyloma. GI following, Input noted, appreciate assistance. No plans for colo at this time. hgb and hemodynamically stable.  -History of DVT, was on Xarelto until this admission. Has history of occlusion of IVC filter. S/p eval by vascular surgery team. Recommend using smaller dose of xarelto if pt remains w/o evidence of cont'd gi bleeding. Appreciate assistance. -Dyslipidemia on statin  -Morbid obesity    PLAN:  Will check repeat hgb in am, if stable and no evidence of cont'd bleeding will dc tomorrow on low dose xarelto.         Additional Notes:      Case discussed with:  [x]Patient  []Family  []Nursing  []Case Management  DVT Prophylaxis:  []Lovenox  []Hep SQ  [x]SCDs  []Coumadin   []On Heparin gtt    Objective:   VS:   Visit Vitals    /83 (BP 1 Location: Left arm, BP Patient Position: At rest)    Pulse 62    Temp 98.1 °F (36.7 °C)    Resp 18    Ht 5' 4\" (1.626 m)    Wt 111.1 kg (244 lb 14.4 oz)    SpO2 99%    Breastfeeding No    BMI 42.04 kg/m2      Tmax/24hrs: Temp (24hrs), Av.9 °F (36.6 °C), Min:97.4 °F (36.3 °C), Max:98.1 °F (36.7 °C)    Intake/Output Summary (Last 24 hours) at 18 1626  Last data filed at 18 0604   Gross per 24 hour   Intake           603.75 ml   Output                0 ml   Net           603.75 ml       General:  Alert, awake, in nad  Cardiovascular:rrr, no  murmurs    Pulmonary: ctab   GI:  Soft, nt, nd  Extremities:no edema    Additional:      Labs:    Recent Results (from the past 24 hour(s))   HGB & HCT    Collection Time: 05/31/18  9:18 PM   Result Value Ref Range    HGB 11.5 (L) 12.0 - 16.0 g/dL    HCT 35.2 35.0 - 58.0 %   METABOLIC PANEL, BASIC    Collection Time: 06/01/18  3:05 AM   Result Value Ref Range    Sodium 141 136 - 145 mmol/L    Potassium 3.2 (L) 3.5 - 5.5 mmol/L    Chloride 106 100 - 108 mmol/L    CO2 30 21 - 32 mmol/L    Anion gap 5 3.0 - 18 mmol/L    Glucose 88 74 - 99 mg/dL    BUN 12 7.0 - 18 MG/DL    Creatinine 0.64 0.6 - 1.3 MG/DL    BUN/Creatinine ratio 19 12 - 20      GFR est AA >60 >60 ml/min/1.73m2    GFR est non-AA >60 >60 ml/min/1.73m2    Calcium 9.1 8.5 - 10.1 MG/DL   MAGNESIUM    Collection Time: 06/01/18  3:05 AM   Result Value Ref Range    Magnesium 2.1 1.6 - 2.6 mg/dL   PHOSPHORUS    Collection Time: 06/01/18  3:05 AM   Result Value Ref Range    Phosphorus 3.3 2.5 - 4.9 MG/DL   CBC WITH AUTOMATED DIFF    Collection Time: 06/01/18  3:05 AM   Result Value Ref Range    WBC 5.7 4.6 - 13.2 K/uL    RBC 4.07 (L) 4.20 - 5.30 M/uL    HGB 11.0 (L) 12.0 - 16.0 g/dL    HCT 34.3 (L) 35.0 - 45.0 %    MCV 84.3 74.0 - 97.0 FL    MCH 27.0 24.0 - 34.0 PG    MCHC 32.1 31.0 - 37.0 g/dL    RDW 14.6 (H) 11.6 - 14.5 %    PLATELET 053 017 - 108 K/uL    MPV 10.2 9.2 - 11.8 FL    NEUTROPHILS 54 40 - 73 %    LYMPHOCYTES 35 21 - 52 %    MONOCYTES 8 3 - 10 %    EOSINOPHILS 3 0 - 5 %    BASOPHILS 0 0 - 2 %    ABS. NEUTROPHILS 3.1 1.8 - 8.0 K/UL    ABS. LYMPHOCYTES 2.0 0.9 - 3.6 K/UL    ABS. MONOCYTES 0.4 0.05 - 1.2 K/UL    ABS. EOSINOPHILS 0.2 0.0 - 0.4 K/UL    ABS.  BASOPHILS 0.0 0.0 - 0.1 K/UL    DF AUTOMATED     URINALYSIS W/ RFLX MICROSCOPIC    Collection Time: 06/01/18  6:20 AM   Result Value Ref Range    Color YELLOW      Appearance CLEAR      Specific gravity 1.017 1.005 - 1.030      pH (UA) 6.5 5.0 - 8.0      Protein NEGATIVE  NEG mg/dL    Glucose NEGATIVE  NEG mg/dL    Ketone NEGATIVE  NEG mg/dL    Bilirubin NEGATIVE  NEG      Blood NEGATIVE  NEG      Urobilinogen 1.0 0.2 - 1.0 EU/dL    Nitrites NEGATIVE  NEG      Leukocyte Esterase SMALL (A) NEG     URINE MICROSCOPIC ONLY    Collection Time: 06/01/18  6:20 AM   Result Value Ref Range    WBC 0 to 3 0 - 4 /hpf    RBC 0 to 1 0 - 5 /hpf    Epithelial cells 2+ 0 - 5 /lpf    Bacteria FEW (A) NEG /hpf   HGB & HCT    Collection Time: 06/01/18 12:15 PM   Result Value Ref Range    HGB 11.6 (L) 12.0 - 16.0 g/dL    HCT 35.9 35.0 - 45.0 %       Signed By: Daryn Floyd MD     June 1, 2018 4:26 PM

## 2018-06-01 NOTE — PROGRESS NOTES
Problem: Falls - Risk of  Goal: *Absence of Falls  Document Faye Fall Risk and appropriate interventions in the flowsheet.    Outcome: Progressing Towards Goal  Fall Risk Interventions:            Medication Interventions: Patient to call before getting OOB

## 2018-06-01 NOTE — ROUTINE PROCESS
Pt alert and oriented x4. No distress noted while on room air. Pain med given as needed for LLQ abdominal pain. Pt ambulating to bathroom with one assist. Skin remains intact. Pt states having 1 BM during shift, which was brown in color. Call bell within reach. Fall precautions maintained. Will continue to monitor. Bedside shift change report given to Arabella Pizarro RN (oncoming nurse) by Lizeth Yuan Rn (offgoing nurse). Report included the following information SBAR, Kardex, Intake/Output, MAR, Recent Results and Cardiac Rhythm NSR.

## 2018-06-01 NOTE — CDMP QUERY
Patient is noted to have a BMI of 42.04. Please clarify if this patient is:     =>Morbidly obese (BMI ³ 40)  =>Obese (BMI 30 - 39.9)  =>Overweight (BMI 25 - 29.9)  =>Other explanation of clinical findings  =>Clinically Undetermined (no explanation for clinical findings)    Presentation: 5'4\", 244 lbs = BMI 42.04    REFERENCE:  The [address] [place] [address] has issued a statement indicating that, \"Individuals who are overweight, obese, or morbidly obese are at an increased risk for certain medical conditions when compared to persons of normal weight. Therefore, these conditions are always clinically significant and reportable when documented by the provider. If you DECLINE this query or would like to communicate with [facility], please utilize the \"[facility] message box\" at the TOP of the Progress Note on the right.       Thank you,  Patricia Cabrera RN/CCDS  [phone]

## 2018-06-01 NOTE — CONSULTS
[email or web address]  [phone]    GASTROENTEROLOGY CONSULT      Impression:   1. Rectal bleeding/hematochezia; recent colonoscopy by Dr Charbel Bynum 5/15/18 with hemorrhoid ligation. Also noted diverticulosis and perianal lesions suspected to be condyloma. Suspect bleeding is from either diverticular source or local anorectal cause. Consider AVM potentially as well. 2. Mild anemia: stable over the last 3-4 months  3. Sigmoid diverticulosis  4. Grade II hemorrhoids s/p ligation  5. H/o DVT on Xarelto   6. H/O Cdiff in 2/2018- s/p treatment   7. Small hiatal hernia; incidental finding noted on CT scan. Asymptomatic      Plan:     1. D/W Dr kSyler Aly- no plan for colonoscopy at this time. Will watch- if diverticular in nature this should resolve in 24-48 hours. If bleeding worsens or continues we will need to consider scoping  2. Monitor H/H   3. Reviewed potential complications of diverticulosis with patient: diverticulitis and diverticular bleeding along with signs and symptoms of both  4. F/U need for anticoagulation; patient undergoing doppler studies and vascular surgery is following. 5. Diet per primary team      Chief Complaint: blood in stool      HPI:  Javier Wyman is a 57 y.o. female who I am being asked to see in consultation for an opinion regarding blood in stool. Patient reports ongoing blood in stool since February after she had CDiff infection. Bleeding was intermittent and was associated with LLQ pain. LLQ pain was sharp, intermittent without precipitating factors and would last 12-24 hours before self resolving. She states this pain always preceded and episode of blood in stool. She was seen by Dr Corie Harrison and subsequently underwent colonoscopy 2 weeks ago with Dr. Charbel Bynum. Findings included sigmoid diverticulosis, grade II hemorrhoids, and perianal lesions. Prep was less than ideal for optimal evaluation.     Yesterday patient thought she was urinating and ended up with large amount of bright red blood in toilet, no stool at that time. No clots. She then had a BM several hours later with blood and clots. No further BM or bleeding since. Had mild LLQ pain at that time. CT scan negative for diverticulitis. PMH:   Past Medical History:   Diagnosis Date    Arthritis        PSH:   Past Surgical History:   Procedure Laterality Date    COLONOSCOPY N/A 5/15/2018    COLONOSCOPY, DIAGNOSTIC  performed by Gabby Herrera MD at [place] [address]  04/27/2016    T2-L2 Decomprssion and Fusion/T12 Corpetomy       Social HX:   Social History     Social History    Marital status: SINGLE     Spouse name: N/A    Number of children: N/A    Years of education: N/A     Occupational History    Not on file. Social History Main Topics    Smoking status: Former Smoker    Smokeless tobacco: Former User     Quit date: 4/1/2016    Alcohol use Yes    Drug use: No    Sexual activity: Not on file     Other Topics Concern    Not on file     Social History Narrative       FHX:   Family History   Problem Relation Age of Onset    Hypertension Mother     Diabetes Mother     Heart Disease Mother     Heart Disease Father     Diabetes Maternal Aunt     Cancer Maternal Uncle      preostate cancer    Diabetes Maternal Uncle        Allergy:   Allergies   Allergen Reactions    Iodinated Contrast- Oral And Iv Dye Other (comments)     Patient states will pass out.        Patient Active Problem List   Diagnosis Code    [facility] S22.009A    Abnormal CT of the abdomen R93.5    S/P thoracic spinal fusion Z98.1    Osteoporosis M81.0    Acute lower GI hemorrhage K92.2    Chronic anticoagulation Z79.01    Mixed hyperlipidemia E78.2    Neuropathy G62.9    History of deep vein thrombosis (DVT) of lower extremity Z86.718       Home Medications:     Prescriptions Prior to Admission   Medication Sig    gabapentin (NEURONTIN) 400 mg capsule Take 400 mg by mouth three (3) times daily.  atorvastatin (LIPITOR) 40 mg tablet Take 1 Tab by mouth nightly.  ferrous sulfate 325 mg (65 mg iron) tablet Take 1 Tab by mouth two (2) times daily (with meals).  nystatin (MYCOSTATIN) powder Apply  to affected area two (2) times a day.  senna-docusate (PERICOLACE) 8.6-50 mg per tablet Take 2 Tabs by mouth daily.  oxyCODONE-acetaminophen (PERCOCET 10)  mg per tablet Take 1 Tab by mouth every four (4) hours as needed. Max Daily Amount: 6 Tabs.  ondansetron (ZOFRAN ODT) 8 mg disintegrating tablet Take 1 Tab by mouth every eight (8) hours as needed for Nausea for up to 12 doses.  diphenoxylate-atropine (LOMOTIL) 2.5-0.025 mg per tablet Take 1 Tab by mouth four (4) times daily as needed for Diarrhea (1 tab after each stool for max 8 per day). Max Daily Amount: 4 Tabs. Take after each stool for a maximum of 8 tablets daily    albuterol (PROVENTIL VENTOLIN) 2.5 mg /3 mL (0.083 %) nebulizer solution 3 mL by Nebulization route every four (4) hours as needed for Wheezing.  Cetirizine (ZYRTEC) 10 mg cap Take  by mouth.  bisacodyl (DULCOLAX) 10 mg suppository Insert 10 mg into rectum as needed.  polyethylene glycol (MIRALAX) 17 gram packet Take 1 Packet by mouth daily. Review of Systems:     Constitutional: No fevers, chills, weight loss, fatigue. Skin: No rashes, pruritis, jaundice, ulcerations, erythema. HENT: No headaches, nosebleeds, sinus pressure, rhinorrhea, sore throat. Eyes: No visual changes, blurred vision, eye pain, photophobia, jaundice. Cardiovascular: No chest pain, heart palpitations. Respiratory: No cough, SOB, wheezing, chest discomfort, orthopnea. Gastrointestinal: + blood in stool, LLQ pain, no heartburn/constipation/diarrhea. No dysphagia, No N/V, changes in appetite. Genitourinary: No dysuria, bleeding, discharge, pyuria. Musculoskeletal: No weakness, arthralgias, wasting. Endo: No sweats. Heme: No bruising, easy bleeding. Allergies: As noted. Neurological: Cranial nerves intact. Alert and oriented. Gait not assessed. Psychiatric:  No anxiety, depression, hallucinations. Visit Vitals    /86    Pulse 69    Temp 98 °F (36.7 °C)    Resp 20    Ht 5' 4\" (1.626 m)    Wt 111.1 kg (244 lb 14.4 oz)    SpO2 99%    Breastfeeding No    BMI 42.04 kg/m2       Physical Assessment:     constitutional: appearance: well developed, well nourished, overweight habitus, no deformities, in no acute distress. skin: inspection: no rashes, ulcers, icterus or other lesions; no clubbing or telangiectasias. palpation: no induration or subcutaneos nodules. eyes: inspection: normal conjunctivae and lids; no jaundice pupils: normal  ENMT: mouth: normal oral mucosa,lips and gums; good dentition. oropharynx: normal tongue, hard and soft palate; posterior pharynx without erithema, exudate or lesions. neck: thyroid: normal size, consistency and position; no masses or tenderness. respiratory: effort: normal chest excursion; no intercostal retraction or accessory muscle use. cardiovascular: abdominal aorta: normal size and position; no bruits. palpation: PMI of normal size and position; normal rhythm; no thrill or murmurs. abdominal: abdomen: normal consistency; no tenderness or masses. hernias: no hernias appreciated. liver: normal size and consistency. spleen: not palpable. rectal: hemoccult/guaiac: not performed. musculoskeletal: digits and nails: no clubbing, cyanosis, petechiae or other inflammatory conditions. gait: normal gait and station head and neck: normal range of motion; no pain, crepitation or contracture. spine/ribs/pelvis: normal range of motion; no pain, deformity or contracture. neurologic: cranial nerves: II-XII normal.   psychiatric: judgement/insight: within normal limits. memory: within normal limits for recent and remote events. mood and affect: no evidence of depression, anxiety or agitation. orientation: oriented to time, space and person. Basic Metabolic Profile   Recent Labs      06/01/18   0305   NA  141   K  3.2*   CL  106   CO2  30   BUN  12   GLU  88   CA  9.1   MG  2.1   PHOS  3.3         CBC w/Diff    Recent Labs      06/01/18   0305   WBC  5.7   RBC  4.07*   HGB  11.0*   HCT  34.3*   MCV  84.3   MCH  27.0   MCHC  32.1   RDW  14.6*   PLT  189    Recent Labs      06/01/18   0305   GRANS  54   LYMPH  35   EOS  3        Hepatic Function   Recent Labs      05/31/18   1410   ALB  3.7   TP  8.2   TBILI  0.4   SGOT  14*   AP  118*   LPSE  117        Coags   Recent Labs      05/31/18   1410   PTP  13.7   INR  1.1   APTT  36.2           Abdulaziz Apt, NP. [facility] [address] 1949, [address] [facility]  Cell: [phone]  [email or web address]

## 2018-06-01 NOTE — PROCEDURES
[facility]  *** [facility]AL [facility] ***    Name: Geovanni Lehman  MRN: [id number]    Inpatient  : 16 Dec 1955  HIS Order #: [id number]  [id number] [facility] Visit #: [id number]  Date: 2018    TYPE OF TEST: Peripheral Venous Testing    REASON FOR TEST  Pain in limb, Limb swelling    Right Leg:-  Deep venous thrombosis:           Yes  Proximal extent of thrombus:      Proximal Femoral  Superficial venous thrombosis:    No  Deep venous insufficiency:        Not examined  Superficial venous insufficiency: Not examined    Left Leg:-  Deep venous thrombosis:           No  Superficial venous thrombosis:    No  Deep venous insufficiency:        Not examined  Superficial venous insufficiency: Not examined      INTERPRETATION/FINDINGS  Duplex images were obtained using 2-D gray scale, color flow, and  spectral Doppler analysis. Right leg :  1. Chronic, nonocclusive deep venous thrombosis identified in the  proximal and mid femoral veins. 2. Deep vein(s) visualized include the common femoral, femoral,  popliteal, posterior tibial, and peroneal veins. 3. No evidence of superficial thrombosis detected. 4. Superficial vein(s) visualized include the great saphenous vein at  the sapheno-femoral junction. Left leg :  1. No evidence of deep venous thrombosis detected in the veins  visualized. 2. Deep vein(s) visualized include the common femoral, femoral,  popliteal, posterior tibial, and peroneal veins. 3. No evidence of superficial thrombosis detected. 4. Superficial vein(s) visualized include the great saphenous vein at  the sapheno-femoral junction. ADDITIONAL COMMENTS  Previous exam performed 2017. I have personally reviewed the data relevant to the interpretation of  this  study. TECHNOLOGIST: Nhi Haney.  Velma MANCUSO  Signed: 2018 11:08 AM    PHYSICIAN: Rufina Koehler MD  Signed: 2018 12:43 PM

## 2018-06-01 NOTE — CONSULTS
Surgery Consult      Patient: Vivien Dorado MRN: [id number]  CSN: [id number]      YOB: 1955    Age: 57 y.o. Sex: female      DOA: 5/31/2018       HPI:     Vivien Dorado is a 57 y.o. female who presented through the ED with abdominal pain in LLQ with diarrheal episode of bright red blood per rectum x 1 episode at home. Pt states that she had another episode upon her arrival to ER. It is suggested bleed could be either diverticular source or local anorectal cause. But she is also known to vascular service for history of DVT and IVC filter placement. She was admitted to [facility] back in April of 2016 after a fall resulting in spinal fracture. She was down for approximately 36 hours prior to EMS being called and admitted to the hospital. She developed a pulmonary embolism and an IVC filter was placed prior to her spine surgery. 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 She is on lifelong anticoagulation with Xarelto. Her IVC filter has been removed. She remains resident at local nursing facility.      Concern regarding anticoagulation in this setting of rectal bleeding is prompted     Past Medical History:   Diagnosis Date    Arthritis        Past Surgical History:   Procedure Laterality Date    COLONOSCOPY N/A 5/15/2018    COLONOSCOPY, DIAGNOSTIC  performed by Paulie Daniels MD at [place] [address]  04/27/2016    T2-L2 Decomprssion and Fusion/T12 Corpetomy       Family History   Problem Relation Age of Onset    Hypertension Mother     Diabetes Mother     Heart Disease Mother     Heart Disease Father     Diabetes Maternal Aunt     Cancer Maternal Uncle      preostate cancer    Diabetes Maternal Uncle        Social History     Social History    Marital status: SINGLE Spouse name: N/A    Number of children: N/A    Years of education: N/A     Social History Main Topics    Smoking status: Former Smoker    Smokeless tobacco: Former User     Quit date: 4/1/2016    Alcohol use Yes    Drug use: No    Sexual activity: Not on file     Other Topics Concern    Not on file     Social History Narrative       Prior to Admission medications    Medication Sig Start Date End Date Taking? Authorizing Provider   gabapentin (NEURONTIN) 400 mg capsule Take 400 mg by mouth three (3) times daily. Yes Historical Provider   atorvastatin (LIPITOR) 40 mg tablet Take 1 Tab by mouth nightly. 7/13/16  Yes Pati Gama MD   ferrous sulfate 325 mg (65 mg iron) tablet Take 1 Tab by mouth two (2) times daily (with meals). 7/13/16  Yes Pati Gama MD   nystatin (MYCOSTATIN) powder Apply  to affected area two (2) times a day. 7/13/16  Yes Pati Gama MD   senna-docusate (PERICOLACE) 8.6-50 mg per tablet Take 2 Tabs by mouth daily. 7/13/16  Yes Pati Gama MD   oxyCODONE-acetaminophen (PERCOCET 10)  mg per tablet Take 1 Tab by mouth every four (4) hours as needed. Max Daily Amount: 6 Tabs. 7/13/16  Yes Pati Gama MD   ondansetron (ZOFRAN ODT) 8 mg disintegrating tablet Take 1 Tab by mouth every eight (8) hours as needed for Nausea for up to 12 doses. 3/6/18   Darden Meigs, NP   diphenoxylate-atropine (LOMOTIL) 2.5-0.025 mg per tablet Take 1 Tab by mouth four (4) times daily as needed for Diarrhea (1 tab after each stool for max 8 per day). Max Daily Amount: 4 Tabs. Take after each stool for a maximum of 8 tablets daily 2/20/18   Tracy Zacarias MD   albuterol (PROVENTIL VENTOLIN) 2.5 mg /3 mL (0.083 %) nebulizer solution 3 mL by Nebulization route every four (4) hours as needed for Wheezing. 2/20/18   Tracy Zacarias MD   Cetirizine (ZYRTEC) 10 mg cap Take  by mouth. Historical Provider   bisacodyl (DULCOLAX) 10 mg suppository Insert 10 mg into rectum as needed. 7/13/16   Alejandra Washburn MD   polyethylene glycol [facility]) 17 gram packet Take 1 Packet by mouth daily. 7/13/16   Alejandra Washburn MD       Allergies   Allergen Reactions    Iodinated Contrast- Oral And Iv Dye Other (comments)     Patient states will pass out. Review of Systems  Review of Systems - History obtained from chart review  General ROS: negative  Psychological ROS: negative  Ophthalmic ROS: negative  Respiratory ROS: negative  Cardiovascular ROS: negative  Gastrointestinal ROS: positive for - abdominal pain and BRBPR  Musculoskeletal ROS: immobility from previous spinal fracture  Neurological ROS: positive for - weakness  Dermatological ROS: negative  Vascular ROS: negative      Physical Exam:      Visit Vitals    /89 (BP 1 Location: Left arm, BP Patient Position: At rest)    Pulse 63    Temp 97.8 °F (36.6 °C)    Resp 18    Ht 5' 4\" (1.626 m)    Wt 244 lb 14.4 oz (111.1 kg)    SpO2 99%    Breastfeeding No    BMI 42.04 kg/m2     Data Review:    CBC:   Lab Results   Component Value Date/Time    WBC 5.7 06/01/2018 03:05 AM    RBC 4.07 (L) 06/01/2018 03:05 AM    HGB 11.6 (L) 06/01/2018 12:15 PM    HCT 35.9 06/01/2018 12:15 PM    PLATELET 169 83/29/0428 03:05 AM      Right leg :  1. Chronic, nonocclusive deep venous thrombosis identified in the  proximal and mid femoral veins. 2. Deep vein(s) visualized include the common femoral, femoral,  popliteal, posterior tibial, and peroneal veins. 3. No evidence of superficial thrombosis detected. 4. Superficial vein(s) visualized include the great saphenous vein at  the sapheno-femoral junction. Left leg :  1. No evidence of deep venous thrombosis detected in the veins  visualized. 2. Deep vein(s) visualized include the common femoral, femoral,  popliteal, posterior tibial, and peroneal veins. 3. No evidence of superficial thrombosis detected.   4. Superficial vein(s) visualized include the great saphenous vein at  the sapheno-femoral junction.     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 With history of filter occlusion, would try to avoid placement again.    Will simply have to monitor her for symptoms and follow up GI recommendations   Following     Principal Problem:    Acute lower GI hemorrhage (5/31/2018)    Active Problems:    Chronic anticoagulation (6/1/2018)      Mixed hyperlipidemia (6/1/2018)      Neuropathy (6/1/2018)      History of deep vein thrombosis (DVT) of lower extremity (6/1/2018)        MICHELLE Johnson  June 1, 2018

## 2018-06-02 VITALS
SYSTOLIC BLOOD PRESSURE: 165 MMHG | OXYGEN SATURATION: 95 % | HEIGHT: 64 IN | RESPIRATION RATE: 18 BRPM | TEMPERATURE: 98.2 F | DIASTOLIC BLOOD PRESSURE: 78 MMHG | HEART RATE: 71 BPM | WEIGHT: 246.5 LBS | BODY MASS INDEX: 42.08 KG/M2

## 2018-06-02 LAB
ANION GAP SERPL CALC-SCNC: 8 MMOL/L (ref 3–18)
BASOPHILS # BLD: 0 K/UL (ref 0–0.1)
BASOPHILS NFR BLD: 0 % (ref 0–2)
BUN SERPL-MCNC: 10 MG/DL (ref 7–18)
BUN/CREAT SERPL: 16 (ref 12–20)
CALCIUM SERPL-MCNC: 9.1 MG/DL (ref 8.5–10.1)
CHLORIDE SERPL-SCNC: 106 MMOL/L (ref 100–108)
CO2 SERPL-SCNC: 25 MMOL/L (ref 21–32)
CREAT SERPL-MCNC: 0.62 MG/DL (ref 0.6–1.3)
DIFFERENTIAL METHOD BLD: ABNORMAL
EOSINOPHIL # BLD: 0.2 K/UL (ref 0–0.4)
EOSINOPHIL NFR BLD: 4 % (ref 0–5)
ERYTHROCYTE [DISTWIDTH] IN BLOOD BY AUTOMATED COUNT: 14.7 % (ref 11.6–14.5)
GLUCOSE SERPL-MCNC: 86 MG/DL (ref 74–99)
HCT VFR BLD AUTO: 34.5 % (ref 35–45)
HGB BLD-MCNC: 11.1 G/DL (ref 12–16)
LYMPHOCYTES # BLD: 1.9 K/UL (ref 0.9–3.6)
LYMPHOCYTES NFR BLD: 34 % (ref 21–52)
MCH RBC QN AUTO: 27.1 PG (ref 24–34)
MCHC RBC AUTO-ENTMCNC: 32.2 G/DL (ref 31–37)
MCV RBC AUTO: 84.4 FL (ref 74–97)
MONOCYTES # BLD: 0.4 K/UL (ref 0.05–1.2)
MONOCYTES NFR BLD: 7 % (ref 3–10)
NEUTS SEG # BLD: 3 K/UL (ref 1.8–8)
NEUTS SEG NFR BLD: 55 % (ref 40–73)
PLATELET # BLD AUTO: 191 K/UL (ref 135–420)
PMV BLD AUTO: 9.2 FL (ref 9.2–11.8)
POTASSIUM SERPL-SCNC: 3.9 MMOL/L (ref 3.5–5.5)
RBC # BLD AUTO: 4.09 M/UL (ref 4.2–5.3)
SODIUM SERPL-SCNC: 139 MMOL/L (ref 136–145)
WBC # BLD AUTO: 5.5 K/UL (ref 4.6–13.2)

## 2018-06-02 PROCEDURE — 74011250637 HC RX REV CODE- 250/637: Performed by: INTERNAL MEDICINE

## 2018-06-02 PROCEDURE — 36415 COLL VENOUS BLD VENIPUNCTURE: CPT | Performed by: INTERNAL MEDICINE

## 2018-06-02 PROCEDURE — 80048 BASIC METABOLIC PNL TOTAL CA: CPT | Performed by: INTERNAL MEDICINE

## 2018-06-02 PROCEDURE — 85025 COMPLETE CBC W/AUTO DIFF WBC: CPT | Performed by: INTERNAL MEDICINE

## 2018-06-02 RX ORDER — CLONIDINE HYDROCHLORIDE 0.1 MG/1
0.1 TABLET ORAL 2 TIMES DAILY
Status: DISCONTINUED | OUTPATIENT
Start: 2018-06-02 | End: 2018-06-02 | Stop reason: HOSPADM

## 2018-06-02 RX ORDER — FUROSEMIDE 40 MG/1
40 TABLET ORAL DAILY
Status: DISCONTINUED | OUTPATIENT
Start: 2018-06-02 | End: 2018-06-02 | Stop reason: HOSPADM

## 2018-06-02 RX ADMIN — GABAPENTIN 400 MG: 400 CAPSULE ORAL at 08:18

## 2018-06-02 RX ADMIN — OXYCODONE HYDROCHLORIDE AND ACETAMINOPHEN 1 TABLET: 5; 325 TABLET ORAL at 08:20

## 2018-06-02 RX ADMIN — FUROSEMIDE 40 MG: 40 TABLET ORAL at 10:05

## 2018-06-02 RX ADMIN — CLONIDINE HYDROCHLORIDE 0.1 MG: 0.1 TABLET ORAL at 10:05

## 2018-06-02 NOTE — PROGRESS NOTES
Reason for Admission:   Acute lower GI hemorrhage                   RRAT Score:    10                 Plan for utilizing home health:     no                     Likelihood of Readmission:  Yellow/moderate                         Transition of Care Plan:   Pt is from [facility] and she will be going back to [facility] today. Pt signed FOC.

## 2018-06-02 NOTE — DISCHARGE SUMMARY
[facility]  [facility]       Patient: Marisa Aguero Age: 57 y.o. : 1955 MR#: [id number] SSN: [id number]  PCP on record: Silviano Collado MD  Admit date: 2018  Discharge date: 2018    Consults:  YAN Dubose-MICHELLE Francisco-vascular surgery  -   Procedures:duplex bilateral lower extremities 18L:  Right Leg:-  Deep venous thrombosis:           Yes  Proximal extent of thrombus:      Proximal Femoral  Superficial venous thrombosis:    No  Deep venous insufficiency:        Not examined  Superficial venous insufficiency: Not examined     Left Leg:-  Deep venous thrombosis:           No  Superficial venous thrombosis:    No  Deep venous insufficiency:        Not examined  Superficial venous insufficiency: Not examined        INTERPRETATION/FINDINGS  Duplex images were obtained using 2-D gray scale, color flow, and  spectral Doppler analysis.     Right leg :  1. Chronic, nonocclusive deep venous thrombosis identified in the  proximal and mid femoral veins. 2. Deep vein(s) visualized include the common femoral, femoral,  popliteal, posterior tibial, and peroneal veins. 3. No evidence of superficial thrombosis detected. 4. Superficial vein(s) visualized include the great saphenous vein at  the sapheno-femoral junction. Left leg :  1. No evidence of deep venous thrombosis detected in the veins  visualized. 2. Deep vein(s) visualized include the common femoral, femoral,  popliteal, posterior tibial, and peroneal veins. 3. No evidence of superficial thrombosis detected. 4. Superficial vein(s) visualized include the great saphenous vein at  the sapheno-femoral junction.     -     Significant Diagnostic Studies: CT abd pelvis w/o contrast 18: IMPRESSION  IMPRESSION:      1. Diverticulosis coli without diverticulitis. No bowel inflammation or  obstruction.     2. Small hiatal hernia.     3.  Tortuous abdominal aorta with moderate atherosclerotic disease.      Thank you for this referral.            -    Discharge Diagnoses: Lower GI bleed-resolved   Chronic DVT right leg                                         Patient Active Problem List   Diagnosis Code    [facility] S22.009A    Abnormal CT of the abdomen R93.5    S/P thoracic spinal fusion Z98.1    Osteoporosis M81.0    Acute lower GI hemorrhage K92.2    Chronic anticoagulation Z79.01    Mixed hyperlipidemia E78.2    Neuropathy G62.9    History of deep vein thrombosis (DVT) of lower extremity Z86.718       Hospital Course by Problem     57year old female on xarelto for dvt rx presented to the ED w/ complaints of rectal bleeding. -GI bleed in this pt on xarelto for rx of DVT. Recent colo 05/15 w/ hemorrhoid ligation, noted to have diverticulosis and findings suspected to be condyloma. GI following, Input noted, appreciate assistance. No plans for colo at this time. hgb and hemodynamically stable. Pt on date of dc denied having had further rectal bleeding. Per GI okay to dc from their standpoint.  -History of DVT, was on Xarelto until this admission. Has history of occlusion of IVC filter. S/p eval by vascular surgery team. Recommend using smaller dose of xarelto (10 mg) if pt remains w/o evidence of cont'd gi bleeding. Appreciate assistance. -Dyslipidemia on statin  -Morbid obesity  -Abdominal pain cause unclear, pt has had it for several weeks, located in LLQ. On exam benign. CT done 05/31 did not show evidence of cause, no evidence of diverticulitis. Pt will need to f/u w/ PCP for further eval and management. Discussed w/ pt. Today's examination of the patient revealed:     Subjective:     Objective:   VS:   Visit Vitals    /81  Comment: Dr. Tomás Kingston aware.      Pulse 77    Temp 97.8 °F (36.6 °C)    Resp 18    Ht 5' 4\" (1.626 m)    Wt 111.8 kg (246 lb 8 oz)    SpO2 99%    Breastfeeding No    BMI 42.31 kg/m2      Tmax/24hrs: Temp (24hrs), Av.9 °F (36.6 °C), Min:97.6 °F (36.4 °C), Max:98.1 °F (36.7 °C)     Input/Output: No intake or output data in the 24 hours ending 18 1059    General:  Alert, awake, in nad  Cardiovascular:  Rrr, no murmurs  Pulmonary:  ctab  GI:  Soft, nt, nd  Extremities: no edema   Additional:      Labs:    Recent Results (from the past 24 hour(s))   HGB & HCT    Collection Time: 18 12:15 PM   Result Value Ref Range    HGB 11.6 (L) 12.0 - 16.0 g/dL    HCT 35.9 35.0 - 85.9 %   METABOLIC PANEL, BASIC    Collection Time: 18  1:15 AM   Result Value Ref Range    Sodium 139 136 - 145 mmol/L    Potassium 3.9 3.5 - 5.5 mmol/L    Chloride 106 100 - 108 mmol/L    CO2 25 21 - 32 mmol/L    Anion gap 8 3.0 - 18 mmol/L    Glucose 86 74 - 99 mg/dL    BUN 10 7.0 - 18 MG/DL    Creatinine 0.62 0.6 - 1.3 MG/DL    BUN/Creatinine ratio 16 12 - 20      GFR est AA >60 >60 ml/min/1.73m2    GFR est non-AA >60 >60 ml/min/1.73m2    Calcium 9.1 8.5 - 10.1 MG/DL   CBC WITH AUTOMATED DIFF    Collection Time: 18  1:15 AM   Result Value Ref Range    WBC 5.5 4.6 - 13.2 K/uL    RBC 4.09 (L) 4.20 - 5.30 M/uL    HGB 11.1 (L) 12.0 - 16.0 g/dL    HCT 34.5 (L) 35.0 - 45.0 %    MCV 84.4 74.0 - 97.0 FL    MCH 27.1 24.0 - 34.0 PG    MCHC 32.2 31.0 - 37.0 g/dL    RDW 14.7 (H) 11.6 - 14.5 %    PLATELET 491 438 - 437 K/uL    MPV 9.2 9.2 - 11.8 FL    NEUTROPHILS 55 40 - 73 %    LYMPHOCYTES 34 21 - 52 %    MONOCYTES 7 3 - 10 %    EOSINOPHILS 4 0 - 5 %    BASOPHILS 0 0 - 2 %    ABS. NEUTROPHILS 3.0 1.8 - 8.0 K/UL    ABS. LYMPHOCYTES 1.9 0.9 - 3.6 K/UL    ABS. MONOCYTES 0.4 0.05 - 1.2 K/UL    ABS. EOSINOPHILS 0.2 0.0 - 0.4 K/UL    ABS.  BASOPHILS 0.0 0.0 - 0.1 K/UL    DF AUTOMATED       Additional Data Reviewed:     Condition: stable  Disposition:    []Home   []Home with Home Health   [x]SNF/NH   []Rehab   []Home with family   []Alternate Facility:____________________      Discharge Medications:     Current Discharge Medication List      START taking these medications    Details   rivaroxaban (XARELTO) 10 mg tablet Take 1 Tab by mouth daily (with breakfast). Qty: 30 Tab, Refills: 3         CONTINUE these medications which have NOT CHANGED    Details   gabapentin (NEURONTIN) 400 mg capsule Take 400 mg by mouth three (3) times daily. atorvastatin (LIPITOR) 40 mg tablet Take 1 Tab by mouth nightly. Qty: 30 Tab, Refills: 1      ferrous sulfate 325 mg (65 mg iron) tablet Take 1 Tab by mouth two (2) times daily (with meals). Qty: 60 Tab, Refills: 1      nystatin (MYCOSTATIN) powder Apply  to affected area two (2) times a day. Qty: 1 Bottle, Refills: 3      senna-docusate (PERICOLACE) 8.6-50 mg per tablet Take 2 Tabs by mouth daily. Qty: 60 Tab, Refills: 1      oxyCODONE-acetaminophen (PERCOCET 10)  mg per tablet Take 1 Tab by mouth every four (4) hours as needed. Max Daily Amount: 6 Tabs. Qty: 40 Tab, Refills: 0      ondansetron (ZOFRAN ODT) 8 mg disintegrating tablet Take 1 Tab by mouth every eight (8) hours as needed for Nausea for up to 12 doses. Qty: 20 Tab, Refills: 0      diphenoxylate-atropine (LOMOTIL) 2.5-0.025 mg per tablet Take 1 Tab by mouth four (4) times daily as needed for Diarrhea (1 tab after each stool for max 8 per day). Max Daily Amount: 4 Tabs. Take after each stool for a maximum of 8 tablets daily  Qty: 20 Tab, Refills: 0    Associated Diagnoses: Viral syndrome      albuterol (PROVENTIL VENTOLIN) 2.5 mg /3 mL (0.083 %) nebulizer solution 3 mL by Nebulization route every four (4) hours as needed for Wheezing. Qty: 24 Each, Refills: 0      Cetirizine (ZYRTEC) 10 mg cap Take  by mouth.      bisacodyl (DULCOLAX) 10 mg suppository Insert 10 mg into rectum as needed. Qty: 30 Suppository, Refills: 1      polyethylene glycol (MIRALAX) 17 gram packet Take 1 Packet by mouth daily.   Qty: 30 Packet, Refills: 1         STOP taking these medications       isoniazid (NYDRAZID) 300 mg tablet Comments:   Reason for Stopping: pyridoxine, vitamin B6, (VITAMIN B-6) 25 mg tablet Comments:   Reason for Stopping: Follow-up Appointments:   1.  Your PCP: Farrukh Young MD, within 7-10days            >30 minutes spent coordinating this discharge (review instructions/follow-up, prescriptions, preparing report for sign off)    Signed:  Lorenzo Rubio MD  6/2/2018  10:59 AM

## 2018-06-02 NOTE — CONSULTS
[facility], [address] [place] [address]  [email or web address]         Impression/Plan:  1. LGIB. Stable, no bleeding. OK to DC w low-dose blood thinner if needed. Recent colo by Zak Manning noted, no plans to repeat. Will sign off, call w questions.       Chief Complaint: none      Subjective:  No bleeding, Hg stable        Eyes: conjunctiva normal, EOM normal   Neck: ROM normal, supple and trachea normal   Cardiovascular: heart normal, intact distal pulses, normal rate and regular rhythm   Pulmonary/Chest Wall: breath sounds normal and effort normal   Abdominal: appearance normal, bowel sounds normal and soft, non-acute, non-tender                Visit Vitals    BP (!) 163/91 (BP 1 Location: Right arm, BP Patient Position: At rest)    Pulse 72    Temp 97.8 °F (36.6 °C)    Resp 18    Ht 5' 4\" (1.626 m)    Wt 111.8 kg (246 lb 8 oz)    SpO2 99%    Breastfeeding No    BMI 42.31 kg/m2         No intake or output data in the 24 hours ending 06/02/18 0736    CBC w/Diff    Lab Results   Component Value Date/Time    WBC 5.5 06/02/2018 01:15 AM    RBC 4.09 (L) 06/02/2018 01:15 AM    HGB 11.1 (L) 06/02/2018 01:15 AM    HCT 34.5 (L) 06/02/2018 01:15 AM    MCV 84.4 06/02/2018 01:15 AM    MCH 27.1 06/02/2018 01:15 AM    MCHC 32.2 06/02/2018 01:15 AM    RDW 14.7 (H) 06/02/2018 01:15 AM     06/02/2018 01:15 AM    Lab Results   Component Value Date/Time    GRANS 55 06/02/2018 01:15 AM    LYMPH 34 06/02/2018 01:15 AM    EOS 4 06/02/2018 01:15 AM    BANDS 2 05/13/2016 06:21 AM    BASOS 0 06/02/2018 01:15 AM    MYELO 3 (H) 05/13/2016 06:21 AM    METAS 3 (H) 05/13/2016 06:21 AM      Basic Metabolic Profile   Recent Labs      06/02/18   0115  06/01/18   0305   NA  139  141   K  3.9  3.2*   CL  106  106   CO2  25  30   BUN  10  12   CA  9.1  9.1   MG   --   2.1   PHOS   --   3.3        Hepatic Function    Lab Results   Component Value Date/Time    ALB 3.7 05/31/2018 02:10 PM    TP 8.2 05/31/2018 02:10 PM     (H) 05/31/2018 02:10 PM    Lab Results   Component Value Date/Time    SGOT 14 (L) 05/31/2018 02:10 PM        @LABNT(CULT:3)                 )Annel Davis MD, MD  [facility], [facility]  [email or web address]

## 2018-06-02 NOTE — PROGRESS NOTES
[address] and spoke with Genevieve about pt returning to [facility] today and she states they are trying to get in touch with the admissions coordinator and she will call me back. Gave her my paper number. Dr. Claribel Ibarra states was able to get in touch with Edyta Orr of [facility] and pt can go back to [facility] today    Transport set with [facility] to  pt at 1300. Lay confirmation number is [id number]    Pt and nursing informed of  time.

## 2018-06-02 NOTE — PROGRESS NOTES
Discharge instructions reviewed with patient. Pt states understanding. IV removed. Pt has all belongings. 's. Dr. Hanks Montrell aware and states patient is fine for discharge. Pt tolerated regular lunch tray. Pt discharged by stretcher, transport. Report called and  given to Nurse Vallejo at [facility].

## 2018-06-02 NOTE — DISCHARGE INSTRUCTIONS
DISCHARGE SUMMARY from Nurse    PATIENT INSTRUCTIONS:    After general anesthesia or intravenous sedation, for 24 hours or while taking prescription Narcotics:  · Limit your activities  · Do not drive and operate hazardous machinery  · Do not make important personal or business decisions  · Do  not drink alcoholic beverages  · If you have not urinated within 8 hours after discharge, please contact your surgeon on call. Report the following to your surgeon:  · Excessive pain, swelling, redness or odor of or around the surgical area  · Temperature over 100.5  · Nausea and vomiting lasting longer than 4 hours or if unable to take medications  · Any signs of decreased circulation or nerve impairment to extremity: change in color, persistent  numbness, tingling, coldness or increase pain  · Any questions    What to do at Home:  Recommended activity: Activity as tolerated. If you experience any of the following symptoms black stools or noticeable blood in stools, chest pain or shortness of breath, please follow up with Primary Care Provider. *  Please give a list of your current medications to your Primary Care Provider. *  Please update this list whenever your medications are discontinued, doses are      changed, or new medications (including over-the-counter products) are added. *  Please carry medication information at all times in case of emergency situations. These are general instructions for a healthy lifestyle:    No smoking/ No tobacco products/ Avoid exposure to second hand smoke  Surgeon General's Warning:  Quitting smoking now greatly reduces serious risk to your health.     Obesity, smoking, and sedentary lifestyle greatly increases your risk for illness    A healthy diet, regular physical exercise & weight monitoring are important for maintaining a healthy lifestyle    You may be retaining fluid if you have a history of heart failure or if you experience any of the following symptoms: Weight gain of 3 pounds or more overnight or 5 pounds in a week, increased swelling in our hands or feet or shortness of breath while lying flat in bed. Please call your doctor as soon as you notice any of these symptoms; do not wait until your next office visit. Recognize signs and symptoms of STROKE:    F-face looks uneven    A-arms unable to move or move unevenly    S-speech slurred or non-existent    T-time-call 911 as soon as signs and symptoms begin-DO NOT go       Back to bed or wait to see if you get better-TIME IS BRAIN. Warning Signs of HEART ATTACK     Call 911 if you have these symptoms:   Chest discomfort. Most heart attacks involve discomfort in the center of the chest that lasts more than a few minutes, or that goes away and comes back. It can feel like uncomfortable pressure, squeezing, fullness, or pain.  Discomfort in other areas of the upper body. Symptoms can include pain or discomfort in one or both arms, the back, neck, jaw, or stomach.  Shortness of breath with or without chest discomfort.  Other signs may include breaking out in a cold sweat, nausea, or lightheadedness. Don't wait more than five minutes to call 911 - MINUTES MATTER! Fast action can save your life. Calling 911 is almost always the fastest way to get lifesaving treatment. Emergency Medical Services staff can begin treatment when they arrive -- up to an hour sooner than if someone gets to the hospital by car. The discharge information has been reviewed with the patient. The patient verbalized understanding. Discharge medications reviewed with the patient and appropriate educational materials and side effects teaching were provided. [employer] Activation    Thank you for requesting access to [employer]. Please follow the instructions below to securely access and download your online medical record.  [employer] allows you to send messages to your doctor, view your test results, renew your prescriptions, schedule appointments, and more. How Do I Sign Up? 1. In your internet browser, go to [email or web address]  2. Click on the First Time User? Click Here link in the Sign In box. You will be redirect to the New Member Sign Up page. 3. Enter your [employer] Access Code exactly as it appears below. You will not need to use this code after youve completed the sign-up process. If you do not sign up before the expiration date, you must request a new code. [employer] Access Code: [email or web address]  Expires: 7/15/2018  1:14 PM (This is the date your [employer] access code will )    4. Enter the last four digits of your Social Security Number (xxxx) and Date of Birth (mm/dd/yyyy) as indicated and click Submit. You will be taken to the next sign-up page. 5. Create a [employer] ID. This will be your [employer] login ID and cannot be changed, so think of one that is secure and easy to remember. 6. Create a [employer] password. You can change your password at any time. 7. Enter your Password Reset Question and Answer. This can be used at a later time if you forget your password. 8. Enter your e-mail address. You will receive e-mail notification when new information is available in [address] 9. Click Sign Up. You can now view and download portions of your medical record. 10. Click the Download Summary menu link to download a portable copy of your medical information. Additional Information    If you have questions, please visit the Frequently Asked Questions section of the [employer] website at [email or web address] Remember, [employer] is NOT to be used for urgent needs. For medical emergencies, dial 911. Patient armband removed and shredded.   ___________________________________________________________________________________________________________________________________

## 2018-06-02 NOTE — ROUTINE PROCESS
Bedside and Verbal shift change report given to Barry Ashford (oncoming nurse) by Jeancarlos Hernandez   (offgoing nurse). Report included the following information SBAR, Procedure Summary, Intake/Output, MAR, Recent Results and Cardiac Rhythm Sinus Rhythm.

## 2019-09-17 ENCOUNTER — HOSPITAL ENCOUNTER (OUTPATIENT)
Dept: LAB | Age: 64
Discharge: HOME | End: 2019-09-17

## 2019-09-17 LAB
ALBUMIN SERPL-MCNC: 3.3 G/DL (ref 3.4–5)
ALBUMIN/GLOB SERPL: 1 {RATIO} (ref 0.8–1.7)
ALP SERPL-CCNC: 92 U/L (ref 45–117)
ALT SERPL-CCNC: 13 U/L (ref 13–56)
ANION GAP SERPL CALC-SCNC: 4 MMOL/L (ref 3–18)
AST SERPL-CCNC: 12 U/L (ref 10–38)
BILIRUB SERPL-MCNC: 0.4 MG/DL (ref 0.2–1)
BUN SERPL-MCNC: 14 MG/DL (ref 7–18)
BUN/CREAT SERPL: 15 (ref 12–20)
CALCIUM SERPL-MCNC: 8.8 MG/DL (ref 8.5–10.1)
CHLORIDE SERPL-SCNC: 105 MMOL/L (ref 100–111)
CO2 SERPL-SCNC: 31 MMOL/L (ref 21–32)
CREAT SERPL-MCNC: 0.91 MG/DL (ref 0.6–1.3)
ERYTHROCYTE [DISTWIDTH] IN BLOOD BY AUTOMATED COUNT: 15.1 % (ref 11.6–14.5)
GLOBULIN SER CALC-MCNC: 3.4 G/DL (ref 2–4)
GLUCOSE SERPL-MCNC: 126 MG/DL (ref 74–99)
HCT VFR BLD AUTO: 35.6 % (ref 35–45)
HGB BLD-MCNC: 11.2 G/DL (ref 12–16)
MCH RBC QN AUTO: 27.7 PG (ref 24–34)
MCHC RBC AUTO-ENTMCNC: 31.5 G/DL (ref 31–37)
MCV RBC AUTO: 87.9 FL (ref 74–97)
PLATELET # BLD AUTO: 184 K/UL (ref 135–420)
PMV BLD AUTO: 9.7 FL (ref 9.2–11.8)
POTASSIUM SERPL-SCNC: 4.4 MMOL/L (ref 3.5–5.5)
PROT SERPL-MCNC: 6.7 G/DL (ref 6.4–8.2)
RBC # BLD AUTO: 4.05 M/UL (ref 4.2–5.3)
SODIUM SERPL-SCNC: 140 MMOL/L (ref 136–145)
WBC # BLD AUTO: 5.4 K/UL (ref 4.6–13.2)

## 2019-09-17 PROCEDURE — 80053 COMPREHEN METABOLIC PANEL: CPT

## 2019-09-17 PROCEDURE — 85027 COMPLETE CBC AUTOMATED: CPT

## 2020-02-11 ENCOUNTER — APPOINTMENT (OUTPATIENT)
Dept: GENERAL RADIOLOGY | Age: 65
End: 2020-02-11
Attending: EMERGENCY MEDICINE
Payer: MEDICARE

## 2020-02-11 ENCOUNTER — HOSPITAL ENCOUNTER (EMERGENCY)
Age: 65
Discharge: HOME | End: 2020-02-12
Attending: EMERGENCY MEDICINE
Payer: MEDICARE

## 2020-02-11 VITALS
RESPIRATION RATE: 14 BRPM | DIASTOLIC BLOOD PRESSURE: 111 MMHG | WEIGHT: 264 LBS | SYSTOLIC BLOOD PRESSURE: 204 MMHG | OXYGEN SATURATION: 94 % | TEMPERATURE: 98.5 F | BODY MASS INDEX: 45.07 KG/M2 | HEIGHT: 64 IN | HEART RATE: 85 BPM
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
## 2020-02-11 DIAGNOSIS — S93.401A MODERATE ANKLE SPRAIN, RIGHT, INITIAL ENCOUNTER: ICD-10-CM

## 2020-02-11 DIAGNOSIS — S42.341A CLOSED DISPLACED SPIRAL FRACTURE OF SHAFT OF RIGHT HUMERUS, INITIAL ENCOUNTER: ICD-10-CM

## 2020-02-11 PROCEDURE — 74011250637 HC RX REV CODE- 250/637: Performed by: EMERGENCY MEDICINE

## 2020-02-11 PROCEDURE — 73060 X-RAY EXAM OF HUMERUS: CPT

## 2020-02-11 PROCEDURE — 73600 X-RAY EXAM OF ANKLE: CPT

## 2020-02-11 PROCEDURE — 73070 X-RAY EXAM OF ELBOW: CPT

## 2020-02-11 PROCEDURE — 99285 EMERGENCY DEPT VISIT HI MDM: CPT

## 2020-02-11 PROCEDURE — 71045 X-RAY EXAM CHEST 1 VIEW: CPT

## 2020-02-11 RX ORDER — CLONIDINE HYDROCHLORIDE 0.1 MG/1
0.1 TABLET ORAL
Status: COMPLETED | OUTPATIENT
Start: 2020-02-11 | End: 2020-02-11

## 2020-02-11 RX ORDER — GABAPENTIN 400 MG/1
400 CAPSULE ORAL
Status: COMPLETED | OUTPATIENT
Start: 2020-02-11 | End: 2020-02-11

## 2020-02-11 RX ORDER — OXYCODONE AND ACETAMINOPHEN 5; 325 MG/1; MG/1
1 TABLET ORAL ONCE
Status: COMPLETED | OUTPATIENT
Start: 2020-02-11 | End: 2020-02-11

## 2020-02-11 RX ORDER — OXYCODONE HYDROCHLORIDE 5 MG/1
10 TABLET ORAL
Status: COMPLETED | OUTPATIENT
Start: 2020-02-11 | End: 2020-02-11

## 2020-02-11 RX ADMIN — OXYCODONE HYDROCHLORIDE 10 MG: 5 TABLET ORAL at 21:42

## 2020-02-11 RX ADMIN — CLONIDINE HYDROCHLORIDE 0.1 MG: 0.1 TABLET ORAL at 21:46

## 2020-02-11 RX ADMIN — OXYCODONE HYDROCHLORIDE AND ACETAMINOPHEN 1 TABLET: 5; 325 TABLET ORAL at 20:37

## 2020-02-11 RX ADMIN — GABAPENTIN 400 MG: 400 CAPSULE ORAL at 21:46

## 2020-02-12 NOTE — ED PROVIDER NOTES
EMERGENCY DEPARTMENT HISTORY AND PHYSICAL EXAM      Date: 2/11/2020  Patient Name: Soledad Rod    History of Presenting Illness     No chief complaint on file. History Provided By: Patient and EMS    Chief Complaint: Fall and right shoulder pain    Additional History (Context): Soledad Rod is a 75 y.o. female who presents with fall and right shoulder pain. Patient lives at a nursing facility, uses a walker for ambulation. Was outside the facility in the parking lot and had difficulty navigating a curb, it was also raining outside, fell forward landing mainly on her right side. There was no loss of consciousness, headache, nausea, vomiting, chest pain, abdominal pain. She has pain in her right shoulder and slightly in the proximal humerus and right elbow. Also notes that her right ankle is a little sore; has not tried to bear weight on it yet. No other injuries or complaints. PCP: George Miles MD    Current Outpatient Medications   Medication Sig Dispense Refill    rivaroxaban (XARELTO) 10 mg tablet Take 1 Tab by mouth daily (with breakfast). 30 Tab 3    ondansetron (ZOFRAN ODT) 8 mg disintegrating tablet Take 1 Tab by mouth every eight (8) hours as needed for Nausea for up to 12 doses. 20 Tab 0    diphenoxylate-atropine (LOMOTIL) 2.5-0.025 mg per tablet Take 1 Tab by mouth four (4) times daily as needed for Diarrhea (1 tab after each stool for max 8 per day). Max Daily Amount: 4 Tabs. Take after each stool for a maximum of 8 tablets daily 20 Tab 0    albuterol (PROVENTIL VENTOLIN) 2.5 mg /3 mL (0.083 %) nebulizer solution 3 mL by Nebulization route every four (4) hours as needed for Wheezing. 24 Each 0    Cetirizine (ZYRTEC) 10 mg cap Take  by mouth.  gabapentin (NEURONTIN) 400 mg capsule Take 400 mg by mouth three (3) times daily.  atorvastatin (LIPITOR) 40 mg tablet Take 1 Tab by mouth nightly.  30 Tab 1    bisacodyl (DULCOLAX) 10 mg suppository Insert 10 mg into rectum as needed. 30 Suppository 1    ferrous sulfate 325 mg (65 mg iron) tablet Take 1 Tab by mouth two (2) times daily (with meals). 60 Tab 1    nystatin (MYCOSTATIN) powder Apply  to affected area two (2) times a day. 1 Bottle 3    polyethylene glycol (MIRALAX) 17 gram packet Take 1 Packet by mouth daily. 30 Packet 1    senna-docusate (PERICOLACE) 8.6-50 mg per tablet Take 2 Tabs by mouth daily. 60 Tab 1    oxyCODONE-acetaminophen (PERCOCET 10)  mg per tablet Take 1 Tab by mouth every four (4) hours as needed. Max Daily Amount: 6 Tabs. 36 Tab 0       Past History     Past Medical History:  Past Medical History:   Diagnosis Date    Arthritis        Past Surgical History:  Past Surgical History:   Procedure Laterality Date    COLONOSCOPY N/A 5/15/2018    COLONOSCOPY, DIAGNOSTIC  performed by Bella Lacy MD at [place] [address]  04/27/2016    T2-L2 Decomprssion and Fusion/T12 Corpetomy       Family History:  Family History   Problem Relation Age of Onset    Hypertension Mother     Diabetes Mother     Heart Disease Mother     Heart Disease Father     Diabetes Maternal Aunt     Cancer Maternal Uncle         preostate cancer    Diabetes Maternal Uncle        Social History:  Social History     Tobacco Use    Smoking status: Former Smoker    Smokeless tobacco: Former User     Quit date: 4/1/2016   Substance Use Topics    Alcohol use: Yes    Drug use: No       Allergies: Allergies   Allergen Reactions    Iodinated Contrast Media Other (comments)     Patient states will pass out. Review of Systems   Review of Systems   Constitutional: Negative for chills, fatigue and fever. HENT: Negative for congestion, rhinorrhea, sore throat and trouble swallowing. Eyes: Negative for discharge, redness and itching. Respiratory: Negative for cough, shortness of breath, wheezing and stridor. Cardiovascular: Negative for chest pain, palpitations and leg swelling. Gastrointestinal: Negative for abdominal pain, blood in stool, diarrhea, nausea and vomiting. Genitourinary: Negative for difficulty urinating and dysuria. Musculoskeletal: Positive for gait problem, joint swelling and myalgias. Negative for arthralgias, back pain, neck pain and neck stiffness. Skin: Negative for rash. Neurological: Negative for syncope and light-headedness. Hematological: Does not bruise/bleed easily. Psychiatric/Behavioral: Negative for behavioral problems and confusion. All other systems reviewed and are negative. Physical Exam     Vitals:    02/11/20 2006 02/11/20 2013   BP: (!) 151/108    Pulse: 98    Resp: 18    Temp:  98.6 °F (37 °C)   SpO2: 96%      Physical Exam  Vitals signs and nursing note reviewed. Constitutional:       General: She is in acute distress. Appearance: She is well-developed. She is obese. She is not diaphoretic. Comments: Mild acute distress complaining of right shoulder pain   HENT:      Head: Normocephalic and atraumatic. Nose: Nose normal.      Mouth/Throat:      Mouth: Mucous membranes are moist.   Eyes:      Extraocular Movements: Extraocular movements intact. Conjunctiva/sclera: Conjunctivae normal.      Pupils: Pupils are equal, round, and reactive to light. Neck:      Musculoskeletal: Normal range of motion and neck supple. No neck rigidity or muscular tenderness. Cardiovascular:      Rate and Rhythm: Normal rate and regular rhythm. Heart sounds: Normal heart sounds. Pulmonary:      Effort: Pulmonary effort is normal.      Breath sounds: Normal breath sounds. No wheezing or rales. Abdominal:      General: Bowel sounds are normal. There is no distension. Palpations: Abdomen is soft. Tenderness: There is no abdominal tenderness. Musculoskeletal:      Comments: Spine with no bony or midline tenderness to palpation, full range of motion.   Right shoulder: Tenderness to palpation laterally from [facility] [place] operated by [facility] joint to proximal humerus. Has mild pain in the entire upper arm and right elbow. Neurovascularly intact distally. Also has some mild diffuse pain and tenderness to palpation around the right ankle; neurovascular intact distally. Lymphadenopathy:      Cervical: No cervical adenopathy. Skin:     General: Skin is warm and dry. Capillary Refill: Capillary refill takes less than 2 seconds. Neurological:      General: No focal deficit present. Mental Status: She is alert and oriented to person, place, and time. Psychiatric:         Mood and Affect: Mood normal.         Thought Content: Thought content normal.           Diagnostic Study Results     Labs -   No results found for this or any previous visit (from the past 12 hour(s)). Radiologic Studies -   XR SHOULDER RT AP/LAT MIN 2 V    (Results Pending)   XR HUMERUS RT    (Results Pending)   XR CHEST SNGL V    (Results Pending)   XR ELBOW RT AP/LAT    (Results Pending)   XR ANKLE RT MIN 3 V    (Results Pending)     CT Results  (Last 48 hours)    None        CXR Results  (Last 48 hours)    None            Medical Decision Making   I am the first provider for this patient. I reviewed the vital signs, available nursing notes, past medical history, past surgical history, family history and social history. Vital Signs-Reviewed the patient's vital signs. Records Reviewed: Old Medical Records    ED Course:   Felt better after Percocet in the emergency department. Disposition:  Pending at time of turnover; anticipate discharge back to nursing facility. Provider Notes (Medical Decision Making):   Mechanical fall from a slip. Has right shoulder and ankle pain that are being evaluated with x-rays. At time of turnover x-rays are pending. Patient with no head injury, headache, nausea, vomiting, or other neurologic symptoms. No indication for head CT, also Nexus negative and no indication for cervical spine CT.   Also with no chest pain or abdominal pain or evidence of trauma to any other sites. Diagnosis     Clinical Impression:   1. Fall from slip, trip, or stumble, initial encounter    2. Moderate ankle sprain, right, initial encounter    3. Other sprain of right shoulder joint, initial encounter      9 PM : Pt care transferred to Dr. Adan Been provider. History of patient complaint(s), available diagnostic reports and current treatment plan has been discussed thoroughly. Bedside rounding on patient occured : yes . Intended disposition of patient : pending, anticipate discharge back to nursing home  Pending diagnostics reports and/or labs (please list): xrays.

## 2020-02-12 NOTE — ED TRIAGE NOTES
Patient presents to the ED via EMS from [facility] for evaluation after a fall. Per patient, \"I was walking outside with my walker when I tripped over a curb. I did not hit my head or pass out. I fell on my right arm/shoulder. \"

## 2020-02-12 NOTE — ED NOTES
2100 PM :Pt care assumed from Dr. Kvng Blancas , ED provider. Pt complaint(s), current treatment plan, progression and available diagnostic results have been discussed thoroughly. Rounding occurred: yes  Intended Disposition: Home   Pending diagnostic reports and/or labs (please list): pending xrays    Patient with spiral fracture of the right humerus from the midshaft to the lesser tuberosity. Discussed case over the phone briefly with orthopedic surgery. Patient will be placed in a sling and discharged with outpatient follow-up. Other x-rays were unremarkable. She does have capability of using a wheelchair at her facility and skilled nursing care. The patient will be discharged home. Findings were discussed at length and questions were answered. Information on all newly prescribed medications was given. the patient was instructed to follow-up with orthopedic surgery, or return to the Emergency Department with any worsened symptoms or concerns. Return precautions were given.

## 2020-02-14 ENCOUNTER — OFFICE VISIT (OUTPATIENT)
Dept: ORTHOPEDIC SURGERY | Facility: CLINIC | Age: 65
End: 2020-02-14

## 2020-02-14 VITALS
RESPIRATION RATE: 16 BRPM | HEIGHT: 64 IN | BODY MASS INDEX: 45.07 KG/M2 | DIASTOLIC BLOOD PRESSURE: 76 MMHG | WEIGHT: 264 LBS | TEMPERATURE: 98.2 F | HEART RATE: 72 BPM | SYSTOLIC BLOOD PRESSURE: 136 MMHG

## 2020-02-14 DIAGNOSIS — S42.254A CLOSED NONDISPLACED FRACTURE OF GREATER TUBEROSITY OF RIGHT HUMERUS, INITIAL ENCOUNTER: ICD-10-CM

## 2020-02-14 DIAGNOSIS — S42.354A CLOSED NONDISPLACED COMMINUTED FRACTURE OF SHAFT OF RIGHT HUMERUS, INITIAL ENCOUNTER: Primary | ICD-10-CM

## 2020-02-14 DIAGNOSIS — G62.9 NEUROPATHY: ICD-10-CM

## 2020-02-14 DIAGNOSIS — M06.9 RHEUMATOID ARTHRITIS, INVOLVING UNSPECIFIED SITE, UNSPECIFIED RHEUMATOID FACTOR PRESENCE: ICD-10-CM

## 2020-02-14 DIAGNOSIS — Z91.81 RISK FOR FALLS: ICD-10-CM

## 2020-02-14 DIAGNOSIS — E66.01 OBESITY, MORBID (HCC): ICD-10-CM

## 2020-02-14 RX ORDER — LOPERAMIDE HCL 2 MG
2 TABLET ORAL
COMMUNITY

## 2020-02-14 RX ORDER — CLONIDINE HYDROCHLORIDE 0.1 MG/1
TABLET ORAL 2 TIMES DAILY
COMMUNITY
End: 2022-09-14 | Stop reason: SDUPTHER

## 2020-02-14 RX ORDER — MONTELUKAST SODIUM 10 MG/1
10 TABLET ORAL
COMMUNITY

## 2020-02-14 RX ORDER — ACETAMINOPHEN 325 MG/1
TABLET ORAL
COMMUNITY

## 2020-02-14 RX ORDER — FUROSEMIDE 40 MG/1
TABLET ORAL DAILY
COMMUNITY
End: 2022-07-05 | Stop reason: SINTOL

## 2020-02-14 RX ORDER — DULOXETIN HYDROCHLORIDE 60 MG/1
60 CAPSULE, DELAYED RELEASE ORAL
COMMUNITY

## 2020-02-14 RX ORDER — LORAZEPAM 0.5 MG/1
TABLET ORAL AS NEEDED
COMMUNITY

## 2020-02-14 RX ORDER — PHENOL/SODIUM PHENOLATE
20 AEROSOL, SPRAY (ML) MUCOUS MEMBRANE DAILY
COMMUNITY
End: 2021-02-16 | Stop reason: CLARIF

## 2020-02-14 NOTE — PROGRESS NOTES
Patient: Rodolfo Mustafa                MRN: [id number]       SSN: [id number]  YOB: 1955        AGE: 75 y.o. SEX: female    PCP: Nico Contreras MD  02/14/20    Chief Complaint   Patient presents with    Shoulder Pain     right shoulder    Ankle Pain     right ankle pain     HISTORY:  Rodolfo Mustafa is a 75 y.o. female who sustained a right shoulder, right elbow and right ankle injuries on 2/11/20. She fell onto her right shoulder when she tripped on a curb as she was walking through the [facility] parking lot with a walker. She was seen at [facility] on the same day where right shoulder and right elbow x rays revealed comminuted mildly displaced spiral fracture extending from the humeral neck/proximal diaphysis to the mid diaphysis. Right ankle x rays revealed potential lucency of distal fibula. She was provided a sling and referred for orthopedic consultation. She has been experiencing right shoulder and right ankle pain and swelling since the injury. She feels like something in her shoulder is moving. She is right handed. She takes Xarelto for A fib. She has a h/o RA. She sees Dr. Roscoe Knapp for back pain related to a fall requiring lumbar fusion 2016 by Dr. Roscoe Knapp. Pain Assessment  2/14/2020   Location of Pain Shoulder; Ankle   Location Modifiers Right   Severity of Pain 10   Quality of Pain Aching   Duration of Pain Persistent   Frequency of Pain Constant   Aggravating Factors (No Data)   Aggravating Factors Comment range of motion   Limiting Behavior -   Relieving Factors Nothing   Relieving Factors Comment -   Result of Injury Yes   Type of Injury Fall     Occupation, etc:  Ms. 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 She also worked at the [employer]. She is a long term resident at [facility] since 2016. She was planning on moving into her [facility] apartment before she fell.  She had custody of and raised her 2 grandsons. Her eldest grandson just graduated from [employer]. She her son passed away tragically a few years ago. He was homeless and she states he was in the wrong place at the wrong time. She is the president of the resident  at [facility]. She is active with the daily activities. She is not diabetic. Ms. Lindsey Elmore weighs 264 lbs and is 5'4\" tall. Lab Results   Component Value Date/Time    Hemoglobin A1c 7.0 (H) 04/27/2016 05:36 AM     Weight Metrics 2/14/2020 2/11/2020 6/2/2018 5/15/2018 11/1/2017 10/19/2017 10/11/2017   Weight 264 lb 264 lb 246 lb 8 oz 250 lb 7.1 oz 200 lb 200 lb 197 lb   BMI 45.32 kg/m2 45.32 kg/m2 42.31 kg/m2 42.32 kg/m2 34.33 kg/m2 34.33 kg/m2 33.81 kg/m2       Patient Active Problem List   Diagnosis Code    Thoracic vertebral fracture (HCC) S22.009A    Abnormal CT of the abdomen R93.5    S/P thoracic spinal fusion Z98.1    Osteoporosis M81.0    Acute lower GI hemorrhage K92.2    Chronic anticoagulation Z79.01    Mixed hyperlipidemia E78.2    Neuropathy G62.9    History of deep vein thrombosis (DVT) of lower extremity Z86.718    Obesity, morbid (HCC) E66.01     REVIEW OF SYSTEMS: All Below are Negative except: See HPI   Constitutional: negative for fever, chills, and weight loss. Cardiovascular: negative for chest pain, claudication, leg swelling, SOB, TERRY   Gastrointestinal: Negative for pain, N/V/C/D, Blood in stool or urine, dysuria, hematuria, incontinence, pelvic pain. Musculoskeletal: See HPI   Neurological: Negative for dizziness and weakness. Negative for headaches, Visual changes, confusion, seizures   Phychiatric/Behavioral: Negative for depression, memory loss, substance abuse. Extremities: Negative for hair changes, rash, or skin lesion changes. Hematologic: Negative for bleeding problems, bruising, pallor or swollen lymph nodes   Peripheral Vascular: No calf pain, no circulation deficits.     Social History     Socioeconomic History    Marital status: SINGLE     Spouse name: Not on file    Number of children: Not on file    Years of education: Not on file    Highest education level: Not on file   Occupational History    Not on file   Social Needs    Financial resource strain: Not on file    Food insecurity:     Worry: Not on file     Inability: Not on file    Transportation needs:     Medical: Not on file     Non-medical: Not on file   Tobacco Use    Smoking status: Former Smoker    Smokeless tobacco: Former User     Quit date: 4/1/2016   Substance and Sexual Activity    Alcohol use: Yes    Drug use: No    Sexual activity: Not on file   Lifestyle    Physical activity:     Days per week: Not on file     Minutes per session: Not on file    Stress: Not on file   Relationships    Social connections:     Talks on phone: Not on file     Gets together: Not on file     Attends Synagogue service: Not on file     Active member of club or organization: Not on file     Attends meetings of clubs or organizations: Not on file     Relationship status: Not on file    Intimate partner violence:     Fear of current or ex partner: Not on file     Emotionally abused: Not on file     Physically abused: Not on file     Forced sexual activity: Not on file   Other Topics Concern    Not on file   Social History Narrative    Not on file      Allergies   Allergen Reactions    Iodinated Contrast Media Other (comments)     Patient states will pass out. Current Outpatient Medications   Medication Sig    LORazepam (ATIVAN) 0.5 mg tablet Take  by mouth.  montelukast (SINGULAIR) 10 mg tablet Take 10 mg by mouth daily.  DULoxetine (CYMBALTA) 60 mg capsule Take 60 mg by mouth daily.  Omeprazole delayed release (PRILOSEC D/R) 20 mg tablet Take 20 mg by mouth daily.  loperamide (IMMODIUM) 2 mg tablet Take 2 mg by mouth four (4) times daily as needed for Diarrhea.  furosemide (LASIX) 40 mg tablet Take  by mouth daily.     cloNIDine HCL (CATAPRES) 0.1 mg tablet Take  by mouth two (2) times a day.  acetaminophen (TYLENOL) 325 mg tablet Take  by mouth every four (4) hours as needed for Pain.  rivaroxaban (XARELTO) 10 mg tablet Take 1 Tab by mouth daily (with breakfast).  ondansetron (ZOFRAN ODT) 8 mg disintegrating tablet Take 1 Tab by mouth every eight (8) hours as needed for Nausea for up to 12 doses.  albuterol (PROVENTIL VENTOLIN) 2.5 mg /3 mL (0.083 %) nebulizer solution 3 mL by Nebulization route every four (4) hours as needed for Wheezing.  Cetirizine (ZYRTEC) 10 mg cap Take  by mouth.  gabapentin (NEURONTIN) 400 mg capsule Take 400 mg by mouth three (3) times daily.  atorvastatin (LIPITOR) 40 mg tablet Take 1 Tab by mouth nightly.  ferrous sulfate 325 mg (65 mg iron) tablet Take 1 Tab by mouth two (2) times daily (with meals).  nystatin (MYCOSTATIN) powder Apply  to affected area two (2) times a day.  polyethylene glycol (MIRALAX) 17 gram packet Take 1 Packet by mouth daily.  senna-docusate (PERICOLACE) 8.6-50 mg per tablet Take 2 Tabs by mouth daily.  oxyCODONE-acetaminophen (PERCOCET 10)  mg per tablet Take 1 Tab by mouth every four (4) hours as needed. Max Daily Amount: 6 Tabs.  diphenoxylate-atropine (LOMOTIL) 2.5-0.025 mg per tablet Take 1 Tab by mouth four (4) times daily as needed for Diarrhea (1 tab after each stool for max 8 per day). Max Daily Amount: 4 Tabs. Take after each stool for a maximum of 8 tablets daily    bisacodyl (DULCOLAX) 10 mg suppository Insert 10 mg into rectum as needed. No current facility-administered medications for this visit.        PHYSICAL EXAMINATION:  Visit Vitals  /76 (BP 1 Location: Left arm, BP Patient Position: Sitting)   Pulse 72   Temp 98.2 °F (36.8 °C) (Oral)   Resp 16   Ht 5' 4\" (1.626 m)   Wt 264 lb (119.7 kg)   BMI 45.32 kg/m²    Appearance: Alert, well appearing and pleasant patient who is in no distress, oriented to person, place/time, and who follows commands. HEENT: Julius Aly hears well, does not require hearing aids. Her sclera of the eyes are non-icteric. She is breathing normally and no respiratory accessory muscle use is noted. No JVD present and Neck ROM within normal limits. Psychiatric: Affect and mood are appropriate. Oriented x3  Cardiovascular/Peripheral Vascular: Normal pulses to each foot. Integumentary: No rashes. Warm and normal color. No drainage.    Gait: non ambulatory in [facility]  Sensory Exam: Intact/Normal Sensation    Lymphatic: No evidence of Lymphedema  Vascular:       Pulses: palpable  Varicosities none  Wounds/Abrasion: None Present  Neuro: Negative, no tremors     ORTHO EXAMINATION:  Examination Right shoulder Left shoulder   Skin Intact, bruising upper arm Intact   Effusion - -   Biceps deformity - -   Atrophy - -   AC joint tenderness - -   Acromial tenderness + -   Biceps tenderness - -   Forward flexion/Elevation ROM Unable to move due to fracture 170   Active abduction ROM Unable to move due to fracture 160   External rotation ROM Unable to move due to fracture 30   Internal rotation ROM Unable to move due to fracture 90   Apprehension - -   Impingement - -   Drop Arm Test - -   Neurovascular Intact Intact     Examination Right Elbow Left Elbow   Skin Intact Intact   Range of Motion 135-0 135-0   Tenderness - -   Swelling - -   Bruising - -   Stability Normal Normal   Motor Strength  Normal Normal   Neurovascular Intact Intact     Examination Right Ankle/Foot Left Ankle/Foot   Skin Intact Intact   Swelling - -   Dorsiflexion 10 10   Plantarflexion 25 25   Deformity - -   Inversion laxity - -   Anterior drawer - -   Medial tenderness - -   Lateral tenderness - -   Heel cord Intact Intact   Sensation Intact Intact   Bunion - -   Toe nails Normal Normal   Capillary refill Normal Normal     RADIOGRAPHS:  XR RIGHT ANKLE 2/11/20 [facility]  IMPRESSION: Potential lucency distal fibula as above.  -I have independently reviewed these images during this office visit. -Dr. Hebert Conception:  Three views - No fractures, no degenerative changes, osteopenia    XR RIGHT ELBOW 2/11/20 [facility]  IMPRESSION: right humeral partially visualized diaphyseal spiral fracture  -I have independently reviewed these images during this office visit. -Dr. Hebert Conception:  Two views - No fractures, negative fat pad sign, no joint space narrowing. XR RIGHT HUMERUS 2/11/20 [facility]  IMPRESSION: Right humeral fracture as above.  -I have independently reviewed these images during this office visit. -Dr. Hebert Conception:  Two views - comminuted spiral mildly displaced spiral fracture extending from the humeral neck/proximal diaphysis to the mid diaphysis. fracture. IMPRESSION:      ICD-10-CM ICD-9-CM    1. Closed nondisplaced comminuted fracture of shaft of right humerus, initial encounter S42.354A 812.21    2. Obesity, morbid ([facility] E66.01 278.01    3. Rheumatoid arthritis, involving unspecified site, unspecified rheumatoid factor presence ([facility]) M06.9 714.0    4. Risk for falls Z91.81 V15.88    5. Neuropathy G62.9 355.9    6. Closed nondisplaced fracture of greater tuberosity of right humerus, initial encounter S42.254A 812.03      PLAN:  She will be scheduled for right humeral IM nailing. BHARATHI Lu pre op. Risks of surgery outlined and informed consent obtained. She will follow up for H&P--then admit Monday for [facility] [place] 2/18/20.       Scribed by Amos Humphries ([address]) as dictated by Sylwia Baxter MD

## 2020-02-17 ENCOUNTER — ANESTHESIA EVENT (OUTPATIENT)
Dept: SURGERY | Age: 65
DRG: 493 | End: 2020-02-17
Payer: MEDICARE

## 2020-02-17 ENCOUNTER — OFFICE VISIT (OUTPATIENT)
Dept: ORTHOPEDIC SURGERY | Facility: CLINIC | Age: 65
End: 2020-02-17

## 2020-02-17 ENCOUNTER — HOSPITAL ENCOUNTER (INPATIENT)
Age: 65
LOS: 2 days | Discharge: NURSING, REHAB OR LONG-TERM CARE | DRG: 493 | End: 2020-02-19
Attending: HOSPITALIST | Admitting: SPECIALIST
Payer: MEDICARE

## 2020-02-17 ENCOUNTER — APPOINTMENT (OUTPATIENT)
Dept: GENERAL RADIOLOGY | Age: 65
DRG: 493 | End: 2020-02-17
Attending: NURSE PRACTITIONER
Payer: MEDICARE

## 2020-02-17 VITALS
BODY MASS INDEX: 45.32 KG/M2 | TEMPERATURE: 98 F | OXYGEN SATURATION: 96 % | HEIGHT: 64 IN | HEART RATE: 71 BPM | RESPIRATION RATE: 16 BRPM

## 2020-02-17 DIAGNOSIS — S42.354D CLOSED NONDISPLACED COMMINUTED FRACTURE OF SHAFT OF RIGHT HUMERUS WITH ROUTINE HEALING, SUBSEQUENT ENCOUNTER: Primary | ICD-10-CM

## 2020-02-17 DIAGNOSIS — Z01.818 PREOP GENERAL PHYSICAL EXAM: ICD-10-CM

## 2020-02-17 DIAGNOSIS — G89.18 ACUTE POST-OPERATIVE PAIN: Primary | ICD-10-CM

## 2020-02-17 PROBLEM — S42.309A FRACTURE, HUMERUS, SHAFT: Status: ACTIVE | Noted: 2020-02-17

## 2020-02-17 PROBLEM — S42.301A CLOSED FRACTURE OF SHAFT OF RIGHT HUMERUS: Status: ACTIVE | Noted: 2020-02-17

## 2020-02-17 PROBLEM — Z79.01 CHRONIC ANTICOAGULATION: Chronic | Status: ACTIVE | Noted: 2018-06-01

## 2020-02-17 LAB
ANION GAP SERPL CALC-SCNC: 3 MMOL/L (ref 3–18)
BASOPHILS # BLD: 0 K/UL (ref 0–0.1)
BASOPHILS NFR BLD: 0 % (ref 0–2)
BUN SERPL-MCNC: 16 MG/DL (ref 7–18)
BUN/CREAT SERPL: 19 (ref 12–20)
CALCIUM SERPL-MCNC: 9.1 MG/DL (ref 8.5–10.1)
CHLORIDE SERPL-SCNC: 102 MMOL/L (ref 100–111)
CO2 SERPL-SCNC: 31 MMOL/L (ref 21–32)
CREAT SERPL-MCNC: 0.86 MG/DL (ref 0.6–1.3)
DIFFERENTIAL METHOD BLD: ABNORMAL
EOSINOPHIL # BLD: 0.2 K/UL (ref 0–0.4)
EOSINOPHIL NFR BLD: 3 % (ref 0–5)
ERYTHROCYTE [DISTWIDTH] IN BLOOD BY AUTOMATED COUNT: 15.1 % (ref 11.6–14.5)
GLUCOSE SERPL-MCNC: 105 MG/DL (ref 74–99)
HCT VFR BLD AUTO: 31.8 % (ref 35–45)
HGB BLD-MCNC: 9.8 G/DL (ref 12–16)
LYMPHOCYTES # BLD: 1.5 K/UL (ref 0.9–3.6)
LYMPHOCYTES NFR BLD: 16 % (ref 21–52)
MCH RBC QN AUTO: 26.9 PG (ref 24–34)
MCHC RBC AUTO-ENTMCNC: 30.8 G/DL (ref 31–37)
MCV RBC AUTO: 87.4 FL (ref 74–97)
MONOCYTES # BLD: 0.4 K/UL (ref 0.05–1.2)
MONOCYTES NFR BLD: 5 % (ref 3–10)
NEUTS SEG # BLD: 7.1 K/UL (ref 1.8–8)
NEUTS SEG NFR BLD: 76 % (ref 40–73)
PLATELET # BLD AUTO: 231 K/UL (ref 135–420)
PMV BLD AUTO: 9.2 FL (ref 9.2–11.8)
POTASSIUM SERPL-SCNC: 4 MMOL/L (ref 3.5–5.5)
RBC # BLD AUTO: 3.64 M/UL (ref 4.2–5.3)
SODIUM SERPL-SCNC: 136 MMOL/L (ref 136–145)
WBC # BLD AUTO: 9.3 K/UL (ref 4.6–13.2)

## 2020-02-17 PROCEDURE — 71045 X-RAY EXAM CHEST 1 VIEW: CPT

## 2020-02-17 PROCEDURE — 99282 EMERGENCY DEPT VISIT SF MDM: CPT

## 2020-02-17 PROCEDURE — 93005 ELECTROCARDIOGRAM TRACING: CPT

## 2020-02-17 PROCEDURE — 65270000029 HC RM PRIVATE

## 2020-02-17 PROCEDURE — 85025 COMPLETE CBC W/AUTO DIFF WBC: CPT

## 2020-02-17 PROCEDURE — 74011250637 HC RX REV CODE- 250/637: Performed by: NURSE PRACTITIONER

## 2020-02-17 PROCEDURE — 80048 BASIC METABOLIC PNL TOTAL CA: CPT

## 2020-02-17 PROCEDURE — 74011250636 HC RX REV CODE- 250/636: Performed by: NURSE PRACTITIONER

## 2020-02-17 RX ORDER — OXYCODONE AND ACETAMINOPHEN 5; 325 MG/1; MG/1
1 TABLET ORAL
Status: DISCONTINUED | OUTPATIENT
Start: 2020-02-17 | End: 2020-02-19 | Stop reason: SDUPTHER

## 2020-02-17 RX ORDER — SODIUM CHLORIDE 0.9 % (FLUSH) 0.9 %
5-40 SYRINGE (ML) INJECTION EVERY 8 HOURS
Status: CANCELLED | OUTPATIENT
Start: 2020-02-17

## 2020-02-17 RX ORDER — OXYCODONE AND ACETAMINOPHEN 5; 325 MG/1; MG/1
1 TABLET ORAL
Status: CANCELLED | OUTPATIENT
Start: 2020-02-17

## 2020-02-17 RX ORDER — PREGABALIN 25 MG/1
75 CAPSULE ORAL
Status: DISPENSED | OUTPATIENT
Start: 2020-02-18 | End: 2020-02-19

## 2020-02-17 RX ORDER — MORPHINE SULFATE 2 MG/ML
2 INJECTION, SOLUTION INTRAMUSCULAR; INTRAVENOUS
Status: DISCONTINUED | OUTPATIENT
Start: 2020-02-17 | End: 2020-02-19 | Stop reason: SDUPTHER

## 2020-02-17 RX ORDER — SODIUM CHLORIDE 0.9 % (FLUSH) 0.9 %
5-40 SYRINGE (ML) INJECTION AS NEEDED
Status: DISCONTINUED | OUTPATIENT
Start: 2020-02-17 | End: 2020-02-19 | Stop reason: HOSPADM

## 2020-02-17 RX ORDER — SODIUM CHLORIDE 0.9 % (FLUSH) 0.9 %
5-40 SYRINGE (ML) INJECTION AS NEEDED
Status: CANCELLED | OUTPATIENT
Start: 2020-02-17

## 2020-02-17 RX ORDER — ACETAMINOPHEN 325 MG/1
650 TABLET ORAL
Status: ACTIVE | OUTPATIENT
Start: 2020-02-18 | End: 2020-02-19

## 2020-02-17 RX ORDER — ACETAMINOPHEN 325 MG/1
650 TABLET ORAL
Status: CANCELLED | OUTPATIENT
Start: 2020-02-18 | End: 2020-02-19

## 2020-02-17 RX ORDER — CELECOXIB 100 MG/1
200 CAPSULE ORAL
Status: DISPENSED | OUTPATIENT
Start: 2020-02-18 | End: 2020-02-19

## 2020-02-17 RX ORDER — CELECOXIB 100 MG/1
200 CAPSULE ORAL
Status: CANCELLED | OUTPATIENT
Start: 2020-02-18 | End: 2020-02-19

## 2020-02-17 RX ORDER — ACETAMINOPHEN 325 MG/1
650 TABLET ORAL
Status: CANCELLED | OUTPATIENT
Start: 2020-02-17

## 2020-02-17 RX ORDER — ACETAMINOPHEN 325 MG/1
650 TABLET ORAL
Status: DISCONTINUED | OUTPATIENT
Start: 2020-02-17 | End: 2020-02-19 | Stop reason: HOSPADM

## 2020-02-17 RX ORDER — PREGABALIN 25 MG/1
75 CAPSULE ORAL
Status: CANCELLED | OUTPATIENT
Start: 2020-02-18 | End: 2020-02-19

## 2020-02-17 RX ORDER — CEFAZOLIN SODIUM 2 G/50ML
2 SOLUTION INTRAVENOUS
Status: COMPLETED | OUTPATIENT
Start: 2020-02-17 | End: 2020-02-18

## 2020-02-17 RX ORDER — SODIUM CHLORIDE 0.9 % (FLUSH) 0.9 %
5-40 SYRINGE (ML) INJECTION EVERY 8 HOURS
Status: DISCONTINUED | OUTPATIENT
Start: 2020-02-17 | End: 2020-02-19 | Stop reason: HOSPADM

## 2020-02-17 RX ORDER — MORPHINE SULFATE 2 MG/ML
2 INJECTION, SOLUTION INTRAMUSCULAR; INTRAVENOUS
Status: CANCELLED | OUTPATIENT
Start: 2020-02-17

## 2020-02-17 RX ADMIN — Medication 10 ML: at 16:05

## 2020-02-17 RX ADMIN — ACETAMINOPHEN 650 MG: 325 TABLET ORAL at 17:41

## 2020-02-17 RX ADMIN — OXYCODONE AND ACETAMINOPHEN 1 TABLET: 5; 325 TABLET ORAL at 17:41

## 2020-02-17 RX ADMIN — MORPHINE SULFATE 2 MG: 2 INJECTION, SOLUTION INTRAMUSCULAR; INTRAVENOUS at 18:48

## 2020-02-17 NOTE — H&P
History and Physical    75 year obese AA female seen in preparation for IM nailing of comminuted right humerus fracture  Review of Systems   Constitutional: Positive for activity change (decreased use right arm secondary to humeral fracture). Negative for fatigue and fever. HENT: Negative for ear pain. Eyes: Negative for pain. Respiratory: Positive for shortness of breath (chronic exertional). Cardiovascular: Negative for chest pain. Gastrointestinal: Negative for nausea and vomiting. Endocrine: Negative. Genitourinary: Negative for flank pain. Musculoskeletal: Positive for arthralgias, back pain, gait problem, joint swelling and myalgias. Limited Ambulator secondary hx Lumbar Sx and now new fracture. Skin:        Swelling right upper arm   Allergic/Immunologic: Negative. Neurological: Positive for weakness (right upper). Hematological: Negative. Psychiatric/Behavioral: The patient is nervous/anxious. Physical Exam  Vitals signs and nursing note reviewed. Constitutional:       Appearance: Normal appearance. HENT:      Head: Normocephalic and atraumatic. Eyes:      Pupils: Pupils are equal, round, and reactive to light. Cardiovascular:      Rate and Rhythm: Normal rate and regular rhythm. Heart sounds: Murmur present. Pulmonary:      Effort: Pulmonary effort is normal.      Breath sounds: Normal breath sounds. Musculoskeletal:      Right shoulder: She exhibits decreased range of motion, tenderness, bony tenderness, swelling and pain. Arms:    Skin:     General: Skin is warm. Capillary Refill: Capillary refill takes less than 2 seconds. Neurological:      General: No focal deficit present. Mental Status: She is alert. Psychiatric:         Attention and Perception: Attention normal.         Mood and Affect: Mood normal.         Behavior: Behavior normal.         Thought Content:  Thought content normal.         Cognition and Memory: Cognition and memory normal.         Judgment: Judgment normal.       Fall     2016 s/p Lumbar sx      Right humerus comminuted spiral fracture    IM nailing of comminuted right humerus fracture          Orthopaedically, this patient requires admission as predetermined by the attending physicians documented severity of the admitting diagnosis,  and expected need for post surgical and co morbity health management determines length of projected stay. Risk / Benefit: Surgeon will discuss in \"face to face\" encounter on day of surgery. Family History and Surgical History reviewed, discussed in detail, and deemed Non-Contributory in preparation for this surgical encounter.

## 2020-02-17 NOTE — ED TRIAGE NOTES
Pt states was told to come to the ED for admission for   \"Closed nondisplaced comminuted fracture of shaft of right humerus with routine healing\" By Dr Alanis Adams   Pt currently has rt arm in sling

## 2020-02-17 NOTE — H&P
History & Physical    Patient: Roberto Solis MRN: [id number]  CSN: [id number]    YOB: 1955  Age: 75 y.o. Sex: female      DOA: 2/17/2020  CC:right arm fracture    PCP: Fabio Welsh MD       HPI:     Roberto Solis is a 75 y.o. female who presents with right arm fracture. Information taken from patient and EMR. 2/11/2020 fall, right ankle sprain and right arm fracture s/p fall. ER visit right arm placed in sling with OP ortho consult. Lives in a NH, walker for ambulation. 2/14/2020 ortho office visit. Scheduled for IM nailing right humerus. Xarelto on hold,  2/14/2020. Admitted on Monday with surgery scheduled for Tuesday. Patient seen in results waiting room of ED. Right shoulder and arm pain. Intense and sharp. Decrease mobility of right arm. Right arm in sling. Review of Systems   Constitutional: Negative. HENT: Negative. Respiratory: Negative. Cardiovascular: Negative. Gastrointestinal: Negative. Genitourinary: Negative. Musculoskeletal: Positive for falls. Right arm pain   Neurological: Negative. Psychiatric/Behavioral: Negative.         Past Medical History:   Diagnosis Date    A-fib ([facility]     Arthritis     History of seasonal allergies     Hypertension     Neuropathy     lower extremities    Rheumatoid arthritis ([facility]        Past Surgical History:   Procedure Laterality Date    COLONOSCOPY N/A 5/15/2018    COLONOSCOPY, DIAGNOSTIC  performed by Alize Sears MD at [address] [place] [address] HX APPENDECTOMY      HX DILATION AND CURETTAGE      HX HEENT      Ear tubes    HX HYSTERECTOMY      HX OOPHORECTOMY      NEUROLOGICAL PROCEDURE UNLISTED  04/27/2016    T2-L2 Decomprssion and Fusion/T12 Corpetomy       Family History   Problem Relation Age of Onset    Hypertension Mother     Diabetes Mother     Heart Disease Mother     Heart Disease Father     Diabetes Maternal Aunt     Cancer Maternal Uncle         preostate cancer    Diabetes Maternal Uncle        Social History     Socioeconomic History    Marital status: SINGLE     Spouse name: Not on file    Number of children: Not on file    Years of education: Not on file    Highest education level: Not on file   Tobacco Use    Smoking status: Former Smoker    Smokeless tobacco: Never Used   Substance and Sexual Activity    Alcohol use: Never     Frequency: Never    Drug use: No       Prior to Admission medications    Medication Sig Start Date End Date Taking? Authorizing Provider   LORazepam (ATIVAN) 0.5 mg tablet Take  by mouth as needed. Provider, Historical   montelukast (SINGULAIR) 10 mg tablet Take 10 mg by mouth daily as needed. Provider, Historical   DULoxetine (CYMBALTA) 60 mg capsule Take 60 mg by mouth nightly. Provider, Historical   Omeprazole delayed release (PRILOSEC D/R) 20 mg tablet Take 20 mg by mouth daily. Provider, Historical   loperamide (IMMODIUM) 2 mg tablet Take 2 mg by mouth four (4) times daily as needed for Diarrhea. Provider, Historical   furosemide (LASIX) 40 mg tablet Take  by mouth daily. Provider, Historical   cloNIDine HCL (CATAPRES) 0.1 mg tablet Take  by mouth two (2) times a day. Provider, Historical   acetaminophen (TYLENOL) 325 mg tablet Take  by mouth every four (4) hours as needed for Pain. Provider, Historical   rivaroxaban (XARELTO) 10 mg tablet Take 1 Tab by mouth daily (with breakfast). 6/2/18   Richy Fraser MD   ondansetron (ZOFRAN ODT) 8 mg disintegrating tablet Take 1 Tab by mouth every eight (8) hours as needed for Nausea for up to 12 doses. 3/6/18   Ann-Marie Parker NP   diphenoxylate-atropine (LOMOTIL) 2.5-0.025 mg per tablet Take 1 Tab by mouth four (4) times daily as needed for Diarrhea (1 tab after each stool for max 8 per day). Max Daily Amount: 4 Tabs.  Take after each stool for a maximum of 8 tablets daily 2/20/18   Wilbert MONTOYA MD   albuterol (PROVENTIL VENTOLIN) 2.5 mg /3 mL (0.083 %) nebulizer solution 3 mL by Nebulization route every four (4) hours as needed for Wheezing. 2/20/18   Aracelis Whitney MD   Cetirizine (ZYRTEC) 10 mg cap Take  by mouth daily as needed. Provider, Historical   gabapentin (NEURONTIN) 400 mg capsule Take 400 mg by mouth four (4) times daily. Provider, Historical   atorvastatin (LIPITOR) 40 mg tablet Take 1 Tab by mouth nightly. 7/13/16   Joaquim Sandhu MD   bisacodyl (DULCOLAX) 10 mg suppository Insert 10 mg into rectum as needed. 7/13/16   Georgiana Araujo MD   ferrous sulfate 325 mg (65 mg iron) tablet Take 1 Tab by mouth two (2) times daily (with meals). 7/13/16   Joaquim Sandhu MD   nystatin (MYCOSTATIN) powder Apply  to affected area two (2) times a day. 7/13/16   Joaquim Sandhu MD   polyethylene glycol (MIRALAX) 17 gram packet Take 1 Packet by mouth daily. Patient taking differently: Take 17 g by mouth daily as needed. 7/13/16   Joaquim Sandhu MD   senna-docusate (PERICOLACE) 8.6-50 mg per tablet Take 2 Tabs by mouth daily. 7/13/16   Joaquim Sandhu MD   oxyCODONE-acetaminophen (PERCOCET 10)  mg per tablet Take 1 Tab by mouth every four (4) hours as needed. Max Daily Amount: 6 Tabs. 7/13/16   Joaquim Sandhu MD       Allergies   Allergen Reactions    Iodinated Contrast Media Other (comments)     Patient states will pass out. Physical Exam:      Visit Vitals  BP (!) 174/100 (BP 1 Location: Left arm, BP Patient Position: Sitting)   Pulse 84   Temp 99.9 °F (37.7 °C)   Resp 16   SpO2 100%       Physical Exam:  General:  Alert, NAD  Cardiovascular:  RRR  Pulmonary:  LSC throughout; respiratory effort WNL  GI:  +BS in all four quadrants, soft, non-tender  Extremities:  No edema; 2+ dorsalis pedis pulses bilaterally  Neuro: alert and oriented x3    Lab/Data Review:  Labs: Results:       Chemistry No results for input(s): GLU, NA, K, CL, CO2, BUN, CREA, CA, AGAP, BUCR, TBIL, GPT, AP, TP, ALB, GLOB, AGRAT in the last 72 hours.    CBC w/Diff No results for input(s): WBC, RBC, HGB, HCT, PLT, GRANS, LYMPH, EOS, HGBEXT, HCTEXT, PLTEXT in the last 72 hours. Coagulation No results for input(s): PTP, INR, APTT, INREXT in the last 72 hours. Iron/Ferritin No results for input(s): IRON in the last 72 hours. No lab exists for component: TIBCCALC   BNP No results for input(s): BNPP in the last 72 hours. Cardiac Enzymes No results for input(s): CPK, CKND1, DINAH in the last 72 hours. No lab exists for component: CKRMB, TROIP   Liver Enzymes No results for input(s): TP, ALB, TBIL, AP, SGOT, GPT in the last 72 hours. No lab exists for component: DBIL   Thyroid Studies Lab Results   Component Value Date/Time    TSH 1.78 04/23/2016 12:50 PM          All Micro Results     None          Imaging Reviewed:  Chest xray pending. Assessment:   Right comminuted humerus fracture    PMH  Afib  HTN  HLD  Chronic back pain  Gait problem  Joint swelling  RA  Neuropathy  Morbid obesity          Plan:   1. Admitted for pre-op medical preparation for ortho surgery tomorrow  2. Ortho consulted and orders placed by ortho surgery. 3. Xarelto held since 2/14/2020  4. Once initial diagnostic pre-op testing is complete, review is needed. 5. NPO after midnight for surgery on Tuesday  6. Code status discussed, full code  7. PT/OT eval and treat  8. Case management for discharge planning.              Tono Fenton NP  2/17/2020, 3:51 PM

## 2020-02-18 ENCOUNTER — APPOINTMENT (OUTPATIENT)
Dept: GENERAL RADIOLOGY | Age: 65
DRG: 493 | End: 2020-02-18
Attending: SPECIALIST
Payer: MEDICARE

## 2020-02-18 ENCOUNTER — ANESTHESIA (OUTPATIENT)
Dept: SURGERY | Age: 65
DRG: 493 | End: 2020-02-18
Payer: MEDICARE

## 2020-02-18 PROBLEM — S42.309A FRACTURE, HUMERUS, SHAFT: Status: ACTIVE | Noted: 2020-02-18

## 2020-02-18 LAB
APTT PPP: 38.2 SEC (ref 23–36.4)
ATRIAL RATE: 71 BPM
CALCULATED P AXIS, ECG09: 35 DEGREES
CALCULATED R AXIS, ECG10: -4 DEGREES
CALCULATED T AXIS, ECG11: -11 DEGREES
DIAGNOSIS, 93000: NORMAL
ERYTHROCYTE [SEDIMENTATION RATE] IN BLOOD: 51 MM/HR (ref 0–30)
GLUCOSE BLD STRIP.AUTO-MCNC: 104 MG/DL (ref 70–110)
GLUCOSE BLD STRIP.AUTO-MCNC: 117 MG/DL (ref 70–110)
INR PPP: 1.2 (ref 0.8–1.2)
P-R INTERVAL, ECG05: 148 MS
PROTHROMBIN TIME: 14.6 SEC (ref 11.5–15.2)
Q-T INTERVAL, ECG07: 372 MS
QRS DURATION, ECG06: 90 MS
QTC CALCULATION (BEZET), ECG08: 404 MS
VENTRICULAR RATE, ECG03: 71 BPM

## 2020-02-18 PROCEDURE — 77030011265 HC ELECTRD BLD HEX COVD -A: Performed by: SPECIALIST

## 2020-02-18 PROCEDURE — 76942 ECHO GUIDE FOR BIOPSY: CPT | Performed by: ANESTHESIOLOGY

## 2020-02-18 PROCEDURE — 74011250636 HC RX REV CODE- 250/636: Performed by: NURSE PRACTITIONER

## 2020-02-18 PROCEDURE — 77030040361 HC SLV COMPR DVT MDII -B: Performed by: SPECIALIST

## 2020-02-18 PROCEDURE — 77030026438 HC STYL ET INTUB CARD -A: Performed by: ANESTHESIOLOGY

## 2020-02-18 PROCEDURE — 76210000016 HC OR PH I REC 1 TO 1.5 HR: Performed by: SPECIALIST

## 2020-02-18 PROCEDURE — 85610 PROTHROMBIN TIME: CPT

## 2020-02-18 PROCEDURE — 85730 THROMBOPLASTIN TIME PARTIAL: CPT

## 2020-02-18 PROCEDURE — 76010000131 HC OR TIME 2 TO 2.5 HR: Performed by: SPECIALIST

## 2020-02-18 PROCEDURE — 76060000035 HC ANESTHESIA 2 TO 2.5 HR: Performed by: SPECIALIST

## 2020-02-18 PROCEDURE — 77030017024 HC ENDCAP HUM NL1 SYNT -C: Performed by: SPECIALIST

## 2020-02-18 PROCEDURE — 85652 RBC SED RATE AUTOMATED: CPT

## 2020-02-18 PROCEDURE — 74011000250 HC RX REV CODE- 250: Performed by: NURSE ANESTHETIST, CERTIFIED REGISTERED

## 2020-02-18 PROCEDURE — 77030019908 HC STETH ESOPH SIMS -A: Performed by: ANESTHESIOLOGY

## 2020-02-18 PROCEDURE — 77030018673: Performed by: SPECIALIST

## 2020-02-18 PROCEDURE — 64450 NJX AA&/STRD OTHER PN/BRANCH: CPT | Performed by: ANESTHESIOLOGY

## 2020-02-18 PROCEDURE — 74011250636 HC RX REV CODE- 250/636: Performed by: NURSE ANESTHETIST, CERTIFIED REGISTERED

## 2020-02-18 PROCEDURE — 77030013079 HC BLNKT BAIR HGGR 3M -A: Performed by: ANESTHESIOLOGY

## 2020-02-18 PROCEDURE — 77030006618 HC IMPL BLD F/HUM SPRL NL SYNT -F: Performed by: SPECIALIST

## 2020-02-18 PROCEDURE — C1713 ANCHOR/SCREW BN/BN,TIS/BN: HCPCS | Performed by: SPECIALIST

## 2020-02-18 PROCEDURE — 65270000029 HC RM PRIVATE

## 2020-02-18 PROCEDURE — 77030008848 HC WRE K SYNT -B: Performed by: SPECIALIST

## 2020-02-18 PROCEDURE — 77030038269 HC DRN EXT URIN PURWCK BARD -A

## 2020-02-18 PROCEDURE — 82962 GLUCOSE BLOOD TEST: CPT

## 2020-02-18 PROCEDURE — 74011250637 HC RX REV CODE- 250/637: Performed by: SPECIALIST

## 2020-02-18 PROCEDURE — 77030003601 HC NDL NRV BLK BBMI -A: Performed by: SPECIALIST

## 2020-02-18 PROCEDURE — 77030040922 HC BLNKT HYPOTHRM STRY -A: Performed by: SPECIALIST

## 2020-02-18 PROCEDURE — 77030027138 HC INCENT SPIROMETER -A: Performed by: SPECIALIST

## 2020-02-18 PROCEDURE — 74011250636 HC RX REV CODE- 250/636

## 2020-02-18 PROCEDURE — 77030003787: Performed by: SPECIALIST

## 2020-02-18 PROCEDURE — 74011250636 HC RX REV CODE- 250/636: Performed by: SPECIALIST

## 2020-02-18 PROCEDURE — 0PSF36Z REPOSITION RIGHT HUMERAL SHAFT WITH INTRAMEDULLARY INTERNAL FIXATION DEVICE, PERCUTANEOUS APPROACH: ICD-10-PCS | Performed by: SPECIALIST

## 2020-02-18 PROCEDURE — 77030018836 HC SOL IRR NACL ICUM -A: Performed by: SPECIALIST

## 2020-02-18 PROCEDURE — 77030000031 HC BIT DRL QC SYNT -C: Performed by: SPECIALIST

## 2020-02-18 PROCEDURE — 77030014405 HC GD ROD RMR SYNT -C: Performed by: SPECIALIST

## 2020-02-18 PROCEDURE — 74011250636 HC RX REV CODE- 250/636: Performed by: ANESTHESIOLOGY

## 2020-02-18 PROCEDURE — 73060 X-RAY EXAM OF HUMERUS: CPT

## 2020-02-18 PROCEDURE — 74011000250 HC RX REV CODE- 250

## 2020-02-18 PROCEDURE — C1769 GUIDE WIRE: HCPCS | Performed by: SPECIALIST

## 2020-02-18 DEVICE — SCREW BNE L24MM DIA4MM BLU TI ST CANN LOK FULL THRD T25: Type: IMPLANTABLE DEVICE | Site: SHOULDER | Status: FUNCTIONAL

## 2020-02-18 DEVICE — NAIL IM L240MM DIA7MM UNIV HUM BLU TI CANN LOK RND CRSS: Type: IMPLANTABLE DEVICE | Status: FUNCTIONAL

## 2020-02-18 DEVICE — BLADE IM L42MM TAN TI ALLY SPRL LOK FOR UHN/PHN HUM NAIL: Type: IMPLANTABLE DEVICE | Site: SHOULDER | Status: FUNCTIONAL

## 2020-02-18 DEVICE — ENDCAP ORTH 0MM EXTN TI W/ T25 STARDRV FOR HUM NAIL-EX SPRL: Type: IMPLANTABLE DEVICE | Site: SHOULDER | Status: FUNCTIONAL

## 2020-02-18 DEVICE — K WIRE FIX L285MM DIA2.5MM S STL W/ TRCR PNT: Type: IMPLANTABLE DEVICE | Status: FUNCTIONAL

## 2020-02-18 RX ORDER — LIDOCAINE HYDROCHLORIDE 10 MG/ML
0.1 INJECTION, SOLUTION EPIDURAL; INFILTRATION; INTRACAUDAL; PERINEURAL AS NEEDED
Status: DISCONTINUED | OUTPATIENT
Start: 2020-02-18 | End: 2020-02-18 | Stop reason: HOSPADM

## 2020-02-18 RX ORDER — ONDANSETRON 2 MG/ML
4 INJECTION INTRAMUSCULAR; INTRAVENOUS ONCE
Status: COMPLETED | OUTPATIENT
Start: 2020-02-18 | End: 2020-02-18

## 2020-02-18 RX ORDER — FUROSEMIDE 40 MG/1
40 TABLET ORAL DAILY
Status: DISCONTINUED | OUTPATIENT
Start: 2020-02-18 | End: 2020-02-19 | Stop reason: HOSPADM

## 2020-02-18 RX ORDER — ROPIVACAINE HYDROCHLORIDE 5 MG/ML
INJECTION, SOLUTION EPIDURAL; INFILTRATION; PERINEURAL
Status: COMPLETED | OUTPATIENT
Start: 2020-02-18 | End: 2020-02-18

## 2020-02-18 RX ORDER — HYDROMORPHONE HYDROCHLORIDE 2 MG/ML
INJECTION, SOLUTION INTRAMUSCULAR; INTRAVENOUS; SUBCUTANEOUS
Status: COMPLETED
Start: 2020-02-18 | End: 2020-02-18

## 2020-02-18 RX ORDER — SODIUM CHLORIDE 0.9 % (FLUSH) 0.9 %
5-40 SYRINGE (ML) INJECTION AS NEEDED
Status: DISCONTINUED | OUTPATIENT
Start: 2020-02-18 | End: 2020-02-18 | Stop reason: HOSPADM

## 2020-02-18 RX ORDER — ROPIVACAINE HYDROCHLORIDE 5 MG/ML
INJECTION, SOLUTION EPIDURAL; INFILTRATION; PERINEURAL
Status: COMPLETED
Start: 2020-02-18 | End: 2020-02-18

## 2020-02-18 RX ORDER — FENTANYL CITRATE 50 UG/ML
50 INJECTION, SOLUTION INTRAMUSCULAR; INTRAVENOUS
Status: COMPLETED | OUTPATIENT
Start: 2020-02-18 | End: 2020-02-18

## 2020-02-18 RX ORDER — LANOLIN ALCOHOL/MO/W.PET/CERES
325 CREAM (GRAM) TOPICAL 2 TIMES DAILY WITH MEALS
Status: DISCONTINUED | OUTPATIENT
Start: 2020-02-18 | End: 2020-02-19 | Stop reason: HOSPADM

## 2020-02-18 RX ORDER — SUCCINYLCHOLINE CHLORIDE 20 MG/ML
INJECTION INTRAMUSCULAR; INTRAVENOUS AS NEEDED
Status: DISCONTINUED | OUTPATIENT
Start: 2020-02-18 | End: 2020-02-18 | Stop reason: HOSPADM

## 2020-02-18 RX ORDER — NYSTATIN 100000 [USP'U]/G
POWDER TOPICAL 2 TIMES DAILY
Status: DISCONTINUED | OUTPATIENT
Start: 2020-02-18 | End: 2020-02-19 | Stop reason: HOSPADM

## 2020-02-18 RX ORDER — DEXTROSE, SODIUM CHLORIDE, SODIUM LACTATE, POTASSIUM CHLORIDE, AND CALCIUM CHLORIDE 5; .6; .31; .03; .02 G/100ML; G/100ML; G/100ML; G/100ML; G/100ML
75 INJECTION, SOLUTION INTRAVENOUS CONTINUOUS
Status: DISPENSED | OUTPATIENT
Start: 2020-02-18 | End: 2020-02-19

## 2020-02-18 RX ORDER — DEXAMETHASONE SODIUM PHOSPHATE 4 MG/ML
INJECTION, SOLUTION INTRA-ARTICULAR; INTRALESIONAL; INTRAMUSCULAR; INTRAVENOUS; SOFT TISSUE AS NEEDED
Status: DISCONTINUED | OUTPATIENT
Start: 2020-02-18 | End: 2020-02-18 | Stop reason: HOSPADM

## 2020-02-18 RX ORDER — LIDOCAINE HYDROCHLORIDE 20 MG/ML
INJECTION, SOLUTION EPIDURAL; INFILTRATION; INTRACAUDAL; PERINEURAL AS NEEDED
Status: DISCONTINUED | OUTPATIENT
Start: 2020-02-18 | End: 2020-02-18 | Stop reason: HOSPADM

## 2020-02-18 RX ORDER — NEOSTIGMINE METHYLSULFATE 1 MG/ML
INJECTION, SOLUTION INTRAVENOUS AS NEEDED
Status: DISCONTINUED | OUTPATIENT
Start: 2020-02-18 | End: 2020-02-18 | Stop reason: HOSPADM

## 2020-02-18 RX ORDER — MONTELUKAST SODIUM 10 MG/1
10 TABLET ORAL
Status: DISCONTINUED | OUTPATIENT
Start: 2020-02-18 | End: 2020-02-19 | Stop reason: HOSPADM

## 2020-02-18 RX ORDER — HYDROMORPHONE HYDROCHLORIDE 1 MG/ML
1 INJECTION, SOLUTION INTRAMUSCULAR; INTRAVENOUS; SUBCUTANEOUS
Status: DISCONTINUED | OUTPATIENT
Start: 2020-02-18 | End: 2020-02-19 | Stop reason: HOSPADM

## 2020-02-18 RX ORDER — MIDAZOLAM HYDROCHLORIDE 1 MG/ML
2 INJECTION, SOLUTION INTRAMUSCULAR; INTRAVENOUS ONCE
Status: COMPLETED | OUTPATIENT
Start: 2020-02-18 | End: 2020-02-18

## 2020-02-18 RX ORDER — AMOXICILLIN 250 MG
1 CAPSULE ORAL 2 TIMES DAILY
Status: DISCONTINUED | OUTPATIENT
Start: 2020-02-18 | End: 2020-02-19 | Stop reason: HOSPADM

## 2020-02-18 RX ORDER — MAGNESIUM SULFATE 100 %
4 CRYSTALS MISCELLANEOUS AS NEEDED
Status: DISCONTINUED | OUTPATIENT
Start: 2020-02-18 | End: 2020-02-18 | Stop reason: HOSPADM

## 2020-02-18 RX ORDER — ATORVASTATIN CALCIUM 40 MG/1
40 TABLET, FILM COATED ORAL
Status: DISCONTINUED | OUTPATIENT
Start: 2020-02-18 | End: 2020-02-19 | Stop reason: HOSPADM

## 2020-02-18 RX ORDER — DEXTROSE 50 % IN WATER (D50W) INTRAVENOUS SYRINGE
25-50 AS NEEDED
Status: DISCONTINUED | OUTPATIENT
Start: 2020-02-18 | End: 2020-02-18 | Stop reason: HOSPADM

## 2020-02-18 RX ORDER — HYDROMORPHONE HYDROCHLORIDE 2 MG/ML
0.5 INJECTION, SOLUTION INTRAMUSCULAR; INTRAVENOUS; SUBCUTANEOUS AS NEEDED
Status: COMPLETED | OUTPATIENT
Start: 2020-02-18 | End: 2020-02-18

## 2020-02-18 RX ORDER — OXYCODONE HYDROCHLORIDE 5 MG/1
5 TABLET ORAL
Status: DISCONTINUED | OUTPATIENT
Start: 2020-02-18 | End: 2020-02-19 | Stop reason: HOSPADM

## 2020-02-18 RX ORDER — CLONIDINE HYDROCHLORIDE 0.1 MG/1
0.1 TABLET ORAL 2 TIMES DAILY
Status: DISCONTINUED | OUTPATIENT
Start: 2020-02-18 | End: 2020-02-19 | Stop reason: HOSPADM

## 2020-02-18 RX ORDER — SODIUM CHLORIDE 0.9 % (FLUSH) 0.9 %
5-40 SYRINGE (ML) INJECTION AS NEEDED
Status: DISCONTINUED | OUTPATIENT
Start: 2020-02-18 | End: 2020-02-19 | Stop reason: HOSPADM

## 2020-02-18 RX ORDER — FAMOTIDINE 20 MG/1
20 TABLET, FILM COATED ORAL ONCE
Status: DISCONTINUED | OUTPATIENT
Start: 2020-02-18 | End: 2020-02-18 | Stop reason: HOSPADM

## 2020-02-18 RX ORDER — PROPOFOL 10 MG/ML
INJECTION, EMULSION INTRAVENOUS AS NEEDED
Status: DISCONTINUED | OUTPATIENT
Start: 2020-02-18 | End: 2020-02-18 | Stop reason: HOSPADM

## 2020-02-18 RX ORDER — OXYCODONE HYDROCHLORIDE 5 MG/1
10 TABLET ORAL
Status: DISCONTINUED | OUTPATIENT
Start: 2020-02-18 | End: 2020-02-19 | Stop reason: HOSPADM

## 2020-02-18 RX ORDER — DULOXETIN HYDROCHLORIDE 60 MG/1
60 CAPSULE, DELAYED RELEASE ORAL
Status: DISCONTINUED | OUTPATIENT
Start: 2020-02-18 | End: 2020-02-19 | Stop reason: HOSPADM

## 2020-02-18 RX ORDER — LORAZEPAM 0.5 MG/1
0.5 TABLET ORAL AS NEEDED
Status: DISCONTINUED | OUTPATIENT
Start: 2020-02-18 | End: 2020-02-19 | Stop reason: HOSPADM

## 2020-02-18 RX ORDER — ZOLPIDEM TARTRATE 5 MG/1
5 TABLET ORAL
Status: DISCONTINUED | OUTPATIENT
Start: 2020-02-18 | End: 2020-02-19 | Stop reason: HOSPADM

## 2020-02-18 RX ORDER — HYDRALAZINE HYDROCHLORIDE 20 MG/ML
10 INJECTION INTRAMUSCULAR; INTRAVENOUS ONCE
Status: COMPLETED | OUTPATIENT
Start: 2020-02-18 | End: 2020-02-18

## 2020-02-18 RX ORDER — SODIUM CHLORIDE, SODIUM LACTATE, POTASSIUM CHLORIDE, CALCIUM CHLORIDE 600; 310; 30; 20 MG/100ML; MG/100ML; MG/100ML; MG/100ML
75 INJECTION, SOLUTION INTRAVENOUS CONTINUOUS
Status: DISCONTINUED | OUTPATIENT
Start: 2020-02-18 | End: 2020-02-18 | Stop reason: HOSPADM

## 2020-02-18 RX ORDER — ROCURONIUM BROMIDE 10 MG/ML
INJECTION, SOLUTION INTRAVENOUS AS NEEDED
Status: DISCONTINUED | OUTPATIENT
Start: 2020-02-18 | End: 2020-02-18 | Stop reason: HOSPADM

## 2020-02-18 RX ORDER — GABAPENTIN 400 MG/1
400 CAPSULE ORAL 3 TIMES DAILY
Status: DISCONTINUED | OUTPATIENT
Start: 2020-02-18 | End: 2020-02-19 | Stop reason: HOSPADM

## 2020-02-18 RX ORDER — MIDAZOLAM HYDROCHLORIDE 1 MG/ML
INJECTION, SOLUTION INTRAMUSCULAR; INTRAVENOUS
Status: COMPLETED
Start: 2020-02-18 | End: 2020-02-18

## 2020-02-18 RX ORDER — ONDANSETRON 2 MG/ML
INJECTION INTRAMUSCULAR; INTRAVENOUS AS NEEDED
Status: DISCONTINUED | OUTPATIENT
Start: 2020-02-18 | End: 2020-02-18 | Stop reason: HOSPADM

## 2020-02-18 RX ORDER — SODIUM CHLORIDE 0.9 % (FLUSH) 0.9 %
5-40 SYRINGE (ML) INJECTION EVERY 8 HOURS
Status: DISCONTINUED | OUTPATIENT
Start: 2020-02-18 | End: 2020-02-18 | Stop reason: HOSPADM

## 2020-02-18 RX ORDER — LABETALOL HCL 20 MG/4 ML
SYRINGE (ML) INTRAVENOUS AS NEEDED
Status: DISCONTINUED | OUTPATIENT
Start: 2020-02-18 | End: 2020-02-18 | Stop reason: HOSPADM

## 2020-02-18 RX ORDER — ACETAMINOPHEN 325 MG/1
650 TABLET ORAL EVERY 6 HOURS
Status: DISCONTINUED | OUTPATIENT
Start: 2020-02-18 | End: 2020-02-19 | Stop reason: HOSPADM

## 2020-02-18 RX ORDER — DIPHENOXYLATE HYDROCHLORIDE AND ATROPINE SULFATE 2.5; .025 MG/1; MG/1
1 TABLET ORAL
Status: DISCONTINUED | OUTPATIENT
Start: 2020-02-18 | End: 2020-02-19 | Stop reason: HOSPADM

## 2020-02-18 RX ORDER — ONDANSETRON 2 MG/ML
4 INJECTION INTRAMUSCULAR; INTRAVENOUS
Status: DISCONTINUED | OUTPATIENT
Start: 2020-02-18 | End: 2020-02-19 | Stop reason: HOSPADM

## 2020-02-18 RX ORDER — FENTANYL CITRATE 50 UG/ML
INJECTION, SOLUTION INTRAMUSCULAR; INTRAVENOUS
Status: COMPLETED
Start: 2020-02-18 | End: 2020-02-18

## 2020-02-18 RX ORDER — GLYCOPYRROLATE 0.2 MG/ML
INJECTION INTRAMUSCULAR; INTRAVENOUS AS NEEDED
Status: DISCONTINUED | OUTPATIENT
Start: 2020-02-18 | End: 2020-02-18 | Stop reason: HOSPADM

## 2020-02-18 RX ORDER — ALBUTEROL SULFATE 0.83 MG/ML
2.5 SOLUTION RESPIRATORY (INHALATION)
Status: DISCONTINUED | OUTPATIENT
Start: 2020-02-18 | End: 2020-02-19 | Stop reason: HOSPADM

## 2020-02-18 RX ORDER — SODIUM CHLORIDE 0.9 % (FLUSH) 0.9 %
5-40 SYRINGE (ML) INJECTION EVERY 8 HOURS
Status: DISCONTINUED | OUTPATIENT
Start: 2020-02-18 | End: 2020-02-19 | Stop reason: HOSPADM

## 2020-02-18 RX ORDER — ROPIVACAINE HYDROCHLORIDE 5 MG/ML
30 INJECTION, SOLUTION EPIDURAL; INFILTRATION; PERINEURAL
Status: COMPLETED | OUTPATIENT
Start: 2020-02-18 | End: 2020-02-18

## 2020-02-18 RX ORDER — NALOXONE HYDROCHLORIDE 0.4 MG/ML
0.4 INJECTION, SOLUTION INTRAMUSCULAR; INTRAVENOUS; SUBCUTANEOUS AS NEEDED
Status: DISCONTINUED | OUTPATIENT
Start: 2020-02-18 | End: 2020-02-19 | Stop reason: HOSPADM

## 2020-02-18 RX ADMIN — FENTANYL CITRATE 50 MCG: 50 INJECTION, SOLUTION INTRAMUSCULAR; INTRAVENOUS at 09:41

## 2020-02-18 RX ADMIN — Medication 3 MG: at 09:30

## 2020-02-18 RX ADMIN — Medication 10 ML: at 23:25

## 2020-02-18 RX ADMIN — ROPIVACAINE HYDROCHLORIDE 150 MG: 5 INJECTION, SOLUTION EPIDURAL; INFILTRATION; PERINEURAL at 07:36

## 2020-02-18 RX ADMIN — HYDROMORPHONE HYDROCHLORIDE 0.5 MG: 2 INJECTION, SOLUTION INTRAMUSCULAR; INTRAVENOUS; SUBCUTANEOUS at 10:09

## 2020-02-18 RX ADMIN — ATORVASTATIN CALCIUM 40 MG: 40 TABLET, FILM COATED ORAL at 23:18

## 2020-02-18 RX ADMIN — DULOXETINE HYDROCHLORIDE 60 MG: 60 CAPSULE, DELAYED RELEASE ORAL at 23:18

## 2020-02-18 RX ADMIN — MORPHINE SULFATE 2 MG: 2 INJECTION, SOLUTION INTRAMUSCULAR; INTRAVENOUS at 00:25

## 2020-02-18 RX ADMIN — ACETAMINOPHEN 650 MG: 325 TABLET ORAL at 23:18

## 2020-02-18 RX ADMIN — LIDOCAINE HYDROCHLORIDE 100 MG: 20 INJECTION, SOLUTION EPIDURAL; INFILTRATION; INTRACAUDAL; PERINEURAL at 07:45

## 2020-02-18 RX ADMIN — ROCURONIUM BROMIDE 30 MG: 10 INJECTION, SOLUTION INTRAVENOUS at 08:02

## 2020-02-18 RX ADMIN — LABETALOL 20 MG/4 ML (5 MG/ML) INTRAVENOUS SYRINGE 5 MG: at 07:51

## 2020-02-18 RX ADMIN — OXYCODONE HYDROCHLORIDE 10 MG: 5 TABLET ORAL at 18:54

## 2020-02-18 RX ADMIN — SENNOSIDES AND DOCUSATE SODIUM 1 TABLET: 8.6; 5 TABLET ORAL at 18:54

## 2020-02-18 RX ADMIN — ROPIVACAINE HYDROCHLORIDE 10 ML: 5 INJECTION, SOLUTION EPIDURAL; INFILTRATION; PERINEURAL at 07:30

## 2020-02-18 RX ADMIN — HYDRALAZINE HYDROCHLORIDE 10 MG: 20 INJECTION INTRAMUSCULAR; INTRAVENOUS at 10:20

## 2020-02-18 RX ADMIN — FENTANYL CITRATE 100 MCG: 50 INJECTION, SOLUTION INTRAMUSCULAR; INTRAVENOUS at 07:34

## 2020-02-18 RX ADMIN — MIDAZOLAM 2 MG: 1 INJECTION INTRAMUSCULAR; INTRAVENOUS at 07:34

## 2020-02-18 RX ADMIN — ACETAMINOPHEN 650 MG: 325 TABLET ORAL at 13:51

## 2020-02-18 RX ADMIN — CEFAZOLIN 3 G: 1 INJECTION, POWDER, FOR SOLUTION INTRAMUSCULAR; INTRAVENOUS; PARENTERAL at 23:31

## 2020-02-18 RX ADMIN — HYDROMORPHONE HYDROCHLORIDE 0.5 MG: 2 INJECTION, SOLUTION INTRAMUSCULAR; INTRAVENOUS; SUBCUTANEOUS at 10:24

## 2020-02-18 RX ADMIN — DEXAMETHASONE SODIUM PHOSPHATE 4 MG: 4 INJECTION, SOLUTION INTRAMUSCULAR; INTRAVENOUS at 08:14

## 2020-02-18 RX ADMIN — FUROSEMIDE 40 MG: 40 TABLET ORAL at 13:51

## 2020-02-18 RX ADMIN — SENNOSIDES AND DOCUSATE SODIUM 1 TABLET: 8.6; 5 TABLET ORAL at 13:51

## 2020-02-18 RX ADMIN — LABETALOL 20 MG/4 ML (5 MG/ML) INTRAVENOUS SYRINGE 5 MG: at 09:45

## 2020-02-18 RX ADMIN — SODIUM CHLORIDE, SODIUM LACTATE, POTASSIUM CHLORIDE, AND CALCIUM CHLORIDE 75 ML/HR: 600; 310; 30; 20 INJECTION, SOLUTION INTRAVENOUS at 07:24

## 2020-02-18 RX ADMIN — CLONIDINE HYDROCHLORIDE 0.1 MG: 0.1 TABLET ORAL at 18:54

## 2020-02-18 RX ADMIN — MORPHINE SULFATE 2 MG: 2 INJECTION, SOLUTION INTRAMUSCULAR; INTRAVENOUS at 04:21

## 2020-02-18 RX ADMIN — HYDROMORPHONE HYDROCHLORIDE 0.5 MG: 2 INJECTION, SOLUTION INTRAMUSCULAR; INTRAVENOUS; SUBCUTANEOUS at 10:34

## 2020-02-18 RX ADMIN — PROPOFOL 200 MG: 10 INJECTION, EMULSION INTRAVENOUS at 07:45

## 2020-02-18 RX ADMIN — CEFAZOLIN SODIUM 2 G: 2 SOLUTION INTRAVENOUS at 08:12

## 2020-02-18 RX ADMIN — Medication 10 ML: at 16:03

## 2020-02-18 RX ADMIN — OXYCODONE HYDROCHLORIDE 5 MG: 5 TABLET ORAL at 14:03

## 2020-02-18 RX ADMIN — LABETALOL 20 MG/4 ML (5 MG/ML) INTRAVENOUS SYRINGE 5 MG: at 07:56

## 2020-02-18 RX ADMIN — ONDANSETRON 4 MG: 2 INJECTION INTRAMUSCULAR; INTRAVENOUS at 10:39

## 2020-02-18 RX ADMIN — FERROUS SULFATE TAB 325 MG (65 MG ELEMENTAL FE) 325 MG: 325 (65 FE) TAB at 18:54

## 2020-02-18 RX ADMIN — HYDROMORPHONE HYDROCHLORIDE 0.5 MG: 2 INJECTION, SOLUTION INTRAMUSCULAR; INTRAVENOUS; SUBCUTANEOUS at 10:14

## 2020-02-18 RX ADMIN — ROPIVACAINE HYDROCHLORIDE 150 MG: 150 INJECTION, SOLUTION EPIDURAL; INFILTRATION; PERINEURAL at 07:36

## 2020-02-18 RX ADMIN — MIDAZOLAM HYDROCHLORIDE 2 MG: 1 INJECTION, SOLUTION INTRAMUSCULAR; INTRAVENOUS at 07:34

## 2020-02-18 RX ADMIN — CEFAZOLIN 3 G: 1 INJECTION, POWDER, FOR SOLUTION INTRAMUSCULAR; INTRAVENOUS; PARENTERAL at 16:00

## 2020-02-18 RX ADMIN — GABAPENTIN 400 MG: 400 CAPSULE ORAL at 23:18

## 2020-02-18 RX ADMIN — SODIUM CHLORIDE, SODIUM LACTATE, POTASSIUM CHLORIDE, CALCIUM CHLORIDE, AND DEXTROSE MONOHYDRATE 75 ML/HR: 600; 310; 30; 20; 5 INJECTION, SOLUTION INTRAVENOUS at 12:00

## 2020-02-18 RX ADMIN — SUCCINYLCHOLINE CHLORIDE 160 MG: 20 INJECTION, SOLUTION INTRAMUSCULAR; INTRAVENOUS at 07:45

## 2020-02-18 RX ADMIN — CLONIDINE HYDROCHLORIDE 0.1 MG: 0.1 TABLET ORAL at 13:51

## 2020-02-18 RX ADMIN — GABAPENTIN 400 MG: 400 CAPSULE ORAL at 16:00

## 2020-02-18 RX ADMIN — ONDANSETRON 4 MG: 2 INJECTION INTRAMUSCULAR; INTRAVENOUS at 08:06

## 2020-02-18 RX ADMIN — ACETAMINOPHEN 650 MG: 325 TABLET ORAL at 18:54

## 2020-02-18 RX ADMIN — FENTANYL CITRATE 50 MCG: 50 INJECTION, SOLUTION INTRAMUSCULAR; INTRAVENOUS at 08:58

## 2020-02-18 RX ADMIN — SODIUM CHLORIDE, SODIUM LACTATE, POTASSIUM CHLORIDE, AND CALCIUM CHLORIDE: 600; 310; 30; 20 INJECTION, SOLUTION INTRAVENOUS at 08:12

## 2020-02-18 RX ADMIN — GLYCOPYRROLATE 0.4 MG: 0.2 INJECTION INTRAMUSCULAR; INTRAVENOUS at 09:30

## 2020-02-18 NOTE — ANESTHESIA PROCEDURE NOTES
Peripheral Block    Start time: 2/18/2020 7:26 AM  End time: 2/18/2020 7:52 AM  Performed by: Zuleika Flores MD  Authorized by: Zuleika Flores MD       Pre-procedure: Indications: at surgeon's request and post-op pain management    Preanesthetic Checklist: patient identified, risks and benefits discussed, site marked, timeout performed, anesthesia consent given and patient being monitored      Block Type:   Block Type:   Interscalene  Laterality:  Right  Monitoring:  Standard ASA monitoring and continuous pulse ox  Injection Technique:  Single shot  Procedures: ultrasound guided    Patient Position: seated  Prep: chlorhexidine    Location:  Interscalene  Needle Type:  Ultraplex  Needle Gauge:  22 G  Needle Localization:  Ultrasound guidance    Assessment:  Number of attempts:  1  Injection Assessment:  Incremental injection every 5 mL, local visualized surrounding nerve on ultrasound, negative aspiration for CSF, negative aspiration for blood, no paresthesia, no intravascular symptoms and ultrasound image on chart  Patient tolerance:  Patient tolerated the procedure well with no immediate complications

## 2020-02-18 NOTE — PROGRESS NOTES
conducted an initial consultation and Spiritual Assessment for Lonnie Cortes, who is a 75 y.o.,female. Patients Primary Language is: [place]. According to the patients EMR Mu-ism Affiliation is: [facility].     The reason the Patient came to the hospital is:   Patient Active Problem List    Diagnosis Date Noted    Fracture, humerus, shaft 02/18/2020    Closed fracture of shaft of right humerus 02/17/2020    Obesity, morbid ([facility] 02/14/2020    Chronic anticoagulation 06/01/2018    Mixed hyperlipidemia 06/01/2018    Neuropathy 06/01/2018    History of deep vein thrombosis (DVT) of lower extremity 06/01/2018    Acute lower GI hemorrhage 05/31/2018    Osteoporosis 12/13/2016    S/P thoracic spinal fusion 09/13/2016    Abnormal CT of the abdomen 04/25/2016    Thoracic vertebral fracture ([facility] 04/24/2016        The  provided the following Interventions:  Initiated a relationship of care and support. Provided information about Spiritual Care Services. Chart reviewed. The following outcomes where achieved:  Patient expressed gratitude for 's visit. Assessment:  Patient does not have any Temple/cultural needs that will affect patients preferences in health care. There are no spiritual or Temple issues which require intervention at this time. Plan:  Chaplains will continue to follow and will provide pastoral care on an as needed/requested basis.  recommends bedside caregivers page  on duty if patient shows signs of acute spiritual or emotional distress.     [address]  [phone])

## 2020-02-18 NOTE — OP NOTES
Operative Note      Patient: Soledad Rod     Date of Surgery: 2/18/2020         YOB: 1955      Age:  75 y.o.        LOS:  LOS: 1 day       Preoperative Diagnosis:  S45.354A COMMUNATED HUMERAL SHAFT FRACTURE, RIGHT    Postoperative Diagnosis: S45.354A COMMUNATED HUMERAL SHAFT FRACTURE, RIGHT      Surgeon:  Skyler Rhodes MD     Assistant:  Hardeep Iyer  Anesthesia:  General anesthesia    Procedure:  Procedure(s):  REDUCTION AND FIXATION WITH RIGHT HUMERAL INTRA MEDULLARY NAIL/C-ARM/SYNTHES/RADIOLUCENT TABLE    Time out performed: YES    Estimated Blood Loss:  min           Implants: Synthes humeral intramedullary nail   Specimens: * No specimens in log *            Complications:  None    DESCRIPTION OF PROCEDURE: After satisfactory general anesthesia with the patient in the semi-lateral position on a radiolucent table, the patient's shoulder and upper extremity were prepped with ChloraPrep solution and draped in the usual fashion for humeral IM nailing. The fracture site was observed with a C-arm in 2 views and initial reduction was achieved with gentle traction and manipulation. The quang entry site was chosen using a C-arm in 2 views. A skin incision was made with a #10 blade. A guide pin was placed through the incision and into the proximal humerus just medial to the greater tuberosity. Once the position was acceptable, the guide pin was over drilled with the humeral entry drill. A guide wire was placed across the reduced fracture site and into the distal fragment. The humerus was then reamed to an 8.5 reamer diameter. A 7 X 240 mm humeral nail was then introduced into the proximal humerus and driven to the appropriate level using the C-arm as a guide. The guide wire was removed. The spiral blade was driven through a small proximal incision using a static aiming arm. The end cap was inserted after removal of the aiming arm.  Through a small distal incision, the distal interlocking screw was inserted using a perfect [place] technique. Adequacy of reduction and fixation was verified using the C-arm image intensifier in 2 views. The wounds were irrigated thoroughly. Closure was obtained using 3-0 Vicryl for the subcutaneous tissues and staples for the skin. Sterile gauze dressings were applied and Ms. Vitale was transported back to the recovery room in good condition. Sponge and needle count was correct.

## 2020-02-18 NOTE — PROGRESS NOTES
Reason for Admission:  Fracture, humerus, shaft [S42.309A]  Fracture, humerus, shaft [S42.309A]                 RRAT Score:   10%           Plan for utilizing home health:    LTC                     Likelihood of Readmission:   LOW                         Transition of Care Plan:              Initial assessment completed with patient. Cognitive status of patient: oriented to time, place, person and situation. Face sheet information confirmed:  yes. The patient designates Julian Mitchell,  ([phone]) to participate in her discharge plan and to receive any needed information. This patient is a long term care resident at [facility]. Patient is not able to navigate steps as needed. Prior to hospitalization, patient was considered to be independent with ADLs/IADLS : no . If not independent,  patient needs assist with : dressing, bathing, food preparation, toileting and grooming    Patient has a current ACP document on file: yes  The patient will need medicaid wheelchair Viviana Amsler transport upon discharge. The patient already has Maggy Monica,  medical equipment available at facility. Currently, the discharge plan is LTC. Patient is a long term care resident at [facility]. Plan is to return to facility when medically stable for discharge. The patient states that she can obtain her medications from the pharmacy, and take her medications as directed.     Patient's current insurance is Medicare Part A & B and [place] Medicaid      Care Management Interventions  PCP Verified by CM: Yes(patient sees facility MD. )  Mode of Transport at Discharge: [address]  Transition of Care Consult (CM Consult): [address]: No  Discharge Durable Medical Equipment: No  Physical Therapy Consult: Yes  Occupational Therapy Consult: Yes  Speech Therapy Consult: No  Current Support Network: [address] [place] [address]  Discharge Location  Discharge Placement: DIXIE Nam, RN  Pager # [phone]  Care Manager

## 2020-02-18 NOTE — PERIOP NOTES
TRANSFER - IN REPORT:    Verbal report received from [address] [place] [address] on Petey Montoya  being received from the [facility] emergency dept for routine progression of care      Report consisted of patients Situation, Background, Assessment and   Recommendations(SBAR). Information from the following report(s) Pre Procedure Checklist was reviewed with the receiving nurse. Opportunity for questions and clarification was provided. Assessment completed upon patients arrival to unit and care assumed.

## 2020-02-18 NOTE — ADDENDUM NOTE
Addendum  created 02/18/20 1132 by Yareli Johnson data copied forward, Intraprocedure Flowsheets edited, Intraprocedure Meds edited

## 2020-02-18 NOTE — ANESTHESIA PREPROCEDURE EVALUATION
Relevant Problems   No relevant active problems       Anesthetic History   No history of anesthetic complications            Review of Systems / Medical History  Patient summary reviewed and pertinent labs reviewed    Pulmonary  Within defined limits                 Neuro/Psych   Within defined limits           Cardiovascular    Hypertension        Dysrhythmias : atrial fibrillation      Exercise tolerance: >4 METS  Comments: Off xarelto 4 days   GI/Hepatic/Renal  Within defined limits              Endo/Other        Morbid obesity     Other Findings   Comments:                Physical Exam    Airway  Mallampati: III  TM Distance: 4 - 6 cm  Neck ROM: normal range of motion   Mouth opening: Normal     Cardiovascular  Regular rate and rhythm,  S1 and S2 normal,  no murmur, click, rub, or gallop  Rhythm: regular  Rate: normal         Dental  No notable dental hx    Comments: Chipped front top tooth   Pulmonary  Breath sounds clear to auscultation               Abdominal  GI exam deferred       Other Findings            Anesthetic Plan    ASA: 3  Anesthesia type: general and regional          Induction: Intravenous  Anesthetic plan and risks discussed with: Patient

## 2020-02-18 NOTE — ANESTHESIA POSTPROCEDURE EVALUATION
Procedure(s):  RIGHT HUMERAL INTRA MEDULLARY NAIL/C-ARM/SYNTHES/RADIOLUCENT TABLE.    general, regional    Anesthesia Post Evaluation      Multimodal analgesia: multimodal analgesia used between 6 hours prior to anesthesia start to PACU discharge  Patient location during evaluation: bedside  Patient participation: complete - patient participated  Level of consciousness: awake  Pain management: adequate  Airway patency: patent  Anesthetic complications: no  Cardiovascular status: stable  Respiratory status: acceptable  Hydration status: acceptable  Post anesthesia nausea and vomiting:  controlled      Vitals Value Taken Time   /63 2/18/2020 11:05 AM   Temp 37.1 °C (98.7 °F) 2/18/2020  9:54 AM   Pulse 84 2/18/2020 11:09 AM   Resp 16 2/18/2020 11:09 AM   SpO2 96 % 2/18/2020 11:09 AM   Vitals shown include unvalidated device data.

## 2020-02-18 NOTE — PROGRESS NOTES
[facility]  [facility]    Patient: Sindy Deras Age: 75 y.o. : 1955 MR#: [id number] SSN: [id number]  Date: 2020    Subjective/24-hour events:     Back to floor from the OR. Sitting in chair at bedside. Pain in good control currently no acute chest pain or shortness of breath. Assessment:   Comminuted right humeral fracture status post intramedullary nailing  Hypertension  Hyperlipidemia  Atrial fibrillation on chronic AC therapy with Xarelto  Chronic back pain  Rheumatoid arthritis  Morbid obesity    Plan:  PT/OT mobilize as tolerated. Analgesia as necessary, initiate bowel regimen. Resume Xarelto when okay with orthopedic surgery. Return to long-term care facility at discharge. Case discussed with:  [x]Patient  []Family  []Nursing  []Case Management  DVT Prophylaxis:  []Lovenox  []Hep SQ  [x]SCDs  []Coumadin   []On Heparin gtt    Objective:   VS:   Visit Vitals  /89 (BP 1 Location: Left arm, BP Patient Position: At rest)   Pulse 86   Temp 98.5 °F (36.9 °C)   Resp 17   Wt 119.7 kg (264 lb)   SpO2 93%   BMI 45.32 kg/m²      Tmax/24hrs: Temp (24hrs), Av.6 °F (37 °C), Min:98 °F (36.7 °C), Max:99.9 °F (37.7 °C)      Intake/Output Summary (Last 24 hours) at 2020 1246  Last data filed at 2020 0955  Gross per 24 hour   Intake 650 ml   Output 30 ml   Net 620 ml       General:  In NAD. Cardiovascular: RRR. Pulmonary:  Clear, no wheezes. Effort nonlabored. GI:  Abdomen soft, NTTP. Extremities:  Warm, no ischemia.  + bilateral nonpitting edema.   Neuro:  Awake and alert, motor nonfocal.    Labs:    Recent Results (from the past 24 hour(s))   CBC WITH AUTOMATED DIFF    Collection Time: 20  6:06 PM   Result Value Ref Range    WBC 9.3 4.6 - 13.2 K/uL    RBC 3.64 (L) 4.20 - 5.30 M/uL    HGB 9.8 (L) 12.0 - 16.0 g/dL    HCT 31.8 (L) 35.0 - 45.0 %    MCV 87.4 74.0 - 97.0 FL    MCH 26.9 24.0 - 34.0 PG    MCHC 30.8 (L) 31.0 - 37.0 g/dL    RDW 15.1 (H) 11.6 - 14.5 %    PLATELET 340 031 - 094 K/uL    MPV 9.2 9.2 - 11.8 FL    NEUTROPHILS 76 (H) 40 - 73 %    LYMPHOCYTES 16 (L) 21 - 52 %    MONOCYTES 5 3 - 10 %    EOSINOPHILS 3 0 - 5 %    BASOPHILS 0 0 - 2 %    ABS. NEUTROPHILS 7.1 1.8 - 8.0 K/UL    ABS. LYMPHOCYTES 1.5 0.9 - 3.6 K/UL    ABS. MONOCYTES 0.4 0.05 - 1.2 K/UL    ABS. EOSINOPHILS 0.2 0.0 - 0.4 K/UL    ABS.  BASOPHILS 0.0 0.0 - 0.1 K/UL    DF AUTOMATED     METABOLIC PANEL, BASIC    Collection Time: 02/17/20  6:06 PM   Result Value Ref Range    Sodium 136 136 - 145 mmol/L    Potassium 4.0 3.5 - 5.5 mmol/L    Chloride 102 100 - 111 mmol/L    CO2 31 21 - 32 mmol/L    Anion gap 3 3.0 - 18 mmol/L    Glucose 105 (H) 74 - 99 mg/dL    BUN 16 7.0 - 18 MG/DL    Creatinine 0.86 0.6 - 1.3 MG/DL    BUN/Creatinine ratio 19 12 - 20      GFR est AA >60 >60 ml/min/1.73m2    GFR est non-AA >60 >60 ml/min/1.73m2    Calcium 9.1 8.5 - 10.1 MG/DL   EKG, 12 LEAD, INITIAL    Collection Time: 02/17/20  6:33 PM   Result Value Ref Range    Ventricular Rate 71 BPM    Atrial Rate 71 BPM    P-R Interval 148 ms    QRS Duration 90 ms    Q-T Interval 372 ms    QTC Calculation (Bezet) 404 ms    Calculated P Axis 35 degrees    Calculated R Axis -4 degrees    Calculated T Axis -11 degrees    Diagnosis       Normal sinus rhythm  Moderate voltage criteria for LVH, may be normal variant  Nonspecific T wave abnormality  Abnormal ECG  When compared with ECG of 31-MAY-2018 14:43,  T wave inversion no longer evident in Lateral leads     PROTHROMBIN TIME + INR    Collection Time: 02/18/20  3:54 AM   Result Value Ref Range    Prothrombin time 14.6 11.5 - 15.2 sec    INR 1.2 0.8 - 1.2     PTT    Collection Time: 02/18/20  3:54 AM   Result Value Ref Range    aPTT 38.2 (H) 23.0 - 36.4 SEC   SED RATE (ESR)    Collection Time: 02/18/20  3:54 AM   Result Value Ref Range    Sed rate, automated 51 (H) 0 - 30 mm/hr   GLUCOSE, POC    Collection Time: 02/18/20  7:25 AM   Result Value Ref Range    Glucose (POC) 104 70 - 110 mg/dL       Signed By: Jorge Alberto Chavez MD     February 18, 2020

## 2020-02-18 NOTE — PERIOP NOTES
TRANSFER - OUT REPORT:    Verbal report given to Selvin Francois RN(name) on Gabbie Ahr  being transferred to [address] [place] [address](unit) for routine post - op       Report consisted of patients Situation, Background, Assessment and   Recommendations(SBAR). Information from the following report(s) SBAR, OR Summary, Procedure Summary, Intake/Output and MAR was reviewed with the receiving nurse. Lines:   Peripheral IV 02/17/20 Left Forearm (Active)   Site Assessment Clean, dry, & intact 2/18/2020  9:54 AM   Phlebitis Assessment 0 2/18/2020  9:54 AM   Infiltration Assessment 0 2/18/2020  9:54 AM   Dressing Status Clean, dry, & intact 2/18/2020  9:54 AM   Dressing Type Transparent;Tape 2/18/2020  9:54 AM   Hub Color/Line Status Pink; Infusing 2/18/2020  9:54 AM   Action Taken Blood drawn 2/17/2020  6:06 PM        Opportunity for questions and clarification was provided.       Patient transported with:   O2 @ 3 liters  Tech

## 2020-02-18 NOTE — PROGRESS NOTES
Problem: Falls - Risk of  Goal: *Absence of Falls  Description  Document Rhett Benjamin Fall Risk and appropriate interventions in the flowsheet. Outcome: Progressing Towards Goal  Note: Fall Risk Interventions:  Mobility Interventions: Patient to call before getting OOB         Medication Interventions: Patient to call before getting OOB, Teach patient to arise slowly    Elimination Interventions: Call light in reach, Patient to call for help with toileting needs    History of Falls Interventions:  Investigate reason for fall, Room close to nurse's station         Problem: Patient Education: Go to Patient Education Activity  Goal: Patient/Family Education  Outcome: Progressing Towards Goal     Problem: Infection - Risk of, Surgical Site Infection  Goal: *Absence of surgical site infection signs and symptoms  Outcome: Progressing Towards Goal     Problem: Patient Education: Go to Patient Education Activity  Goal: Patient/Family Education  Outcome: Progressing Towards Goal

## 2020-02-18 NOTE — INTERVAL H&P NOTE
H&P Update: 
Roberto Solis was seen and examined. History and physical has been reviewed. The patient has been examined. There have been no significant clinical changes since the completion of the originally dated History and Physical. 
Patient identified by surgeon; surgical site was confirmed by patient and surgeon.

## 2020-02-18 NOTE — BRIEF OP NOTE
BRIEF OPERATIVE NOTE    Date of Procedure: 2/18/2020   Preoperative Diagnosis: S45.354A COMMUNATED HUMERAL SHAFT FRACTURE, RIGHT  Postoperative Diagnosis: S45.354A COMMUNATED HUMERAL SHAFT FRACTURE, RIGHT    Procedure(s):  REDUCTION AND FIXATION WITH RIGHT HUMERAL INTRA MEDULLARY NAIL/C-ARM/SYNTHES/RADIOLUCENT TABLE  Surgeon(s) and Role:     * Marco Alex MD     Surgical Staff:  Circ-1: Montez Mendoza RN  Radiology Technician: Maru Ortiz  Scrub Tech-1: Jamin Oconnell  Surg Asst-1: Thor Polk  Event Time In Time Out   Incision Start 02/18/2020 0834    Incision Close       Anesthesia: General   Estimated Blood Loss: 10 CC  Specimens: * No specimens in log *   Findings: ABOVE   Complications: none  Implants:  Synthes humeral intramedullary nail

## 2020-02-18 NOTE — PROGRESS NOTES
PT orders received, chart reviewed. Pt scheduled for humeral nailing today by Dr. Ajith Grace. Discontinuing current PT orders and will need new orders after orthopedic surgery with any restrictions.   Thank you for this referral.  Slava Cedeño, PT, DPT

## 2020-02-19 VITALS
DIASTOLIC BLOOD PRESSURE: 73 MMHG | RESPIRATION RATE: 16 BRPM | OXYGEN SATURATION: 96 % | SYSTOLIC BLOOD PRESSURE: 111 MMHG | WEIGHT: 264 LBS | TEMPERATURE: 99 F | HEART RATE: 75 BPM | BODY MASS INDEX: 45.32 KG/M2

## 2020-02-19 LAB
HCT VFR BLD AUTO: 29.7 % (ref 35–45)
HGB BLD-MCNC: 9.2 G/DL (ref 12–16)

## 2020-02-19 PROCEDURE — 97535 SELF CARE MNGMENT TRAINING: CPT

## 2020-02-19 PROCEDURE — 77030038269 HC DRN EXT URIN PURWCK BARD -A

## 2020-02-19 PROCEDURE — 97530 THERAPEUTIC ACTIVITIES: CPT

## 2020-02-19 PROCEDURE — 74011250637 HC RX REV CODE- 250/637: Performed by: NURSE PRACTITIONER

## 2020-02-19 PROCEDURE — 85018 HEMOGLOBIN: CPT

## 2020-02-19 PROCEDURE — 97165 OT EVAL LOW COMPLEX 30 MIN: CPT

## 2020-02-19 PROCEDURE — 74011250637 HC RX REV CODE- 250/637: Performed by: SPECIALIST

## 2020-02-19 PROCEDURE — 36415 COLL VENOUS BLD VENIPUNCTURE: CPT

## 2020-02-19 PROCEDURE — 97116 GAIT TRAINING THERAPY: CPT

## 2020-02-19 PROCEDURE — 97162 PT EVAL MOD COMPLEX 30 MIN: CPT

## 2020-02-19 RX ORDER — OXYCODONE HYDROCHLORIDE 10 MG/1
10 TABLET ORAL
Qty: 42 TAB | Refills: 0 | Status: SHIPPED | OUTPATIENT
Start: 2020-02-19 | End: 2020-03-04

## 2020-02-19 RX ADMIN — CLONIDINE HYDROCHLORIDE 0.1 MG: 0.1 TABLET ORAL at 09:49

## 2020-02-19 RX ADMIN — Medication 10 ML: at 05:49

## 2020-02-19 RX ADMIN — OXYCODONE AND ACETAMINOPHEN 1 TABLET: 5; 325 TABLET ORAL at 02:40

## 2020-02-19 RX ADMIN — OXYCODONE HYDROCHLORIDE 10 MG: 5 TABLET ORAL at 09:49

## 2020-02-19 RX ADMIN — GABAPENTIN 400 MG: 400 CAPSULE ORAL at 09:50

## 2020-02-19 RX ADMIN — ACETAMINOPHEN 650 MG: 325 TABLET ORAL at 05:49

## 2020-02-19 RX ADMIN — ACETAMINOPHEN 650 MG: 325 TABLET ORAL at 12:48

## 2020-02-19 RX ADMIN — OXYCODONE HYDROCHLORIDE 10 MG: 5 TABLET ORAL at 17:02

## 2020-02-19 RX ADMIN — FERROUS SULFATE TAB 325 MG (65 MG ELEMENTAL FE) 325 MG: 325 (65 FE) TAB at 09:50

## 2020-02-19 RX ADMIN — SENNOSIDES AND DOCUSATE SODIUM 1 TABLET: 8.6; 5 TABLET ORAL at 09:50

## 2020-02-19 NOTE — PROGRESS NOTES
Mobility Intervention:       [] Pt dangled at edge of bed    [] Pt assisted OOB to bedside commode    [] Pt assisted OOB to chair    [] Pt ambulated to bathroom    [] Patient was ambulated in room/hallway    Assistive Device Utilized:       [] Rolling walker   [] Crutches   [] Straight Cane   [] Knee immobilizer   [] IV pole    After Mobilization:     [] Patient left in no apparent distress sitting up in chair  [] Patient left in no apparent distress in bed  [] Call bell left within reach  [] SCDs on & machine turned on  [] Ice applied  [] RN notified  [] Caregiver present  [] Bed alarm activated    Reason patient not mobilized:      [x] Patient refused   [] Nausea/vomiting   [] Low blood pressure   [] Drowsy/lethargic    Pain Rating:     [] 0  [] 1  Assistive Device:        [] 2  [] 3  [] 4  [] 5  [] 6  Assistive Device:        [] 7  [] 8  [] 9  [] 10    Comments:

## 2020-02-19 NOTE — DISCHARGE SUMMARY
DISCHARGE SUMMARY            ADMISSION DIAGNOSIS: Fracture, humerus, shaft [S42.309A]  Fracture, humerus, shaft [S42.309A]    DISCHARGE DIAGNOSIS: Status post S45.354A COMMUNATED HUMERAL SHAFT FRACTURE, RIGHT        Subjective: 75 y.o., female, 1 Day Post-Op, Procedure(s):  RIGHT HUMERAL INTRA MEDULLARY NAIL/C-ARM/SYNTHES/RADIOLUCENT TABLE     Admitting date:18 Feb 20    Date of Discharge/Transfer: 19 Feb 20    Physical Exam (day of transfer / discharge):19 Feb 20      Condition at Discharge: Stable and cleared to advance to next level of care / rehabilitation. History:  Past Medical History:   Diagnosis Date    [facility]fib ([facility]     Arthritis     History of seasonal allergies     Hypertension     Neuropathy     lower extremities    Rheumatoid arthritis ([facility]              History of Present Illness:     Ms. Ara Mcdonald is a pleasant female who suffered a well documented radiographic history of COMMUNATED HUMERAL SHAFT FRACTURE, RIGHT.    Ara Mcdonald had failed all conservative measures as directed by Dr. Lucie Casarez, and in light of Ara Mcdonald progressive pain and difficultly safely performing her  necessary Activities of Daily Living, Dr. Susan Evans recommended a Right humeral IM nailing      PAST MEDICAL HISTORY:   Past Medical History:   Diagnosis Date    A-fib [facility])     Arthritis     History of seasonal allergies     Hypertension     Neuropathy     lower extremities    Rheumatoid arthritis ([facility]        PAST SURGICAL HISTORY:   Past Surgical History:   Procedure Laterality Date    COLONOSCOPY N/A 5/15/2018    COLONOSCOPY, DIAGNOSTIC  performed by Yessi King MD at [address] [place] [address] HX APPENDECTOMY      HX DILATION AND CURETTAGE      HX HEENT      Ear tubes    HX HYSTERECTOMY      HX OOPHORECTOMY      NEUROLOGICAL PROCEDURE UNLISTED  04/27/2016    T2-L2 Decomprssion and Fusion/T12 Corpetomy       ALLERGIES:   Allergies   Allergen Reactions    Iodinated Contrast Media Other (comments)     Patient states will pass out. CURRENT MEDICATIONS:  A list of medications prior to the time of admission include:  Prior to Admission medications    Medication Sig Start Date End Date Taking? Authorizing Provider   oxyCODONE IR (ROXICODONE) 10 mg tab immediate release tablet Take 1 Tab by mouth every six (6) hours as needed for Pain for up to 14 days. Max Daily Amount: 40 mg. 2/19/20 3/4/20 Yes Arias Amado PA-C   LORazepam (ATIVAN) 0.5 mg tablet Take  by mouth as needed. Yes Provider, Historical   montelukast (SINGULAIR) 10 mg tablet Take 10 mg by mouth daily as needed. Yes Provider, Historical   DULoxetine (CYMBALTA) 60 mg capsule Take 60 mg by mouth nightly. Yes Provider, Historical   Omeprazole delayed release (PRILOSEC D/R) 20 mg tablet Take 20 mg by mouth daily. Yes Provider, Historical   furosemide (LASIX) 40 mg tablet Take  by mouth daily. Yes Provider, Historical   cloNIDine HCL (CATAPRES) 0.1 mg tablet Take  by mouth two (2) times a day. Yes Provider, Historical   acetaminophen (TYLENOL) 325 mg tablet Take  by mouth every four (4) hours as needed for Pain. Yes Provider, Historical   rivaroxaban (XARELTO) 10 mg tablet Take 1 Tab by mouth daily (with breakfast). 6/2/18  Yes aZ Ruano MD   Cetirizine (ZYRTEC) 10 mg cap Take  by mouth daily as needed. Yes Provider, Historical   gabapentin (NEURONTIN) 400 mg capsule Take 400 mg by mouth four (4) times daily. Yes Provider, Historical   ferrous sulfate 325 mg (65 mg iron) tablet Take 1 Tab by mouth two (2) times daily (with meals). 7/13/16  Yes Humberto Sandhu MD   oxyCODONE-acetaminophen (PERCOCET 10)  mg per tablet Take 1 Tab by mouth every four (4) hours as needed. Max Daily Amount: 6 Tabs. 7/13/16  Yes Humberto Sandhu MD   loperamide (IMMODIUM) 2 mg tablet Take 2 mg by mouth four (4) times daily as needed for Diarrhea.     Provider, Historical   ondansetron (ZOFRAN ODT) 8 mg disintegrating tablet Take 1 Tab by mouth every eight (8) hours as needed for Nausea for up to 12 doses. 3/6/18   Sancho Reeves NP   diphenoxylate-atropine (LOMOTIL) 2.5-0.025 mg per tablet Take 1 Tab by mouth four (4) times daily as needed for Diarrhea (1 tab after each stool for max 8 per day). Max Daily Amount: 4 Tabs. Take after each stool for a maximum of 8 tablets daily 2/20/18   Chaz Enciso MD   albuterol (PROVENTIL VENTOLIN) 2.5 mg /3 mL (0.083 %) nebulizer solution 3 mL by Nebulization route every four (4) hours as needed for Wheezing. 2/20/18   Chaz Enciso MD   atorvastatin (LIPITOR) 40 mg tablet Take 1 Tab by mouth nightly. 7/13/16   Virginia Sandhu MD   bisacodyl (DULCOLAX) 10 mg suppository Insert 10 mg into rectum as needed. 7/13/16   Virginia Sandhu MD   nystatin (MYCOSTATIN) powder Apply  to affected area two (2) times a day. 7/13/16   Virginia Sandhu MD   polyethylene glycol (MIRALAX) 17 gram packet Take 1 Packet by mouth daily. Patient taking differently: Take 17 g by mouth daily as needed. 7/13/16   Virginia Sandhu MD   senna-docusate (PERICOLACE) 8.6-50 mg per tablet Take 2 Tabs by mouth daily.  7/13/16   Virginia Sandhu MD       FAMILY HISTORY:   Family History   Problem Relation Age of Onset    Hypertension Mother     Diabetes Mother     Heart Disease Mother     Heart Disease Father     Diabetes Maternal Aunt     Cancer Maternal Uncle         preostate cancer    Diabetes Maternal Uncle        SOCIAL HISTORY:   Social History     Socioeconomic History    Marital status: SINGLE     Spouse name: Not on file    Number of children: Not on file    Years of education: Not on file    Highest education level: Not on file   Tobacco Use    Smoking status: Former Smoker    Smokeless tobacco: Never Used   Substance and Sexual Activity    Alcohol use: Never     Frequency: Never    Drug use: No       REVIEW OF SYSTEMS: All review of systems are negative. Social History     Occupational History    Not on file   Tobacco Use    Smoking status: Former Smoker    Smokeless tobacco: Never Used   Substance and Sexual Activity    Alcohol use: Never     Frequency: Never    Drug use: No    Sexual activity: Not on file       Hospital Course:     Ms. Rodolfo Mustafa was [facility] to [facility] on the morning of 18 Feb 20 under the care of Dr. Luz Elena Vallecillo. she was taken to the operating theater under Dr. Tamera Soto direction, first assisted by trained surgical assistant's where she underwent an IM nailing of [address], RIGHT. she tolerated the procedure well, and there were no intra operative complications. Post operatively she was transferred medically stable to post op holding. After all safety criteria had been met during her immediate post op recovery phase she was transferred medical stable to [address] [place] [address] to begin comprehensive physical and occupational therapies, restorative nursing and thrombo embolism (DVT/PE) prophylaxis. Ms. Rodolfo Mustafa post operative course progressed slow but directed. The focus throughout the post op period focused on range of motion and pain control. An acute post operative blood loss anemia was noted post operatively and there was no need to transfuse packed red blood cells. Medically she  remained stable through the remainder of the hospital stay. In light of the slow gains attained by Ms. Rodolfo Mustafa post operatively she is being recommended for a directed course of comprehensive PT / OT at nursing facility. DISCHARGE PLAN:      The patient will be discharged to skilled nursing facility. DIET:  Low Calorie High Protein Diet    NUTRITION: OTC Nutritional supplements, multivitamins      ACTIVITY: No lifting, Driving, or Strenuous exercise and No heavy lifting, pushing, pulling.  Weight Bearing/Range of Motion Restrictions: Per Protocol    WOUND / INCISION CARE: Keep wound clean and dry, Reinforce dressing PRN and Ice to area for comfort Keep the current dressings on and in place. There is no need to change these current dressings. Dressings to please be changed by home nurse or nursing home staff each day. Keep all pets away from any wound present in order to prevent infection. PAIN CONTROL: Prescriptions written: On chart    PRECAUTIONS: Weight Bearing/Range of Motion per Restrictions: See Below    VTE PROPHYLAXIS: Restart Xarelto    ANTIBIOTICS: None at this time. MEDICATIONS AT DISCHARGE:  Current Discharge Medication List      START taking these medications    Details   oxyCODONE IR (ROXICODONE) 10 mg tab immediate release tablet Take 1 Tab by mouth every six (6) hours as needed for Pain for up to 14 days. Max Daily Amount: 40 mg.  Qty: 42 Tab, Refills: 0    Associated Diagnoses: Acute post-operative pain         CONTINUE these medications which have NOT CHANGED    Details   LORazepam (ATIVAN) 0.5 mg tablet Take  by mouth as needed. montelukast (SINGULAIR) 10 mg tablet Take 10 mg by mouth daily as needed. DULoxetine (CYMBALTA) 60 mg capsule Take 60 mg by mouth nightly. Omeprazole delayed release (PRILOSEC D/R) 20 mg tablet Take 20 mg by mouth daily. furosemide (LASIX) 40 mg tablet Take  by mouth daily. cloNIDine HCL (CATAPRES) 0.1 mg tablet Take  by mouth two (2) times a day. acetaminophen (TYLENOL) 325 mg tablet Take  by mouth every four (4) hours as needed for Pain.      rivaroxaban (XARELTO) 10 mg tablet Take 1 Tab by mouth daily (with breakfast). Qty: 30 Tab, Refills: 3      Cetirizine (ZYRTEC) 10 mg cap Take  by mouth daily as needed. gabapentin (NEURONTIN) 400 mg capsule Take 400 mg by mouth four (4) times daily. ferrous sulfate 325 mg (65 mg iron) tablet Take 1 Tab by mouth two (2) times daily (with meals).   Qty: 60 Tab, Refills: 1      loperamide (IMMODIUM) 2 mg tablet Take 2 mg by mouth four (4) times daily as needed for Diarrhea. ondansetron (ZOFRAN ODT) 8 mg disintegrating tablet Take 1 Tab by mouth every eight (8) hours as needed for Nausea for up to 12 doses. Qty: 20 Tab, Refills: 0      diphenoxylate-atropine (LOMOTIL) 2.5-0.025 mg per tablet Take 1 Tab by mouth four (4) times daily as needed for Diarrhea (1 tab after each stool for max 8 per day). Max Daily Amount: 4 Tabs. Take after each stool for a maximum of 8 tablets daily  Qty: 20 Tab, Refills: 0    Associated Diagnoses: Viral syndrome      albuterol (PROVENTIL VENTOLIN) 2.5 mg /3 mL (0.083 %) nebulizer solution 3 mL by Nebulization route every four (4) hours as needed for Wheezing. Qty: 24 Each, Refills: 0      atorvastatin (LIPITOR) 40 mg tablet Take 1 Tab by mouth nightly. Qty: 30 Tab, Refills: 1      bisacodyl (DULCOLAX) 10 mg suppository Insert 10 mg into rectum as needed. Qty: 30 Suppository, Refills: 1      nystatin (MYCOSTATIN) powder Apply  to affected area two (2) times a day. Qty: 1 Bottle, Refills: 3      polyethylene glycol (MIRALAX) 17 gram packet Take 1 Packet by mouth daily. Qty: 30 Packet, Refills: 1      senna-docusate (PERICOLACE) 8.6-50 mg per tablet Take 2 Tabs by mouth daily. Qty: 60 Tab, Refills: 1         STOP taking these medications       oxyCODONE-acetaminophen (PERCOCET 10)  mg per tablet Comments:   Reason for Stopping:                             Physical and Occupational therapies:    will continue with attention to standard postop precautions / restrictions and below:    [ Misbah Quevedo [ ] RLE  [ ] Rosita Garcia  [ ] Evans Betancourt    [ ] Full WBAT   SharaJulio Cesariper ] Partial WBAT   [ ] Costilla Coins WB   [ ] Non Weight Bearing: Follow up:    [ Fern mann  [ ] 10 days  [ ] Specific Date:       Per [facility] [place] Protocol this  case was discussed with attending surgeon and reflects their orders as confirmed by their co signature.      Very Respectfully,        CATALINO Corey Neva Needles, [address] Assistant-C  Department of [address] and Sp[facility] Specialities   [address] [facility]  Contact Cell [phone]

## 2020-02-19 NOTE — ROUTINE PROCESS
Discharge instructions went over w/pt, pt verbalized understanding. IV d/c'd, skin intact. No health questions/concerns at this time.

## 2020-02-19 NOTE — PROGRESS NOTES
2131  Patient alert, no apparent distress, refused to have her BP check. Education provided about the risk of non compliance. Patient verbalized understanding. Will try again. 2330  AOx4, no apparent distress. Encourage to have her BP taken but patient continue to refused. Education provided about the risk of non compliance. Patient verbalized understanding. Nurse in charge informed.

## 2020-02-19 NOTE — PROGRESS NOTES
End of Shift Note     Bedside and verbal shift change report given to TATYANA Castillo (On coming nurse) by Swapna Jaramillo RN (Off going nurse).   Report included the following information:      --Procedure Summary     --MAR,     --Recent Results     --Med Rec Status    SBAR Recommendations:     Issues for Provider to address           Activity This Shift     [] Bed Rest Order   [] Refused   [] Dangled    [] TDWB         Ambulating:     [] Bathroom     [] BSC     [x] Room      Up in Chair for meals    []Yes [] No   Voiding       [x] Yes  [] No  Garcia          [] Yes  [] No  Incontinent [] Yes  [] No    DUE TO VOID POUR        [] Yes [] No  Purewick    [] Yes [] No  New Onset [] Yes [] No Straight Cath   []Yes  [] No  Condom Cath  [] Yes [] No  MD Called      [] Yes  [] No   Blood Sugars Managed []Yes [x] No    Bowels Moved [] Yes [x] No    Incontinent     [] Yes [x] No Passed Gas []Yes [] No    New Onset  []Yes [] No        MD Called []Yes  [] No     CHG Bath Done     Before Surgery     After Surgery      [x] Yes  [] No  [x] Yes  [] No       Drain Removed [] Yes  [] No [x] N/A    Dressing Changed [] Yes   [x] No [] N/A      Nausea/Vomiting [] Yes   [x] No     Ice Packs Changed [x] Yes   [] No  [] N/A    Incentive Spirometer  [x] Yes  [] No      SCD Pumps On     Ankle Pumping  [x] Yes   [] No      [] Yes   [] No        Telemetry Monitoring [] Yes   [x] No   Rhythm

## 2020-02-19 NOTE — PROGRESS NOTES
Problem: Self Care Deficits Care Plan (Adult)  Goal: *Acute Goals and Plan of Care (Insert Text)  Description  Occupational Therapy Goals  Initiated 2/19/2020 within 7 day(s). 1.  Patient will perform functional activity standing for 4-7 minutes with modified independence and F+ balance. .   2.  Patient will perform upper body dressing with minimal assistance/contact guard assist.  3.  Patient will perform upper body bathing with minimal assistance/contact guard assist.  4.  Patient will perform toilet transfers with supervision/set-up. 5.  Patient will perform all aspects of toileting with supervision/set-up. 6.  Patient will utilize energy conservation techniques during functional activities with verbal cues. Prior Level of Function: Prior to patient's fall a week ago she was independent with self-care and used a RW for functional mobility PTA. Outcome: Progressing Towards Goal     OCCUPATIONAL THERAPY EVALUATION    Patient: Boo Ledesma (6 y.o. female)  Date: 2/19/2020  Primary Diagnosis: Fracture, humerus, shaft [S42.309A]  Fracture, humerus, shaft [S42.309A]  Procedure(s) (LRB):  RIGHT HUMERAL INTRA MEDULLARY NAIL/C-ARM/SYNTHES/RADIOLUCENT TABLE (Right) 1 Day Post-Op   Precautions: Fall, NWB(RUE until orders specified)    ASSESSMENT :  Patient cleared to participate in OT evaluation by RN. Upon entering the room, pt was supine in bed, alert, and agreeable to participate in OT evaluation. Patient educated on weight bearing status, proper sling application/wear, compensatory strategies for self-care, and importance of wrist flex/ext and ulnar and radial deviation, as well as active finger and hand movement to prevent edema and maintain function. Patient educated on energy conservation techniques, pursed lip breathing, and self pacing during daily activities to assist with pain management this session.  Patient performed bed mobility with contact guard assist and additional time needed 2/2 simulation of set-up at [address] [place] [address]. Patient reports having roommate and unable to perform bed mobility to L side/change position in her bed, simulated to the R this session. Patient performed functional transfers with min assist this session and required min assist for balance during standing self-care tasks. 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 Patient performed toileting tasks this session with min assist and educated on using BSC over toilet upon return to facility for arm rest support on L side. Patient required multiple rest breaks this session 2/2 increased pain, RN notified. The pt presents with Fair+ static standing and fair dynamic standing balance, however will defer to PT for functional balance and functional mobility tasks. Based on the objective data described below, the patient presents with decreased strength, decreased independence, decreased functional balance, and decreased functional mobility, which impedes pt performance in basic self-care/ADL tasks. Pt would benefit from skilled OT to restore PLOF and maximize function. Patient will benefit from skilled intervention to address the above impairments.   Patient's rehabilitation potential is considered to be Good  Factors which may influence rehabilitation potential include:   []             None noted  [x]             Mental ability/status  [x]             Medical condition  [x]             Home/family situation and support systems  [x]             Safety awareness  [x]             Pain tolerance/management  []             Other:      PLAN :  Recommendations and Planned Interventions:   [x]               Self Care Training                  [x]      Therapeutic Activities  [x]               Functional Mobility Training   []      Cognitive Retraining  [x]               Therapeutic Exercises           [x]      Endurance Activities  [x] Balance Training                    [x]      Neuromuscular Re-Education  []               Visual/Perceptual Training     [x]      Home Safety Training  [x]               Patient Education                   [x]      Family Training/Education  []               Other (comment):    Frequency/Duration: Patient will be followed by occupational therapy daily to address goals. Discharge Recommendations: Return to LTC, [facility]  Further Equipment Recommendations for Discharge: Patient has all DME     SUBJECTIVE:   Patient stated Smitha Merida won't let me go to the bathroom on my own there, they will have somebody help me    OBJECTIVE DATA SUMMARY:     Past Medical History:   Diagnosis Date    A-fib [facility])     Arthritis     History of seasonal allergies     Hypertension     Neuropathy     lower extremities    Rheumatoid arthritis ([facility]      Past Surgical History:   Procedure Laterality Date    COLONOSCOPY N/A 5/15/2018    COLONOSCOPY, DIAGNOSTIC  performed by Ira Wright MD at [facility]      HX HEENT      Ear tubes    HX HYSTERECTOMY      HX [address]  04/27/2016    T2-L2 Decomprssion and Fusion/T12 Corpetomy     Barriers to Learning/Limitations: None  Compensate with: visual, verbal, tactile, kinesthetic cues/model    Home Situation:   Home Situation  Home Environment: [address] [place] [address] Name: [address] [place] [address],[address] Floor  One/Two Story Residence: One story  Living Alone: No  Support Systems: Skilled nursing facility, Family member(s)  Patient Expects to be Discharged to[de-identified] Skilled nursing facility  Current DME Used/Available at Home: [address] or Shower Type: Shower  [x]  Right hand dominant   []  Left hand dominant    Cognitive/Behavioral Status:  Neurologic State: Alert  Orientation Level: Oriented X4  Cognition: Follows commands  Safety/Judgement: Fall prevention; Awareness of environment    Skin: Intact  Edema: None noted    Vision/Perceptual:    Acuity: Within Defined Limits    Corrective Lenses: Glasses    Coordination: BUE  Fine Motor Skills-Upper: Left Intact; Right Intact    Gross Motor Skills-Upper: Left Intact; Right Impaired    Balance:  Sitting: Intact  Standing: Impaired; With support  Standing - Static: Fair(+)  Standing - Dynamic : Fair    Strength: BUE  Strength: Generally decreased, Functional (BUE  strength)    Tone & Sensation: BUE  Tone: Normal(R hand/wrist, LUE)  Sensation: Intact(BUE)    Range of Motion: BUE  AROM: Within functional limits(LUE; RUE not assessed as it is in sling)    Functional Mobility and Transfers for ADLs:  Bed Mobility:  Supine to Sit: Contact guard assistance; Additional time  Scooting: Contact guard assistance; Additional time    Transfers:  Sit to Stand: Minimum assistance  Stand to Sit: Minimum assistance   Toilet Transfer : Minimum assistance    ADL Assessment:   Feeding: Setup;Stand-by assistance    Oral Facial Hygiene/Grooming: Setup;Stand-by assistance    Bathing: Moderate assistance    Upper Body Dressing: Moderate assistance    Lower Body Dressing: Moderate assistance    Toileting: Moderate assistance      ADL Intervention:  Grooming  Grooming Assistance: Stand-by assistance  Washing Hands: Stand-by assistance; Compensatory technique training    Upper Body Dressing Assistance  Dressing Assistance: Moderate assistance  Orthotics(Brace): Moderate assistance(sling adjustment for proper fit)  Hospital Gown: Moderate assistance      Toileting  Toileting Assistance: Moderate assistance  Bladder Hygiene: Contact guard assistance(min assist for balance)  Bowel Hygiene: Minimum assistance(min assist for balance)    Cognitive Retraining  Safety/Judgement: Fall prevention; Awareness of environment    Pain:  Pain level pre-treatment: 9/10 , RUE  Pain level post-treatment: 10/10 , RUE  Pain Intervention(s): Medication (see MAR);    Response to intervention:  See doc flow    Activity Tolerance: Fair    Please refer to the flowsheet for vital signs taken during this treatment. After treatment:   [x] Patient left in no apparent distress sitting up in chair  [] Patient left in no apparent distress in bed  [x] Call bell left within reach  [x] Nursing notified  [] Caregiver present  [] Bed alarm activated    COMMUNICATION/EDUCATION:   [x] Role of Occupational Therapy in the acute care setting  [x] Home safety education was provided and the patient/caregiver indicated understanding. [x] Patient/family have participated as able in goal setting and plan of care. [x] Patient/family agree to work toward stated goals and plan of care. [] Patient understands intent and goals of therapy, but is neutral about his/her participation. [] Patient is unable to participate in goal setting and plan of care. Thank you for this referral.  Jayme Rose, OTR/L  Time Calculation: 61 mins    Eval Complexity: History: MEDIUM Complexity : Expanded review of history including physical, cognitive and psychosocial  history ; Examination: MEDIUM Complexity : 3-5 performance deficits relating to physical, cognitive , or psychosocial skils that result in activity limitations and / or participation restrictions; Decision Making:MEDIUM Complexity : Patient may present with comorbidities that affect occupational performnce.  Miniml to moderate modification of tasks or assistance (eg, physical or verbal ) with assesment(s) is necessary to enable patient to complete evaluation

## 2020-02-19 NOTE — PROGRESS NOTES
Transition of Care Plan to SNF/Rehab    LTCTransition:  Patient is long term care at [facility]    Patient will  be transported by [facility] wheelchair for immediate  with confirmation #[id number] ([phone])      Communication to Patient/Family:  Met with patient and agreeable to the transition plan. Communication to SNF/Rehab:  Bedside RN, Tani Villanueva, has been notified of the transition plan to the facility and to call report [phone] unit #2. Discharge information has been updated on the AVS. And communicated to facility via [facility], or CC link. SNF/Rehab Transition:        Nursing Please include all hard scripts for controlled substances, med rec and dc summary, and AVS in packet. Reviewed and confirmed with facility representative, Lion Burns, at [facility]  they can manage the patient care needs for the following:     Octavio Ng with (X) only those applicable:    Medication:  [x]  Medications will be available at the facility  []  IV Antibiotics   [x]  Controlled Substance - hard copy to be sent with patient   []  Weekly Labs    Documents:  [x] Hard RX  Number sent 1  [] MAR  [] Kardex  [] AVS  [x] Wound care note  [x]Transfer Summary/Discharge Summary    Equipment:  []  CPAP/BiPAP  []  Wound Vacuum  []  Garcia or Urinary Device  []  PICC/Central Line  []  Nebulizer  []  Ventilator    Treatment:  []Isolation (for MRSA, VRE, etc.)  []Surgical Drain Management  []Tracheostomy Care  []Dressing Changes  []Dialysis with transportation and chair time Center Mode of tranpsort   []PEG Care  []Oxygen  []Daily Weights for Heart Failure    Dietary:  []Any diet limitations  []Tube Feedings   []Total Parenteral Management (TPN)    Eligible for Medicaid Long Term Services and Supports  Yes:  [] Eligible for medical assistance or will become eligible within 180 days and LTSS completed.    [] Provider/Patient and/or support system has requested screening.  [] LTSS copy provided to patient or responsible party,  [] LTSS unavailable at discharge will send once processed to SNF provider.  [] LTSS  unavailable at discharged mailed to patient  [] LTSS performed by outside agency on  with tracking number   No:   [] Private pay and is not financially eligible for Medicaid within the next 180 days. [] Reside out-of-state.   [] A residents of a state owned/operated facility that is licensed  by [facility] and [facility] or [facility]  [] Enrollment in [facility] hospice services  [] [address] [place] [address] [facility]  [] Patient /Family declines to have screening completed or provide financial information for screening          Financial Resources:  Medicaid    [] Initiated and application pending   [] Full coverage      Advanced Care Plan:  []Surrogate Decision Maker of Care  []POA  []Communicated Code Status/     Other    DIXIE Victoria, RN  Pager # [phone]  Care Manager

## 2020-02-19 NOTE — DISCHARGE INSTRUCTIONS
DISCHARGE SUMMARY from Nurse    PATIENT INSTRUCTIONS:    After general anesthesia or intravenous sedation, for 24 hours or while taking prescription Narcotics:  · Limit your activities  · Do not drive and operate hazardous machinery  · Do not make important personal or business decisions  · Do  not drink alcoholic beverages  · If you have not urinated within 8 hours after discharge, please contact your surgeon on call. Report the following to your surgeon:  · Excessive pain, swelling, redness or odor of or around the surgical area  · Temperature over 100.5  · Nausea and vomiting lasting longer than 4 hours or if unable to take medications  · Any signs of decreased circulation or nerve impairment to extremity: change in color, persistent  numbness, tingling, coldness or increase pain  · Any questions? What to do at Home:  Recommended activity: as directed by MD/PT. *  Please give a list of your current medications to your Primary Care Provider. *  Please update this list whenever your medications are discontinued, doses are      changed, or new medications (including over-the-counter products) are added. *  Please carry medication information at all times in case of emergency situations. These are general instructions for a healthy lifestyle:    No smoking/ No tobacco products/ Avoid exposure to second hand smoke  Surgeon General's Warning:  Quitting smoking now greatly reduces serious risk to your health. Obesity, smoking, and sedentary lifestyle greatly increases your risk for illness    A healthy diet, regular physical exercise & weight monitoring are important for maintaining a healthy lifestyle    You may be retaining fluid if you have a history of heart failure or if you experience any of the following symptoms:  Weight gain of 3 pounds or more overnight or 5 pounds in a week, increased swelling in our hands or feet or shortness of breath while lying flat in bed.   Please call your doctor as soon as you notice any of these symptoms; do not wait until your next office visit. The discharge information has been reviewed with the patient. The patient verbalized understanding. Discharge medications reviewed with the patient and appropriate educational materials and side effects teaching were provided.   ___________________________________________________________________________________________________________________________________

## 2020-02-19 NOTE — PROGRESS NOTES
Problem: Falls - Risk of  Goal: *Absence of Falls  Description  Document Dmitry Quinteros Fall Risk and appropriate interventions in the flowsheet.   Outcome: Progressing Towards Goal  Note: Fall Risk Interventions:  Mobility Interventions: Patient to call before getting OOB         Medication Interventions: Patient to call before getting OOB, Teach patient to arise slowly    Elimination Interventions: Call light in reach, Patient to call for help with toileting needs    History of Falls Interventions: Door open when patient unattended, Room close to nurse's station         Problem: Patient Education: Go to Patient Education Activity  Goal: Patient/Family Education  Outcome: Progressing Towards Goal     Problem: Infection - Risk of, Surgical Site Infection  Goal: *Absence of surgical site infection signs and symptoms  Outcome: Progressing Towards Goal     Problem: Patient Education: Go to Patient Education Activity  Goal: Patient/Family Education  Outcome: Progressing Towards Goal     Problem: Patient Education: Go to Patient Education Activity  Goal: Patient/Family Education  Outcome: Progressing Towards Goal     Problem: Pain  Goal: *Control of Pain  Outcome: Progressing Towards Goal     Problem: Patient Education: Go to Patient Education Activity  Goal: Patient/Family Education  Outcome: Progressing Towards Goal

## 2020-02-19 NOTE — PROGRESS NOTES
Bedside shift change report given to Suman Warren (oncoming nurse) by Bridgette Ward (offgoing nurse). Report included the following information SBAR, Kardex, Procedure Summary, Intake/Output and MAR.

## 2020-02-19 NOTE — PROGRESS NOTES
Problem: Mobility Impaired (Adult and Pediatric)  Goal: *Acute Goals and Plan of Care (Insert Text)  Description  Physical Therapy Goals  Initiated 2/19/2020 and to be accomplished within 7 day(s)  1. Patient will move from supine to sit and sit to supine  in bed with supervision/set-up. 2.  Patient will transfer from bed to chair and chair to bed with supervision/set-up using the least restrictive device. 3.  Patient will perform sit to stand with supervision/set-up. 4.  Patient will ambulate with supervision/set-up for 100 feet with the least restrictive device. PLOF: Pt is a long term care resident at [facility]. She ambulates with RW, but prior to fall was ambulating without AD. Outcome: Progressing Towards Goal    PHYSICAL THERAPY EVALUATION    Patient: Marcus Pain (54 y.o. female)  Date: 2/19/2020  Primary Diagnosis: Fracture, humerus, shaft [S42.309A]  Fracture, humerus, shaft [S42.309A]  Procedure(s) (LRB):  RIGHT HUMERAL INTRA MEDULLARY NAIL/C-ARM/SYNTHES/RADIOLUCENT TABLE (Right) 1 Day Post-Op   Precautions:   Fall, NWB(RUE until orders specified)      ASSESSMENT :  Patient is a 75 y.o. female s/p  right humeral IMN. Based on the objective data described below, the patient presents with right UE pain, impaired balance, falls risk, and decreased independence with mobility. Pt with right sling donned appropriately and pt sitting in recliner. Pt is able to recall weight bearing status. She needs min A for sit to stand transfer, 2 trials performed. Gait 20 ft with HHA, mild trunk lateral sway and decreased urban. Pt requests to use restroom, vc's provided to use grab bars to increase safety. Pt returned to sitting in recliner and left with all needs met. Patient will benefit from continued skilled PT to maximize safety/ independence with mobility and restore PLOF. Patient will benefit from skilled intervention to address the above impairments.   Patient's rehabilitation potential is considered to be Good  Factors which may influence rehabilitation potential include:   []         None noted  []         Mental ability/status  [x]         Medical condition  [x]         Home/family situation and support systems  [x]         Safety awareness  [x]         Pain tolerance/management  []         Other:      PLAN :  Recommendations and Planned Interventions:   [x]           Bed Mobility Training             [x]    Neuromuscular Re-Education  [x]           Transfer Training                   []    Orthotic/Prosthetic Training  [x]           Gait Training                          []    Modalities  [x]           Therapeutic Exercises           []    Edema Management/Control  [x]           Therapeutic Activities            [x]    Family Training/Education  [x]           Patient Education  []           Other (comment):    Frequency/Duration: Patient will be followed by physical therapy 1-2 times per day/4-7 days per week to address goals. Discharge Recommendations: return to LTC, [facility]  Further Equipment Recommendations for Discharge: n/a     SUBJECTIVE:   Patient stated  The pain is a little better now after taking the pills.     OBJECTIVE DATA SUMMARY:     Past Medical History:   Diagnosis Date    A-fib ([facility]     Arthritis     History of seasonal allergies     Hypertension     Neuropathy     lower extremities    Rheumatoid arthritis ([facility]      Past Surgical History:   Procedure Laterality Date    COLONOSCOPY N/A 5/15/2018    COLONOSCOPY, DIAGNOSTIC  performed by Dilan Santo MD at [address] [place] [address] HX APPENDECTOMY      HX DILATION AND CURETTAGE      HX HEENT      Ear tubes    HX HYSTERECTOMY      HX OOPHORECTOMY      NEUROLOGICAL PROCEDURE UNLISTED  04/27/2016    T2-L2 Decomprssion and Fusion/T12 Corpetomy     Barriers to Learning/Limitations: None  Compensate with: N/A  Home Situation:  Home Situation  Home Environment: [address] [place] [address] Name: [facility]  One/Two Story Residence: One story  Living Alone: No  Support Systems: Skilled nursing facility, Family member(s)  Patient Expects to be Discharged to[de-identified] Skilled nursing facility  Current DME Used/Available at Home: [address] or Shower Type: Shower  Critical Behavior:  Neurologic State: Alert  Orientation Level: Oriented X4  Cognition: Follows commands  Safety/Judgement: Fall prevention; Awareness of environment  Psychosocial  Patient Behaviors: Calm; Cooperative  Skin Condition/Temp: Dry;Warm     Skin Integrity: Incision (comment)(dressing to the right shoulder dry and intact)  Skin Integumentary  Skin Color: Appropriate for ethnicity  Skin Condition/Temp: Dry;Warm  Skin Integrity: Incision (comment)(dressing to the right shoulder dry and intact)  Turgor: Non-tenting  Hair Growth: Present  Varicosities: Absent     Strength:    Strength: Within functional limits(BLE)    Tone & Sensation:   Tone: Normal  Sensation: Intact    Range Of Motion:  AROM: Within functional limits(BLE)    Functional Mobility:    Sit to Stand: Minimum assistance  Stand to Sit: Minimum assistance    Balance:   Sitting: Intact  Standing: Impaired; With support  Standing - Static: Fair(+)  Standing - Dynamic : Fair    Ambulation/Gait Training:  Distance (ft): 20 Feet (ft)  Assistive Device: (HHA)  Ambulation - Level of Assistance: Contact guard assistance;Minimal assistance  Gait Abnormalities: Trunk sway increased  Speed/Екатерина: Pace decreased (<100 feet/min)  Step Length: Left shortened;Right shortened      Pain:  Pain level pre-treatment: 7/10   Pain level post-treatment: 7/10   Pain Intervention(s) : Medication (see MAR); Rest, Ice, Repositioning  Response to intervention: Nurse notified, See doc flow    Activity Tolerance:   Fair  Please refer to the flowsheet for vital signs taken during this treatment.   After treatment:   [x]         Patient left in no apparent distress sitting up in chair  []         Patient left in no apparent distress in bed  [x]         Call bell left within reach  [x]         Nursing notified  []         Caregiver present  []         Bed alarm activated  []         SCDs applied    COMMUNICATION/EDUCATION:   [x]         Role of Physical Therapy in the acute care setting. [x]         Fall prevention education was provided and the patient/caregiver indicated understanding. [x]         Patient/family have participated as able in goal setting and plan of care. [x]         Patient/family agree to work toward stated goals and plan of care. []         Patient understands intent and goals of therapy, but is neutral about his/her participation. []         Patient is unable to participate in goal setting/plan of care: ongoing with therapy staff.  []         Other:     Thank you for this referral.  Genora Lefort, PT   Time Calculation: 50 mins minus 27= 23 minutes      Eval Complexity: History: MEDIUM  Complexity : 1-2 comorbidities / personal factors will impact the outcome/ POC Exam:MEDIUM Complexity : 3 Standardized tests and measures addressing body structure, function, activity limitation and / or participation in recreation  Presentation: MEDIUM Complexity : Evolving with changing characteristics  Clinical Decision Making:Medium Complexity    Overall Complexity:MEDIUM

## 2020-02-19 NOTE — PROGRESS NOTES
Mobility Intervention:       [] Pt dangled at edge of bed    [] Pt assisted OOB to bedside commode    [] Pt assisted OOB to chair    [] Pt ambulated to bathroom    [x] Patient was ambulated around the bed    Assistive Device Utilized:       [] Rolling walker   [] Crutches   [] Straight Cane   [] Knee immobilizer   [] IV pole    After Mobilization:     [] Patient left in no apparent distress sitting up in chair  [x] Patient left in no apparent distress in bed  [x] Call bell left within reach  [x] SCDs on & machine turned on  [x] Ice applied  [x] pain med  [] Caregiver present  [] Bed alarm activated    Reason patient not mobilized:      [] Patient refused   [] Nausea/vomiting   [] Low blood pressure   [] Drowsy/lethargic    Pain Rating:     [] 0  [] 1  Assistive Device:        [] 2  [] 3  [] 4  [] 5  [x] 6  Assistive Device:        [] 7  [] 8  [] 9  [] 10    Comments:

## 2020-02-25 ENCOUNTER — OFFICE VISIT (OUTPATIENT)
Dept: ORTHOPEDIC SURGERY | Facility: CLINIC | Age: 65
End: 2020-02-25

## 2020-02-25 VITALS
TEMPERATURE: 98.2 F | DIASTOLIC BLOOD PRESSURE: 69 MMHG | BODY MASS INDEX: 45.32 KG/M2 | OXYGEN SATURATION: 96 % | SYSTOLIC BLOOD PRESSURE: 119 MMHG | HEART RATE: 74 BPM | RESPIRATION RATE: 18 BRPM | HEIGHT: 64 IN

## 2020-02-25 DIAGNOSIS — S42.354D CLOSED NONDISPLACED COMMINUTED FRACTURE OF SHAFT OF RIGHT HUMERUS WITH ROUTINE HEALING, SUBSEQUENT ENCOUNTER: Primary | ICD-10-CM

## 2020-02-25 NOTE — PROGRESS NOTES
Patient: Zahraa Deras                MRN: [id number]       SSN: [id number]  YOB: 1955        AGE: 75 y.o. SEX: female    PCP: Gavin Eugene MD         02/25/20: Patient is status post IM nailing of right humeral.     Surgical note below:    Patient: Zahraa Deras     Date of Surgery: 2/18/2020          YOB: 1955      Age:  75 y.o.        LOS:  LOS: 1 day         Preoperative Diagnosis:  S45.354A COMMUNATED HUMERAL SHAFT FRACTURE, RIGHT     Postoperative Diagnosis: S45.354A COMMUNATED HUMERAL SHAFT FRACTURE, RIGHT       Surgeon:  Judit Collier MD      Assistant:  Monique Vasquez  Anesthesia:  General anesthesia     Procedure:  Procedure(s):  REDUCTION AND FIXATION WITH RIGHT HUMERAL INTRA MEDULLARY NAIL/C-ARM/SYNTHES/RADIOLUCENT TABLE     Time out performed: YES     Estimated Blood Loss:  min            Chief Complaint   Patient presents with    Arm Pain     Right     HISTORY:  Zahraa Deras is a 75 y.o. female who sustained a right shoulder, right elbow and right ankle injuries on 2/11/20. She fell onto her right shoulder when she tripped on a curb as she was walking through the [facility] parking lot with a walker. She was seen at [facility] on the same day where right shoulder and right elbow x rays revealed comminuted mildly displaced spiral fracture extending from the humeral neck/proximal diaphysis to the mid diaphysis. Right ankle x rays revealed potential lucency of distal fibula. She was provided a sling and referred for orthopedic consultation. She has been experiencing right shoulder and right ankle pain and swelling since the injury. She feels like something in her shoulder is moving. She is right handed. She takes Xarelto for A fib. She has a h/o RA. She sees Dr. Malka Tilley for back pain related to a fall requiring lumbar fusion 2016 by Dr. Malka Tilley.     Pain Assessment  2/25/2020   Location of Pain Arm   Location Modifiers Right   Severity of Pain 7   Quality of Pain Throbbing;Aching   Duration of Pain Persistent   Frequency of Pain Intermittent   Aggravating Factors Bending;Stretching;Straightening   Aggravating Factors Comment -   Limiting Behavior Yes   Relieving Factors Rest   Relieving Factors Comment -   Result of Injury Yes   Work-Related Injury No   Type of Injury Fall     Occupation, etc:  Ms. 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 She also worked at the [employer]. She is a long term resident at [address] [place] [address],[address] since 2016. She was planning on moving into her [facility] apartment before she fell. She had custody of and raised her 2 grandsons. Her eldest grandson just graduated from [employer]. She her son passed away tragically a few years ago. He was homeless and she states he was in the wrong place at the wrong time. She is the president of the resident  at [address] [place] [address],[address]. She is active with the daily activities. She is not diabetic. Ms. Zoe Mcmanus weighs 264 lbs and is 5'4\" tall.      Lab Results   Component Value Date/Time    Hemoglobin A1c 7.0 (H) 04/27/2016 05:36 AM     Weight Metrics 2/25/2020 2/18/2020 2/17/2020 2/17/2020 2/17/2020 2/14/2020 2/11/2020   Weight - - 264 lb - 264 lb 264 lb 264 lb   BMI 45.32 kg/m2 45.32 kg/m2 45.32 kg/m2 45.32 kg/m2 - 45.32 kg/m2 45.32 kg/m2       Patient Active Problem List   Diagnosis Code    Thoracic vertebral fracture (HCC) S22.009A    Abnormal CT of the abdomen R93.5    S/P thoracic spinal fusion Z98.1    Osteoporosis M81.0    Acute lower GI hemorrhage K92.2    Chronic anticoagulation Z79.01    Mixed hyperlipidemia E78.2    Neuropathy G62.9    History of deep vein thrombosis (DVT) of lower extremity Z86.718    Obesity, morbid (HCC) E66.01    Closed fracture of shaft of right humerus S42.301A    Fracture, humerus, shaft S42.309A     REVIEW OF SYSTEMS: All Below are Negative except: See HPI   Constitutional: negative for fever, chills, and weight loss.   Cardiovascular: negative for chest pain, claudication, leg swelling, SOB, TERRY   Gastrointestinal: Negative for pain, N/V/C/D, Blood in stool or urine, dysuria, hematuria, incontinence, pelvic pain. Musculoskeletal: See HPI   Neurological: Negative for dizziness and weakness. Negative for headaches, Visual changes, confusion, seizures   Phychiatric/Behavioral: Negative for depression, memory loss, substance abuse. Extremities: Negative for hair changes, rash, or skin lesion changes. Hematologic: Negative for bleeding problems, bruising, pallor or swollen lymph nodes   Peripheral Vascular: No calf pain, no circulation deficits.     Social History     Socioeconomic History    Marital status: SINGLE     Spouse name: Not on file    Number of children: Not on file    Years of education: Not on file    Highest education level: Not on file   Occupational History    Not on file   Social Needs    Financial resource strain: Not on file    Food insecurity:     Worry: Not on file     Inability: Not on file    Transportation needs:     Medical: Not on file     Non-medical: Not on file   Tobacco Use    Smoking status: Former Smoker    Smokeless tobacco: Never Used   Substance and Sexual Activity    Alcohol use: Never     Frequency: Never    Drug use: No    Sexual activity: Not on file   Lifestyle    Physical activity:     Days per week: Not on file     Minutes per session: Not on file    Stress: Not on file   Relationships    Social connections:     Talks on phone: Not on file     Gets together: Not on file     Attends Christian service: Not on file     Active member of club or organization: Not on file     Attends meetings of clubs or organizations: Not on file     Relationship status: Not on file    Intimate partner violence:     Fear of current or ex partner: Not on file     Emotionally abused: Not on file     Physically abused: Not on file     Forced sexual activity: Not on file   Other Topics Concern    Not on file   Social History Narrative    Not on file      Allergies   Allergen Reactions    Iodinated Contrast Media Other (comments)     Patient states will pass out. Current Outpatient Medications   Medication Sig    oxyCODONE IR (ROXICODONE) 10 mg tab immediate release tablet Take 1 Tab by mouth every six (6) hours as needed for Pain for up to 14 days. Max Daily Amount: 40 mg.    LORazepam (ATIVAN) 0.5 mg tablet Take  by mouth as needed.  montelukast (SINGULAIR) 10 mg tablet Take 10 mg by mouth daily as needed.  DULoxetine (CYMBALTA) 60 mg capsule Take 60 mg by mouth nightly.  Omeprazole delayed release (PRILOSEC D/R) 20 mg tablet Take 20 mg by mouth daily.  loperamide (IMMODIUM) 2 mg tablet Take 2 mg by mouth four (4) times daily as needed for Diarrhea.  furosemide (LASIX) 40 mg tablet Take  by mouth daily.  cloNIDine HCL (CATAPRES) 0.1 mg tablet Take  by mouth two (2) times a day.  acetaminophen (TYLENOL) 325 mg tablet Take  by mouth every four (4) hours as needed for Pain.  rivaroxaban (XARELTO) 10 mg tablet Take 1 Tab by mouth daily (with breakfast).  ondansetron (ZOFRAN ODT) 8 mg disintegrating tablet Take 1 Tab by mouth every eight (8) hours as needed for Nausea for up to 12 doses.  diphenoxylate-atropine (LOMOTIL) 2.5-0.025 mg per tablet Take 1 Tab by mouth four (4) times daily as needed for Diarrhea (1 tab after each stool for max 8 per day). Max Daily Amount: 4 Tabs. Take after each stool for a maximum of 8 tablets daily    albuterol (PROVENTIL VENTOLIN) 2.5 mg /3 mL (0.083 %) nebulizer solution 3 mL by Nebulization route every four (4) hours as needed for Wheezing.  Cetirizine (ZYRTEC) 10 mg cap Take  by mouth daily as needed.  gabapentin (NEURONTIN) 400 mg capsule Take 400 mg by mouth four (4) times daily.  atorvastatin (LIPITOR) 40 mg tablet Take 1 Tab by mouth nightly.     bisacodyl (DULCOLAX) 10 mg suppository Insert 10 mg into rectum as needed.  ferrous sulfate 325 mg (65 mg iron) tablet Take 1 Tab by mouth two (2) times daily (with meals).  nystatin (MYCOSTATIN) powder Apply  to affected area two (2) times a day.  polyethylene glycol (MIRALAX) 17 gram packet Take 1 Packet by mouth daily. (Patient taking differently: Take 17 g by mouth daily as needed.)    senna-docusate (PERICOLACE) 8.6-50 mg per tablet Take 2 Tabs by mouth daily. No current facility-administered medications for this visit. PHYSICAL EXAMINATION:  Visit Vitals  /69   Pulse 74   Temp 98.2 °F (36.8 °C) (Oral)   Resp 18   Ht 5' 4\" (1.626 m)   SpO2 96%   BMI 45.32 kg/m²         ORTHO EXAMINATION:  Examination Right shoulder    Skin Intact, bruising upper arm   Surgical sites x 3 at 2cm length, (+) Staples, wound borders well approximated    Effusion -    Biceps deformity -    Atrophy -    AC joint tenderness -    Acromial tenderness +    Biceps tenderness -    Forward flexion/Elevation ROM (P) 5 degrees with pain    Active abduction ROM Untested    External rotation ROM (P) 5 degrees with pain    Internal rotation ROM untested    Apprehension -    Impingement -    Drop Arm Test -    Neurovascular Intact    Right (P) ROM Elbow 90 degrees minus 60 degrees with guarding and pain. RADIOGRAPHS:    XR Right Humerus: 2/25/20 [facility]  Right humerus IM nail long well seated, sx staples in all views, fracture reduced and acceptably aligned. XR RIGHT ANKLE 2/11/20 [facility]  IMPRESSION: Potential lucency distal fibula as above.  -I have independently reviewed these images during this office visit. -Dr. Fabian Rang:  Three views - No fractures, no degenerative changes, osteopenia    XR RIGHT ELBOW 2/11/20 [facility]  IMPRESSION: right humeral partially visualized diaphyseal spiral fracture  -I have independently reviewed these images during this office visit. -Dr. Fabian Rang:  Two views - No fractures, negative fat pad sign, no joint space narrowing. XR RIGHT HUMERUS 2/11/20 [facility]  IMPRESSION: Right humeral fracture as above.  -I have independently reviewed these images during this office visit. -Dr. Sunshine Mccurdy:  Two views - comminuted spiral mildly displaced spiral fracture extending from the humeral neck/proximal diaphysis to the mid diaphysis. fracture. IMPRESSION:      ICD-10-CM ICD-9-CM    1. Closed nondisplaced comminuted fracture of shaft of right humerus with routine healing, subsequent encounter S42.354D V54.11 AMB POC XRAY; HUMERUS, 2+ VIEWS     Procedural: All staples removed with no complications, sterile strips placed and sterile top cover. PLAN:  Dressing changes daily to RUE. Physician order: Begin PT/OT for gentle ROM stretching RUE. Sling is optional for comfort. Demonstrate \"codman\" exercises. Weight limit (push, pull, lift, carry) RUE 1-3 pounds. Follow up 3 weeks.

## 2020-03-16 ENCOUNTER — TELEPHONE (OUTPATIENT)
Dept: ORTHOPEDIC SURGERY | Facility: CLINIC | Age: 65
End: 2020-03-16

## 2020-03-16 ENCOUNTER — TELEPHONE (OUTPATIENT)
Dept: ORTHOPEDIC SURGERY | Age: 65
End: 2020-03-16

## 2020-03-16 NOTE — TELEPHONE ENCOUNTER
Laura from [facility] called for . Agapitonoa Gallagher said that the patient has an appointment with us tomorrow. She would like to know if the appointment is really necessary. That they are cancelling a lot of the patient appointments. That if we feel the patient needs to be seen they will bring the patient. Laura tel. [phone]. Note : Pt had sx done on 02/18/2020.     Patient has a [facility] appointment with Lidia Milligan on 03/17/20 for Shoulder Fu.

## 2020-03-17 ENCOUNTER — HOSPITAL ENCOUNTER (OUTPATIENT)
Dept: LAB | Age: 65
Discharge: HOME | End: 2020-03-17

## 2020-03-17 LAB
ALBUMIN SERPL-MCNC: 3.3 G/DL (ref 3.4–5)
ALBUMIN/GLOB SERPL: 0.8 {RATIO} (ref 0.8–1.7)
ALP SERPL-CCNC: 131 U/L (ref 45–117)
ALT SERPL-CCNC: 13 U/L (ref 13–56)
ANION GAP SERPL CALC-SCNC: 7 MMOL/L (ref 3–18)
AST SERPL-CCNC: 12 U/L (ref 10–38)
BASOPHILS # BLD: 0 K/UL (ref 0–0.1)
BASOPHILS NFR BLD: 0 % (ref 0–2)
BILIRUB SERPL-MCNC: 0.4 MG/DL (ref 0.2–1)
BUN SERPL-MCNC: 7 MG/DL (ref 7–18)
BUN/CREAT SERPL: 7 (ref 12–20)
CALCIUM SERPL-MCNC: 8.5 MG/DL (ref 8.5–10.1)
CHLORIDE SERPL-SCNC: 99 MMOL/L (ref 100–111)
CO2 SERPL-SCNC: 32 MMOL/L (ref 21–32)
CREAT SERPL-MCNC: 0.95 MG/DL (ref 0.6–1.3)
DIFFERENTIAL METHOD BLD: ABNORMAL
EOSINOPHIL # BLD: 0.2 K/UL (ref 0–0.4)
EOSINOPHIL NFR BLD: 2 % (ref 0–5)
ERYTHROCYTE [DISTWIDTH] IN BLOOD BY AUTOMATED COUNT: 14.8 % (ref 11.6–14.5)
GLOBULIN SER CALC-MCNC: 3.9 G/DL (ref 2–4)
GLUCOSE SERPL-MCNC: 163 MG/DL (ref 74–99)
HCT VFR BLD AUTO: 35.6 % (ref 35–45)
HGB BLD-MCNC: 10.8 G/DL (ref 12–16)
LYMPHOCYTES # BLD: 1.8 K/UL (ref 0.9–3.6)
LYMPHOCYTES NFR BLD: 22 % (ref 21–52)
MCH RBC QN AUTO: 27.1 PG (ref 24–34)
MCHC RBC AUTO-ENTMCNC: 30.3 G/DL (ref 31–37)
MCV RBC AUTO: 89.2 FL (ref 74–97)
MONOCYTES # BLD: 0.5 K/UL (ref 0.05–1.2)
MONOCYTES NFR BLD: 5 % (ref 3–10)
NEUTS SEG # BLD: 5.8 K/UL (ref 1.8–8)
NEUTS SEG NFR BLD: 71 % (ref 40–73)
PLATELET # BLD AUTO: 187 K/UL (ref 135–420)
PMV BLD AUTO: 10.1 FL (ref 9.2–11.8)
POTASSIUM SERPL-SCNC: 3 MMOL/L (ref 3.5–5.5)
PROT SERPL-MCNC: 7.2 G/DL (ref 6.4–8.2)
RBC # BLD AUTO: 3.99 M/UL (ref 4.2–5.3)
SODIUM SERPL-SCNC: 138 MMOL/L (ref 136–145)
WBC # BLD AUTO: 8.3 K/UL (ref 4.6–13.2)

## 2020-03-17 PROCEDURE — 80053 COMPREHEN METABOLIC PANEL: CPT

## 2020-03-17 PROCEDURE — 85025 COMPLETE CBC W/AUTO DIFF WBC: CPT

## 2020-03-17 PROCEDURE — 36415 COLL VENOUS BLD VENIPUNCTURE: CPT

## 2020-05-09 ENCOUNTER — HOSPITAL ENCOUNTER (OUTPATIENT)
Dept: LAB | Age: 65
Discharge: HOME | End: 2020-05-09

## 2020-05-09 LAB
ALBUMIN SERPL-MCNC: 3.1 G/DL (ref 3.4–5)
ALBUMIN/GLOB SERPL: 0.8 {RATIO} (ref 0.8–1.7)
ALP SERPL-CCNC: 117 U/L (ref 45–117)
ALT SERPL-CCNC: 9 U/L (ref 13–56)
ANION GAP SERPL CALC-SCNC: 1 MMOL/L (ref 3–18)
AST SERPL-CCNC: 12 U/L (ref 10–38)
BASOPHILS # BLD: 0 K/UL (ref 0–0.1)
BASOPHILS NFR BLD: 0 % (ref 0–2)
BILIRUB SERPL-MCNC: 0.4 MG/DL (ref 0.2–1)
BUN SERPL-MCNC: 9 MG/DL (ref 7–18)
BUN/CREAT SERPL: 11 (ref 12–20)
CALCIUM SERPL-MCNC: 8.2 MG/DL (ref 8.5–10.1)
CHLORIDE SERPL-SCNC: 104 MMOL/L (ref 100–111)
CO2 SERPL-SCNC: 34 MMOL/L (ref 21–32)
CREAT SERPL-MCNC: 0.83 MG/DL (ref 0.6–1.3)
DIFFERENTIAL METHOD BLD: ABNORMAL
EOSINOPHIL # BLD: 0.3 K/UL (ref 0–0.4)
EOSINOPHIL NFR BLD: 4 % (ref 0–5)
ERYTHROCYTE [DISTWIDTH] IN BLOOD BY AUTOMATED COUNT: 15.2 % (ref 11.6–14.5)
GLOBULIN SER CALC-MCNC: 3.9 G/DL (ref 2–4)
GLUCOSE SERPL-MCNC: 97 MG/DL (ref 74–99)
HCT VFR BLD AUTO: 34.3 % (ref 35–45)
HGB BLD-MCNC: 10.6 G/DL (ref 12–16)
LYMPHOCYTES # BLD: 2.3 K/UL (ref 0.9–3.6)
LYMPHOCYTES NFR BLD: 30 % (ref 21–52)
MCH RBC QN AUTO: 26.9 PG (ref 24–34)
MCHC RBC AUTO-ENTMCNC: 30.9 G/DL (ref 31–37)
MCV RBC AUTO: 87.1 FL (ref 74–97)
MONOCYTES # BLD: 0.6 K/UL (ref 0.05–1.2)
MONOCYTES NFR BLD: 8 % (ref 3–10)
NEUTS SEG # BLD: 4.4 K/UL (ref 1.8–8)
NEUTS SEG NFR BLD: 58 % (ref 40–73)
PLATELET # BLD AUTO: 215 K/UL (ref 135–420)
PMV BLD AUTO: 9.2 FL (ref 9.2–11.8)
POTASSIUM SERPL-SCNC: 3.7 MMOL/L (ref 3.5–5.5)
PROT SERPL-MCNC: 7 G/DL (ref 6.4–8.2)
RBC # BLD AUTO: 3.94 M/UL (ref 4.2–5.3)
SODIUM SERPL-SCNC: 139 MMOL/L (ref 136–145)
WBC # BLD AUTO: 7.6 K/UL (ref 4.6–13.2)

## 2020-05-09 PROCEDURE — 80053 COMPREHEN METABOLIC PANEL: CPT

## 2020-05-09 PROCEDURE — 85025 COMPLETE CBC W/AUTO DIFF WBC: CPT

## 2020-09-08 ENCOUNTER — HOSPITAL ENCOUNTER (OUTPATIENT)
Dept: LAB | Age: 65
Discharge: HOME | End: 2020-09-08

## 2020-09-08 LAB
ALBUMIN SERPL-MCNC: 3 G/DL (ref 3.4–5)
ALBUMIN/GLOB SERPL: 0.7 {RATIO} (ref 0.8–1.7)
ALP SERPL-CCNC: 81 U/L (ref 45–117)
ALT SERPL-CCNC: 16 U/L (ref 13–56)
ANION GAP SERPL CALC-SCNC: 6 MMOL/L (ref 3–18)
AST SERPL-CCNC: 38 U/L (ref 10–38)
BASOPHILS # BLD: 0 K/UL (ref 0–0.1)
BASOPHILS NFR BLD: 0 % (ref 0–2)
BILIRUB SERPL-MCNC: 0.6 MG/DL (ref 0.2–1)
BUN SERPL-MCNC: 19 MG/DL (ref 7–18)
BUN/CREAT SERPL: 19 (ref 12–20)
CALCIUM SERPL-MCNC: 8.1 MG/DL (ref 8.5–10.1)
CHLORIDE SERPL-SCNC: 97 MMOL/L (ref 100–111)
CO2 SERPL-SCNC: 37 MMOL/L (ref 21–32)
CREAT SERPL-MCNC: 0.98 MG/DL (ref 0.6–1.3)
DIFFERENTIAL METHOD BLD: ABNORMAL
EOSINOPHIL # BLD: 0 K/UL (ref 0–0.4)
EOSINOPHIL NFR BLD: 0 % (ref 0–5)
ERYTHROCYTE [DISTWIDTH] IN BLOOD BY AUTOMATED COUNT: 16.3 % (ref 11.6–14.5)
GLOBULIN SER CALC-MCNC: 4.2 G/DL (ref 2–4)
GLUCOSE SERPL-MCNC: 107 MG/DL (ref 74–99)
HCT VFR BLD AUTO: 38 % (ref 35–45)
HGB BLD-MCNC: 11.7 G/DL (ref 12–16)
LYMPHOCYTES # BLD: 0.9 K/UL (ref 0.9–3.6)
LYMPHOCYTES NFR BLD: 16 % (ref 21–52)
MCH RBC QN AUTO: 27.3 PG (ref 24–34)
MCHC RBC AUTO-ENTMCNC: 30.8 G/DL (ref 31–37)
MCV RBC AUTO: 88.8 FL (ref 74–97)
MONOCYTES # BLD: 0.3 K/UL (ref 0.05–1.2)
MONOCYTES NFR BLD: 6 % (ref 3–10)
NEUTS SEG # BLD: 4.2 K/UL (ref 1.8–8)
NEUTS SEG NFR BLD: 78 % (ref 40–73)
PLATELET # BLD AUTO: 127 K/UL (ref 135–420)
PMV BLD AUTO: 11 FL (ref 9.2–11.8)
POTASSIUM SERPL-SCNC: 2.6 MMOL/L (ref 3.5–5.5)
PROT SERPL-MCNC: 7.2 G/DL (ref 6.4–8.2)
RBC # BLD AUTO: 4.28 M/UL (ref 4.2–5.3)
SODIUM SERPL-SCNC: 140 MMOL/L (ref 136–145)
WBC # BLD AUTO: 5.4 K/UL (ref 4.6–13.2)

## 2020-09-08 PROCEDURE — 80053 COMPREHEN METABOLIC PANEL: CPT

## 2020-09-08 PROCEDURE — 85025 COMPLETE CBC W/AUTO DIFF WBC: CPT

## 2020-09-11 ENCOUNTER — HOSPITAL ENCOUNTER (OUTPATIENT)
Dept: LAB | Age: 65
Discharge: HOME | End: 2020-09-11

## 2020-09-11 LAB
ALBUMIN SERPL-MCNC: 2.8 G/DL (ref 3.4–5)
ALBUMIN/GLOB SERPL: 0.6 {RATIO} (ref 0.8–1.7)
ALP SERPL-CCNC: 74 U/L (ref 45–117)
ALT SERPL-CCNC: 16 U/L (ref 13–56)
ANION GAP SERPL CALC-SCNC: 8 MMOL/L (ref 3–18)
AST SERPL-CCNC: 35 U/L (ref 10–38)
BILIRUB SERPL-MCNC: 0.8 MG/DL (ref 0.2–1)
BUN SERPL-MCNC: 12 MG/DL (ref 7–18)
BUN/CREAT SERPL: 19 (ref 12–20)
CALCIUM SERPL-MCNC: 8.9 MG/DL (ref 8.5–10.1)
CHLORIDE SERPL-SCNC: 104 MMOL/L (ref 100–111)
CO2 SERPL-SCNC: 32 MMOL/L (ref 21–32)
CREAT SERPL-MCNC: 0.63 MG/DL (ref 0.6–1.3)
GLOBULIN SER CALC-MCNC: 4.4 G/DL (ref 2–4)
GLUCOSE SERPL-MCNC: 93 MG/DL (ref 74–99)
POTASSIUM SERPL-SCNC: 3.3 MMOL/L (ref 3.5–5.5)
PROT SERPL-MCNC: 7.2 G/DL (ref 6.4–8.2)
SODIUM SERPL-SCNC: 144 MMOL/L (ref 136–145)

## 2020-09-11 PROCEDURE — 80053 COMPREHEN METABOLIC PANEL: CPT

## 2020-11-22 ENCOUNTER — APPOINTMENT (OUTPATIENT)
Dept: CT IMAGING | Age: 65
End: 2020-11-22
Attending: NURSE PRACTITIONER
Payer: MEDICARE

## 2020-11-22 ENCOUNTER — HOSPITAL ENCOUNTER (EMERGENCY)
Age: 65
Discharge: HOME | End: 2020-11-23
Attending: EMERGENCY MEDICINE | Admitting: EMERGENCY MEDICINE
Payer: MEDICARE

## 2020-11-22 ENCOUNTER — APPOINTMENT (OUTPATIENT)
Dept: GENERAL RADIOLOGY | Age: 65
End: 2020-11-22
Attending: NURSE PRACTITIONER
Payer: MEDICARE

## 2020-11-22 VITALS
OXYGEN SATURATION: 99 % | SYSTOLIC BLOOD PRESSURE: 146 MMHG | RESPIRATION RATE: 15 BRPM | DIASTOLIC BLOOD PRESSURE: 114 MMHG | HEART RATE: 92 BPM | WEIGHT: 250 LBS | BODY MASS INDEX: 42.68 KG/M2 | HEIGHT: 64 IN

## 2020-11-22 DIAGNOSIS — S09.90XA INJURY OF HEAD, INITIAL ENCOUNTER: ICD-10-CM

## 2020-11-22 DIAGNOSIS — S02.5XXA CLOSED FRACTURE OF TOOTH, INITIAL ENCOUNTER: ICD-10-CM

## 2020-11-22 DIAGNOSIS — S01.511A LIP LACERATION, INITIAL ENCOUNTER: Primary | ICD-10-CM

## 2020-11-22 DIAGNOSIS — M25.561 ACUTE PAIN OF RIGHT KNEE: ICD-10-CM

## 2020-11-22 DIAGNOSIS — S00.83XA CONTUSION OF FACE, INITIAL ENCOUNTER: ICD-10-CM

## 2020-11-22 LAB
ANION GAP SERPL CALC-SCNC: 3 MMOL/L (ref 3–18)
APTT PPP: 47.9 SEC (ref 23–36.4)
BASOPHILS # BLD: 0 K/UL (ref 0–0.1)
BASOPHILS NFR BLD: 0 % (ref 0–2)
BUN SERPL-MCNC: 6 MG/DL (ref 7–18)
BUN/CREAT SERPL: 6 (ref 12–20)
CALCIUM SERPL-MCNC: 8.8 MG/DL (ref 8.5–10.1)
CHLORIDE SERPL-SCNC: 105 MMOL/L (ref 100–111)
CO2 SERPL-SCNC: 32 MMOL/L (ref 21–32)
CREAT SERPL-MCNC: 1.01 MG/DL (ref 0.6–1.3)
DIFFERENTIAL METHOD BLD: ABNORMAL
EOSINOPHIL # BLD: 0.3 K/UL (ref 0–0.4)
EOSINOPHIL NFR BLD: 2 % (ref 0–5)
ERYTHROCYTE [DISTWIDTH] IN BLOOD BY AUTOMATED COUNT: 15.1 % (ref 11.6–14.5)
GLUCOSE SERPL-MCNC: 159 MG/DL (ref 74–99)
HCT VFR BLD AUTO: 35.4 % (ref 35–45)
HGB BLD-MCNC: 11.3 G/DL (ref 12–16)
INR PPP: 1.7 (ref 0.8–1.2)
LYMPHOCYTES # BLD: 3.2 K/UL (ref 0.9–3.6)
LYMPHOCYTES NFR BLD: 24 % (ref 21–52)
MCH RBC QN AUTO: 27.9 PG (ref 24–34)
MCHC RBC AUTO-ENTMCNC: 31.9 G/DL (ref 31–37)
MCV RBC AUTO: 87.4 FL (ref 74–97)
MONOCYTES # BLD: 0.8 K/UL (ref 0.05–1.2)
MONOCYTES NFR BLD: 6 % (ref 3–10)
NEUTS SEG # BLD: 9 K/UL (ref 1.8–8)
NEUTS SEG NFR BLD: 68 % (ref 40–73)
PLATELET # BLD AUTO: 271 K/UL (ref 135–420)
PMV BLD AUTO: 10.1 FL (ref 9.2–11.8)
POTASSIUM SERPL-SCNC: 3.8 MMOL/L (ref 3.5–5.5)
PROTHROMBIN TIME: 19.2 SEC (ref 11.5–15.2)
RBC # BLD AUTO: 4.05 M/UL (ref 4.2–5.3)
SODIUM SERPL-SCNC: 140 MMOL/L (ref 136–145)
WBC # BLD AUTO: 13.2 K/UL (ref 4.6–13.2)

## 2020-11-22 PROCEDURE — 96374 THER/PROPH/DIAG INJ IV PUSH: CPT

## 2020-11-22 PROCEDURE — 85025 COMPLETE CBC W/AUTO DIFF WBC: CPT

## 2020-11-22 PROCEDURE — 90471 IMMUNIZATION ADMIN: CPT

## 2020-11-22 PROCEDURE — 75810000293 HC SIMP/SUPERF WND  RPR

## 2020-11-22 PROCEDURE — 74011250636 HC RX REV CODE- 250/636: Performed by: NURSE PRACTITIONER

## 2020-11-22 PROCEDURE — 90715 TDAP VACCINE 7 YRS/> IM: CPT | Performed by: NURSE PRACTITIONER

## 2020-11-22 PROCEDURE — 70486 CT MAXILLOFACIAL W/O DYE: CPT

## 2020-11-22 PROCEDURE — 73562 X-RAY EXAM OF KNEE 3: CPT

## 2020-11-22 PROCEDURE — 74011250636 HC RX REV CODE- 250/636

## 2020-11-22 PROCEDURE — 99283 EMERGENCY DEPT VISIT LOW MDM: CPT

## 2020-11-22 PROCEDURE — 85730 THROMBOPLASTIN TIME PARTIAL: CPT

## 2020-11-22 PROCEDURE — 80048 BASIC METABOLIC PNL TOTAL CA: CPT

## 2020-11-22 PROCEDURE — 74011000250 HC RX REV CODE- 250: Performed by: NURSE PRACTITIONER

## 2020-11-22 PROCEDURE — 85610 PROTHROMBIN TIME: CPT

## 2020-11-22 PROCEDURE — 74011250636 HC RX REV CODE- 250/636: Performed by: PHYSICIAN ASSISTANT

## 2020-11-22 PROCEDURE — 70450 CT HEAD/BRAIN W/O DYE: CPT

## 2020-11-22 RX ORDER — ACETAMINOPHEN 500 MG
1000 TABLET ORAL
Status: DISCONTINUED | OUTPATIENT
Start: 2020-11-22 | End: 2020-11-23 | Stop reason: HOSPADM

## 2020-11-22 RX ORDER — ONDANSETRON 2 MG/ML
4 INJECTION INTRAMUSCULAR; INTRAVENOUS
Status: COMPLETED | OUTPATIENT
Start: 2020-11-22 | End: 2020-11-22

## 2020-11-22 RX ORDER — LIDOCAINE HYDROCHLORIDE 20 MG/ML
15 SOLUTION OROPHARYNGEAL
Status: DISCONTINUED | OUTPATIENT
Start: 2020-11-22 | End: 2020-11-23 | Stop reason: HOSPADM

## 2020-11-22 RX ORDER — ONDANSETRON 2 MG/ML
INJECTION INTRAMUSCULAR; INTRAVENOUS
Status: COMPLETED
Start: 2020-11-22 | End: 2020-11-22

## 2020-11-22 RX ORDER — LIDOCAINE HYDROCHLORIDE AND EPINEPHRINE 10; 10 MG/ML; UG/ML
1.5 INJECTION, SOLUTION INFILTRATION; PERINEURAL ONCE
Status: COMPLETED | OUTPATIENT
Start: 2020-11-22 | End: 2020-11-22

## 2020-11-22 RX ADMIN — ONDANSETRON 4 MG: 2 INJECTION INTRAMUSCULAR; INTRAVENOUS at 18:08

## 2020-11-22 RX ADMIN — LIDOCAINE HYDROCHLORIDE,EPINEPHRINE BITARTRATE 15 MG: 10; .01 INJECTION, SOLUTION INFILTRATION; PERINEURAL at 18:09

## 2020-11-22 RX ADMIN — TETANUS TOXOID, REDUCED DIPHTHERIA TOXOID AND ACELLULAR PERTUSSIS VACCINE, ADSORBED 0.5 ML: 5; 2.5; 8; 8; 2.5 SUSPENSION INTRAMUSCULAR at 18:11

## 2020-11-22 RX ADMIN — SODIUM CHLORIDE 1000 ML: 900 INJECTION, SOLUTION INTRAVENOUS at 18:28

## 2020-11-22 NOTE — ED TRIAGE NOTES
Patient arrives from [facility] accompanied by [employer]. Patient fell directly on her face and her bottom teeth went directly through her top lip. Patient is on blood thinners. Per medics there was no LOC, nochange in her mental status. Patient presents with continous bleeding and nose is swollen on right side. History of chronic back pain, knee pain, Afib, and hypertension.

## 2020-11-22 NOTE — ED PROVIDER NOTES
EMERGENCY DEPARTMENT HISTORY AND PHYSICAL EXAM    4:17 PM      Date: 11/22/2020  Patient Name: Karin Arango    History of Presenting Illness     Chief Complaint   Patient presents with    Fall    Laceration         History Provided By: Patient    Additional History (Context): Karin Arango is a 75 y.o. female with past medical history significant for A. fib, arthritis, hypertension, and rheumatoid arthritis who presents with complaints of a lip laceration after a trip and fall that occurred today. She also complains of right knee pain. She is by EMS. She denies any loss of consciousness and has no headache, dizziness, or visual changes. She is on Eliquis for A. fib. She has significant bleeding to the lip internally on the right upper side with swelling and has some pain on her nose as well. She denies any vomiting. Patient states she is supposed to be ambulating with a walker but was not when this fall occurred. She notes that she did chip her right front tooth and states it was partially chipped prior to this fall but seems worse at this time. PCP: Clyde Fuentes MD    Current Facility-Administered Medications   Medication Dose Route Frequency Provider Last Rate Last Dose    acetaminophen (TYLENOL) tablet 1,000 mg  1,000 mg Oral NOW MAXIMUS [facility]   Stopped at 11/22/20 1606    lidocaine (XYLOCAINE) 2 % viscous solution 15 mL  15 mL Mouth/Throat Q3H PRN MAXIMUS Cornell         Current Outpatient Medications   Medication Sig Dispense Refill    LORazepam (ATIVAN) 0.5 mg tablet Take  by mouth as needed.  montelukast (SINGULAIR) 10 mg tablet Take 10 mg by mouth daily as needed.  DULoxetine (CYMBALTA) 60 mg capsule Take 60 mg by mouth nightly.  Omeprazole delayed release (PRILOSEC D/R) 20 mg tablet Take 20 mg by mouth daily.  loperamide (IMMODIUM) 2 mg tablet Take 2 mg by mouth four (4) times daily as needed for Diarrhea.       furosemide (LASIX) 40 mg tablet Take  by mouth daily.  cloNIDine HCL (CATAPRES) 0.1 mg tablet Take  by mouth two (2) times a day.  acetaminophen (TYLENOL) 325 mg tablet Take  by mouth every four (4) hours as needed for Pain.  rivaroxaban (XARELTO) 10 mg tablet Take 1 Tab by mouth daily (with breakfast). 30 Tab 3    ondansetron (ZOFRAN ODT) 8 mg disintegrating tablet Take 1 Tab by mouth every eight (8) hours as needed for Nausea for up to 12 doses. 20 Tab 0    diphenoxylate-atropine (LOMOTIL) 2.5-0.025 mg per tablet Take 1 Tab by mouth four (4) times daily as needed for Diarrhea (1 tab after each stool for max 8 per day). Max Daily Amount: 4 Tabs. Take after each stool for a maximum of 8 tablets daily 20 Tab 0    albuterol (PROVENTIL VENTOLIN) 2.5 mg /3 mL (0.083 %) nebulizer solution 3 mL by Nebulization route every four (4) hours as needed for Wheezing. 24 Each 0    Cetirizine (ZYRTEC) 10 mg cap Take  by mouth daily as needed.  gabapentin (NEURONTIN) 400 mg capsule Take 400 mg by mouth four (4) times daily.  atorvastatin (LIPITOR) 40 mg tablet Take 1 Tab by mouth nightly. 30 Tab 1    bisacodyl (DULCOLAX) 10 mg suppository Insert 10 mg into rectum as needed. 30 Suppository 1    ferrous sulfate 325 mg (65 mg iron) tablet Take 1 Tab by mouth two (2) times daily (with meals). 60 Tab 1    nystatin (MYCOSTATIN) powder Apply  to affected area two (2) times a day. 1 Bottle 3    polyethylene glycol (MIRALAX) 17 gram packet Take 1 Packet by mouth daily. (Patient taking differently: Take 17 g by mouth daily as needed.) 30 Packet 1    senna-docusate (PERICOLACE) 8.6-50 mg per tablet Take 2 Tabs by mouth daily.  61 Tab 1       Past History     Past Medical History:  Past Medical History:   Diagnosis Date    [facility]fib ([facility]     Arthritis     History of seasonal allergies     Hypertension     Neuropathy     lower extremities    Rheumatoid arthritis ([facility]        Past Surgical History:  Past Surgical History:   Procedure Laterality Date    COLONOSCOPY N/A 5/15/2018    COLONOSCOPY, DIAGNOSTIC  performed by Randy Brannon MD at [facility] HX APPENDECTOMY      HX DILATION AND CURETTAGE      HX HEENT      Ear tubes    HX HYSTERECTOMY      HX OOPHORECTOMY      NEUROLOGICAL PROCEDURE UNLISTED  04/27/2016    T2-L2 Decomprssion and Fusion/T12 Corpetomy       Family History:  Family History   Problem Relation Age of Onset    Hypertension Mother     Diabetes Mother     Heart Disease Mother     Heart Disease Father     Diabetes Maternal Aunt     Cancer Maternal Uncle         preostate cancer    Diabetes Maternal Uncle        Social History:  Social History     Tobacco Use    Smoking status: Former Smoker    Smokeless tobacco: Never Used   Substance Use Topics    Alcohol use: Never     Frequency: Never    Drug use: No       Allergies: Allergies   Allergen Reactions    Iodinated Contrast Media Other (comments)     Patient states will pass out. Review of Systems       Review of Systems   Constitutional: Negative. Negative for chills and fever. HENT: Positive for dental problem. Negative for congestion, drooling, ear discharge, ear pain, facial swelling, hearing loss, mouth sores, postnasal drip and rhinorrhea. Oral laceration   Eyes: Negative. Negative for pain and redness. Respiratory: Negative. Negative for cough and shortness of breath. Cardiovascular: Negative. Negative for chest pain, palpitations and leg swelling. Gastrointestinal: Negative. Negative for abdominal pain, constipation, diarrhea, nausea and vomiting. Genitourinary: Negative. Negative for dysuria, frequency, hematuria and urgency. Musculoskeletal: Negative. Negative for back pain, gait problem, joint swelling and neck pain. Skin: Positive for wound. Negative for rash. Neurological: Negative. Negative for dizziness, seizures, facial asymmetry, speech difficulty, weakness, light-headedness and headaches. Hematological: Negative for adenopathy. Does not bruise/bleed easily. Psychiatric/Behavioral: Negative for confusion. All other systems reviewed and are negative. Physical Exam     Visit Vitals  BP (!) 146/114 (BP 1 Location: Left arm, BP Patient Position: At rest)   Pulse 92   Resp 15   Ht 5' 4\" (1.626 m)   Wt 113.4 kg (250 lb)   SpO2 99%   BMI 42.91 kg/m²         Physical Exam  Vitals signs and nursing note reviewed. Constitutional:       General: She is not in acute distress. Appearance: Normal appearance. She is obese. She is not ill-appearing, toxic-appearing or diaphoretic. HENT:      Head: Normocephalic and atraumatic. No raccoon eyes or Gabriel's sign. Jaw: There is normal jaw occlusion. No trismus. Right Ear: Hearing, tympanic membrane, ear canal and external ear normal. No drainage. Left Ear: Hearing, tympanic membrane, ear canal and external ear normal. No drainage. Nose: Nasal tenderness present. No nasal deformity, septal deviation, signs of injury, mucosal edema, congestion or rhinorrhea. Right Sinus: No maxillary sinus tenderness or frontal sinus tenderness. Left Sinus: No maxillary sinus tenderness or frontal sinus tenderness. Comments: No septal hematoma bilaterally or epistaxis. No facial bone crepitus. Mouth/Throat:      Mouth: Mucous membranes are moist.      Dentition: Abnormal dentition. Dental tenderness present. Pharynx: Oropharynx is clear. Uvula midline. No pharyngeal swelling, oropharyngeal exudate, posterior oropharyngeal erythema or uvula swelling. Tonsils: No tonsillar exudate. Comments: Alveolar ridge without loosening or evidence of fracture. Eyes:      General: Lids are normal. Vision grossly intact. No scleral icterus. Extraocular Movements: Extraocular movements intact.       Conjunctiva/sclera: Conjunctivae normal.   Neck:      Musculoskeletal: Full passive range of motion without pain and normal range of motion. No injury, pain with movement, spinous process tenderness or muscular tenderness. Cardiovascular:      Rate and Rhythm: Normal rate and regular rhythm. Pulses: Normal pulses. Heart sounds: Normal heart sounds. No murmur. No friction rub. No gallop. Pulmonary:      Effort: Pulmonary effort is normal. No respiratory distress. Breath sounds: Normal breath sounds. No stridor. No wheezing, rhonchi or rales. Chest:      Chest wall: No tenderness. Abdominal:      General: Abdomen is flat. Bowel sounds are normal. There is no distension. Palpations: Abdomen is soft. There is no mass. Tenderness: There is no abdominal tenderness. There is no right CVA tenderness, left CVA tenderness, guarding or rebound. Hernia: No hernia is present. Musculoskeletal:      Right knee: She exhibits decreased range of motion (Pain with range of motion at extremes of flexion). She exhibits no swelling, no effusion and no ecchymosis. Tenderness (Diffusely without laxity. No swelling.) found. Cervical back: Normal. She exhibits normal range of motion, no tenderness, no bony tenderness and no swelling. Comments: No cervical, thoracic, or lumbar midline spinal process tenderness, step-off, deformity, erythema, or ecchymosis. Skin:     General: Skin is warm and dry. Capillary Refill: Capillary refill takes less than 2 seconds. Comments: 3 cm jagged stellate laceration to the inner surface of the right upper lip internally with a large surrounding hematoma. There is no through and through laceration to the external lip. Neurological:      General: No focal deficit present. Mental Status: She is alert and oriented to person, place, and time. Psychiatric:         Mood and Affect: Mood normal.         Behavior: Behavior normal. Behavior is cooperative.              Diagnostic Study Results     Labs -  No results found for this or any previous visit (from the past 12 hour(s)). Radiologic Studies -   CT HEAD WO CONT   Final Result   IMPRESSION:      1. No findings of acute intracranial trauma. No calvarial fracture or   intracranial hemorrhage. 2. Moderate chronic microvascular ischemic changes in the cerebral white matter. 3. Chronic appearing sphenoid sinus opacification. CT MAXILLOFACIAL WO CONT   Final Result   IMPRESSION:      Right midline upper lip hematoma, lower lip laceration. Chronic appearing dental disease. No findings of acute facial fracture. XR KNEE RT 3 V    (Results Pending)         Medical Decision Making   I am the first provider for this patient. I reviewed available nursing notes, past medical history, past surgical history, family history and social history. Vital Signs-Reviewed the patient's vital signs. Records Reviewed: Nursing Notes and Old Medical Records (Time of Review: 4:17 PM)    Pulse Oximetry Analysis - 99% on RA-normal    ED Course: Progress Notes, Reevaluation, and Consults:  4:00 PM  Initial assessment performed. The patients presenting problems have been discussed, and they/their family are in agreement with the care plan formulated and outlined with them. I have encouraged them to ask questions as they arise throughout their visit. 5:10 PM after patient returned from CT scan I reassessed her and there was significant bleeding while she was laying flat. She was complaining of nausea, was mildly tachycardic with a heart rate of 110 and diaphoretic. An IV was established and she was given Zofran. 5:15 PM I applied 2% lidocaine to the oral mucosal surface and anesthetized with 1% lidocaine with epinephrine. Bleeding was not controlled with this so a figure 8 suture was placed with chromic gut 5-0 and additional 3 simple sutures. Explored the wound extensively for any foreign body which was not visualized or palpable. Bleeding was controlled with suturing.   Patient did have a small amount of blood clots that she coughed up and felt much better afterward. She was no longer diaphoretic and was feeling much better after Zofran. Labs obtained and sent for CBC to rule out blood loss anemia. Also obtained CMP and coags. Wound Repair    Date/Time: 11/22/2020 5:15 PM  Performed by: Rachel provider: Leona  Location: mouth, right upper lip. Wound length:2.5 cm or less  Anesthesia: local infiltration    Anesthesia:  Local Anesthetic: lidocaine 1% with epinephrine  Anesthetic total: 4 mL  Foreign bodies: no foreign bodies  Irrigation solution: saline  Skin closure: gut  Wound subcutaneous closure material used: 5.0. Number of sutures: 4  Technique: simple (figure 8)  Approximation: close  Dressing: 4x4 and pressure dressing  Patient tolerance: Patient tolerated the procedure well with no immediate complications  My total time at bedside, performing this procedure was 16-30 minutes. 6:11 PM  Patient rechecked and bleeding remains controlled to the upper lip with good hemostasis. CT head and face were negative for any acute findings. Patient will be signed out to Florentino Carrington PA-C ED provider at this time. History of patient complaint(s), available diagnostic reports and current treatment plan has been discussed thoroughly. Intended disposition of patient : Back to sending facility  Pending diagnostics reports and/or labs (please list): Labs and knee x-ray      Provider Notes (Medical Decision Making):     Patient is a 76-year-old female who presents from her assisted living facility by EMS after a fall. She sustained a large irregular laceration to the right upper lip internally due to a fractured right front tooth. No loosening to the alveolar ridge. There is significant bleeding on arrival controlled with pressure. Patient had no LOC but she is on Xarelto. She has no complaints of headache, dizziness, visual changes, or vomiting.   She has no neck pain no injury with no indicated due to Nexus criteria. Will obtain appropriate studies to evaluate patient's complaints and treat symptomatically. Will disposition after reassessment assuming no clinical change or worsening and appropriate response to symptomatic treatment. Diagnosis     Clinical Impression:   1. Lip laceration, initial encounter    2. Closed fracture of tooth, initial encounter    3. Injury of head, initial encounter    4. Contusion of face, initial encounter    5. Acute pain of right knee        Disposition: Pending       Follow-up Information    None          Current Discharge Medication List            Dictation disclaimer:  Please note that this dictation was completed with [employer], the [employer] voice recognition software. Quite often unanticipated grammatical, syntax, homophones, and other interpretive errors are inadvertently transcribed by the computer software. Please disregard these errors. Please excuse any errors that have escaped final proofreading.

## 2020-11-22 NOTE — DISCHARGE INSTRUCTIONS
Patient Education        Lip Laceration: Care Instructions  Your Care Instructions     A cut (laceration) on your lip can be on the outside of your mouth, or it may include the skin inside your mouth. Cuts to the lip usually heal quickly. But your lip may be sore while it heals. The doctor used stitches to close the cut. Using stitches helps the cut heal. The doctor may also have called in a specialist, such as a plastic surgeon, to close the cut. Your cut may leave a scar that will fade over time. The doctor took special care to close the cut so that the edges line up. This can help reduce scarring. If the cut went deep and through the skin, the doctor may have put in two layers of stitches. The deeper layer brings the deep part of the cut together. These stitches will dissolve and don't need to be removed. The stitches in the upper layer are the ones you see on the cut. You may have strips of tape covering part of the cut. Your stitches may dissolve on their own. Or the doctor may need to remove the stitches in about 3 to 5 days. The doctor has checked you carefully, but problems can develop later. If you notice any problems or new symptoms, get medical treatment right away. Follow-up care is a key part of your treatment and safety. Be sure to make and go to all appointments, and call your doctor if you are having problems. It's also a good idea to know your test results and keep a list of the medicines you take. How can you care for yourself at home? · Put ice or a cold pack on the area for 10 to 20 minutes at a time. Put a thin cloth between the ice and your skin. · If the cut is inside your mouth:  ? Rinse your mouth with warm salt water right after meals. Saltwater rinses may help healing. To make a saltwater solution for rinsing the mouth, mix 1 tsp of salt in 1 cup of warm water. ? Eat soft foods that are easy to chew. Avoid foods that might sting.  These include salty or spicy foods, citrus fruits or juices, and tomatoes. ? Try using a topical medicine, such as Orabase, to reduce mouth pain. · Do not use a straw until your lip is healed. · If your doctor told you how to care for your cut, follow your doctor's instructions. If you did not get instructions, follow this general advice:  ? After the first 24 to 48 hours, wash around the cut with clean water 2 times a day. Don't use hydrogen peroxide or alcohol, which can slow healing. · If you have strips of tape on the cut, leave the tape on for a week or until it falls off. · If your doctor prescribed antibiotics, take them as directed. Do not stop taking them just because you feel better. You need to take the full course of antibiotics. · Be safe with medicines. Read and follow all instructions on the label. ? If the doctor gave you a prescription medicine for pain, take it as prescribed. ? If you are not taking a prescription pain medicine, ask your doctor if you can take an over-the-counter medicine. · Avoid any activity that could cause the cut to reopen. · Do not remove the stitches on your own. Your doctor will tell you when to come back to have the stitches removed. When should you call for help? Call your doctor now or seek immediate medical care if:    · The cut starts to bleed. Oozing small amounts of blood is normal.     · You have symptoms of infection, such as:  ? Increased pain, swelling, warmth, or redness around the cut.  ? Red streaks leading from the cut.  ? Pus draining from the cut.  ? A fever. Watch closely for changes in your health, and be sure to contact your doctor if:    · The cut reopens.     · You do not get better as expected. Where can you learn more? Go to http://www.gray.com/  Enter H870 in the search box to learn more about \"Lip Laceration: Care Instructions. \"  Current as of: June 26, 2019               Content Version: 12.6  © 4225-9441 [employer].    Care instructions adapted under license by [employer] (which disclaims liability or warranty for this information). If you have questions about a medical condition or this instruction, always ask your healthcare professional. [address] any warranty or liability for your use of this information. Patient Education        Mouth Injury: Care Instructions  Your Care Instructions     Mouth injuries are common. They may involve the teeth, jaw, lips, tongue, inner cheeks, or gums. A mouth injury can also affect the roof of the mouth, your neck, or your tonsils. You may injure your teeth during a fall or while playing sports. An injury can crack, chip, or break a tooth or make a tooth change color. A tooth also may be knocked out, loosened, moved, or jammed into the gum. An injury to the roof of your mouth, the back of your throat, or a tonsil can injure deeper tissues in your head or neck. These injuries can happen when you fall with a pointed object, such as a pencil, in your mouth. Sometimes you may bite the inside of your cheek several times while chewing, causing a sore. Or you may bite your tongue while playing sports or because of a seizure, a car or bicycle crash, an assault, or another injury. Braces or mouth jewelry can also poke or cause sores on mouth tissues. Sometimes the piece of skin between your lips and gums or under your tongue may tear or rip. A cut or tear to the tongue can bleed a lot. Small injuries may often heal on their own. If the injury is long or deep, it may need stitches that dissolve over time. Follow-up care is a key part of your treatment and safety. Be sure to make and go to all appointments, and call your doctor if you are having problems. It's also a good idea to know your test results and keep a list of the medicines you take. How can you care for yourself at home? · Apply a cold compress to the injured area.  Or suck on a piece of ice or a flavored ice pop.  · Rinse your wound with warm salt water right after meals. Saltwater rinses may relieve some pain. To make a saltwater solution for rinsing the mouth, mix 1 tsp of salt in 1 cup of warm water. · Eat soft foods that are easy to swallow. · Avoid foods that might sting. These include salty or spicy foods, citrus fruits or juices, and tomatoes. · Be safe with medicines. Read and follow all instructions on the label. ? If the doctor gave you a prescription medicine for pain, take it as prescribed. ? If you are not taking a prescription pain medicine, ask your doctor if you can take an over-the-counter medicine. · If your doctor prescribed antibiotics, take them as directed. Do not stop taking them just because you feel better. You need to take the full course of antibiotics. · Try using a topical medicine, such as Orabase, to reduce mouth pain. When should you call for help? Call 911 anytime you think you may need emergency care. For example, call if:    · You have trouble breathing. Call your doctor now or seek immediate medical care if:    · You have new or worse bleeding.     · You have signs of infection, such as:  ? Increased pain, swelling, warmth, or redness. ? Red streaks leading from the injured area. ? Pus draining from the injured area. ? A fever. Watch closely for changes in your health, and be sure to contact your doctor if:    · You do not get better as expected. Where can you learn more? Go to http://www.gray.com/  Enter F555 in the search box to learn more about \"Mouth Injury: Care Instructions. \"  Current as of: June 26, 2019               Content Version: 12.6  © 8735-6307 [employer]. Care instructions adapted under license by [employer] (which disclaims liability or warranty for this information).  If you have questions about a medical condition or this instruction, always ask your healthcare professional. [employer] disclaims any warranty or liability for your use of this information. Patient Education        Broken Tooth: Care Instructions  Your Care Instructions  A tooth can be chipped, broken, or knocked out during sports, an accident, or a bad fall. Your doctor may have fixed your tooth temporarily. You also may have been given pain medicine. If you had signs of infection, you may need to take antibiotics. You will need to see a dentist. If you have chipped a tooth, it may be jagged, which can irritate your mouth and tongue. The dentist may smooth the edges and fill in the part that chipped off. A permanent tooth that has been knocked out can be put back in (reimplanted) if it is done quickly. The dentist may need to put a crown on a broken tooth to cover the tooth and hold it together. Prompt dental treatment can often prevent infection in the tooth. Follow-up care is a key part of your treatment and safety. Be sure to make and go to all appointments, and call your doctor if you are having problems. It's also a good idea to know your test results and keep a list of the medicines you take. How can you care for yourself at home? · If your tooth pulp is exposed, you can protect it by putting temporary filling material over the broken area. You can buy temporary filling mixes in drugstores. Follow the directions on the label. · To relieve pain and swelling, put ice or a cold cloth on the tooth's gum or cheek area, or suck on a piece of ice. But if the tooth's nerve or pulp is exposed, avoid putting anything too hot or cold near the tooth until you see your dentist.  · Ask your doctor if you can take an over-the-counter pain medicine, such as acetaminophen (Tylenol), ibuprofen (Advil, Motrin), or naproxen (Aleve). Be safe with medicines. Read and follow all instructions on the label. · If your doctor prescribed antibiotics, take them as directed. Do not stop taking them just because you feel better. You need to take the full course of antibiotics. · To help healing, rinse your mouth with warm salt water right after meals. To make a saltwater solution, mix 1 teaspoon of salt in 1 cup of warm water. · Eat soft foods that are easy to chew. · Avoid foods that might sting, such as salty or spicy foods, citrus fruits, and tomatoes. · Do not smoke or use spit tobacco. Tobacco can slow healing in your mouth. If you need help quitting, talk to your doctor about stop-smoking programs and medicines. These can increase your chances of quitting for good. · If your tooth is loose, be gentle when you brush or floss. But be sure to brush your teeth at least two times a day, and floss at least once a day. When should you call for help? Call your doctor now or seek immediate medical care if:    · You have signs of infection, such as:  ? Increased pain, swelling, warmth, or redness. ? Red streaks leading from the area. ? Pus draining from the area. ? A fever. Watch closely for changes in your health, and be sure to contact your doctor if:    · You do not get better as expected. Where can you learn more? Go to http://www.gray.com/  Enter W162 in the search box to learn more about \"Broken Tooth: Care Instructions. \"  Current as of: March 25, 2020               Content Version: 12.6  © 5274-8693 [employer]. Care instructions adapted under license by [employer] (which disclaims liability or warranty for this information). If you have questions about a medical condition or this instruction, always ask your healthcare professional. [place] [address] any warranty or liability for your use of this information.

## 2020-11-23 NOTE — ED NOTES
1800  Assumed care from colleague Zuleika Chan NP at shift change. Plan: Awaiting labs, specifically hgb. CT head and maxfacial negative for fracture and ICH. Xray knee shows no acute changes. 2040  Hgb stable. Pt stable for outpatient management Dicussed with pt sutures will dissolve. Discussed lab and imaging results with pt along with dx and treatment plan. Discussed importance of PCP and GI follow up. All questions answered. Pt voiced they understood. Return if sx worsen.

## 2020-11-23 NOTE — ED NOTES
Report was given to Kailee Vizcaino RN for night shift. Patient is left resting on stretcher after Xray.

## 2020-12-01 ENCOUNTER — HOSPITAL ENCOUNTER (EMERGENCY)
Age: 65
Discharge: NURSING, REHAB OR LONG-TERM CARE | End: 2020-12-01
Attending: EMERGENCY MEDICINE
Payer: MEDICARE

## 2020-12-01 VITALS
DIASTOLIC BLOOD PRESSURE: 67 MMHG | HEART RATE: 86 BPM | SYSTOLIC BLOOD PRESSURE: 109 MMHG | RESPIRATION RATE: 16 BRPM | OXYGEN SATURATION: 100 % | TEMPERATURE: 97.3 F

## 2020-12-01 DIAGNOSIS — K13.79 ORAL BLEEDING: Primary | ICD-10-CM

## 2020-12-01 DIAGNOSIS — N39.0 ACUTE UTI (URINARY TRACT INFECTION): ICD-10-CM

## 2020-12-01 LAB
ALBUMIN SERPL-MCNC: 2.8 G/DL (ref 3.4–5)
ALBUMIN/GLOB SERPL: 0.7 {RATIO} (ref 0.8–1.7)
ALP SERPL-CCNC: 77 U/L (ref 45–117)
ALT SERPL-CCNC: 12 U/L (ref 13–56)
ANION GAP SERPL CALC-SCNC: 9 MMOL/L (ref 3–18)
APPEARANCE UR: ABNORMAL
APTT PPP: 51.1 SEC (ref 23–36.4)
AST SERPL-CCNC: 9 U/L (ref 10–38)
BACTERIA URNS QL MICRO: ABNORMAL /HPF
BASOPHILS # BLD: 0 K/UL (ref 0–0.1)
BASOPHILS NFR BLD: 0 % (ref 0–2)
BILIRUB SERPL-MCNC: 0.2 MG/DL (ref 0.2–1)
BILIRUB UR QL: NEGATIVE
BUN SERPL-MCNC: 21 MG/DL (ref 7–18)
BUN/CREAT SERPL: 22 (ref 12–20)
CALCIUM SERPL-MCNC: 8.2 MG/DL (ref 8.5–10.1)
CHLORIDE SERPL-SCNC: 104 MMOL/L (ref 100–111)
CK MB CFR SERPL CALC: NORMAL % (ref 0–4)
CK MB SERPL-MCNC: <1 NG/ML (ref 5–25)
CK SERPL-CCNC: 28 U/L (ref 26–192)
CO2 SERPL-SCNC: 28 MMOL/L (ref 21–32)
COLOR UR: YELLOW
CREAT SERPL-MCNC: 0.96 MG/DL (ref 0.6–1.3)
DIFFERENTIAL METHOD BLD: ABNORMAL
EOSINOPHIL # BLD: 0 K/UL (ref 0–0.4)
EOSINOPHIL NFR BLD: 0 % (ref 0–5)
EPITH CASTS URNS QL MICRO: ABNORMAL /LPF (ref 0–5)
ERYTHROCYTE [DISTWIDTH] IN BLOOD BY AUTOMATED COUNT: 16.5 % (ref 11.6–14.5)
GLOBULIN SER CALC-MCNC: 4 G/DL (ref 2–4)
GLUCOSE SERPL-MCNC: 162 MG/DL (ref 74–99)
GLUCOSE UR STRIP.AUTO-MCNC: NEGATIVE MG/DL
HCT VFR BLD AUTO: 27.1 % (ref 35–45)
HGB BLD-MCNC: 8.5 G/DL (ref 12–16)
HGB UR QL STRIP: NEGATIVE
HYALINE CASTS URNS QL MICRO: ABNORMAL /LPF (ref 0–2)
INR PPP: 1.8 (ref 0.8–1.2)
KETONES UR QL STRIP.AUTO: ABNORMAL MG/DL
LEUKOCYTE ESTERASE UR QL STRIP.AUTO: ABNORMAL
LYMPHOCYTES # BLD: 3.8 K/UL (ref 0.9–3.6)
LYMPHOCYTES NFR BLD: 19 % (ref 21–52)
MCH RBC QN AUTO: 27.9 PG (ref 24–34)
MCHC RBC AUTO-ENTMCNC: 31.4 G/DL (ref 31–37)
MCV RBC AUTO: 88.9 FL (ref 74–97)
MONOCYTES # BLD: 1.5 K/UL (ref 0.05–1.2)
MONOCYTES NFR BLD: 7 % (ref 3–10)
NEUTS SEG # BLD: 14.7 K/UL (ref 1.8–8)
NEUTS SEG NFR BLD: 74 % (ref 40–73)
NITRITE UR QL STRIP.AUTO: NEGATIVE
PH UR STRIP: 7 [PH] (ref 5–8)
PLATELET # BLD AUTO: 351 K/UL (ref 135–420)
PMV BLD AUTO: 9.4 FL (ref 9.2–11.8)
POTASSIUM SERPL-SCNC: 3.4 MMOL/L (ref 3.5–5.5)
PROT SERPL-MCNC: 6.8 G/DL (ref 6.4–8.2)
PROT UR STRIP-MCNC: NEGATIVE MG/DL
PROTHROMBIN TIME: 20.6 SEC (ref 11.5–15.2)
RBC # BLD AUTO: 3.05 M/UL (ref 4.2–5.3)
RBC #/AREA URNS HPF: ABNORMAL /HPF (ref 0–5)
SODIUM SERPL-SCNC: 141 MMOL/L (ref 136–145)
SP GR UR REFRACTOMETRY: 1.02 (ref 1–1.03)
TROPONIN I SERPL-MCNC: <0.02 NG/ML (ref 0–0.04)
UROBILINOGEN UR QL STRIP.AUTO: 1 EU/DL (ref 0.2–1)
WBC # BLD AUTO: 20 K/UL (ref 4.6–13.2)
WBC URNS QL MICRO: ABNORMAL /HPF (ref 0–5)

## 2020-12-01 PROCEDURE — 74011250636 HC RX REV CODE- 250/636: Performed by: EMERGENCY MEDICINE

## 2020-12-01 PROCEDURE — 82550 ASSAY OF CK (CPK): CPT

## 2020-12-01 PROCEDURE — 85610 PROTHROMBIN TIME: CPT

## 2020-12-01 PROCEDURE — 99284 EMERGENCY DEPT VISIT MOD MDM: CPT

## 2020-12-01 PROCEDURE — 93005 ELECTROCARDIOGRAM TRACING: CPT

## 2020-12-01 PROCEDURE — 96375 TX/PRO/DX INJ NEW DRUG ADDON: CPT

## 2020-12-01 PROCEDURE — 74011250636 HC RX REV CODE- 250/636: Performed by: STUDENT IN AN ORGANIZED HEALTH CARE EDUCATION/TRAINING PROGRAM

## 2020-12-01 PROCEDURE — 85730 THROMBOPLASTIN TIME PARTIAL: CPT

## 2020-12-01 PROCEDURE — 96374 THER/PROPH/DIAG INJ IV PUSH: CPT

## 2020-12-01 PROCEDURE — 80053 COMPREHEN METABOLIC PANEL: CPT

## 2020-12-01 PROCEDURE — 81001 URINALYSIS AUTO W/SCOPE: CPT

## 2020-12-01 PROCEDURE — 85025 COMPLETE CBC W/AUTO DIFF WBC: CPT

## 2020-12-01 RX ORDER — ONDANSETRON 2 MG/ML
4 INJECTION INTRAMUSCULAR; INTRAVENOUS
Status: COMPLETED | OUTPATIENT
Start: 2020-12-01 | End: 2020-12-01

## 2020-12-01 RX ORDER — CEFTRIAXONE 1 G/1
1 INJECTION, POWDER, FOR SOLUTION INTRAMUSCULAR; INTRAVENOUS
Status: COMPLETED | OUTPATIENT
Start: 2020-12-01 | End: 2020-12-01

## 2020-12-01 RX ORDER — NITROFURANTOIN (MACROCRYSTALS) 100 MG/1
100 CAPSULE ORAL 2 TIMES DAILY
Qty: 14 CAP | Refills: 0 | Status: SHIPPED | OUTPATIENT
Start: 2020-12-01 | End: 2020-12-08

## 2020-12-01 RX ADMIN — CEFTRIAXONE SODIUM 1 G: 1 INJECTION, POWDER, FOR SOLUTION INTRAMUSCULAR; INTRAVENOUS at 07:17

## 2020-12-01 RX ADMIN — ONDANSETRON 4 MG: 2 INJECTION INTRAMUSCULAR; INTRAVENOUS at 04:00

## 2020-12-01 RX ADMIN — SODIUM CHLORIDE, SODIUM LACTATE, POTASSIUM CHLORIDE, AND CALCIUM CHLORIDE 1000 ML: 600; 310; 30; 20 INJECTION, SOLUTION INTRAVENOUS at 05:00

## 2020-12-01 NOTE — DISCHARGE INSTRUCTIONS
Patient Education        Urinary Tract Infection in Women: Care Instructions  Your Care Instructions     A urinary tract infection, or UTI, is a general term for an infection anywhere between the kidneys and the urethra (where urine comes out). Most UTIs are bladder infections. They often cause pain or burning when you urinate. UTIs are caused by bacteria and can be cured with antibiotics. Be sure to complete your treatment so that the infection goes away. Follow-up care is a key part of your treatment and safety. Be sure to make and go to all appointments, and call your doctor if you are having problems. It's also a good idea to know your test results and keep a list of the medicines you take. How can you care for yourself at home? · Take your antibiotics as directed. Do not stop taking them just because you feel better. You need to take the full course of antibiotics. · Drink extra water and other fluids for the next day or two. This may help wash out the bacteria that are causing the infection. (If you have kidney, heart, or liver disease and have to limit fluids, talk with your doctor before you increase your fluid intake.)  · Avoid drinks that are carbonated or have caffeine. They can irritate the bladder. · Urinate often. Try to empty your bladder each time. · To relieve pain, take a hot bath or lay a heating pad set on low over your lower belly or genital area. Never go to sleep with a heating pad in place. To prevent UTIs  · Drink plenty of water each day. This helps you urinate often, which clears bacteria from your system. (If you have kidney, heart, or liver disease and have to limit fluids, talk with your doctor before you increase your fluid intake.)  · Urinate when you need to. · Urinate right after you have sex. · Change sanitary pads often. · Avoid douches, bubble baths, feminine hygiene sprays, and other feminine hygiene products that have deodorants.   · After going to the bathroom, wipe from front to back. When should you call for help? Call your doctor now or seek immediate medical care if:    · Symptoms such as fever, chills, nausea, or vomiting get worse or appear for the first time.     · You have new pain in your back just below your rib cage. This is called flank pain.     · There is new blood or pus in your urine.     · You have any problems with your antibiotic medicine. Watch closely for changes in your health, and be sure to contact your doctor if:    · You are not getting better after taking an antibiotic for 2 days.     · Your symptoms go away but then come back. Where can you learn more? Go to http://www.gray.com/  Enter V901 in the search box to learn more about \"Urinary Tract Infection in Women: Care Instructions. \"  Current as of: June 29, 2020               Content Version: 12.6  © 0379-8783 [employer]. Care instructions adapted under license by [employer] (which disclaims liability or warranty for this information). If you have questions about a medical condition or this instruction, always ask your healthcare professional. [address] any warranty or liability for your use of this information. [employer] Activation    Thank you for requesting access to [employer]. Please follow the instructions below to securely access and download your online medical record. [employer] allows you to send messages to your doctor, view your test results, renew your prescriptions, schedule appointments, and more. How Do I Sign Up? In your internet browser, go to [email or web address]. Click on the First Time User? Click Here link in the Sign In box. You will see the New Member Sign Up page. Enter your [employer] Access Code exactly as it appears below. You will not need to use this code after you´ve completed the sign-up process.  If you do not sign up before the expiration date, you must request a new code. [employer] Access Code: [email or web address]  Expires: 3/28/2019  2:27 PM (This is the date your [employer] access code will )    Enter the last four digits of your Social Security Number (xxxx) and Date of Birth (mm/dd/yyyy) as indicated and click Submit. You will be taken to the next sign-up page. Create a [employer] ID. This will be your [employer] login ID and cannot be changed, so think of one that is secure and easy to remember. Create a [employer] password. You can change your password at any time. Enter your Password Reset Question and Answer. This can be used at a later time if you forget your password. Enter your e-mail address. You will receive e-mail notification when new information is available in [address] Click Sign Up. You can now view and download portions of your medical record. Click the [employer] link to download a portable copy of your medical information. Additional Information    If you have questions, please visit the Frequently Asked Questions section of the [employer] website at [email or web address] Remember, [employer] is NOT to be used for urgent needs. For medical emergencies, dial 911.

## 2020-12-01 NOTE — ED PROVIDER NOTES
EMERGENCY DEPARTMENT HISTORY AND PHYSICAL EXAM    3:05 AM      Date: 12/1/2020  Patient Name: Marcus Pain    History of Presenting Illness     Chief Complaint   Patient presents with    Epistaxis         History Provided By: Patient  Location/Duration/Severity/Modifying factors   55-year-old female with past medical history of hypertension and atrial fibrillation on Xarelto presenting with new onset lip bleeding starting today. Patient had a fall with a lip laceration which was repaired in the [place] [facility] 1 week prior. She states that she has not had any bleeding until today in which she said it started bleeding spontaneously and copiously. She states she swallowed a large amount of blood and vomited it back up. Since then patient reports feeling generally weak and lightheaded, exacerbated by ambulation. Patient denies any chest pain, shortness of breath. Patient endorses continuous mild nausea with generalized abdominal pain secondary to nausea. Patient denies any hematemesis or vomiting prior to the episode of light bleeding. Patient denies any melena. PCP: Yobani Guerra MD    Current Facility-Administered Medications   Medication Dose Route Frequency Provider Last Rate Last Dose    lactated ringers bolus infusion 1,000 mL  1,000 mL IntraVENous ONCE Champ Cavazos, DO        ondansetron (ZOFRAN) injection 4 mg  4 mg IntraVENous NOW Champ Cavazos, DO         Current Outpatient Medications   Medication Sig Dispense Refill    LORazepam (ATIVAN) 0.5 mg tablet Take  by mouth as needed.  montelukast (SINGULAIR) 10 mg tablet Take 10 mg by mouth daily as needed.  DULoxetine (CYMBALTA) 60 mg capsule Take 60 mg by mouth nightly.  Omeprazole delayed release (PRILOSEC D/R) 20 mg tablet Take 20 mg by mouth daily.  loperamide (IMMODIUM) 2 mg tablet Take 2 mg by mouth four (4) times daily as needed for Diarrhea.  furosemide (LASIX) 40 mg tablet Take  by mouth daily.  cloNIDine HCL (CATAPRES) 0.1 mg tablet Take  by mouth two (2) times a day.  acetaminophen (TYLENOL) 325 mg tablet Take  by mouth every four (4) hours as needed for Pain.  rivaroxaban (XARELTO) 10 mg tablet Take 1 Tab by mouth daily (with breakfast). 30 Tab 3    ondansetron (ZOFRAN ODT) 8 mg disintegrating tablet Take 1 Tab by mouth every eight (8) hours as needed for Nausea for up to 12 doses. 20 Tab 0    diphenoxylate-atropine (LOMOTIL) 2.5-0.025 mg per tablet Take 1 Tab by mouth four (4) times daily as needed for Diarrhea (1 tab after each stool for max 8 per day). Max Daily Amount: 4 Tabs. Take after each stool for a maximum of 8 tablets daily 20 Tab 0    albuterol (PROVENTIL VENTOLIN) 2.5 mg /3 mL (0.083 %) nebulizer solution 3 mL by Nebulization route every four (4) hours as needed for Wheezing. 24 Each 0    Cetirizine (ZYRTEC) 10 mg cap Take  by mouth daily as needed.  gabapentin (NEURONTIN) 400 mg capsule Take 400 mg by mouth four (4) times daily.  atorvastatin (LIPITOR) 40 mg tablet Take 1 Tab by mouth nightly. 30 Tab 1    bisacodyl (DULCOLAX) 10 mg suppository Insert 10 mg into rectum as needed. 30 Suppository 1    ferrous sulfate 325 mg (65 mg iron) tablet Take 1 Tab by mouth two (2) times daily (with meals). 60 Tab 1    nystatin (MYCOSTATIN) powder Apply  to affected area two (2) times a day. 1 Bottle 3    polyethylene glycol (MIRALAX) 17 gram packet Take 1 Packet by mouth daily. (Patient taking differently: Take 17 g by mouth daily as needed.) 30 Packet 1    senna-docusate (PERICOLACE) 8.6-50 mg per tablet Take 2 Tabs by mouth daily.  61 Tab 1       Past History     Past Medical History:  Past Medical History:   Diagnosis Date    A-fib ([facility]     Arthritis     History of seasonal allergies     Hypertension     Neuropathy     lower extremities    [facility]        Past Surgical History:  Past Surgical History:   Procedure Laterality Date    COLONOSCOPY N/A 5/15/2018    COLONOSCOPY, DIAGNOSTIC  performed by Demetri Rich MD at [facility] HX APPENDECTOMY      HX DILATION AND CURETTAGE      HX HEENT      Ear tubes    HX HYSTERECTOMY      HX OOPHORECTOMY      NEUROLOGICAL PROCEDURE UNLISTED  04/27/2016    T2-L2 Decomprssion and Fusion/T12 Corpetomy       Family History:  Family History   Problem Relation Age of Onset    Hypertension Mother     Diabetes Mother     Heart Disease Mother     Heart Disease Father     Diabetes Maternal Aunt     Cancer Maternal Uncle         preostate cancer    Diabetes Maternal Uncle        Social History:  Social History     Tobacco Use    Smoking status: Former Smoker    Smokeless tobacco: Never Used   Substance Use Topics    Alcohol use: Never     Frequency: Never    Drug use: No       Allergies: Allergies   Allergen Reactions    Iodinated Contrast Media Other (comments)     Patient states will pass out. Review of Systems       Review of Systems   Constitutional: Positive for fatigue. Negative for chills, diaphoresis and fever. HENT: Negative for postnasal drip, rhinorrhea, sinus pressure, sinus pain, sneezing, sore throat and trouble swallowing. Eyes: Negative for visual disturbance. Respiratory: Negative for cough, choking, chest tightness, shortness of breath and wheezing. Cardiovascular: Positive for leg swelling. Negative for chest pain and palpitations. Gastrointestinal: Positive for abdominal pain, nausea and vomiting. Negative for anal bleeding, blood in stool, constipation, diarrhea and rectal pain. Genitourinary: Negative for difficulty urinating, dysuria, frequency, hematuria and urgency. Musculoskeletal: Negative for myalgias. Neurological: Positive for dizziness and light-headedness. Negative for tremors, seizures, syncope, facial asymmetry, speech difficulty, weakness, numbness and headaches.          Physical Exam     Visit Vitals  /66   Pulse 97   Resp 17   SpO2 98%         Physical Exam  Vitals signs and nursing note reviewed. Constitutional:       General: She is not in acute distress. Appearance: She is obese. She is ill-appearing. She is not toxic-appearing or diaphoretic. HENT:      Head: Normocephalic and atraumatic. Mouth/Throat:      Mouth: Mucous membranes are moist.      Comments: Upper inner lip revealing evidence of clotted bleeding. Clotted blood overlying teeth and mouth. Did not attempt to remove clot given patient's bleeding history. Eyes:      General: No scleral icterus. Right eye: No discharge. Left eye: No discharge. Conjunctiva/sclera: Conjunctivae normal.      Pupils: Pupils are equal, round, and reactive to light. Cardiovascular:      Rate and Rhythm: Normal rate and regular rhythm. Pulses: Normal pulses. Heart sounds: Normal heart sounds. No murmur. No friction rub. No gallop. Pulmonary:      Effort: Pulmonary effort is normal. No respiratory distress. Breath sounds: Normal breath sounds. No stridor. No wheezing, rhonchi or rales. Chest:      Chest wall: No tenderness. Abdominal:      General: Abdomen is flat. Palpations: Abdomen is soft. There is no mass. Tenderness: There is abdominal tenderness. Comments: Generalized abdominal tenderness to mild palpation without rebound or guarding   Musculoskeletal:         General: Swelling present. No tenderness. Right lower leg: Edema present. Left lower leg: Edema present. Comments: Bilateral lower extremity swelling concerning the patient's history of lymphedema. Skin:     General: Skin is warm and dry. Capillary Refill: Capillary refill takes 2 to 3 seconds. Findings: No rash. Neurological:      General: No focal deficit present. Mental Status: She is alert and oriented to person, place, and time. Mental status is at baseline. Cranial Nerves: No cranial nerve deficit.       Sensory: No sensory deficit. Motor: Weakness present. Coordination: Coordination normal.      Gait: Gait normal.   Psychiatric:         Mood and Affect: Mood normal.         Behavior: Behavior normal.         Thought Content: Thought content normal.         Judgment: Judgment normal.           Diagnostic Study Results     Labs -  No results found for this or any previous visit (from the past 12 hour(s)). Radiologic Studies -   No orders to display         Medical Decision Making   I am the first provider for this patient. I reviewed the vital signs, available nursing notes, past medical history, past surgical history, family history and social history. Vital Signs-Reviewed the patient's vital signs. EKG: Normal sinus rhythm    Records Reviewed: Old Medical Records (Time of Review: 3:05 AM)    ED Course: Progress Notes, Reevaluation, and Consults:         Provider Notes (Medical Decision Making):   MDM  Number of Diagnoses or Management Options  Diagnosis management comments: Ms. Marilynn Bateman is a 76-year-old female with past medical history of hypertension and atrial fibrillation rate controlled on Xarelto who presented to the emergency department following a brisk bleed from prior lip laceration site. On presentation patient's bleed was clotted, though she had evidence of clotted blood in mouth. Patient with presyncope while attempting to ambulate to the bathroom in the emergency department. Patient's lab results significant for hemoglobin of 8.5, which is down from 11.3 9 days prior. The rest of the patient's labs significant for elevated coags consistent with patient's status on Xarelto. Patient with white count of 20.0. Will evaluate further with urinalysis. Care of patient was transferred to Dr. Trevon Ha prior to results of urinalysis.       Procedures    Critical Care Time:       Diagnosis     Clinical Impression:   Oral Bleed    Disposition:     Follow-up Information    None          Patient's Medications   Start Taking    No medications on file   Continue Taking    ACETAMINOPHEN (TYLENOL) 325 MG TABLET    Take  by mouth every four (4) hours as needed for Pain. ALBUTEROL (PROVENTIL VENTOLIN) 2.5 MG /3 ML (0.083 %) NEBULIZER SOLUTION    3 mL by Nebulization route every four (4) hours as needed for Wheezing. ATORVASTATIN (LIPITOR) 40 MG TABLET    Take 1 Tab by mouth nightly. BISACODYL (DULCOLAX) 10 MG SUPPOSITORY    Insert 10 mg into rectum as needed. CETIRIZINE (ZYRTEC) 10 MG CAP    Take  by mouth daily as needed. CLONIDINE HCL (CATAPRES) 0.1 MG TABLET    Take  by mouth two (2) times a day. DIPHENOXYLATE-ATROPINE (LOMOTIL) 2.5-0.025 MG PER TABLET    Take 1 Tab by mouth four (4) times daily as needed for Diarrhea (1 tab after each stool for max 8 per day). Max Daily Amount: 4 Tabs. Take after each stool for a maximum of 8 tablets daily    DULOXETINE (CYMBALTA) 60 MG CAPSULE    Take 60 mg by mouth nightly. FERROUS SULFATE 325 MG (65 MG IRON) TABLET    Take 1 Tab by mouth two (2) times daily (with meals). FUROSEMIDE (LASIX) 40 MG TABLET    Take  by mouth daily. GABAPENTIN (NEURONTIN) 400 MG CAPSULE    Take 400 mg by mouth four (4) times daily. LOPERAMIDE (IMMODIUM) 2 MG TABLET    Take 2 mg by mouth four (4) times daily as needed for Diarrhea. LORAZEPAM (ATIVAN) 0.5 MG TABLET    Take  by mouth as needed. MONTELUKAST (SINGULAIR) 10 MG TABLET    Take 10 mg by mouth daily as needed. NYSTATIN (MYCOSTATIN) POWDER    Apply  to affected area two (2) times a day. OMEPRAZOLE DELAYED RELEASE (PRILOSEC D/R) 20 MG TABLET    Take 20 mg by mouth daily. ONDANSETRON (ZOFRAN ODT) 8 MG DISINTEGRATING TABLET    Take 1 Tab by mouth every eight (8) hours as needed for Nausea for up to 12 doses. POLYETHYLENE GLYCOL (MIRALAX) 17 GRAM PACKET    Take 1 Packet by mouth daily. RIVAROXABAN (XARELTO) 10 MG TABLET    Take 1 Tab by mouth daily (with breakfast).     SENNA-DOCUSATE (PERICOLACE) 8.6-50 MG PER TABLET    Take 2 Tabs by mouth daily. These Medications have changed    No medications on file   Stop Taking    No medications on file     Disclaimer: Sections of this note are dictated using utilizing voice recognition software. Minor typographical errors may be present. If questions arise, please do not hesitate to contact me or call our department.

## 2020-12-01 NOTE — ED TRIAGE NOTES
Patient fell her face one week ago. Tonight she started bleeding from the mouth. Patient is currently on Xarelto.

## 2020-12-01 NOTE — ED NOTES
Patient needed to go to bathroom. Pt states she could walk with assistance. Pt stood up and became weak. Pt states she was dizzy. Pt assisted to side of bed. Patient immediately went unresponsive.    Called for help

## 2020-12-01 NOTE — ED NOTES
Attending Physician Note for Supervision of   Resident Physician Care of Patient    I have personally seen and examined this patient. I have fully participated in the care of this patient. I have reviewed all pertinent clinical information, including history, physical exam and plan. Physical exam was performed by the [address] under my direct supervision. I, as the supervising physician, was at the bedside to perform a direct face-to-face PMH, ROS, HPI, and physical examination of the patient in addition to the resident physician's evaluation. Diagnostic Study Results     Abnormal lab results from this emergency department encounter:  Labs Reviewed   CBC WITH AUTOMATED DIFF - Abnormal; Notable for the following components:       Result Value    WBC 20.0 (*)     RBC 3.05 (*)     HGB 8.5 (*)     HCT 27.1 (*)     RDW 16.5 (*)     NEUTROPHILS 74 (*)     LYMPHOCYTES 19 (*)     ABS. NEUTROPHILS 14.7 (*)     ABS. LYMPHOCYTES 3.8 (*)     ABS.  MONOCYTES 1.5 (*)     All other components within normal limits   METABOLIC PANEL, COMPREHENSIVE - Abnormal; Notable for the following components:    Potassium 3.4 (*)     Glucose 162 (*)     BUN 21 (*)     BUN/Creatinine ratio 22 (*)     GFR est non-AA 58 (*)     Calcium 8.2 (*)     ALT (SGPT) 12 (*)     AST (SGOT) 9 (*)     Albumin 2.8 (*)     A-G Ratio 0.7 (*)     All other components within normal limits   PROTHROMBIN TIME + INR - Abnormal; Notable for the following components:    Prothrombin time 20.6 (*)     INR 1.8 (*)     All other components within normal limits   PTT - Abnormal; Notable for the following components:    aPTT 51.1 (*)     All other components within normal limits   URINALYSIS W/ RFLX MICROSCOPIC - Abnormal; Notable for the following components:    Ketone TRACE (*)     Leukocyte Esterase TRACE (*)     All other components within normal limits   URINE MICROSCOPIC ONLY - Abnormal; Notable for the following components:    Bacteria 1+ (*)     All other components within normal limits   CARDIAC PANEL,(CK, CKMB & TROPONIN)       Lab values for this patient within approximately the last 12 hours:  Recent Results (from the past 12 hour(s))   EKG, 12 LEAD, INITIAL    Collection Time: 12/01/20  3:10 AM   Result Value Ref Range    Ventricular Rate 92 BPM    Atrial Rate 92 BPM    P-R Interval 138 ms    QRS Duration 84 ms    Q-T Interval 352 ms    QTC Calculation (Bezet) 435 ms    Calculated P Axis 28 degrees    Calculated R Axis -9 degrees    Calculated T Axis -4 degrees    Diagnosis       Normal sinus rhythm  Moderate voltage criteria for LVH, may be normal variant  Borderline ECG  When compared with ECG of 17-FEB-2020 18:33,  Nonspecific T wave abnormality, improved in Anterior leads     CBC WITH AUTOMATED DIFF    Collection Time: 12/01/20  4:00 AM   Result Value Ref Range    WBC 20.0 (H) 4.6 - 13.2 K/uL    RBC 3.05 (L) 4.20 - 5.30 M/uL    HGB 8.5 (L) 12.0 - 16.0 g/dL    HCT 27.1 (L) 35.0 - 45.0 %    MCV 88.9 74.0 - 97.0 FL    MCH 27.9 24.0 - 34.0 PG    MCHC 31.4 31.0 - 37.0 g/dL    RDW 16.5 (H) 11.6 - 14.5 %    PLATELET 094 175 - 205 K/uL    MPV 9.4 9.2 - 11.8 FL    NEUTROPHILS 74 (H) 40 - 73 %    LYMPHOCYTES 19 (L) 21 - 52 %    MONOCYTES 7 3 - 10 %    EOSINOPHILS 0 0 - 5 %    BASOPHILS 0 0 - 2 %    ABS. NEUTROPHILS 14.7 (H) 1.8 - 8.0 K/UL    ABS. LYMPHOCYTES 3.8 (H) 0.9 - 3.6 K/UL    ABS. MONOCYTES 1.5 (H) 0.05 - 1.2 K/UL    ABS. EOSINOPHILS 0.0 0.0 - 0.4 K/UL    ABS.  BASOPHILS 0.0 0.0 - 0.1 K/UL    DF AUTOMATED     CARDIAC PANEL,(CK, CKMB & TROPONIN)    Collection Time: 12/01/20  4:00 AM   Result Value Ref Range    CK - MB <1.0 <3.6 ng/ml    CK-MB Index  0.0 - 4.0 %     CALCULATION NOT PERFORMED WHEN RESULT IS BELOW LINEAR LIMIT    CK 28 26 - 192 U/L    Troponin-I, QT <0.02 0.0 - 5.115 NG/ML   METABOLIC PANEL, COMPREHENSIVE    Collection Time: 12/01/20  4:00 AM   Result Value Ref Range    Sodium 141 136 - 145 mmol/L    Potassium 3.4 (L) 3.5 - 5.5 mmol/L Chloride 104 100 - 111 mmol/L    CO2 28 21 - 32 mmol/L    Anion gap 9 3.0 - 18 mmol/L    Glucose 162 (H) 74 - 99 mg/dL    BUN 21 (H) 7.0 - 18 MG/DL    Creatinine 0.96 0.6 - 1.3 MG/DL    BUN/Creatinine ratio 22 (H) 12 - 20      GFR est AA >60 >60 ml/min/1.73m2    GFR est non-AA 58 (L) >60 ml/min/1.73m2    Calcium 8.2 (L) 8.5 - 10.1 MG/DL    Bilirubin, total 0.2 0.2 - 1.0 MG/DL    ALT (SGPT) 12 (L) 13 - 56 U/L    AST (SGOT) 9 (L) 10 - 38 U/L    Alk. phosphatase 77 45 - 117 U/L    Protein, total 6.8 6.4 - 8.2 g/dL    Albumin 2.8 (L) 3.4 - 5.0 g/dL    Globulin 4.0 2.0 - 4.0 g/dL    A-G Ratio 0.7 (L) 0.8 - 1.7     PROTHROMBIN TIME + INR    Collection Time: 12/01/20  4:00 AM   Result Value Ref Range    Prothrombin time 20.6 (H) 11.5 - 15.2 sec    INR 1.8 (H) 0.8 - 1.2     PTT    Collection Time: 12/01/20  4:00 AM   Result Value Ref Range    aPTT 51.1 (H) 23.0 - 36.4 SEC   URINALYSIS W/ RFLX MICROSCOPIC    Collection Time: 12/01/20  5:36 AM   Result Value Ref Range    Color YELLOW      Appearance CLOUDY      Specific gravity 1.020 1.005 - 1.030      pH (UA) 7.0 5.0 - 8.0      Protein Negative NEG mg/dL    Glucose Negative NEG mg/dL    Ketone TRACE (A) NEG mg/dL    Bilirubin Negative NEG      Blood Negative NEG      Urobilinogen 1.0 0.2 - 1.0 EU/dL    Nitrites Negative NEG      Leukocyte Esterase TRACE (A) NEG     URINE MICROSCOPIC ONLY    Collection Time: 12/01/20  5:36 AM   Result Value Ref Range    WBC 0 to 1 0 - 5 /hpf    RBC NONE 0 - 5 /hpf    Epithelial cells 2+ 0 - 5 /lpf    Bacteria 1+ (A) NEG /hpf    Hyaline cast 6 to 8 0 - 2 /lpf       Radiologist and cardiologist interpretations if available at time of this note:  No results found.     Medication(s) ordered for patient during this emergency visit encounter:  Medications   lactated ringers bolus infusion 1,000 mL (has no administration in time range)   ondansetron (ZOFRAN) injection 4 mg (has no administration in time range)   cefTRIAXone (ROCEPHIN) injection 1 g (has no administration in time range)       Medical Decision Making     I am the first provider for this patient. I reviewed the vital signs, available nursing notes, past medical history, past surgical history, family history and social history. Vital Signs:  Reviewed the patient's vital signs. Physician comments:   Patient was seen for lip laceration recently at [facility] where it was repaired. She notes that there was some bleeding from the lip laceration today and thus came to the ER for evaluation treatment. Upon initial evaluation the patient's laceration is clotted and there is no active bleeding. Of note however, when the patient was in her room in the emergency department, and she attempted to get up to go to the bathroom with nursing at bedside, patient felt lightheaded and had a near syncopal event. That lasted briefly and now she is back at baseline. She has no complaints on initial MD exam after that event. Shirin Merritt M.D.   SUKI Board Certified Emergency Physician

## 2020-12-02 LAB
ATRIAL RATE: 92 BPM
CALCULATED P AXIS, ECG09: 28 DEGREES
CALCULATED R AXIS, ECG10: -9 DEGREES
CALCULATED T AXIS, ECG11: -4 DEGREES
DIAGNOSIS, 93000: NORMAL
P-R INTERVAL, ECG05: 138 MS
Q-T INTERVAL, ECG07: 352 MS
QRS DURATION, ECG06: 84 MS
QTC CALCULATION (BEZET), ECG08: 435 MS
VENTRICULAR RATE, ECG03: 92 BPM

## 2020-12-03 ENCOUNTER — HOSPITAL ENCOUNTER (OUTPATIENT)
Dept: LAB | Age: 65
Discharge: HOME | End: 2020-12-03

## 2020-12-03 LAB
ALBUMIN SERPL-MCNC: 2.9 G/DL (ref 3.4–5)
ALBUMIN/GLOB SERPL: 1 {RATIO} (ref 0.8–1.7)
ALP SERPL-CCNC: 70 U/L (ref 45–117)
ALT SERPL-CCNC: 10 U/L (ref 13–56)
ANION GAP SERPL CALC-SCNC: 4 MMOL/L (ref 3–18)
AST SERPL-CCNC: 11 U/L (ref 10–38)
BASOPHILS # BLD: 0 K/UL (ref 0–0.1)
BASOPHILS NFR BLD: 0 % (ref 0–2)
BILIRUB SERPL-MCNC: 0.3 MG/DL (ref 0.2–1)
BUN SERPL-MCNC: 19 MG/DL (ref 7–18)
BUN/CREAT SERPL: 24 (ref 12–20)
CALCIUM SERPL-MCNC: 8.4 MG/DL (ref 8.5–10.1)
CHLORIDE SERPL-SCNC: 102 MMOL/L (ref 100–111)
CO2 SERPL-SCNC: 31 MMOL/L (ref 21–32)
CREAT SERPL-MCNC: 0.79 MG/DL (ref 0.6–1.3)
DIFFERENTIAL METHOD BLD: ABNORMAL
EOSINOPHIL # BLD: 0.1 K/UL (ref 0–0.4)
EOSINOPHIL NFR BLD: 0 % (ref 0–5)
ERYTHROCYTE [DISTWIDTH] IN BLOOD BY AUTOMATED COUNT: 17.6 % (ref 11.6–14.5)
GLOBULIN SER CALC-MCNC: 2.9 G/DL (ref 2–4)
GLUCOSE SERPL-MCNC: 120 MG/DL (ref 74–99)
HCT VFR BLD AUTO: 20 % (ref 35–45)
HGB BLD-MCNC: 6.2 G/DL (ref 12–16)
LYMPHOCYTES # BLD: 3.6 K/UL (ref 0.9–3.6)
LYMPHOCYTES NFR BLD: 23 % (ref 21–52)
MCH RBC QN AUTO: 28.3 PG (ref 24–34)
MCHC RBC AUTO-ENTMCNC: 31 G/DL (ref 31–37)
MCV RBC AUTO: 91.3 FL (ref 74–97)
MONOCYTES # BLD: 0.9 K/UL (ref 0.05–1.2)
MONOCYTES NFR BLD: 6 % (ref 3–10)
NEUTS SEG # BLD: 11.2 K/UL (ref 1.8–8)
NEUTS SEG NFR BLD: 71 % (ref 40–73)
PLATELET # BLD AUTO: 268 K/UL (ref 135–420)
PMV BLD AUTO: 10.1 FL (ref 9.2–11.8)
POTASSIUM SERPL-SCNC: 3.6 MMOL/L (ref 3.5–5.5)
PROT SERPL-MCNC: 5.8 G/DL (ref 6.4–8.2)
RBC # BLD AUTO: 2.19 M/UL (ref 4.2–5.3)
SODIUM SERPL-SCNC: 137 MMOL/L (ref 136–145)
WBC # BLD AUTO: 15.7 K/UL (ref 4.6–13.2)

## 2020-12-03 PROCEDURE — 85025 COMPLETE CBC W/AUTO DIFF WBC: CPT

## 2020-12-03 PROCEDURE — 80053 COMPREHEN METABOLIC PANEL: CPT

## 2020-12-06 ENCOUNTER — HOSPITAL ENCOUNTER (OUTPATIENT)
Dept: LAB | Age: 65
Discharge: HOME | End: 2020-12-06

## 2020-12-06 PROCEDURE — 85018 HEMOGLOBIN: CPT

## 2020-12-07 ENCOUNTER — HOSPITAL ENCOUNTER (OUTPATIENT)
Dept: LAB | Age: 65
Discharge: HOME | End: 2020-12-07

## 2020-12-07 ENCOUNTER — HOSPITAL ENCOUNTER (OUTPATIENT)
Dept: INFUSION THERAPY | Age: 65
Discharge: HOME | End: 2020-12-07

## 2020-12-07 LAB
HCT VFR BLD AUTO: 21.5 % (ref 35–45)
HCT VFR BLD AUTO: 23.4 % (ref 35–45)
HGB BLD-MCNC: 6.5 G/DL (ref 12–16)
HGB BLD-MCNC: 6.9 G/DL (ref 12–16)

## 2020-12-07 RX ORDER — ACETAMINOPHEN 325 MG/1
650 TABLET ORAL ONCE
Status: CANCELLED | OUTPATIENT
Start: 2020-12-08 | End: 2020-12-08

## 2020-12-08 ENCOUNTER — HOSPITAL ENCOUNTER (OUTPATIENT)
Dept: INFUSION THERAPY | Age: 65
End: 2020-12-08

## 2020-12-09 ENCOUNTER — HOSPITAL ENCOUNTER (OUTPATIENT)
Dept: INFUSION THERAPY | Age: 65
Discharge: HOME | End: 2020-12-09
Payer: MEDICARE

## 2020-12-09 ENCOUNTER — HOSPITAL ENCOUNTER (OUTPATIENT)
Dept: LAB | Age: 65
Discharge: HOME | End: 2020-12-09

## 2020-12-09 VITALS
RESPIRATION RATE: 18 BRPM | HEART RATE: 91 BPM | TEMPERATURE: 98.6 F | DIASTOLIC BLOOD PRESSURE: 72 MMHG | SYSTOLIC BLOOD PRESSURE: 124 MMHG

## 2020-12-09 LAB
HCT VFR BLD AUTO: 22.4 % (ref 35–45)
HGB BLD-MCNC: 6.8 G/DL (ref 12–16)
HISTORY CHECKED?,CKHIST: NORMAL

## 2020-12-09 PROCEDURE — 36592 COLLECT BLOOD FROM PICC: CPT

## 2020-12-09 PROCEDURE — 74011250636 HC RX REV CODE- 250/636: Performed by: INTERNAL MEDICINE

## 2020-12-09 PROCEDURE — 85018 HEMOGLOBIN: CPT

## 2020-12-09 PROCEDURE — 86923 COMPATIBILITY TEST ELECTRIC: CPT

## 2020-12-09 PROCEDURE — 86900 BLOOD TYPING SEROLOGIC ABO: CPT

## 2020-12-09 RX ORDER — SODIUM CHLORIDE 0.9 % (FLUSH) 0.9 %
10-40 SYRINGE (ML) INJECTION AS NEEDED
Status: DISCONTINUED | OUTPATIENT
Start: 2020-12-09 | End: 2020-12-13 | Stop reason: HOSPADM

## 2020-12-09 RX ORDER — HEPARIN 100 UNIT/ML
500 SYRINGE INTRAVENOUS AS NEEDED
Status: DISCONTINUED | OUTPATIENT
Start: 2020-12-09 | End: 2020-12-13 | Stop reason: HOSPADM

## 2020-12-09 RX ADMIN — HEPARIN 500 UNITS: 100 SYRINGE at 10:15

## 2020-12-09 RX ADMIN — Medication 30 ML: at 10:15

## 2020-12-09 NOTE — PROGRESS NOTES
[facility] Progress Note    Date: 2020    Name: Romana Chi    MRN: [id number]         : 1955      Ms. Vitale was assessed and education was provided. Ms. April Giselle vitals were reviewed and patient was observed for 5 minutes prior to treatment. Visit Vitals  /72 (BP 1 Location: Left arm, BP Patient Position: At rest;Sitting)   Pulse 91   Temp 98.6 °F (37 °C)   Resp 18       Lab results were obtained and reviewed. Recent Results (from the past 12 hour(s))   TYPE & SCREEN    Collection Time: 20 10:15 AM   Result Value Ref Range    Crossmatch Expiration 2020,2359     ABO/Rh(D) Laura Foil POSITIVE     Antibody screen NEG     Unit number [id number]     Blood component type  LR     Unit division 00     Status of unit ALLOCATED     Crossmatch result Compatible     Unit number [id number]     Blood component type  LR     Unit division 00     Status of unit ALLOCATED     Crossmatch result Compatible    RBC, ALLOCATE    Collection Time: 20 10:15 AM   Result Value Ref Range    HISTORY CHECKED? Historical check performed    HGB & HCT    Collection Time: 20 12:26 PM   Result Value Ref Range    HGB 6.8 (L) 12.0 - 16.0 g/dL    HCT 22.4 (L) 35.0 - 45.0 %       Type and crossmatch drawn from single lumen PICC line right arm and sent to hospital with purple top tube. Patient armband removed and shredded. Ms. Delroy Quiles was discharged from [place] [address] in stable condition at 1030. She is to return on 12/10/2020 at 0900 for her next appointment.     Helen Mercer RN  2020  4:42 PM

## 2020-12-10 ENCOUNTER — HOSPITAL ENCOUNTER (OUTPATIENT)
Dept: INFUSION THERAPY | Age: 65
Discharge: HOME | End: 2020-12-10
Payer: MEDICARE

## 2020-12-10 VITALS
TEMPERATURE: 98.4 F | DIASTOLIC BLOOD PRESSURE: 63 MMHG | HEART RATE: 71 BPM | SYSTOLIC BLOOD PRESSURE: 122 MMHG | RESPIRATION RATE: 18 BRPM | OXYGEN SATURATION: 96 %

## 2020-12-10 PROCEDURE — 74011250636 HC RX REV CODE- 250/636: Performed by: INTERNAL MEDICINE

## 2020-12-10 PROCEDURE — 74011250637 HC RX REV CODE- 250/637

## 2020-12-10 PROCEDURE — 36430 TRANSFUSION BLD/BLD COMPNT: CPT

## 2020-12-10 PROCEDURE — 77030013169 SET IV BLD ICUM -A

## 2020-12-10 PROCEDURE — P9016 RBC LEUKOCYTES REDUCED: HCPCS

## 2020-12-10 PROCEDURE — 74011250636 HC RX REV CODE- 250/636

## 2020-12-10 RX ORDER — SODIUM CHLORIDE 9 MG/ML
250 INJECTION, SOLUTION INTRAVENOUS AS NEEDED
Status: DISCONTINUED | OUTPATIENT
Start: 2020-12-10 | End: 2020-12-14 | Stop reason: HOSPADM

## 2020-12-10 RX ORDER — ACETAMINOPHEN 325 MG/1
650 TABLET ORAL ONCE
Status: COMPLETED | OUTPATIENT
Start: 2020-12-10 | End: 2020-12-10

## 2020-12-10 RX ORDER — SODIUM CHLORIDE 9 MG/ML
25 INJECTION, SOLUTION INTRAVENOUS CONTINUOUS
Status: DISCONTINUED | OUTPATIENT
Start: 2020-12-10 | End: 2020-12-11 | Stop reason: HOSPADM

## 2020-12-10 RX ORDER — HEPARIN 100 UNIT/ML
SYRINGE INTRAVENOUS
Status: COMPLETED
Start: 2020-12-10 | End: 2020-12-10

## 2020-12-10 RX ORDER — SODIUM CHLORIDE 0.9 % (FLUSH) 0.9 %
10-40 SYRINGE (ML) INJECTION AS NEEDED
Status: DISCONTINUED | OUTPATIENT
Start: 2020-12-10 | End: 2020-12-14 | Stop reason: HOSPADM

## 2020-12-10 RX ADMIN — ACETAMINOPHEN 650 MG: 325 TABLET ORAL at 09:25

## 2020-12-10 RX ADMIN — HEPARIN 500 UNITS: 100 SYRINGE at 15:04

## 2020-12-10 RX ADMIN — Medication 10 ML: at 09:38

## 2020-12-10 RX ADMIN — SODIUM CHLORIDE 25 ML/HR: 9 INJECTION, SOLUTION INTRAVENOUS at 09:35

## 2020-12-10 RX ADMIN — Medication 10 ML: at 15:02

## 2020-12-10 NOTE — PROGRESS NOTES
[facility] Progress Note    Date: December 10, 2020    Name: Stu Robb    MRN: [id number]         : 1955      Ms. Vitale arrived to [facility] at [facility]. No complaints or concerns voiced    Ms. Vitale was assessed and education was provided. Discussed risks and benefits of blood transfusion with patient, including risk of transfusion reaction and disease transmission. Patient verbalized understanding and signed consent placed on chart. Care notes given. Ms. Chivo Harris vitals were reviewed. Visit Vitals  /63 (BP 1 Location: Left arm, BP Patient Position: At rest)   Pulse 71   Temp 98.4 °F (36.9 °C)   Resp 18   SpO2 96%       Single lumen right arm picc line intact with dry drsg at site, and without streaking, drainage, bruising, infiltration or tenderness to area and site. Positive for blood return and flushes without difficulty. Normal saline initiated at [facility]. Pre-medications (Tylenol 650 mg were administered as ordered. First unit of two ordered units of PRBCs initiated @ 75 ml/hr at 1000. Fifteen minutes into infusion, VS stable and pt denied c/o SOB, itching/hives, lip/tongue/facial swelling, CP or other complaints. Fifteen minutes later, VS stable and pt denied complaints; infusion rate increased to 155 ml/hr for the remainder of the transfusion. Unit finished @ 1200. VS stable and no transfusion reaction suspected. Second unit of two ordered units of PRBCs initiated @ 75 ml/hr at 1205. Fifteen minutes into infusion, VS stable and pt denied c/o SOB, itching/hives, lip/tongue/facial swelling, CP or other complaints. Fifteen minutes later, VS stable and pt denied complaints; infusion rate increased to 155 ml/hr for the remainder of the transfusion. Unit finished @ 1410. VS stable and no transfusion reaction suspected. Ms. Roxanna Lesch tolerated infusion without complaints.  Will monitor patient for one hour per protocol    Patient Vitals for the past 12 hrs:   Temp Pulse Resp BP SpO2   12/10/20 1510 98.4 °F (36.9 °C) 71 18 122/63 96 %   12/10/20 1410 97.8 °F (36.6 °C) 93 18 98/63    12/10/20 1325 97.7 °F (36.5 °C) 79 16 107/67    12/10/20 1255 97.5 °F (36.4 °C) 75 16 114/66    12/10/20 1235 97.3 °F (36.3 °C) 81 16 100/65    12/10/20 1220 97.8 °F (36.6 °C) 78 16 118/73    12/10/20 1200 98.1 °F (36.7 °C) 79 16 118/63    12/10/20 1100 98.3 °F (36.8 °C) 89 16 121/64    12/10/20 1030 98.7 °F (37.1 °C) 90 16 125/70    12/10/20 1015 99.1 °F (37.3 °C) 92 16 122/68    12/10/20 0959 99.4 °F (37.4 °C) 95 16 130/73    12/10/20 0920 98.4 °F (36.9 °C) (!) 113 20 (!) 141/76 93 %       Patient without complaints or reaction one hour post infusion. Brisk flush/blood returns fron infused lumen. No irritation, bleeding, or hematoma noted at and around site of picc line--drsg remains d/i and green cap applied to lumen. Discharge instructions reviewed with pt. Pt instructed to report SOB, CP, elevated temp, back pain, or other symptoms of transfusion reaction to MD or ED. Pt verbalized understanding. Patient ARMBANDS removed and shredded    Ms. Vitale was discharged from [place] [address] in stable condition at 1510. She has no further appointment with [facility]. Harish Jordan RN  December 10, 2020

## 2020-12-11 LAB
ABO + RH BLD: NORMAL
BLD PROD TYP BPU: NORMAL
BLD PROD TYP BPU: NORMAL
BLOOD GROUP ANTIBODIES SERPL: NORMAL
BPU ID: NORMAL
BPU ID: NORMAL
CROSSMATCH RESULT,%XM: NORMAL
CROSSMATCH RESULT,%XM: NORMAL
SPECIMEN EXP DATE BLD: NORMAL
STATUS OF UNIT,%ST: NORMAL
STATUS OF UNIT,%ST: NORMAL
UNIT DIVISION, %UDIV: 0
UNIT DIVISION, %UDIV: 0

## 2021-02-12 ENCOUNTER — HOSPITAL ENCOUNTER (OUTPATIENT)
Dept: LAB | Age: 66
Discharge: HOME | End: 2021-02-12
Payer: MEDICARE

## 2021-02-12 PROCEDURE — U0003 INFECTIOUS AGENT DETECTION BY NUCLEIC ACID (DNA OR RNA); SEVERE ACUTE RESPIRATORY SYNDROME CORONAVIRUS 2 (SARS-COV-2) (CORONAVIRUS DISEASE [COVID-19]), AMPLIFIED PROBE TECHNIQUE, MAKING USE OF HIGH THROUGHPUT TECHNOLOGIES AS DESCRIBED BY CMS-2020-01-R: HCPCS

## 2021-02-13 LAB — SARS-COV-2, COV2NT: NOT DETECTED

## 2021-02-17 ENCOUNTER — ANESTHESIA EVENT (OUTPATIENT)
Dept: ENDOSCOPY | Age: 66
End: 2021-02-17
Payer: MEDICARE

## 2021-02-18 ENCOUNTER — ANESTHESIA (OUTPATIENT)
Dept: ENDOSCOPY | Age: 66
End: 2021-02-18
Payer: MEDICARE

## 2021-02-18 ENCOUNTER — HOSPITAL ENCOUNTER (OUTPATIENT)
Age: 66
Setting detail: OUTPATIENT SURGERY
Discharge: HOME | End: 2021-02-18
Attending: INTERNAL MEDICINE | Admitting: INTERNAL MEDICINE
Payer: MEDICARE

## 2021-02-18 VITALS
SYSTOLIC BLOOD PRESSURE: 135 MMHG | DIASTOLIC BLOOD PRESSURE: 76 MMHG | RESPIRATION RATE: 14 BRPM | WEIGHT: 267.5 LBS | OXYGEN SATURATION: 96 % | TEMPERATURE: 97.3 F | BODY MASS INDEX: 44.57 KG/M2 | HEIGHT: 65 IN | HEART RATE: 83 BPM

## 2021-02-18 LAB
ANION GAP SERPL CALC-SCNC: 3 MMOL/L (ref 3–18)
BUN SERPL-MCNC: 7 MG/DL (ref 7–18)
BUN/CREAT SERPL: 7 (ref 12–20)
CALCIUM SERPL-MCNC: 8.9 MG/DL (ref 8.5–10.1)
CHLORIDE SERPL-SCNC: 103 MMOL/L (ref 100–111)
CO2 SERPL-SCNC: 31 MMOL/L (ref 21–32)
CREAT SERPL-MCNC: 0.94 MG/DL (ref 0.6–1.3)
ERYTHROCYTE [DISTWIDTH] IN BLOOD BY AUTOMATED COUNT: 15 % (ref 11.6–14.5)
GLUCOSE SERPL-MCNC: 96 MG/DL (ref 74–99)
HCT VFR BLD AUTO: 38.3 % (ref 35–45)
HGB BLD-MCNC: 11.9 G/DL (ref 12–16)
MCH RBC QN AUTO: 26.4 PG (ref 24–34)
MCHC RBC AUTO-ENTMCNC: 31.1 G/DL (ref 31–37)
MCV RBC AUTO: 84.9 FL (ref 74–97)
PLATELET # BLD AUTO: 236 K/UL (ref 135–420)
PMV BLD AUTO: 9 FL (ref 9.2–11.8)
POTASSIUM SERPL-SCNC: 3.9 MMOL/L (ref 3.5–5.5)
RBC # BLD AUTO: 4.51 M/UL (ref 4.2–5.3)
SODIUM SERPL-SCNC: 137 MMOL/L (ref 136–145)
WBC # BLD AUTO: 8.3 K/UL (ref 4.6–13.2)

## 2021-02-18 PROCEDURE — 76040000019: Performed by: INTERNAL MEDICINE

## 2021-02-18 PROCEDURE — 74011250636 HC RX REV CODE- 250/636: Performed by: NURSE ANESTHETIST, CERTIFIED REGISTERED

## 2021-02-18 PROCEDURE — 76060000031 HC ANESTHESIA FIRST 0.5 HR: Performed by: INTERNAL MEDICINE

## 2021-02-18 PROCEDURE — 00731 ANES UPR GI NDSC PX NOS: CPT | Performed by: ANESTHESIOLOGY

## 2021-02-18 PROCEDURE — 77030008565 HC TBNG SUC IRR ERBE -B: Performed by: INTERNAL MEDICINE

## 2021-02-18 PROCEDURE — 77030019988 HC FCPS ENDOSC DISP BSC -B: Performed by: INTERNAL MEDICINE

## 2021-02-18 PROCEDURE — 2709999900 HC NON-CHARGEABLE SUPPLY: Performed by: INTERNAL MEDICINE

## 2021-02-18 PROCEDURE — 88342 IMHCHEM/IMCYTCHM 1ST ANTB: CPT

## 2021-02-18 PROCEDURE — 80048 BASIC METABOLIC PNL TOTAL CA: CPT

## 2021-02-18 PROCEDURE — 00731 ANES UPR GI NDSC PX NOS: CPT | Performed by: NURSE ANESTHETIST, CERTIFIED REGISTERED

## 2021-02-18 PROCEDURE — 85027 COMPLETE CBC AUTOMATED: CPT

## 2021-02-18 PROCEDURE — 74011000250 HC RX REV CODE- 250: Performed by: NURSE ANESTHETIST, CERTIFIED REGISTERED

## 2021-02-18 PROCEDURE — 88305 TISSUE EXAM BY PATHOLOGIST: CPT

## 2021-02-18 RX ORDER — PROPOFOL 10 MG/ML
INJECTION, EMULSION INTRAVENOUS AS NEEDED
Status: DISCONTINUED | OUTPATIENT
Start: 2021-02-18 | End: 2021-02-18 | Stop reason: HOSPADM

## 2021-02-18 RX ORDER — LIDOCAINE HYDROCHLORIDE 20 MG/ML
INJECTION, SOLUTION EPIDURAL; INFILTRATION; INTRACAUDAL; PERINEURAL AS NEEDED
Status: DISCONTINUED | OUTPATIENT
Start: 2021-02-18 | End: 2021-02-18 | Stop reason: HOSPADM

## 2021-02-18 RX ORDER — LIDOCAINE HYDROCHLORIDE 10 MG/ML
0.1 INJECTION, SOLUTION EPIDURAL; INFILTRATION; INTRACAUDAL; PERINEURAL AS NEEDED
Status: DISCONTINUED | OUTPATIENT
Start: 2021-02-18 | End: 2021-02-18 | Stop reason: HOSPADM

## 2021-02-18 RX ORDER — SODIUM CHLORIDE, SODIUM LACTATE, POTASSIUM CHLORIDE, CALCIUM CHLORIDE 600; 310; 30; 20 MG/100ML; MG/100ML; MG/100ML; MG/100ML
25 INJECTION, SOLUTION INTRAVENOUS CONTINUOUS
Status: DISCONTINUED | OUTPATIENT
Start: 2021-02-18 | End: 2021-02-18 | Stop reason: HOSPADM

## 2021-02-18 RX ORDER — FAMOTIDINE 10 MG/ML
20 INJECTION INTRAVENOUS ONCE
Status: COMPLETED | OUTPATIENT
Start: 2021-02-18 | End: 2021-02-18

## 2021-02-18 RX ADMIN — PROPOFOL 70 MG: 10 INJECTION, EMULSION INTRAVENOUS at 13:23

## 2021-02-18 RX ADMIN — PROPOFOL 40 MG: 10 INJECTION, EMULSION INTRAVENOUS at 13:27

## 2021-02-18 RX ADMIN — LIDOCAINE HYDROCHLORIDE 50 MG: 20 INJECTION, SOLUTION EPIDURAL; INFILTRATION; INTRACAUDAL; PERINEURAL at 13:23

## 2021-02-18 RX ADMIN — FAMOTIDINE 20 MG: 10 INJECTION INTRAVENOUS at 13:01

## 2021-02-18 RX ADMIN — SODIUM CHLORIDE, SODIUM LACTATE, POTASSIUM CHLORIDE, AND CALCIUM CHLORIDE 25 ML/HR: 600; 310; 30; 20 INJECTION, SOLUTION INTRAVENOUS at 12:45

## 2021-02-18 NOTE — ANESTHESIA PREPROCEDURE EVALUATION
Relevant Problems   ENDOCRINE   (+) Obesity, morbid ([facility] [address]       Anesthetic History   No history of anesthetic complications            Review of Systems / Medical History  Patient summary reviewed and pertinent labs reviewed    Pulmonary            Asthma : well controlled       Neuro/Psych   Within defined limits           Cardiovascular    Hypertension        Dysrhythmias : atrial fibrillation      Exercise tolerance: >4 METS     GI/Hepatic/Renal  Within defined limits              Endo/Other        Morbid obesity and anemia     Other Findings   Comments:                  Physical Exam    Airway  Mallampati: III  TM Distance: 4 - 6 cm  Neck ROM: normal range of motion   Mouth opening: Normal     Cardiovascular  Regular rate and rhythm,  S1 and S2 normal,  no murmur, click, rub, or gallop  Rhythm: regular  Rate: normal         Dental    Dentition: Poor dentition     Pulmonary  Breath sounds clear to auscultation               Abdominal  GI exam deferred       Other Findings            Anesthetic Plan    ASA: 3  Anesthesia type: MAC          Induction: Intravenous  Anesthetic plan and risks discussed with: Patient

## 2021-02-18 NOTE — ANESTHESIA POSTPROCEDURE EVALUATION
Procedure(s):  UPPER ENDOSCOPY with bxs.     MAC    Anesthesia Post Evaluation      Multimodal analgesia: multimodal analgesia used between 6 hours prior to anesthesia start to PACU discharge  Patient location during evaluation: bedside  Patient participation: complete - patient participated  Level of consciousness: awake  Pain management: adequate  Airway patency: patent  Anesthetic complications: no  Cardiovascular status: stable  Respiratory status: acceptable  Hydration status: acceptable  Post anesthesia nausea and vomiting:  controlled      INITIAL Post-op Vital signs:   Vitals Value Taken Time   /76 02/18/21 1427   Temp 36.3 °C (97.3 °F) 02/18/21 1427   Pulse 83 02/18/21 1427   Resp 14 02/18/21 1427   SpO2 96 % 02/18/21 1427

## 2021-02-18 NOTE — DISCHARGE INSTRUCTIONS
Patient Education        Upper GI Endoscopy: What to Expect at [address] had an upper GI endoscopy. Your doctor used a thin, lighted tube that bends to look at the inside of your esophagus, your stomach, and the first part of the small intestine, called the duodenum. After you have an endoscopy, you will stay at the hospital or clinic for 1 to 2 hours. This will allow the medicine to wear off. You will be able to go home after your doctor or nurse checks to make sure that you're not having any problems. You may have to stay overnight if you had treatment during the test. You may have a sore throat for a day or two after the test.  This care sheet gives you a general idea about what to expect after the test.  How can you care for yourself at home? Activity   · Rest as much as you need to after you go home. · You should be able to go back to your usual activities the day after the test.  Diet   · Follow your doctor's directions for eating after the test.  · Drink plenty of fluids (unless your doctor has told you not to). Medications   · If you have a sore throat the day after the test, use an over-the-counter spray to numb your throat. Follow-up care is a key part of your treatment and safety. Be sure to make and go to all appointments, and call your doctor if you are having problems. It's also a good idea to know your test results and keep a list of the medicines you take. When should you call for help? Call 911 anytime you think you may need emergency care. For example, call if:    · You passed out (lost consciousness).     · You have trouble breathing.     · You pass maroon or bloody stools.    Call your doctor now or seek immediate medical care if:    · You have pain that does not get better after your take pain medicine.     · You have new or worse belly pain.     · You have blood in your stools.     · You are sick to your stomach and cannot keep fluids down.     · You have a fever.     · You cannot pass stools or gas. Watch closely for changes in your health, and be sure to contact your doctor if:    · Your throat still hurts after a day or two.     · You do not get better as expected. Where can you learn more? Go to http://www.[employer].com/  Enter J454 in the search box to learn more about \"Upper GI Endoscopy: What to Expect at Home. \"  Current as of: April 15, 2020               Content Version: 12.6  © 2006-2020 [employer]. Care instructions adapted under license by [employer] (which disclaims liability or warranty for this information). If you have questions about a medical condition or this instruction, always ask your healthcare professional. [address] any warranty or liability for your use of this information. Patient Education        Gastritis: Care Instructions  Your Care Instructions     Gastritis is a sore and upset stomach. It happens when something irritates the stomach lining. Many things can cause it. These include an infection such as the flu or something you ate or drank. Medicines or a sore on the lining of the stomach (ulcer) also can cause it. Your belly may bloat and ache. You may belch, vomit, and feel sick to your stomach. You should be able to relieve the problem by taking medicine. And it may help to change your diet. If gastritis lasts, your doctor may prescribe medicine. Follow-up care is a key part of your treatment and safety. Be sure to make and go to all appointments, and call your doctor if you are having problems. It's also a good idea to know your test results and keep a list of the medicines you take. How can you care for yourself at home? · If your doctor prescribed antibiotics, take them as directed. Do not stop taking them just because you feel better. You need to take the full course of antibiotics. · Be safe with medicines.  If your doctor prescribed medicine to decrease stomach acid, take it as directed. Call your doctor if you think you are having a problem with your medicine. · Do not take any other medicine, including over-the-counter pain relievers, without talking to your doctor first.  · If your doctor recommends over-the-counter medicine to reduce stomach acid, such as Pepcid AC (famotidine), Prilosec (omeprazole), or Tagamet HB (cimetidine) follow the directions on the label. · Drink plenty of fluids (enough so that your urine is light yellow or clear like water) to prevent dehydration. Choose water and other caffeine-free clear liquids. If you have kidney, heart, or liver disease and have to limit fluids, talk with your doctor before you increase the amount of fluids you drink. · Limit how much alcohol you drink. · Avoid coffee, tea, cola drinks, chocolate, and other foods with caffeine. They increase stomach acid. When should you call for help? Call 911 anytime you think you may need emergency care. For example, call if:    · You vomit blood or what looks like coffee grounds.     · You pass maroon or very bloody stools. Call your doctor now or seek immediate medical care if:    · You start breathing fast and have not produced urine in the last 8 hours.     · You cannot keep fluids down. Watch closely for changes in your health, and be sure to contact your doctor if:    · You do not get better as expected. Where can you learn more? Go to http://www.[employer].com/  Enter Z536 in the search box to learn more about \"Gastritis: Care Instructions. \"  Current as of: April 15, 2020               Content Version: 12.6  © 6258-2761 [employer]. Care instructions adapted under license by [employer] (which disclaims liability or warranty for this information).  If you have questions about a medical condition or this instruction, always ask your healthcare professional. [employer] disclaims any warranty or liability for your use of this information.

## 2021-06-07 ENCOUNTER — HOSPITAL ENCOUNTER (OUTPATIENT)
Dept: LAB | Age: 66
Discharge: HOME | End: 2021-06-07
Payer: MEDICARE

## 2021-06-07 PROCEDURE — U0003 INFECTIOUS AGENT DETECTION BY NUCLEIC ACID (DNA OR RNA); SEVERE ACUTE RESPIRATORY SYNDROME CORONAVIRUS 2 (SARS-COV-2) (CORONAVIRUS DISEASE [COVID-19]), AMPLIFIED PROBE TECHNIQUE, MAKING USE OF HIGH THROUGHPUT TECHNOLOGIES AS DESCRIBED BY CMS-2020-01-R: HCPCS

## 2021-06-07 RX ORDER — OXYBUTYNIN CHLORIDE 10 MG/1
10 TABLET, EXTENDED RELEASE ORAL DAILY
COMMUNITY

## 2021-06-07 NOTE — PERIOP NOTES
PRE-SURGICAL INSTRUCTIONS        Patient's Name:  Yelena Acevedo      LGQHL'H Date:  6/7/2021              Surgery Date:  6/11/2021                1. Do NOT eat or drink anything, including candy, gum, or ice chips after midnight on 06/10/2021, unless you have specific instructions from your surgeon or anesthesia provider to do so.  2. You may brush your teeth before coming to the hospital.  3. No smoking 24 hours prior to the day of surgery. 4. No alcohol 24 hours prior to the day of surgery. 5. No recreational drugs for one week prior to the day of surgery. 6. Leave all valuables, including money/purse, at home. 7. Remove all jewelry, nail polish, acrylic nails, and makeup (including mascara); no lotions powders, deodorant, or perfume/cologne/after shave on the skin. 8. Glasses/contact lenses and dentures may be worn to the hospital.  They will be removed prior to surgery. 9. Call your doctor if symptoms of a cold or illness develop within 24-48 hours prior to your surgery. 10.  AN ADULT MUST DRIVE YOU HOME AFTER OUTPATIENT SURGERY. 11.  If you are having an outpatient procedure, please make arrangements for a responsible adult to be with you for 24 hours after your surgery. 12.  NO VISITORS in the hospital at this time for outpatient procedures. Exceptions may be made for surgical admissions, per nursing unit guidelines      Special Instructions:      Bring list of CURRENT medications. Bring any pertinent legal medical records. Take these medications the morning of surgery with a sip of water:  B/P  Follow physician instructions about stopping anticoagulants. Complete bowel prep per MD instructions. On the day of surgery, come in the main entrance of [facility]. Let the  at the desk know you are there for surgery. A staff member will come escort you to the surgical area on the second floor.     If you have any questions or concerns, please do not hesitate to call:     (Prior to the day of surgery) [facility] department:  [phone]   (Day of surgery) Pre-Op department:  [phone]    These surgical instructions were reviewed with PATIENT during the PAT phone call.

## 2021-06-09 LAB — SARS-COV-2, COV2NT: NOT DETECTED

## 2021-06-10 ENCOUNTER — ANESTHESIA EVENT (OUTPATIENT)
Dept: ENDOSCOPY | Age: 66
End: 2021-06-10
Payer: MEDICARE

## 2021-06-11 ENCOUNTER — ANESTHESIA (OUTPATIENT)
Dept: ENDOSCOPY | Age: 66
End: 2021-06-11
Payer: MEDICARE

## 2021-06-11 ENCOUNTER — HOSPITAL ENCOUNTER (OUTPATIENT)
Age: 66
Setting detail: OUTPATIENT SURGERY
Discharge: HOME | End: 2021-06-11
Attending: INTERNAL MEDICINE | Admitting: INTERNAL MEDICINE
Payer: MEDICARE

## 2021-06-11 VITALS
SYSTOLIC BLOOD PRESSURE: 128 MMHG | HEART RATE: 71 BPM | TEMPERATURE: 97.8 F | DIASTOLIC BLOOD PRESSURE: 78 MMHG | WEIGHT: 260 LBS | OXYGEN SATURATION: 100 % | HEIGHT: 65 IN | BODY MASS INDEX: 43.32 KG/M2 | RESPIRATION RATE: 14 BRPM

## 2021-06-11 PROCEDURE — 77030040934 HC CATH DIAG DXTERITY MEDT -A: Performed by: INTERNAL MEDICINE

## 2021-06-11 PROCEDURE — 77030013992 HC SNR POLYP ENDOSC BSC -B: Performed by: INTERNAL MEDICINE

## 2021-06-11 PROCEDURE — 2709999900 HC NON-CHARGEABLE SUPPLY: Performed by: INTERNAL MEDICINE

## 2021-06-11 PROCEDURE — 77030008565 HC TBNG SUC IRR ERBE -B: Performed by: INTERNAL MEDICINE

## 2021-06-11 PROCEDURE — 74011250636 HC RX REV CODE- 250/636: Performed by: NURSE ANESTHETIST, CERTIFIED REGISTERED

## 2021-06-11 PROCEDURE — 76040000019: Performed by: INTERNAL MEDICINE

## 2021-06-11 PROCEDURE — 00812 ANES LWR INTST SCR COLSC: CPT | Performed by: ANESTHESIOLOGY

## 2021-06-11 PROCEDURE — 74011000250 HC RX REV CODE- 250: Performed by: NURSE ANESTHETIST, CERTIFIED REGISTERED

## 2021-06-11 PROCEDURE — 76060000031 HC ANESTHESIA FIRST 0.5 HR: Performed by: INTERNAL MEDICINE

## 2021-06-11 PROCEDURE — 00812 ANES LWR INTST SCR COLSC: CPT | Performed by: NURSE ANESTHETIST, CERTIFIED REGISTERED

## 2021-06-11 RX ORDER — PROPOFOL 10 MG/ML
VIAL (ML) INTRAVENOUS
Status: DISCONTINUED | OUTPATIENT
Start: 2021-06-11 | End: 2021-06-11 | Stop reason: HOSPADM

## 2021-06-11 RX ORDER — LABETALOL HYDROCHLORIDE 5 MG/ML
INJECTION, SOLUTION INTRAVENOUS AS NEEDED
Status: DISCONTINUED | OUTPATIENT
Start: 2021-06-11 | End: 2021-06-11 | Stop reason: HOSPADM

## 2021-06-11 RX ORDER — SODIUM CHLORIDE, SODIUM LACTATE, POTASSIUM CHLORIDE, CALCIUM CHLORIDE 600; 310; 30; 20 MG/100ML; MG/100ML; MG/100ML; MG/100ML
125 INJECTION, SOLUTION INTRAVENOUS CONTINUOUS
Status: CANCELLED | OUTPATIENT
Start: 2021-06-11

## 2021-06-11 RX ORDER — ONDANSETRON 2 MG/ML
4 INJECTION INTRAMUSCULAR; INTRAVENOUS ONCE
Status: CANCELLED | OUTPATIENT
Start: 2021-06-11 | End: 2021-06-11

## 2021-06-11 RX ORDER — FAMOTIDINE 20 MG/1
20 TABLET, FILM COATED ORAL ONCE
Status: DISCONTINUED | OUTPATIENT
Start: 2021-06-11 | End: 2021-06-11 | Stop reason: HOSPADM

## 2021-06-11 RX ORDER — FENTANYL CITRATE 50 UG/ML
50 INJECTION, SOLUTION INTRAMUSCULAR; INTRAVENOUS AS NEEDED
Status: CANCELLED | OUTPATIENT
Start: 2021-06-11

## 2021-06-11 RX ORDER — LIDOCAINE HYDROCHLORIDE 10 MG/ML
0.1 INJECTION, SOLUTION EPIDURAL; INFILTRATION; INTRACAUDAL; PERINEURAL AS NEEDED
Status: DISCONTINUED | OUTPATIENT
Start: 2021-06-11 | End: 2021-06-11 | Stop reason: HOSPADM

## 2021-06-11 RX ORDER — HYDROMORPHONE HYDROCHLORIDE 2 MG/ML
0.5 INJECTION, SOLUTION INTRAMUSCULAR; INTRAVENOUS; SUBCUTANEOUS
Status: CANCELLED | OUTPATIENT
Start: 2021-06-11

## 2021-06-11 RX ORDER — SODIUM CHLORIDE, SODIUM LACTATE, POTASSIUM CHLORIDE, CALCIUM CHLORIDE 600; 310; 30; 20 MG/100ML; MG/100ML; MG/100ML; MG/100ML
25 INJECTION, SOLUTION INTRAVENOUS CONTINUOUS
Status: DISCONTINUED | OUTPATIENT
Start: 2021-06-11 | End: 2021-06-11 | Stop reason: HOSPADM

## 2021-06-11 RX ORDER — LIDOCAINE HYDROCHLORIDE 20 MG/ML
INJECTION, SOLUTION EPIDURAL; INFILTRATION; INTRACAUDAL; PERINEURAL AS NEEDED
Status: DISCONTINUED | OUTPATIENT
Start: 2021-06-11 | End: 2021-06-11 | Stop reason: HOSPADM

## 2021-06-11 RX ORDER — PROPOFOL 10 MG/ML
INJECTION, EMULSION INTRAVENOUS AS NEEDED
Status: DISCONTINUED | OUTPATIENT
Start: 2021-06-11 | End: 2021-06-11 | Stop reason: HOSPADM

## 2021-06-11 RX ADMIN — PROPOFOL 50 MG: 10 INJECTION, EMULSION INTRAVENOUS at 12:23

## 2021-06-11 RX ADMIN — SODIUM CHLORIDE, SODIUM LACTATE, POTASSIUM CHLORIDE, AND CALCIUM CHLORIDE: 600; 310; 30; 20 INJECTION, SOLUTION INTRAVENOUS at 12:17

## 2021-06-11 RX ADMIN — LABETALOL HYDROCHLORIDE 5 MG: 5 INJECTION, SOLUTION INTRAVENOUS at 12:25

## 2021-06-11 RX ADMIN — PROPOFOL 30 MG: 10 INJECTION, EMULSION INTRAVENOUS at 12:27

## 2021-06-11 RX ADMIN — PROPOFOL 120 MCG/KG/MIN: 10 INJECTION, EMULSION INTRAVENOUS at 12:27

## 2021-06-11 RX ADMIN — LIDOCAINE HYDROCHLORIDE 50 MG: 20 INJECTION, SOLUTION EPIDURAL; INFILTRATION; INTRACAUDAL; PERINEURAL at 12:27

## 2021-06-11 NOTE — DISCHARGE INSTRUCTIONS
Patient Education        Colonoscopy: What to Expect at [address] [place] [address]  After a colonoscopy, you'll stay at the clinic for 1 to 2 hours until the medicines wear off. Then you can go home. But you'll need to arrange for a ride. Your doctor will tell you when you can eat and do your other usual activities. Your doctor will talk to you about when you'll need your next colonoscopy. Your doctor can help you decide how often you need to be checked. This will depend on the results of your test and your risk for colorectal cancer. After the test, you may be bloated or have gas pains. You may need to pass gas. If a biopsy was done or a polyp was removed, you may have streaks of blood in your stool (feces) for a few days. Problems such as heavy rectal bleeding may not occur until several weeks after the test. This isn't common. But it can happen after polyps are removed. This care sheet gives you a general idea about how long it will take for you to recover. But each person recovers at a different pace. Follow the steps below to get better as quickly as possible. How can you care for yourself at home? Activity    · Rest when you feel tired.     · You can do your normal activities when it feels okay to do so. Diet    · Follow your doctor's directions for eating.     · Unless your doctor has told you not to, drink plenty of fluids. This helps to replace the fluids that were lost during the colon prep.     · Do not drink alcohol. Medicines    · Your doctor will tell you if and when you can restart your medicines. He or she will also give you instructions about taking any new medicines.     · If you take aspirin or some other blood thinner, ask your doctor if and when to start taking it again.  Make sure that you understand exactly what your doctor wants you to do.     · If polyps were removed or a biopsy was done during the test, your doctor may tell you not to take aspirin or other anti-inflammatory medicines for a few days. These include ibuprofen (Advil, Motrin) and naproxen (Aleve). Other instructions    · For your safety, do not drive or operate machinery until the medicine wears off and you can think clearly. Your doctor may tell you not to drive or operate machinery until the day after your test.     · Do not sign legal documents or make major decisions until the medicine wears off and you can think clearly. The anesthesia can make it hard for you to fully understand what you are agreeing to. Follow-up care is a key part of your treatment and safety. Be sure to make and go to all appointments, and call your doctor if you are having problems. It's also a good idea to know your test results and keep a list of the medicines you take. When should you call for help? Call 911 anytime you think you may need emergency care. For example, call if:    · You passed out (lost consciousness).     · You pass maroon or bloody stools.     · You have trouble breathing. Call your doctor now or seek immediate medical care if:    · You have pain that does not get better after you take pain medicine.     · You are sick to your stomach or cannot drink fluids.     · You have new or worse belly pain.     · You have blood in your stools.     · You have a fever.     · You cannot pass stools or gas. Watch closely for changes in your health, and be sure to contact your doctor if you have any problems. Where can you learn more? Go to http://www.gray.com/  Enter E264 in the search box to learn more about \"Colonoscopy: What to Expect at Home. \"  Current as of: December 17, 2020               Content Version: 12.8  © 5132-5230 [employer]. Care instructions adapted under license by [employer] (which disclaims liability or warranty for this information).  If you have questions about a medical condition or this instruction, always ask your healthcare professional. [employer] disclaims any warranty or liability for your use of this information.

## 2021-06-11 NOTE — ANESTHESIA PREPROCEDURE EVALUATION
Relevant Problems   ENDOCRINE   (+) Obesity, morbid ([facility] [address]       Anesthetic History   No history of anesthetic complications            Review of Systems / Medical History  Patient summary reviewed and pertinent labs reviewed    Pulmonary            Asthma : well controlled       Neuro/Psych   Within defined limits           Cardiovascular    Hypertension        Dysrhythmias : atrial fibrillation           GI/Hepatic/Renal                Endo/Other        Morbid obesity and arthritis     Other Findings              Physical Exam    Airway  Mallampati: II  TM Distance: 4 - 6 cm  Neck ROM: normal range of motion   Mouth opening: Normal     Cardiovascular  Regular rate and rhythm,  S1 and S2 normal,  no murmur, click, rub, or gallop             Dental    Dentition: Poor dentition     Pulmonary  Breath sounds clear to auscultation               Abdominal  GI exam deferred       Other Findings            Anesthetic Plan    ASA: 3  Anesthesia type: MAC          Induction: Intravenous  Anesthetic plan and risks discussed with: Patient

## 2021-06-11 NOTE — ANESTHESIA POSTPROCEDURE EVALUATION
Procedure(s):  COLONOSCOPY. MAC    Anesthesia Post Evaluation      Multimodal analgesia: multimodal analgesia used between 6 hours prior to anesthesia start to PACU discharge  Patient location during evaluation: PACU  Patient participation: complete - patient participated  Level of consciousness: awake and alert  Pain management: adequate  Airway patency: patent  Anesthetic complications: no  Cardiovascular status: acceptable  Respiratory status: acceptable  Hydration status: acceptable  Post anesthesia nausea and vomiting:  controlled  Final Post Anesthesia Temperature Assessment:  Normothermia (36.0-37.5 degrees C)      INITIAL Post-op Vital signs:   Vitals Value Taken Time   /63 06/11/21 1244   Temp 36.4 °C (97.6 °F) 06/11/21 1236   Pulse 65 06/11/21 1247   Resp 11 06/11/21 1247   SpO2 95 % 06/11/21 1247   Vitals shown include unvalidated device data.

## 2021-06-11 NOTE — PERIOP NOTES
Call medical transportation for ride back to nursing home.   If medical transportation does not answer call nursing home [facility])

## 2021-11-17 ENCOUNTER — HOSPITAL ENCOUNTER (EMERGENCY)
Age: 66
Discharge: NURSING, REHAB OR LONG-TERM CARE | End: 2021-11-17
Attending: STUDENT IN AN ORGANIZED HEALTH CARE EDUCATION/TRAINING PROGRAM
Payer: MEDICARE

## 2021-11-17 ENCOUNTER — APPOINTMENT (OUTPATIENT)
Dept: GENERAL RADIOLOGY | Age: 66
End: 2021-11-17
Attending: PHYSICIAN ASSISTANT
Payer: MEDICARE

## 2021-11-17 VITALS
SYSTOLIC BLOOD PRESSURE: 165 MMHG | HEART RATE: 92 BPM | RESPIRATION RATE: 16 BRPM | TEMPERATURE: 98.2 F | WEIGHT: 252 LBS | BODY MASS INDEX: 43.02 KG/M2 | HEIGHT: 64 IN | DIASTOLIC BLOOD PRESSURE: 128 MMHG | OXYGEN SATURATION: 97 %

## 2021-11-17 DIAGNOSIS — S52.692A OTHER CLOSED FRACTURE OF DISTAL END OF LEFT ULNA, INITIAL ENCOUNTER: Primary | ICD-10-CM

## 2021-11-17 DIAGNOSIS — S52.125A CLOSED NONDISPLACED FRACTURE OF HEAD OF LEFT RADIUS, INITIAL ENCOUNTER: ICD-10-CM

## 2021-11-17 LAB
ALBUMIN SERPL-MCNC: 2.7 G/DL (ref 3.4–5)
ALBUMIN/GLOB SERPL: 0.5 {RATIO} (ref 0.8–1.7)
ALP SERPL-CCNC: 96 U/L (ref 45–117)
ALT SERPL-CCNC: 15 U/L (ref 13–56)
ANION GAP SERPL CALC-SCNC: 1 MMOL/L (ref 3–18)
ANION GAP SERPL CALC-SCNC: 2 MMOL/L (ref 3–18)
AST SERPL-CCNC: 44 U/L (ref 10–38)
BASOPHILS # BLD: 0 K/UL (ref 0–0.1)
BASOPHILS NFR BLD: 0 % (ref 0–2)
BILIRUB SERPL-MCNC: 0.6 MG/DL (ref 0.2–1)
BUN SERPL-MCNC: 6 MG/DL (ref 7–18)
BUN SERPL-MCNC: 7 MG/DL (ref 7–18)
BUN/CREAT SERPL: 8 (ref 12–20)
BUN/CREAT SERPL: 8 (ref 12–20)
CALCIUM SERPL-MCNC: 8.4 MG/DL (ref 8.5–10.1)
CALCIUM SERPL-MCNC: 8.8 MG/DL (ref 8.5–10.1)
CHLORIDE SERPL-SCNC: 106 MMOL/L (ref 100–111)
CHLORIDE SERPL-SCNC: 108 MMOL/L (ref 100–111)
CO2 SERPL-SCNC: 31 MMOL/L (ref 21–32)
CO2 SERPL-SCNC: 33 MMOL/L (ref 21–32)
CREAT SERPL-MCNC: 0.76 MG/DL (ref 0.6–1.3)
CREAT SERPL-MCNC: 0.84 MG/DL (ref 0.6–1.3)
DIFFERENTIAL METHOD BLD: ABNORMAL
EOSINOPHIL # BLD: 0.2 K/UL (ref 0–0.4)
EOSINOPHIL NFR BLD: 3 % (ref 0–5)
ERYTHROCYTE [DISTWIDTH] IN BLOOD BY AUTOMATED COUNT: 14.7 % (ref 11.6–14.5)
GLOBULIN SER CALC-MCNC: 5 G/DL (ref 2–4)
GLUCOSE SERPL-MCNC: 96 MG/DL (ref 74–99)
GLUCOSE SERPL-MCNC: 99 MG/DL (ref 74–99)
HCT VFR BLD AUTO: 37.3 % (ref 35–45)
HGB BLD-MCNC: 11.4 G/DL (ref 12–16)
IMM GRANULOCYTES # BLD AUTO: 0 K/UL (ref 0–0.04)
IMM GRANULOCYTES NFR BLD AUTO: 0 % (ref 0–0.5)
LYMPHOCYTES # BLD: 2 K/UL (ref 0.9–3.6)
LYMPHOCYTES NFR BLD: 25 % (ref 21–52)
MAGNESIUM SERPL-MCNC: 2.2 MG/DL (ref 1.6–2.6)
MCH RBC QN AUTO: 26.4 PG (ref 24–34)
MCHC RBC AUTO-ENTMCNC: 30.6 G/DL (ref 31–37)
MCV RBC AUTO: 86.3 FL (ref 78–100)
MONOCYTES # BLD: 0.5 K/UL (ref 0.05–1.2)
MONOCYTES NFR BLD: 6 % (ref 3–10)
NEUTS SEG # BLD: 5.3 K/UL (ref 1.8–8)
NEUTS SEG NFR BLD: 65 % (ref 40–73)
NRBC # BLD: 0 K/UL (ref 0–0.01)
NRBC BLD-RTO: 0 PER 100 WBC
PLATELET # BLD AUTO: 243 K/UL (ref 135–420)
PMV BLD AUTO: 9.4 FL (ref 9.2–11.8)
POTASSIUM SERPL-SCNC: 3.7 MMOL/L (ref 3.5–5.5)
POTASSIUM SERPL-SCNC: 5.7 MMOL/L (ref 3.5–5.5)
PROT SERPL-MCNC: 7.7 G/DL (ref 6.4–8.2)
RBC # BLD AUTO: 4.32 M/UL (ref 4.2–5.3)
SODIUM SERPL-SCNC: 138 MMOL/L (ref 136–145)
SODIUM SERPL-SCNC: 143 MMOL/L (ref 136–145)
WBC # BLD AUTO: 8.1 K/UL (ref 4.6–13.2)

## 2021-11-17 PROCEDURE — 93005 ELECTROCARDIOGRAM TRACING: CPT

## 2021-11-17 PROCEDURE — 80053 COMPREHEN METABOLIC PANEL: CPT

## 2021-11-17 PROCEDURE — 75810000053 HC SPLINT APPLICATION

## 2021-11-17 PROCEDURE — 96376 TX/PRO/DX INJ SAME DRUG ADON: CPT

## 2021-11-17 PROCEDURE — 83735 ASSAY OF MAGNESIUM: CPT

## 2021-11-17 PROCEDURE — 85025 COMPLETE CBC W/AUTO DIFF WBC: CPT

## 2021-11-17 PROCEDURE — 73110 X-RAY EXAM OF WRIST: CPT

## 2021-11-17 PROCEDURE — 99283 EMERGENCY DEPT VISIT LOW MDM: CPT

## 2021-11-17 PROCEDURE — 74011250637 HC RX REV CODE- 250/637: Performed by: PHYSICIAN ASSISTANT

## 2021-11-17 PROCEDURE — 74011250636 HC RX REV CODE- 250/636: Performed by: PHYSICIAN ASSISTANT

## 2021-11-17 PROCEDURE — 96374 THER/PROPH/DIAG INJ IV PUSH: CPT

## 2021-11-17 PROCEDURE — 71045 X-RAY EXAM CHEST 1 VIEW: CPT

## 2021-11-17 RX ORDER — MORPHINE SULFATE 4 MG/ML
4 INJECTION INTRAVENOUS
Status: COMPLETED | OUTPATIENT
Start: 2021-11-17 | End: 2021-11-17

## 2021-11-17 RX ORDER — OXYCODONE AND ACETAMINOPHEN 5; 325 MG/1; MG/1
1 TABLET ORAL
Status: COMPLETED | OUTPATIENT
Start: 2021-11-17 | End: 2021-11-17

## 2021-11-17 RX ADMIN — SODIUM CHLORIDE 1000 ML: 900 INJECTION, SOLUTION INTRAVENOUS at 12:43

## 2021-11-17 RX ADMIN — MORPHINE SULFATE 4 MG: 4 INJECTION INTRAVENOUS at 12:42

## 2021-11-17 RX ADMIN — OXYCODONE HYDROCHLORIDE AND ACETAMINOPHEN 1 TABLET: 5; 325 TABLET ORAL at 16:52

## 2021-11-17 RX ADMIN — SODIUM CHLORIDE 1000 ML: 900 INJECTION, SOLUTION INTRAVENOUS at 12:42

## 2021-11-17 RX ADMIN — MORPHINE SULFATE 4 MG: 4 INJECTION INTRAVENOUS at 15:33

## 2021-11-17 NOTE — ED NOTES
Pt. Laroy Agent in via EMS from [facility]. Pt is alert and Oriented times 4. Complaining of pain to left wrist. Pt. Stated she had an episode of dizziness sat on edge of bed and fell. Xray was conclusive for fracture to lt radial. Pt. Given ativan and tylenol at 0943.

## 2021-11-17 NOTE — ED PROVIDER NOTES
EMERGENCY DEPARTMENT HISTORY AND PHYSICAL EXAM    Date: 11/17/2021  Patient Name: Mona Servin    History of Presenting Illness       History Provided By: Patient    Chief Complaint:Dizziness, fall, left wrist injury  Duration: Yesterday evening  Timing: Acute  Location: Left wrist  Quality: Swollen  Severity: Moderate to severe  Modifying Factors: Worse after she fell  Associated Symptoms: none       Additional History (Context): Mona Servin is a 77 y.o. female with a history of A. fib, asthma and hypertension who presents today for issues listed above. Patient reports she is in a nursing home, is currently at [facility]. Reports that she was sitting on the edge of her bed yesterday when she began dizzy and kind of slid into the floor on her butt. Patient reports it was controlled. Did not hit her head and denies LOC. Patient reports that she did have x-rays at the nursing home and was told that her left wrist was broken. Patient states other than her wrist pain she feels otherwise well at this time. Denies any current dizziness, chest pain, shortness of breath, palpitations, headache, nausea, vomiting or diaphoresis. Patient denies any prior injuries to this wrist.  States they have not done anything for her fracture at the nursing home. Patient reports concerns for dehydration and states that after she received her Covid vaccine recently she had a few days of diarrhea. States that the diarrhea has resolved however that her mouth has been very dry. Patient also reports that [facility] has been supplementing her with potassium.       PCP: Jessa Reyes MD    Current Facility-Administered Medications   Medication Dose Route Frequency Provider Last Rate Last Admin    oxyCODONE-acetaminophen (PERCOCET) 5-325 mg per tablet 1 Tablet  1 Tablet Oral NOW MICHELLE Chadwick         Current Outpatient Medications   Medication Sig Dispense Refill    oxybutynin chloride XL (Ditropan XL) 10 mg CR tablet Take 10 mg by mouth daily.  LORazepam (ATIVAN) 0.5 mg tablet Take  by mouth as needed. (Patient not taking: Reported on 6/7/2021)      montelukast (SINGULAIR) 10 mg tablet Take 10 mg by mouth daily as needed.  DULoxetine (CYMBALTA) 60 mg capsule Take 60 mg by mouth nightly.  loperamide (IMMODIUM) 2 mg tablet Take 2 mg by mouth four (4) times daily as needed for Diarrhea.  furosemide (LASIX) 40 mg tablet Take  by mouth daily.  cloNIDine HCL (CATAPRES) 0.1 mg tablet Take  by mouth two (2) times a day.  acetaminophen (TYLENOL) 325 mg tablet Take  by mouth every four (4) hours as needed for Pain.  rivaroxaban (XARELTO) 10 mg tablet Take 1 Tab by mouth daily (with breakfast). (Patient taking differently: Take 10 mg by mouth daily (with breakfast). Per patient will stop 3 days before procedure 6/11/2021.) 30 Tab 3    ondansetron (ZOFRAN ODT) 8 mg disintegrating tablet Take 1 Tab by mouth every eight (8) hours as needed for Nausea for up to 12 doses. 20 Tab 0    albuterol (PROVENTIL VENTOLIN) 2.5 mg /3 mL (0.083 %) nebulizer solution 3 mL by Nebulization route every four (4) hours as needed for Wheezing. 24 Each 0    Cetirizine (ZYRTEC) 10 mg cap Take  by mouth daily as needed.  gabapentin (NEURONTIN) 400 mg capsule Take 400 mg by mouth four (4) times daily.  atorvastatin (LIPITOR) 40 mg tablet Take 1 Tab by mouth nightly. 30 Tab 1    bisacodyl (DULCOLAX) 10 mg suppository Insert 10 mg into rectum as needed. 30 Suppository 1    ferrous sulfate 325 mg (65 mg iron) tablet Take 1 Tab by mouth two (2) times daily (with meals). (Patient taking differently: Take 325 mg by mouth two (2) times daily (with meals). Per patient will stop 3 days before procedure 6/11/2021.) 60 Tab 1    nystatin (MYCOSTATIN) powder Apply  to affected area two (2) times a day. 1 Bottle 3    polyethylene glycol (MIRALAX) 17 gram packet Take 1 Packet by mouth daily.  (Patient taking differently: Take 17 g by mouth daily as needed.) 30 Packet 1    senna-docusate (PERICOLACE) 8.6-50 mg per tablet Take 2 Tabs by mouth daily. 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     TREATED 2002 AND 2015    Rheumatoid arthritis [facility])        Past Surgical History:  Past Surgical History:   Procedure Laterality Date    COLONOSCOPY N/A 5/15/2018    COLONOSCOPY, DIAGNOSTIC  performed by Srinivasa Landers MD at [place] [address] N/A 6/11/2021    COLONOSCOPY performed by Ann Bazzi MD at [facility]    HX APPENDECTOMY      HX DILATION AND CURETTAGE      HX HEENT      Ear tubes    HX HYSTERECTOMY      HX OOPHORECTOMY      NEUROLOGICAL PROCEDURE UNLISTED  04/27/2016    T2-L2 Decomprssion and Fusion/T12 Corpetomy       Family History:  Family History   Problem Relation Age of Onset    Hypertension Mother     Diabetes Mother     Heart Disease Mother     Heart Disease Father     Diabetes Maternal Aunt     Cancer Maternal Uncle         preostate cancer    Diabetes Maternal Uncle        Social History:  Social History     Tobacco Use    Smoking status: Former Smoker     Years: 0.00    Smokeless tobacco: Never Used   Vaping Use    Vaping Use: Never used   Substance Use Topics    Alcohol use: Never    Drug use: No       Allergies: Allergies   Allergen Reactions    Iodinated Contrast Media Other (comments)     Patient states will pass out. Review of Systems   Review of Systems   Constitutional: Negative for chills and fever. HENT: Negative for congestion, rhinorrhea and sore throat. Respiratory: Negative for cough and shortness of breath. Cardiovascular: Negative for chest pain. Gastrointestinal: Negative for abdominal pain, blood in stool, constipation, diarrhea, nausea and vomiting. Genitourinary: Negative for dysuria, frequency and hematuria. Musculoskeletal: Positive for arthralgias and joint swelling. Negative for back pain and myalgias. Skin: Negative for rash and wound. Neurological: Negative for dizziness and headaches. All other systems reviewed and are negative. All Other Systems Negative  Physical Exam     Vitals:    11/17/21 1110 11/17/21 1245   BP: (!) 165/128    Pulse: 92    Resp: 16    Temp: 98.2 °F (36.8 °C)    SpO2: 97%    Weight:  114.3 kg (252 lb)   Height:  5' 4\" (1.626 m)     Physical Exam  Vitals and nursing note reviewed. Constitutional:       General: She is not in acute distress. Appearance: She is well-developed. She is not diaphoretic. HENT:      Head: Normocephalic and atraumatic. Mouth/Throat:      Mouth: Mucous membranes are dry. Eyes:      Conjunctiva/sclera: Conjunctivae normal.   Cardiovascular:      Rate and Rhythm: Normal rate and regular rhythm. Heart sounds: Normal heart sounds. Pulmonary:      Effort: Pulmonary effort is normal. No respiratory distress. Breath sounds: Normal breath sounds. Chest:      Chest wall: No tenderness. Abdominal:      General: Bowel sounds are normal. There is no distension. Palpations: Abdomen is soft. Tenderness: There is no abdominal tenderness. There is no guarding or rebound. Musculoskeletal:         General: No deformity. Left wrist: Swelling, tenderness, bony tenderness and crepitus present. No lacerations. Decreased range of motion. Normal pulse. Cervical back: Normal range of motion and neck supple. Comments: Extensive swelling and tenderness noted to the left wrist.  Obvious deformity. Strong radial pulse and full range of motion of all digits and joint spaces. Skin is intact.    Skin:     General: Skin is warm and dry. Neurological:      Mental Status: She is alert and oriented to person, place, and time. Deep Tendon Reflexes: Reflexes are normal and symmetric. Diagnostic Study Results     Labs -     Recent Results (from the past 12 hour(s))   CBC WITH AUTOMATED DIFF    Collection Time: 11/17/21  1:58 PM   Result Value Ref Range    WBC 8.1 4.6 - 13.2 K/uL    RBC 4.32 4.20 - 5.30 M/uL    HGB 11.4 (L) 12.0 - 16.0 g/dL    HCT 37.3 35.0 - 45.0 %    MCV 86.3 78.0 - 100.0 FL    MCH 26.4 24.0 - 34.0 PG    MCHC 30.6 (L) 31.0 - 37.0 g/dL    RDW 14.7 (H) 11.6 - 14.5 %    PLATELET 295 779 - 442 K/uL    MPV 9.4 9.2 - 11.8 FL    NRBC 0.0 0  WBC    ABSOLUTE NRBC 0.00 0.00 - 0.01 K/uL    NEUTROPHILS 65 40 - 73 %    LYMPHOCYTES 25 21 - 52 %    MONOCYTES 6 3 - 10 %    EOSINOPHILS 3 0 - 5 %    BASOPHILS 0 0 - 2 %    IMMATURE GRANULOCYTES 0 0.0 - 0.5 %    ABS. NEUTROPHILS 5.3 1.8 - 8.0 K/UL    ABS. LYMPHOCYTES 2.0 0.9 - 3.6 K/UL    ABS. MONOCYTES 0.5 0.05 - 1.2 K/UL    ABS. EOSINOPHILS 0.2 0.0 - 0.4 K/UL    ABS. BASOPHILS 0.0 0.0 - 0.1 K/UL    ABS. IMM. GRANS. 0.0 0.00 - 0.04 K/UL    DF AUTOMATED     METABOLIC PANEL, COMPREHENSIVE    Collection Time: 11/17/21  1:58 PM   Result Value Ref Range    Sodium 138 136 - 145 mmol/L    Potassium 5.7 (H) 3.5 - 5.5 mmol/L    Chloride 106 100 - 111 mmol/L    CO2 31 21 - 32 mmol/L    Anion gap 1 (L) 3.0 - 18 mmol/L    Glucose 96 74 - 99 mg/dL    BUN 7 7.0 - 18 MG/DL    Creatinine 0.84 0.6 - 1.3 MG/DL    BUN/Creatinine ratio 8 (L) 12 - 20      GFR est AA >60 >60 ml/min/1.73m2    GFR est non-AA >60 >60 ml/min/1.73m2    Calcium 8.8 8.5 - 10.1 MG/DL    Bilirubin, total 0.6 0.2 - 1.0 MG/DL    ALT (SGPT) 15 13 - 56 U/L    AST (SGOT) 44 (H) 10 - 38 U/L    Alk.  phosphatase 96 45 - 117 U/L    Protein, total 7.7 6.4 - 8.2 g/dL    Albumin 2.7 (L) 3.4 - 5.0 g/dL    Globulin 5.0 (H) 2.0 - 4.0 g/dL    A-G Ratio 0.5 (L) 0.8 - 1.7     MAGNESIUM    Collection Time: 11/17/21  1:58 PM Result Value Ref Range    Magnesium 2.2 1.6 - 2.6 mg/dL   METABOLIC PANEL, BASIC    Collection Time: 11/17/21  3:25 PM   Result Value Ref Range    Sodium 143 136 - 145 mmol/L    Potassium 3.7 3.5 - 5.5 mmol/L    Chloride 108 100 - 111 mmol/L    CO2 33 (H) 21 - 32 mmol/L    Anion gap 2 (L) 3.0 - 18 mmol/L    Glucose 99 74 - 99 mg/dL    BUN 6 (L) 7.0 - 18 MG/DL    Creatinine 0.76 0.6 - 1.3 MG/DL    BUN/Creatinine ratio 8 (L) 12 - 20      GFR est AA >60 >60 ml/min/1.73m2    GFR est non-AA >60 >60 ml/min/1.73m2    Calcium 8.4 (L) 8.5 - 10.1 MG/DL       Radiologic Studies -   XR WRIST LT AP/LAT/OBL MIN 3V   Final Result   1. Acute fracture of the distal radius and distal ulna with apex anterior   angulation. 2.  Anasarca. XR CHEST PORT   Final Result   1. Low lung volumes, otherwise no acute process. CT Results  (Last 48 hours)    None        CXR Results  (Last 48 hours)               11/17/21 1142  XR CHEST PORT Final result    Impression:  1. Low lung volumes, otherwise no acute process. Narrative:  XR CHEST PORT       Indication: Dizzy       Comparison: 2/17/2020       Findings:   Lung volumes are mildly diminished. No effusion or pneumothorax. Heart is   borderline enlarged stable. Stable postsurgical changes in the spine. Medical Decision Making   I am the first provider for this patient. I reviewed the vital signs, available nursing notes, past medical history, past surgical history, family history and social history. Vital Signs-Reviewed the patient's vital signs. Records Reviewed: Nursing Notes and Old Medical Records     Procedures: None   Procedures    Provider Notes (Medical Decision Making):       Differential: ACS, arrhythmia, pneumonia, dehydration, fracture, dislocation, contusion, hematoma      Plan: We will order EKG, chest x-ray, labs and fluids. Patient's mouth is very dry, suspect dehydration.   Will order x-rays of left wrist to confirm fracture. 4:34 PM  Have discussed overall reassuring work-up with patient. Have discussed findings of radial and ulnar fracture. Will place in splint. Have reviewed imaging with Dr. Melodee Goldmann. Patient reports that she has seen Ortho in the past and states she will have no issues  with close follow-up. Patient reports she is already on Percocet as needed for pain. Have given compartment syndrome precautions. Patient states understanding. Patient reports she feels much better after her fluids. Return precautions have been given. Will discharge home. ED Course as of 11/17/21 1634   Wed Nov 17, 2021   1344 Pending US guided IV [CS]   7261 Patients chemistry was hemolyzed, will repeat. [CS]      ED Course User Index  [CS] Lawrence Nishant, [address]         MED RECONCILIATION:  Current Facility-Administered Medications   Medication Dose Route Frequency    oxyCODONE-acetaminophen (PERCOCET) 5-325 mg per tablet 1 Tablet  1 Tablet Oral NOW     Current Outpatient Medications   Medication Sig    oxybutynin chloride XL (Ditropan XL) 10 mg CR tablet Take 10 mg by mouth daily.  LORazepam (ATIVAN) 0.5 mg tablet Take  by mouth as needed. (Patient not taking: Reported on 6/7/2021)    montelukast (SINGULAIR) 10 mg tablet Take 10 mg by mouth daily as needed.  DULoxetine (CYMBALTA) 60 mg capsule Take 60 mg by mouth nightly.  loperamide (IMMODIUM) 2 mg tablet Take 2 mg by mouth four (4) times daily as needed for Diarrhea.  furosemide (LASIX) 40 mg tablet Take  by mouth daily.  cloNIDine HCL (CATAPRES) 0.1 mg tablet Take  by mouth two (2) times a day.  acetaminophen (TYLENOL) 325 mg tablet Take  by mouth every four (4) hours as needed for Pain.  rivaroxaban (XARELTO) 10 mg tablet Take 1 Tab by mouth daily (with breakfast). (Patient taking differently: Take 10 mg by mouth daily (with breakfast).  Per patient will stop 3 days before procedure 6/11/2021.)    ondansetron (ZOFRAN ODT) 8 mg disintegrating tablet Take 1 Tab by mouth every eight (8) hours as needed for Nausea for up to 12 doses.  albuterol (PROVENTIL VENTOLIN) 2.5 mg /3 mL (0.083 %) nebulizer solution 3 mL by Nebulization route every four (4) hours as needed for Wheezing.  Cetirizine (ZYRTEC) 10 mg cap Take  by mouth daily as needed.  gabapentin (NEURONTIN) 400 mg capsule Take 400 mg by mouth four (4) times daily.  atorvastatin (LIPITOR) 40 mg tablet Take 1 Tab by mouth nightly.  bisacodyl (DULCOLAX) 10 mg suppository Insert 10 mg into rectum as needed.  ferrous sulfate 325 mg (65 mg iron) tablet Take 1 Tab by mouth two (2) times daily (with meals). (Patient taking differently: Take 325 mg by mouth two (2) times daily (with meals). Per patient will stop 3 days before procedure 6/11/2021.)    nystatin (MYCOSTATIN) powder Apply  to affected area two (2) times a day.  polyethylene glycol (MIRALAX) 17 gram packet Take 1 Packet by mouth daily. (Patient taking differently: Take 17 g by mouth daily as needed.)    senna-docusate (PERICOLACE) 8.6-50 mg per tablet Take 2 Tabs by mouth daily. Disposition:  Home     DISCHARGE NOTE:   Pt has been reexamined. Patient has no new complaints, changes, or physical findings. Care plan outlined and precautions discussed. Results of workup were reviewed with the patient. All medications were reviewed with the patient. All of pt's questions and concerns were addressed. Patient was instructed and agrees to follow up with PCP as well as to return to the ED upon further deterioration. Patient is ready to go home.     Follow-up Information     Follow up With Specialties Details Why Contact Info    [facility] EMERGENCY DEPT Emergency Medicine  As needed [address] [address] [facility]  Orthopedic Surgery Schedule an appointment as soon as possible for a visit   [address], [address] [facility] [place]  [phone] Current Discharge Medication List              Diagnosis     Clinical Impression:   1. Other closed fracture of distal end of left ulna, initial encounter    2. Closed nondisplaced fracture of head of left radius, initial encounter          \"Please note that this dictation was completed with [employer], the computer voice recognition software. Quite often unanticipated grammatical, syntax, homophones, and other interpretive errors are inadvertently transcribed by the computer software. Please disregard these errors. Please excuse any errors that have escaped final proofreading. \"

## 2021-11-17 NOTE — ED NOTES
Called [facility], spoke to Yunior Davis, let them know she was being returned tonight.

## 2021-11-18 ENCOUNTER — TELEPHONE (OUTPATIENT)
Dept: ORTHOPEDIC SURGERY | Age: 66
End: 2021-11-18

## 2021-11-18 LAB
ATRIAL RATE: 71 BPM
CALCULATED R AXIS, ECG10: -10 DEGREES
CALCULATED T AXIS, ECG11: -20 DEGREES
DIAGNOSIS, 93000: NORMAL
P-R INTERVAL, ECG05: 128 MS
Q-T INTERVAL, ECG07: 400 MS
QRS DURATION, ECG06: 86 MS
QTC CALCULATION (BEZET), ECG08: 434 MS
VENTRICULAR RATE, ECG03: 71 BPM

## 2021-11-18 NOTE — TELEPHONE ENCOUNTER
Pamella Issa from [facility] is trying to schedule a follow up from ED for this resident.   Other closed fracture of distal end of left ulna - please review to determine proper placement, thank you

## 2021-11-18 NOTE — TELEPHONE ENCOUNTER
Called [facility] to schedule this appt and was transferred to a line that went to [facility] to call our office for the appt information. Please schedule for 11/22 at 10:45am at [facility] when [facility] returns the call.

## 2021-11-22 ENCOUNTER — OFFICE VISIT (OUTPATIENT)
Dept: ORTHOPEDIC SURGERY | Age: 66
End: 2021-11-22
Payer: MEDICARE

## 2021-11-22 VITALS — TEMPERATURE: 97.6 F | HEART RATE: 97 BPM | OXYGEN SATURATION: 99 %

## 2021-11-22 DIAGNOSIS — S52.532A CLOSED COLLES' FRACTURE OF LEFT RADIUS, INITIAL ENCOUNTER: Primary | ICD-10-CM

## 2021-11-22 PROCEDURE — 99215 OFFICE O/P EST HI 40 MIN: CPT | Performed by: ORTHOPAEDIC SURGERY

## 2021-11-22 PROCEDURE — 1101F PT FALLS ASSESS-DOCD LE1/YR: CPT | Performed by: ORTHOPAEDIC SURGERY

## 2021-11-22 PROCEDURE — G8427 DOCREV CUR MEDS BY ELIG CLIN: HCPCS | Performed by: ORTHOPAEDIC SURGERY

## 2021-11-22 PROCEDURE — G8417 CALC BMI ABV UP PARAM F/U: HCPCS | Performed by: ORTHOPAEDIC SURGERY

## 2021-11-22 PROCEDURE — G8510 SCR DEP NEG, NO PLAN REQD: HCPCS | Performed by: ORTHOPAEDIC SURGERY

## 2021-11-22 PROCEDURE — 1090F PRES/ABSN URINE INCON ASSESS: CPT | Performed by: ORTHOPAEDIC SURGERY

## 2021-11-22 PROCEDURE — 3017F COLORECTAL CA SCREEN DOC REV: CPT | Performed by: ORTHOPAEDIC SURGERY

## 2021-11-22 PROCEDURE — G8536 NO DOC ELDER MAL SCRN: HCPCS | Performed by: ORTHOPAEDIC SURGERY

## 2021-11-22 NOTE — PROGRESS NOTES
Tamica Ashraf is a 77 y.o. female right handed retiree. Worker's Compensation and legal considerations: none filed. Vitals:    11/22/21 1044   Pulse: 97   Temp: 97.6 °F (36.4 °C)   TempSrc: Temporal   SpO2: 99%   PainSc:   6   PainLoc: Wrist           Chief Complaint   Patient presents with    Wrist Pain         HPI: Patient presents today for ER follow-up for right distal radius fracture. She comes from an assisted living facility.     Date of onset: 11/17/2021    Injury: Yes: Comment: Fall    Prior Treatment:  Yes: Comment: ED splint    Numbness/ Tingling: No      ROS: Review of Systems - General ROS: negative  Psychological ROS: negative  ENT ROS: negative  Allergy and Immunology ROS: negative  Hematological and Lymphatic ROS: negative  Respiratory ROS: no cough, shortness of breath, or wheezing  Cardiovascular ROS: no chest pain or dyspnea on exertion  Gastrointestinal ROS: no abdominal pain, change in bowel habits, or black or bloody stools  Musculoskeletal ROS: negative  Neurological ROS: negative  Dermatological ROS: negative    Past Medical History:   Diagnosis Date    [address]fib ([facility] [address]     Arthritis     Childhood asthma     History of seasonal allergies     Hypertension     MSSA (methicillin susceptible Staphylococcus aureus) 2016    bone    Neuropathy     lower extremities    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     TREATED 2002 AND 2015    Rheumatoid arthritis ([facility] [address]        Past Surgical History:   Procedure Laterality Date    COLONOSCOPY N/A 5/15/2018    COLONOSCOPY, DIAGNOSTIC  performed by Shantal Garber MD at [address] [place] [address] 6/11/2021    COLONOSCOPY performed by Veronica Bolaños MD at [facility]COPY    HX APPENDECTOMY      HX DILATION AND CURETTAGE      HX HEENT      Ear tubes    HX HYSTERECTOMY      HX OOPHORECTOMY      NEUROLOGICAL PROCEDURE UNLISTED  04/27/2016    T2-L2 Decomprssion and Fusion/T12 Corpetomy       Current Outpatient Medications   Medication Sig Dispense Refill    oxybutynin chloride XL (Ditropan XL) 10 mg CR tablet Take 10 mg by mouth daily.  LORazepam (ATIVAN) 0.5 mg tablet Take  by mouth as needed.  montelukast (SINGULAIR) 10 mg tablet Take 10 mg by mouth daily as needed.  DULoxetine (CYMBALTA) 60 mg capsule Take 60 mg by mouth nightly.  loperamide (IMMODIUM) 2 mg tablet Take 2 mg by mouth four (4) times daily as needed for Diarrhea.  furosemide (LASIX) 40 mg tablet Take  by mouth daily.  cloNIDine HCL (CATAPRES) 0.1 mg tablet Take  by mouth two (2) times a day.  acetaminophen (TYLENOL) 325 mg tablet Take  by mouth every four (4) hours as needed for Pain.  rivaroxaban (XARELTO) 10 mg tablet Take 1 Tab by mouth daily (with breakfast). (Patient taking differently: Take 10 mg by mouth daily (with breakfast). Per patient will stop 3 days before procedure 6/11/2021.) 30 Tab 3    ondansetron (ZOFRAN ODT) 8 mg disintegrating tablet Take 1 Tab by mouth every eight (8) hours as needed for Nausea for up to 12 doses. 20 Tab 0    albuterol (PROVENTIL VENTOLIN) 2.5 mg /3 mL (0.083 %) nebulizer solution 3 mL by Nebulization route every four (4) hours as needed for Wheezing. 24 Each 0    Cetirizine (ZYRTEC) 10 mg cap Take  by mouth daily as needed.  gabapentin (NEURONTIN) 400 mg capsule Take 400 mg by mouth four (4) times daily.  atorvastatin (LIPITOR) 40 mg tablet Take 1 Tab by mouth nightly. 30 Tab 1    bisacodyl (DULCOLAX) 10 mg suppository Insert 10 mg into rectum as needed. 30 Suppository 1    ferrous sulfate 325 mg (65 mg iron) tablet Take 1 Tab by mouth two (2) times daily (with meals). (Patient taking differently: Take 325 mg by mouth two (2) times daily (with meals). Per patient will stop 3 days before procedure 6/11/2021.) 60 Tab 1    nystatin (MYCOSTATIN) powder Apply  to affected area two (2) times a day.  1 Bottle 3    polyethylene glycol (MIRALAX) 17 gram packet Take 1 Packet by mouth daily. (Patient taking differently: Take 17 g by mouth daily as needed.) 30 Packet 1    senna-docusate (PERICOLACE) 8.6-50 mg per tablet Take 2 Tabs by mouth daily. 60 Tab 1       Allergies   Allergen Reactions    Iodinated Contrast Media Other (comments)     Patient states will pass out. PE:     Physical Exam  Vitals and nursing note reviewed. Constitutional:       General: She is not in acute distress. Appearance: Normal appearance. She is not ill-appearing. Cardiovascular:      Pulses: Normal pulses. Pulmonary:      Effort: Pulmonary effort is normal. No respiratory distress. Musculoskeletal:         General: Swelling and tenderness present. No deformity or signs of injury. Cervical back: Normal range of motion and neck supple. Right lower leg: No edema. Left lower leg: No edema. Skin:     General: Skin is warm and dry. Capillary Refill: Capillary refill takes less than 2 seconds. Findings: No bruising or erythema. Neurological:      General: No focal deficit present. Mental Status: She is alert and oriented to person, place, and time. Psychiatric:         Mood and Affect: Mood normal.         Behavior: Behavior normal.            Left wrist: There is tenderness to palpation and edema about the wrist however a large soft tissue envelope. Sensation is grossly intact distally and cap refill brisk. There is minimal deformity noted. Imaging:     External injury films of left wrist shows a mildly angulated distal radius fracture in the dorsal direction. There is minimal shortening and inclination is largely maintained. ICD-10-CM ICD-9-CM    1. Closed Colles' fracture of left radius, initial encounter  S52.532A 813.41 AMB SUPPLY ORDER         Plan:     Left wrist brace to be worn at all times like a cast.  I discussed possible casting versus operative fixation.   Via shared decision making, it was decided that a brace would be best for the patient at this time. Follow-up and Dispositions    · Return in about 3 weeks (around 12/13/2021) for Reevaluation and x-rays.           Plan was reviewed with patient, who verbalized agreement and understanding of the plan

## 2021-12-13 ENCOUNTER — OFFICE VISIT (OUTPATIENT)
Dept: ORTHOPEDIC SURGERY | Age: 66
End: 2021-12-13
Payer: MEDICARE

## 2021-12-13 VITALS
DIASTOLIC BLOOD PRESSURE: 74 MMHG | TEMPERATURE: 96.8 F | HEART RATE: 91 BPM | OXYGEN SATURATION: 97 % | SYSTOLIC BLOOD PRESSURE: 128 MMHG

## 2021-12-13 DIAGNOSIS — S52.532D CLOSED COLLES' FRACTURE OF LEFT RADIUS WITH ROUTINE HEALING, SUBSEQUENT ENCOUNTER: Primary | ICD-10-CM

## 2021-12-13 PROCEDURE — 99214 OFFICE O/P EST MOD 30 MIN: CPT | Performed by: ORTHOPAEDIC SURGERY

## 2021-12-13 PROCEDURE — G8417 CALC BMI ABV UP PARAM F/U: HCPCS | Performed by: ORTHOPAEDIC SURGERY

## 2021-12-13 PROCEDURE — 1101F PT FALLS ASSESS-DOCD LE1/YR: CPT | Performed by: ORTHOPAEDIC SURGERY

## 2021-12-13 PROCEDURE — G8427 DOCREV CUR MEDS BY ELIG CLIN: HCPCS | Performed by: ORTHOPAEDIC SURGERY

## 2021-12-13 PROCEDURE — 3017F COLORECTAL CA SCREEN DOC REV: CPT | Performed by: ORTHOPAEDIC SURGERY

## 2021-12-13 PROCEDURE — G8510 SCR DEP NEG, NO PLAN REQD: HCPCS | Performed by: ORTHOPAEDIC SURGERY

## 2021-12-13 PROCEDURE — 73110 X-RAY EXAM OF WRIST: CPT | Performed by: ORTHOPAEDIC SURGERY

## 2021-12-13 PROCEDURE — G8536 NO DOC ELDER MAL SCRN: HCPCS | Performed by: ORTHOPAEDIC SURGERY

## 2021-12-13 PROCEDURE — 1090F PRES/ABSN URINE INCON ASSESS: CPT | Performed by: ORTHOPAEDIC SURGERY

## 2021-12-13 NOTE — PROGRESS NOTES
Vern Dumont is a 77 y.o. female right handed retiree. Worker's Compensation and legal considerations: none filed. Vitals:    12/13/21 0945   BP: 128/74   Pulse: 91   Temp: 96.8 °F (36 °C)   TempSrc: Temporal   SpO2: 97%   PainSc:   8   PainLoc: Wrist           Chief Complaint   Patient presents with    Wrist Pain     left        HPI: Patient returns today for follow-up of left wrist fracture. She has been wearing her brace except for occasionally to ice    Initial HPI: Patient presents today for ER follow-up for right distal radius fracture. She comes from an assisted living facility.     Date of onset: 11/17/2021    Injury: Yes: Comment: Fall    Prior Treatment:  Yes: Comment: ED splint    Numbness/ Tingling: No      ROS: Review of Systems - General ROS: negative  Psychological ROS: negative  ENT ROS: negative  Allergy and Immunology ROS: negative  Hematological and Lymphatic ROS: negative  Respiratory ROS: no cough, shortness of breath, or wheezing  Cardiovascular ROS: no chest pain or dyspnea on exertion  Gastrointestinal ROS: no abdominal pain, change in bowel habits, or black or bloody stools  Musculoskeletal ROS: negative  Neurological ROS: negative  Dermatological ROS: negative    Past Medical History:   Diagnosis Date    A-fib ([address]     Arthritis     Childhood asthma     History of seasonal allergies     Hypertension     MSSA (methicillin susceptible Staphylococcus aureus) 2016    bone    Neuropathy     lower extremities    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     TREATED 2002 AND 2015    Rheumatoid arthritis [facility])        Past Surgical History:   Procedure Laterality Date    COLONOSCOPY N/A 5/15/2018    COLONOSCOPY, DIAGNOSTIC  performed by Javier Rivera MD at [place] [address] N/A 6/11/2021    COLONOSCOPY performed by David León MD at [facility] END[facility]COPY    HX APPENDECTOMY      HX DILATION AND CURETTAGE      HX HEENT      Ear tubes    HX HYSTERECTOMY      HX OOPHORECTOMY      NEUROLOGICAL PROCEDURE UNLISTED  04/27/2016    T2-L2 Decomprssion and Fusion/T12 Corpetomy       Current Outpatient Medications   Medication Sig Dispense Refill    oxybutynin chloride XL (Ditropan XL) 10 mg CR tablet Take 10 mg by mouth daily.  LORazepam (ATIVAN) 0.5 mg tablet Take  by mouth as needed.  montelukast (SINGULAIR) 10 mg tablet Take 10 mg by mouth daily as needed.  DULoxetine (CYMBALTA) 60 mg capsule Take 60 mg by mouth nightly.  loperamide (IMMODIUM) 2 mg tablet Take 2 mg by mouth four (4) times daily as needed for Diarrhea.  furosemide (LASIX) 40 mg tablet Take  by mouth daily.  cloNIDine HCL (CATAPRES) 0.1 mg tablet Take  by mouth two (2) times a day.  acetaminophen (TYLENOL) 325 mg tablet Take  by mouth every four (4) hours as needed for Pain.  ondansetron (ZOFRAN ODT) 8 mg disintegrating tablet Take 1 Tab by mouth every eight (8) hours as needed for Nausea for up to 12 doses. 20 Tab 0    albuterol (PROVENTIL VENTOLIN) 2.5 mg /3 mL (0.083 %) nebulizer solution 3 mL by Nebulization route every four (4) hours as needed for Wheezing. 24 Each 0    Cetirizine (ZYRTEC) 10 mg cap Take  by mouth daily as needed.  gabapentin (NEURONTIN) 400 mg capsule Take 400 mg by mouth four (4) times daily.  atorvastatin (LIPITOR) 40 mg tablet Take 1 Tab by mouth nightly. 30 Tab 1    bisacodyl (DULCOLAX) 10 mg suppository Insert 10 mg into rectum as needed. 30 Suppository 1    nystatin (MYCOSTATIN) powder Apply  to affected area two (2) times a day. 1 Bottle 3    polyethylene glycol (MIRALAX) 17 gram packet Take 1 Packet by mouth daily. (Patient taking differently: Take 17 g by mouth daily as needed.) 30 Packet 1    senna-docusate (PERICOLACE) 8.6-50 mg per tablet Take 2 Tabs by mouth daily. 60 Tab 1    rivaroxaban (XARELTO) 10 mg tablet Take 1 Tab by mouth daily (with breakfast).  (Patient not taking: Reported on 2021) 30 Tab 3    ferrous sulfate 325 mg (65 mg iron) tablet Take 1 Tab by mouth two (2) times daily (with meals). (Patient not taking: Reported on 2021) 60 Tab 1       Allergies   Allergen Reactions    Iodinated Contrast Media Other (comments)     Patient states will pass out. PE:     Physical Exam  Vitals and nursing note reviewed. Constitutional:       General: She is not in acute distress. Appearance: Normal appearance. She is not ill-appearing, toxic-appearing or diaphoretic. HENT:      Head: Normocephalic and atraumatic. Nose: Nose normal.      Mouth/Throat:      Mouth: Mucous membranes are moist.   Eyes:      Extraocular Movements: Extraocular movements intact. Pupils: Pupils are equal, round, and reactive to light. Cardiovascular:      Pulses: Normal pulses. Pulmonary:      Effort: Pulmonary effort is normal. No respiratory distress. Abdominal:      General: Abdomen is flat. There is no distension. Musculoskeletal:         General: Swelling and tenderness present. No deformity or signs of injury. Cervical back: Normal range of motion and neck supple. Right lower leg: No edema. Left lower leg: No edema. Skin:     General: Skin is warm and dry. Capillary Refill: Capillary refill takes less than 2 seconds. Findings: No bruising or erythema. Neurological:      General: No focal deficit present. Mental Status: She is alert and oriented to person, place, and time. Psychiatric:         Mood and Affect: Mood normal.         Behavior: Behavior normal.            Left wrist: Gross deformity noted about the dorsal wrist.  Neurovascularly intact. Range of motion limited. Tenderness over dorsal wrist.      Imagin2021 3 views of left wrist shows significant displacement and dorsal angulation of the previously seen fracture. Minimal callus formation noted.     External injury films of left wrist shows a mildly angulated distal radius fracture in the dorsal direction. There is minimal shortening and inclination is largely maintained. ICD-10-CM ICD-9-CM    1. Closed Colles' fracture of left radius with routine healing, subsequent encounter  S52.532D V54.12 AMB POC XRAY, WRIST; COMPLETE, 3+ VIE      SCHEDULE SURGERY         Plan:     Schedule left distal radius open reduction internal fixation. Continue brace until surgery. I had a thorough discussion with the patient regarding operative versus nonoperative treatment and she would like to move forward with fixation. The patient was counseled at length about the risks of en Covid-19 during their perioperative period and any recovery window from their procedure. The patient was made aware that en Covid-19  may worsen their prognosis for recovering from their procedure and lend to a higher morbidity and/or mortality risk. All material risks, benefits, and reasonable alternatives including postponing the procedure were discussed. The patient does  wish to proceed with the procedure at this time. This procedure has been fully reviewed with the patient and written informed consent has been obtained. Follow-up and Dispositions    · Return for postop.   Follow-up and Disposition History          Plan was reviewed with patient, who verbalized agreement and understanding of the plan

## 2021-12-14 NOTE — PERIOP NOTES
PRE-SURGICAL INSTRUCTIONS        Patient's Name:  Cy LANDA Date:  12/14/2021            Covid Testing Date and Time:    Surgery Date:  12/21/2021                1. Do NOT eat or drink anything, including candy, gum, or ice chips after midnight on 12/20/2021, unless you have specific instructions from your surgeon or anesthesia provider to do so.  2. You may brush your teeth before coming to the hospital.  3. No smoking 24 hours prior to the day of surgery. 4. No alcohol 24 hours prior to the day of surgery. 5. No recreational drugs for one week prior to the day of surgery. 6. Leave all valuables, including money/purse, at home. 7. Remove all jewelry, nail polish, acrylic nails, and makeup (including mascara); no lotions powders, deodorant, or perfume/cologne/after shave on the skin. 8. Follow instruction for Hibiclens washes and CHG wipes from surgeon's office. 9. Glasses/contact lenses and dentures may be worn to the hospital.  They will be removed prior to surgery. 10. Call your doctor if symptoms of a cold or illness develop within 24-48 hours prior to your surgery. 11.  If you are having an outpatient procedure, please make arrangements for a responsible ADULT TO [address] [place] [address] and stay with you for 24 hours after your surgery. 12. ONE VISITOR in the hospital at this time for outpatient procedures. Exceptions may be made for surgical admissions, per nursing unit guidelines      Special Instructions:      Bring list of CURRENT medications. Bring inhaler. Bring CPAP machine. Bring any pertinent legal medical records. Take these medications the morning of surgery with a sip of water:  Per MD instruction  Follow physician instructions about insulin. Follow physician instructions about stopping anticoagulants. Complete bowel prep per MD instructions. On the day of surgery, come in the main entrance of [facility].   Let the  at the desk know you are there for surgery. A staff member will come escort you to the surgical area on the second floor. If you have any questions or concerns, please do not hesitate to call:     (Prior to the day of surgery) [facility] department:  [phone]   (Day of surgery) Pre-Op department:  [phone]    These surgical instructions were reviewed with patient during the PAT phone call.

## 2021-12-15 RX ORDER — POTASSIUM CHLORIDE 1500 MG/1
TABLET, FILM COATED, EXTENDED RELEASE ORAL
COMMUNITY
Start: 2021-12-04 | End: 2022-07-05 | Stop reason: ALTCHOICE

## 2021-12-15 RX ORDER — ASPIRIN 325 MG
TABLET, DELAYED RELEASE (ENTERIC COATED) ORAL
COMMUNITY

## 2021-12-15 RX ORDER — OXYCODONE HYDROCHLORIDE 10 MG/1
TABLET ORAL
COMMUNITY
Start: 2021-12-04

## 2021-12-15 RX ORDER — PROMETHAZINE HYDROCHLORIDE 12.5 MG/1
TABLET ORAL
COMMUNITY
Start: 2021-12-04 | End: 2022-07-05 | Stop reason: ALTCHOICE

## 2021-12-15 RX ORDER — MECLIZINE HCL 12.5 MG 12.5 MG/1
TABLET ORAL DAILY
COMMUNITY
End: 2022-07-05 | Stop reason: DRUGHIGH

## 2021-12-15 RX ORDER — NITROGLYCERIN 0.4 MG/1
0.4 TABLET SUBLINGUAL
COMMUNITY

## 2021-12-15 RX ORDER — FAMOTIDINE 20 MG/1
TABLET, FILM COATED ORAL
COMMUNITY
Start: 2021-11-25

## 2021-12-15 RX ORDER — IPRATROPIUM BROMIDE AND ALBUTEROL SULFATE 2.5; .5 MG/3ML; MG/3ML
3 SOLUTION RESPIRATORY (INHALATION)
COMMUNITY

## 2021-12-15 RX ORDER — LIDOCAINE 4 G/100G
PATCH TOPICAL EVERY 24 HOURS
COMMUNITY

## 2021-12-20 ENCOUNTER — ANESTHESIA EVENT (OUTPATIENT)
Dept: SURGERY | Age: 66
End: 2021-12-20
Payer: MEDICARE

## 2021-12-21 ENCOUNTER — ANESTHESIA (OUTPATIENT)
Dept: SURGERY | Age: 66
End: 2021-12-21
Payer: MEDICARE

## 2021-12-21 ENCOUNTER — HOSPITAL ENCOUNTER (OUTPATIENT)
Age: 66
Setting detail: OUTPATIENT SURGERY
Discharge: HOME | End: 2021-12-21
Attending: ORTHOPAEDIC SURGERY | Admitting: ORTHOPAEDIC SURGERY
Payer: MEDICARE

## 2021-12-21 VITALS
TEMPERATURE: 97.6 F | HEART RATE: 79 BPM | OXYGEN SATURATION: 99 % | HEIGHT: 65 IN | DIASTOLIC BLOOD PRESSURE: 58 MMHG | RESPIRATION RATE: 17 BRPM | WEIGHT: 247.1 LBS | BODY MASS INDEX: 41.17 KG/M2 | SYSTOLIC BLOOD PRESSURE: 122 MMHG

## 2021-12-21 DIAGNOSIS — S52.532D CLOSED COLLES' FRACTURE OF LEFT RADIUS WITH ROUTINE HEALING: Primary | ICD-10-CM

## 2021-12-21 LAB — GLUCOSE BLD STRIP.AUTO-MCNC: 115 MG/DL (ref 70–110)

## 2021-12-21 PROCEDURE — 77030040361 HC SLV COMPR DVT MDII -B: Performed by: ORTHOPAEDIC SURGERY

## 2021-12-21 PROCEDURE — 64450 NJX AA&/STRD OTHER PN/BRANCH: CPT | Performed by: ANESTHESIOLOGY

## 2021-12-21 PROCEDURE — 01830 ANES ARTHR/NDSC WRST/HND NOS: CPT | Performed by: NURSE ANESTHETIST, CERTIFIED REGISTERED

## 2021-12-21 PROCEDURE — 74011250636 HC RX REV CODE- 250/636: Performed by: ANESTHESIOLOGY

## 2021-12-21 PROCEDURE — 77030020274 HC MISC IMPL ORTHOPEDIC: Performed by: ORTHOPAEDIC SURGERY

## 2021-12-21 PROCEDURE — C1713 ANCHOR/SCREW BN/BN,TIS/BN: HCPCS | Performed by: ORTHOPAEDIC SURGERY

## 2021-12-21 PROCEDURE — 77030006689 HC BLD OPHTH BVR BD -A: Performed by: ORTHOPAEDIC SURGERY

## 2021-12-21 PROCEDURE — 77030008829 HC WRE K ACMD -B: Performed by: ORTHOPAEDIC SURGERY

## 2021-12-21 PROCEDURE — 77030003601 HC NDL NRV BLK BBMI -A: Performed by: ORTHOPAEDIC SURGERY

## 2021-12-21 PROCEDURE — 76210000006 HC OR PH I REC 0.5 TO 1 HR: Performed by: ORTHOPAEDIC SURGERY

## 2021-12-21 PROCEDURE — 77030003736 HC BIT DRL ACMD -B: Performed by: ORTHOPAEDIC SURGERY

## 2021-12-21 PROCEDURE — 77030018836 HC SOL IRR NACL ICUM -A: Performed by: ORTHOPAEDIC SURGERY

## 2021-12-21 PROCEDURE — 76942 ECHO GUIDE FOR BIOPSY: CPT | Performed by: ANESTHESIOLOGY

## 2021-12-21 PROCEDURE — 77030040356 HC CORD BPLR FRCP COVD -A: Performed by: ORTHOPAEDIC SURGERY

## 2021-12-21 PROCEDURE — 74011000250 HC RX REV CODE- 250: Performed by: ANESTHESIOLOGY

## 2021-12-21 PROCEDURE — C9290 INJ, BUPIVACAINE LIPOSOME: HCPCS | Performed by: ORTHOPAEDIC SURGERY

## 2021-12-21 PROCEDURE — L3650 SO 8 ABD RESTRAINT PRE OTS: HCPCS | Performed by: ORTHOPAEDIC SURGERY

## 2021-12-21 PROCEDURE — 2709999900 HC NON-CHARGEABLE SUPPLY: Performed by: ORTHOPAEDIC SURGERY

## 2021-12-21 PROCEDURE — 74011250637 HC RX REV CODE- 250/637: Performed by: NURSE ANESTHETIST, CERTIFIED REGISTERED

## 2021-12-21 PROCEDURE — 74011250636 HC RX REV CODE- 250/636: Performed by: NURSE ANESTHETIST, CERTIFIED REGISTERED

## 2021-12-21 PROCEDURE — 77030040922 HC BLNKT HYPOTHRM STRY -A: Performed by: ORTHOPAEDIC SURGERY

## 2021-12-21 PROCEDURE — 25609 OPTX DST RD XART FX/EP SEP3+: CPT | Performed by: ORTHOPAEDIC SURGERY

## 2021-12-21 PROCEDURE — 74011250636 HC RX REV CODE- 250/636: Performed by: ORTHOPAEDIC SURGERY

## 2021-12-21 PROCEDURE — 74011000250 HC RX REV CODE- 250: Performed by: ORTHOPAEDIC SURGERY

## 2021-12-21 PROCEDURE — 77030003737 HC BIT DRL ACMD -C: Performed by: ORTHOPAEDIC SURGERY

## 2021-12-21 PROCEDURE — 76010000131 HC OR TIME 2 TO 2.5 HR: Performed by: ORTHOPAEDIC SURGERY

## 2021-12-21 PROCEDURE — 82962 GLUCOSE BLOOD TEST: CPT

## 2021-12-21 PROCEDURE — 77030002933 HC SUT MCRYL J&J -A: Performed by: ORTHOPAEDIC SURGERY

## 2021-12-21 PROCEDURE — 74011000258 HC RX REV CODE- 258: Performed by: NURSE ANESTHETIST, CERTIFIED REGISTERED

## 2021-12-21 PROCEDURE — 01830 ANES ARTHR/NDSC WRST/HND NOS: CPT | Performed by: ANESTHESIOLOGY

## 2021-12-21 PROCEDURE — 74011000250 HC RX REV CODE- 250: Performed by: NURSE ANESTHETIST, CERTIFIED REGISTERED

## 2021-12-21 PROCEDURE — 76060000035 HC ANESTHESIA 2 TO 2.5 HR: Performed by: ORTHOPAEDIC SURGERY

## 2021-12-21 PROCEDURE — 76210000026 HC REC RM PH II 1 TO 1.5 HR: Performed by: ORTHOPAEDIC SURGERY

## 2021-12-21 DEVICE — 2.3MM X 22MM LOCKING CORTICAL SCREW
Type: IMPLANTABLE DEVICE | Site: WRIST | Status: FUNCTIONAL
Brand: ACUMED

## 2021-12-21 DEVICE — 3.5MM X 12MM LOCKING HEXALOBE SCREW
Type: IMPLANTABLE DEVICE | Site: WRIST | Status: FUNCTIONAL
Brand: ACUMED

## 2021-12-21 DEVICE — 2.3MM X 20MM LOCKING CORTICAL SCREW
Type: IMPLANTABLE DEVICE | Site: WRIST | Status: FUNCTIONAL
Brand: ACUMED

## 2021-12-21 DEVICE — ACU-LOC® 2 VDR PLT STD L
Type: IMPLANTABLE DEVICE | Site: WRIST | Status: FUNCTIONAL
Brand: ACUMED

## 2021-12-21 DEVICE — 2.3MM X 18MM LOCKING CORTICAL SCREW
Type: IMPLANTABLE DEVICE | Site: WRIST | Status: FUNCTIONAL
Brand: ACUMED

## 2021-12-21 DEVICE — 3.5 MM X 12 MM HEXALOBE VA SCREW
Type: IMPLANTABLE DEVICE | Site: WRIST | Status: FUNCTIONAL
Brand: ACUMED

## 2021-12-21 RX ORDER — SODIUM CHLORIDE 0.9 % (FLUSH) 0.9 %
5-40 SYRINGE (ML) INJECTION EVERY 8 HOURS
Status: DISCONTINUED | OUTPATIENT
Start: 2021-12-21 | End: 2021-12-21 | Stop reason: HOSPADM

## 2021-12-21 RX ORDER — INSULIN LISPRO 100 [IU]/ML
INJECTION, SOLUTION INTRAVENOUS; SUBCUTANEOUS ONCE
Status: DISCONTINUED | OUTPATIENT
Start: 2021-12-21 | End: 2021-12-21 | Stop reason: HOSPADM

## 2021-12-21 RX ORDER — SODIUM CHLORIDE, SODIUM LACTATE, POTASSIUM CHLORIDE, CALCIUM CHLORIDE 600; 310; 30; 20 MG/100ML; MG/100ML; MG/100ML; MG/100ML
50 INJECTION, SOLUTION INTRAVENOUS CONTINUOUS
Status: DISCONTINUED | OUTPATIENT
Start: 2021-12-21 | End: 2021-12-21 | Stop reason: HOSPADM

## 2021-12-21 RX ORDER — ROPIVACAINE HYDROCHLORIDE 5 MG/ML
INJECTION, SOLUTION EPIDURAL; INFILTRATION; PERINEURAL
Status: COMPLETED | OUTPATIENT
Start: 2021-12-21 | End: 2021-12-21

## 2021-12-21 RX ORDER — NALBUPHINE HYDROCHLORIDE 10 MG/ML
5 INJECTION, SOLUTION INTRAMUSCULAR; INTRAVENOUS; SUBCUTANEOUS
Status: DISCONTINUED | OUTPATIENT
Start: 2021-12-21 | End: 2021-12-21 | Stop reason: HOSPADM

## 2021-12-21 RX ORDER — KETOROLAC TROMETHAMINE 15 MG/ML
INJECTION, SOLUTION INTRAMUSCULAR; INTRAVENOUS AS NEEDED
Status: DISCONTINUED | OUTPATIENT
Start: 2021-12-21 | End: 2021-12-21 | Stop reason: HOSPADM

## 2021-12-21 RX ORDER — SODIUM CHLORIDE, SODIUM LACTATE, POTASSIUM CHLORIDE, CALCIUM CHLORIDE 600; 310; 30; 20 MG/100ML; MG/100ML; MG/100ML; MG/100ML
75 INJECTION, SOLUTION INTRAVENOUS CONTINUOUS
Status: DISCONTINUED | OUTPATIENT
Start: 2021-12-21 | End: 2021-12-21 | Stop reason: HOSPADM

## 2021-12-21 RX ORDER — ONDANSETRON 2 MG/ML
4 INJECTION INTRAMUSCULAR; INTRAVENOUS ONCE
Status: DISCONTINUED | OUTPATIENT
Start: 2021-12-21 | End: 2021-12-21 | Stop reason: HOSPADM

## 2021-12-21 RX ORDER — LABETALOL HYDROCHLORIDE 5 MG/ML
INJECTION, SOLUTION INTRAVENOUS AS NEEDED
Status: DISCONTINUED | OUTPATIENT
Start: 2021-12-21 | End: 2021-12-21 | Stop reason: HOSPADM

## 2021-12-21 RX ORDER — MAGNESIUM SULFATE 100 %
4 CRYSTALS MISCELLANEOUS AS NEEDED
Status: DISCONTINUED | OUTPATIENT
Start: 2021-12-21 | End: 2021-12-21 | Stop reason: HOSPADM

## 2021-12-21 RX ORDER — SODIUM CHLORIDE 0.9 % (FLUSH) 0.9 %
5-40 SYRINGE (ML) INJECTION AS NEEDED
Status: DISCONTINUED | OUTPATIENT
Start: 2021-12-21 | End: 2021-12-21 | Stop reason: HOSPADM

## 2021-12-21 RX ORDER — DEXTROSE 50 % IN WATER (D50W) INTRAVENOUS SYRINGE
25-50 AS NEEDED
Status: DISCONTINUED | OUTPATIENT
Start: 2021-12-21 | End: 2021-12-21 | Stop reason: HOSPADM

## 2021-12-21 RX ORDER — MIDAZOLAM HYDROCHLORIDE 1 MG/ML
2 INJECTION, SOLUTION INTRAMUSCULAR; INTRAVENOUS
Status: DISCONTINUED | OUTPATIENT
Start: 2021-12-21 | End: 2021-12-21 | Stop reason: HOSPADM

## 2021-12-21 RX ORDER — HYDRALAZINE HYDROCHLORIDE 20 MG/ML
INJECTION INTRAMUSCULAR; INTRAVENOUS AS NEEDED
Status: DISCONTINUED | OUTPATIENT
Start: 2021-12-21 | End: 2021-12-21 | Stop reason: HOSPADM

## 2021-12-21 RX ORDER — DICLOFENAC SODIUM 75 MG/1
75 TABLET, DELAYED RELEASE ORAL 2 TIMES DAILY WITH MEALS
Qty: 4 TABLET | Refills: 0 | Status: SHIPPED | OUTPATIENT
Start: 2021-12-21 | End: 2021-12-23

## 2021-12-21 RX ORDER — DEXMEDETOMIDINE HYDROCHLORIDE 4 UG/ML
INJECTION, SOLUTION INTRAVENOUS AS NEEDED
Status: DISCONTINUED | OUTPATIENT
Start: 2021-12-21 | End: 2021-12-21 | Stop reason: HOSPADM

## 2021-12-21 RX ORDER — HYDROMORPHONE HYDROCHLORIDE 1 MG/ML
0.5 INJECTION, SOLUTION INTRAMUSCULAR; INTRAVENOUS; SUBCUTANEOUS AS NEEDED
Status: DISCONTINUED | OUTPATIENT
Start: 2021-12-21 | End: 2021-12-21 | Stop reason: HOSPADM

## 2021-12-21 RX ORDER — LIDOCAINE HYDROCHLORIDE 10 MG/ML
0.1 INJECTION, SOLUTION EPIDURAL; INFILTRATION; INTRACAUDAL; PERINEURAL AS NEEDED
Status: DISCONTINUED | OUTPATIENT
Start: 2021-12-21 | End: 2021-12-21 | Stop reason: HOSPADM

## 2021-12-21 RX ORDER — DIPHENHYDRAMINE HYDROCHLORIDE 50 MG/ML
12.5 INJECTION, SOLUTION INTRAMUSCULAR; INTRAVENOUS
Status: DISCONTINUED | OUTPATIENT
Start: 2021-12-21 | End: 2021-12-21 | Stop reason: HOSPADM

## 2021-12-21 RX ORDER — MAGNESIUM SULFATE HEPTAHYDRATE 40 MG/ML
INJECTION, SOLUTION INTRAVENOUS AS NEEDED
Status: DISCONTINUED | OUTPATIENT
Start: 2021-12-21 | End: 2021-12-21 | Stop reason: HOSPADM

## 2021-12-21 RX ORDER — FENTANYL CITRATE 50 UG/ML
INJECTION, SOLUTION INTRAMUSCULAR; INTRAVENOUS AS NEEDED
Status: DISCONTINUED | OUTPATIENT
Start: 2021-12-21 | End: 2021-12-21 | Stop reason: HOSPADM

## 2021-12-21 RX ORDER — ROPIVACAINE HYDROCHLORIDE 5 MG/ML
30 INJECTION, SOLUTION EPIDURAL; INFILTRATION; PERINEURAL
Status: COMPLETED | OUTPATIENT
Start: 2021-12-21 | End: 2021-12-21

## 2021-12-21 RX ORDER — ONDANSETRON 2 MG/ML
INJECTION INTRAMUSCULAR; INTRAVENOUS AS NEEDED
Status: DISCONTINUED | OUTPATIENT
Start: 2021-12-21 | End: 2021-12-21 | Stop reason: HOSPADM

## 2021-12-21 RX ORDER — PROPOFOL 10 MG/ML
INJECTION, EMULSION INTRAVENOUS AS NEEDED
Status: DISCONTINUED | OUTPATIENT
Start: 2021-12-21 | End: 2021-12-21 | Stop reason: HOSPADM

## 2021-12-21 RX ORDER — DEXAMETHASONE SODIUM PHOSPHATE 4 MG/ML
INJECTION, SOLUTION INTRA-ARTICULAR; INTRALESIONAL; INTRAMUSCULAR; INTRAVENOUS; SOFT TISSUE AS NEEDED
Status: DISCONTINUED | OUTPATIENT
Start: 2021-12-21 | End: 2021-12-21 | Stop reason: HOSPADM

## 2021-12-21 RX ORDER — FAMOTIDINE 20 MG/1
20 TABLET, FILM COATED ORAL ONCE
Status: COMPLETED | OUTPATIENT
Start: 2021-12-21 | End: 2021-12-21

## 2021-12-21 RX ORDER — ROCURONIUM BROMIDE 10 MG/ML
INJECTION, SOLUTION INTRAVENOUS AS NEEDED
Status: DISCONTINUED | OUTPATIENT
Start: 2021-12-21 | End: 2021-12-21 | Stop reason: HOSPADM

## 2021-12-21 RX ORDER — DEXAMETHASONE SODIUM PHOSPHATE 4 MG/ML
INJECTION, SOLUTION INTRA-ARTICULAR; INTRALESIONAL; INTRAMUSCULAR; INTRAVENOUS; SOFT TISSUE
Status: COMPLETED | OUTPATIENT
Start: 2021-12-21 | End: 2021-12-21

## 2021-12-21 RX ORDER — FENTANYL CITRATE 50 UG/ML
100 INJECTION, SOLUTION INTRAMUSCULAR; INTRAVENOUS
Status: DISCONTINUED | OUTPATIENT
Start: 2021-12-21 | End: 2021-12-21 | Stop reason: HOSPADM

## 2021-12-21 RX ADMIN — ROPIVACAINE HYDROCHLORIDE 25 ML: 5 INJECTION, SOLUTION EPIDURAL; INFILTRATION; PERINEURAL at 10:14

## 2021-12-21 RX ADMIN — MIDAZOLAM HYDROCHLORIDE 2 MG: 1 INJECTION, SOLUTION INTRAMUSCULAR; INTRAVENOUS at 10:08

## 2021-12-21 RX ADMIN — FAMOTIDINE 20 MG: 20 TABLET ORAL at 09:43

## 2021-12-21 RX ADMIN — Medication 10 MCG: at 12:39

## 2021-12-21 RX ADMIN — ONDANSETRON 4 MG: 2 INJECTION INTRAMUSCULAR; INTRAVENOUS at 12:20

## 2021-12-21 RX ADMIN — Medication 10 MCG: at 12:40

## 2021-12-21 RX ADMIN — DEXAMETHASONE SODIUM PHOSPHATE 4 MG: 4 INJECTION, SOLUTION INTRA-ARTICULAR; INTRALESIONAL; INTRAMUSCULAR; INTRAVENOUS; SOFT TISSUE at 12:20

## 2021-12-21 RX ADMIN — Medication 10 ML: at 11:24

## 2021-12-21 RX ADMIN — ROCURONIUM BROMIDE 50 MG: 50 INJECTION INTRAVENOUS at 11:58

## 2021-12-21 RX ADMIN — HYDRALAZINE HYDROCHLORIDE 10 MG: 20 INJECTION, SOLUTION INTRAMUSCULAR; INTRAVENOUS at 12:25

## 2021-12-21 RX ADMIN — MAGNESIUM SULFATE 2 G: 2 INJECTION INTRAVENOUS at 12:39

## 2021-12-21 RX ADMIN — LABETALOL HYDROCHLORIDE 10 MG: 5 INJECTION, SOLUTION INTRAVENOUS at 12:20

## 2021-12-21 RX ADMIN — SODIUM CHLORIDE 3 G: 9 INJECTION, SOLUTION INTRAVENOUS at 11:43

## 2021-12-21 RX ADMIN — DEXAMETHASONE SODIUM PHOSPHATE 8 MG: 4 INJECTION, SOLUTION INTRAMUSCULAR; INTRAVENOUS at 10:14

## 2021-12-21 RX ADMIN — Medication 10 MCG: at 12:41

## 2021-12-21 RX ADMIN — FENTANYL CITRATE 100 MCG: 50 INJECTION, SOLUTION INTRAMUSCULAR; INTRAVENOUS at 12:15

## 2021-12-21 RX ADMIN — ROPIVACAINE HYDROCHLORIDE 30 ML: 5 INJECTION, SOLUTION EPIDURAL; INFILTRATION; PERINEURAL at 10:13

## 2021-12-21 RX ADMIN — Medication 10 MCG: at 12:44

## 2021-12-21 RX ADMIN — FENTANYL CITRATE 50 MCG: 50 INJECTION, SOLUTION INTRAMUSCULAR; INTRAVENOUS at 10:08

## 2021-12-21 RX ADMIN — SODIUM CHLORIDE, SODIUM LACTATE, POTASSIUM CHLORIDE, AND CALCIUM CHLORIDE 50 ML/HR: 600; 310; 30; 20 INJECTION, SOLUTION INTRAVENOUS at 09:30

## 2021-12-21 RX ADMIN — PROPOFOL 200 MG: 10 INJECTION, EMULSION INTRAVENOUS at 11:58

## 2021-12-21 RX ADMIN — KETOROLAC TROMETHAMINE 15 MG: 15 INJECTION, SOLUTION INTRAMUSCULAR; INTRAVENOUS at 13:34

## 2021-12-21 NOTE — PERIOP NOTES
Discharge instructions provided to patient and caregiver from [facility] verbally and written. Understanding verbalized. Written prescription from Dr. Lawanda Thompson provided with additional instructions written on the bottom of the page.

## 2021-12-21 NOTE — DISCHARGE INSTRUCTIONS
Ice and Elevate operative wound/dressing. Begin moving fingers immediately after surgery. Keep dressing clean and dry. Cover for showering. Do not remove dressing/splint but may loosen outter bandage. Patient Education        Open Reduction With Internal Fixation of a Limb: What to Expect at Home  Your Recovery  Your broken bone (fracture) was put into position and stabilized. You can expect some pain and swelling around the cut (incision) the doctor made. This should get better within a few days after your surgery. But it is normal to have some pain for 2 to 3 weeks after surgery and mild pain for up to 6 weeks after surgery. How soon you can return to work and your normal routine depends on your job and how long it takes the bone to heal. For example, if you have a fractured leg and you sit at work, you may be able to go back in 1 to 2 weeks. But if your job requires you to walk or stand a lot, you will need to wait until your fracture has healed before you go back to work. This care sheet gives you a general idea about how long it will take for you to recover. But each person recovers at a different pace. Follow the steps below to get better as quickly as possible. How can you care for yourself at home? Activity    · Rest when you feel tired. Getting enough sleep will help you recover.     · Increase your activity as recommended by your doctor. Being active boosts blood flow and helps prevent pneumonia and constipation. It's usually okay to exercise other parts of your body as soon as you feel well enough.     · Avoid putting weight on your repaired bone until your doctor says it is okay.     · You will probably need to take 1 to 2 weeks off from work. It depends on the type of work you do and how you feel.     · Do not shower for 1 or 2 days after surgery. When you shower, keep your dressing and incisions dry. If you have a cast, tape a sheet of plastic to cover it so that it does not get wet. It may help to sit on a shower stool.     · Do not take a bath, swim, use a hot tub, or soak your affected limb until your incision is healed. This usually takes 1 to 2 weeks. Diet    · You can eat your normal diet. If your stomach is upset, try bland, low-fat foods like plain rice, broiled chicken, toast, and yogurt. Medicines    · Your doctor will tell you if and when you can restart your medicines. He or she will also give you instructions about taking any new medicines.     · If you take aspirin or some other blood thinner, ask your doctor if and when to start taking it again. Make sure that you understand exactly what your doctor wants you to do.     · Take pain medicines exactly as directed. ? If the doctor gave you a prescription medicine for pain, take it as prescribed. ? If you are not taking a prescription pain medicine, ask your doctor if you can take an over-the-counter medicine.     · If you think your pain medicine is making you sick to your stomach:  ? Take your medicine after meals (unless your doctor has told you not to). ? Ask your doctor for a different pain medicine.     · If your doctor prescribed antibiotics, take them as directed. Do not stop taking them just because you feel better. You need to take the full course of antibiotics. Incision care    · If you have strips of tape on the incision, leave the tape on for a week or until it falls off.     · If you do not have a cast, clean the incision 2 times a day after your doctor allows you to remove the bandage. Use only soap and water to clean the incision unless your doctor gives you different instructions. Don't use hydrogen peroxide or alcohol, which can slow healing. Exercise    · Do exercises as instructed by your doctor or physical therapist. These exercises will help keep your muscles strong and your joints flexible while your bone is healing.     · Wiggle your fingers or toes on the injured arm or leg often.  This helps reduce swelling and stiffness. Ice and elevation    · Prop up the injured arm or leg on a pillow when you ice it or anytime you sit or lie down during the first 1 to 2 weeks after your surgery. Try to keep it above the level of your heart. This will help reduce swelling and pain. Other instructions    · If you have a cast or splint:  ? Keep it dry. ? If you have a removable splint, ask your doctor if it is okay to take it off to bathe. Your doctor may want you to keep it on as much as possible. Be careful not to put the splint on too tight. ? Do not stick objects such as pencils or coat hangers in your cast or splint to scratch your skin. ? Do not put powder into your cast or splint to relieve itchy skin. ? Never cut or alter your cast or splint. Follow-up care is a key part of your treatment and safety. Be sure to make and go to all appointments, and call your doctor if you are having problems. It's also a good idea to know your test results and keep a list of the medicines you take. When should you call for help? Call 911 anytime you think you may need emergency care. For example, call if:    · You passed out (lost consciousness).     · You have severe trouble breathing.     · You have sudden chest pain and shortness of breath, or you cough up blood. Call your doctor now or seek immediate medical care if:    · You have pain that does not get better after you take pain medicine.     · Your fingers or toes on the injured arm or leg are cool, pale, or change color.     · You have tingling or numbness in your fingers or toes.     · You cannot move your fingers or toes.     · Your cast or splint feels too tight.     · The skin under your cast or splint is burning or stinging.     · You have signs of infection, such as:  ? Increased pain, swelling, warmth, or redness. ? Red streaks leading from the incision. ? Pus draining from the incision. ?  A fever.     · You have drainage or a bad smell coming from the cast or splint.     · You have signs of a blood clot, such as:  ? Pain in your calf, back of the knee, thigh, or groin. ? Redness and swelling in your leg or groin.     · You have new or worse nausea or vomiting.     · You are too sick to your stomach to drink any fluids.     · You cannot keep down fluids. Watch closely for any changes in your health, and be sure to contact your doctor if:    · You have any problems with your cast or splint. Where can you learn more? Go to http://www.gray.com/  Enter M980 in the search box to learn more about \"Open Reduction With Internal Fixation of a Limb: What to Expect at Home. \"  Current as of: July 1, 2021               Content Version: 13.0  © 2006-2021 [employer] Care instructions adapted under license by [employer] (which disclaims liability or warranty for this information). If you have questions about a medical condition or this instruction, always ask your healthcare professional. [place] [address] any warranty or liability for your use of this information. DISCHARGE SUMMARY from Nurse    PATIENT INSTRUCTIONS:    After general anesthesia or intravenous sedation, for 24 hours or while taking prescription Narcotics:  · Limit your activities  · Do not drive and operate hazardous machinery  · Do not make important personal or business decisions  · Do  not drink alcoholic beverages  · If you have not urinated within 8 hours after discharge, please contact your surgeon on call.     Report the following to your surgeon:  · Excessive pain, swelling, redness or odor of or around the surgical area  · Temperature over 100.5  · Nausea and vomiting lasting longer than 4 hours or if unable to take medications  · Any signs of decreased circulation or nerve impairment to extremity: change in color, persistent  numbness, tingling, coldness or increase pain  · Any questions    What to do at Home:  Recommended activity: Activity as tolerated and no driving for today, ***        *  Please give a list of your current medications to your Primary Care Provider. *  Please update this list whenever your medications are discontinued, doses are      changed, or new medications (including over-the-counter products) are added. *  Please carry medication information at all times in case of emergency situations. These are general instructions for a healthy lifestyle:    No smoking/ No tobacco products/ Avoid exposure to second hand smoke  Surgeon General's Warning:  Quitting smoking now greatly reduces serious risk to your health. Obesity, smoking, and sedentary lifestyle greatly increases your risk for illness    A healthy diet, regular physical exercise & weight monitoring are important for maintaining a healthy lifestyle    You may be retaining fluid if you have a history of heart failure or if you experience any of the following symptoms:  Weight gain of 3 pounds or more overnight or 5 pounds in a week, increased swelling in our hands or feet or shortness of breath while lying flat in bed. Please call your doctor as soon as you notice any of these symptoms; do not wait until your next office visit. The discharge information has been reviewed with the patient and caregiver. The patient and caregiver verbalized understanding. Discharge medications reviewed with the {[employer] meds reviewed CDAM:29546} and appropriate educational materials and side effects teaching were provided.   ___________________________________________________________________________________________________________________________________

## 2021-12-21 NOTE — BRIEF OP NOTE
Brief Postoperative Note    Patient: Jake Lucero  YOB: 1955  MRN: [id number]    Date of Procedure: 12/21/2021     Pre-Op Diagnosis: S52.532D CLOSED COLLES FRACTURE OF LEFT RADIUS WITH ROUTINE HEALING, SUBSEQUENT ENCOUNTER    Post-Op Diagnosis: Same as preoperative diagnosis. Procedure(s):  OPEN REDUCTION INTERNAL FIXATION LEFT DISTAL RADIUS/MINI C-ARM/ACUMED/BLOCK    Surgeon(s): Susan Sylvester DO    Surgical Assistant: Surg Asst-1: Aye Loving    Anesthesia: General     Estimated Blood Loss (mL): less than 50     Complications: None    Specimens: * No specimens in log *     Implants:   Implant Name Type Inv. Item Serial No.  Lot No. LRB No. Used Action   PLATE BNE STD L DST RAD WRST SANCHEZ ACU LOC 2 - [id number]  PLATE BNE STD L DST RAD WRST SANCHEZ ACU LOC 2  [employer]WD NA Left 1 Implanted   SCR BNE JUAN LCK DENIS 2.3X20MM --  - [id number]  SCR BNE JUAN LCK DENIS 2.3X20MM --   [employer]WD NA Left 3 Implanted   SCREW BNE L22MM DIA2. 3MM JUAN DST VOLAR RAD TI SANCHEZ FOR - [id number]  SCREW BNE L22MM DIA2. 3MM JUAN DST VOLAR RAD TI SANCHEZ FOR  [employer]WD NA Left 1 Implanted   SCR BNE JUAN LCK DENIS 2.3X18MM --  - [id number]  SCR BNE JUAN LCK DENIS 2.3X18MM --   [employer]WD NA Left 2 Implanted   SCR BNE LCK HEXALOBE 3.5X12MM -- F/ELBOW PLT SYS - [id number]  SCR BNE LCK HEXALOBE 3.5X12MM -- F/ELBOW PLT SYS  [employer]WD NA Left 2 Implanted   SCREW BNE HEXALOBE 3.5X12 MM VA SM FRAG BASE SET NS - [id number]  SCREW BNE HEXALOBE 3.5X12 MM VA SM FRAG BASE SET NS  [employer]WD NA Left 1 Implanted       Drains: * No LDAs found *    Findings: displaced intra-articular 4 part distal raddius fracture    Electronically Signed by Sera Trent DO on 12/21/2021 at 2:08 PM

## 2021-12-21 NOTE — ANESTHESIA PROCEDURE NOTES
Peripheral Block    Start time: 12/21/2021 10:08 AM  End time: 12/21/2021 10:14 AM  Performed by: Patrick Grimaldo MD  Authorized by: Patrick Grimaldo MD       Pre-procedure: Indications: at surgeon's request, post-op pain management and procedure for pain    Preanesthetic Checklist: patient identified, risks and benefits discussed, site marked, timeout performed, anesthesia consent given and patient being monitored      Block Type:   Block Type:  Supraclavicular  Laterality:  Left  Monitoring:  Standard ASA monitoring, continuous pulse ox, frequent vital sign checks, oxygen, responsive to questions and heart rate  Injection Technique:  Single shot  Procedures: ultrasound guided    Prep: chlorhexidine    Needle Type:  Stimuplex  Needle Gauge:  22 G  Needle Localization:  Ultrasound guidance  Medication Injected:  Dexamethasone (DECADRON) 4 mg/mL injection, 8 mg  ropivacaine (PF) (NAROPIN)(0.5%) 5 mg/mL injection, 25 mL    Assessment:  Number of attempts:  1  Injection Assessment:  No intravascular symptoms, negative aspiration for blood, local visualized surrounding nerve on ultrasound, ultrasound image on chart, no paresthesia and incremental injection every 5 mL  Patient tolerance:  Patient tolerated the procedure well with no immediate complications  Location:  PREOP HOLDING    Patient given 2 mg IV Versed and 50 mcg IV Fentanyl for sedation.     12/21/2021     10:14 AM     Kriss Frey MD

## 2021-12-21 NOTE — OP NOTES
Operative Report    Patient: Oswald Dubose MRN: [id number]  SSN: [id number]    YOB: 1955  Age: 74 y.o. Sex: female       Date of Surgery: 12/21/2021     Preoperative Diagnosis: S52.532D CLOSED COLLES FRACTURE OF LEFT RADIUS WITH ROUTINE HEALING, SUBSEQUENT ENCOUNTER     Postoperative Diagnosis: S52.532D CLOSED COLLES FRACTURE OF LEFT RADIUS WITH ROUTINE HEALING, SUBSEQUENT ENCOUNTER     Surgeon(s) and Role:     Meenu Joseph DO - Primary    Assistant: Stephanie Adan    Anesthesia: General, block, and local     Procedure: Procedure(s):  OPEN REDUCTION INTERNAL FIXATION OF 4-PART INTRA-ARTICULAR LEFT DISTAL RADIUS    Findings displaced 4-part intra-articular distal radius fracture with early healing in a malunited position. Procedure in Detail:     Indications for procedure been outlined in the perioperative documentation, most notably being not amenable to conservative treatment. Informed consent was obtained from the patient. The risks and benefits of the procedure were discussed with the patient. They include but are not limited to neurovascular injury, tendon/ligamentous injury, blood loss, infection, need for further surgery, hematoma, neuroma, seroma, chronic pain, chronic stiffness, complications from anesthesia including death, and the possibility of en Covid. After informed consent was obtained, the patient was taken back to the operative suite. A tourniquet was applied to the operative extremity and it was prepped and draped in the normal sterile fashion. The arm was exsanguinated and the tourniquet was elevated to 250 mmHg. Attention was turned to the wrist where an incision was made volarly over the FCR tendon. The subcutaneous tissues were dissected and electrocautery was used for hemostasis. The volar aspect of the FCR sheath was incised and the tendon was retracted ulnarly.   The dorsal floor of the sheath was incised and the FPL was identified and retracted ulnarly. The pronator quadratus was incised and elevated off of the distal radius. The fracture was identified and cleared of any hematoma or fibrous tissue. Using gross reduction means the distal radius was reduced with a K wire if needed. Fluoroscopy was used to aid in the reduction of the distal radius. The appropriate sized plate was fitted to the distal radius with the aid of fluoroscopy to determine how the plate sits distally as well as radially and ulnarly. After the plate was found to be in good position it was pinned distally and proximally. At this point in an ulnar to distal fashion the screws were placed in the distal aspect of the plate. The the distal most ulnar most hole was filled with a nonlocking screw initially to allow for compression of the plate down to the radius. This was followed sequentially in a radial direction. After second screw was placed, the K wire was removed. After all distal holes were filled the radial styloid holes were drilled and filled as appropriate. Confirmation on fluoroscopy was undertaken to determine if both styloid holes needed to be filled. At this point the proximal row screw was placed. The nonlocking screw was subsequently replaced with a locking screw. Attention was then turned to the proximal aspect of the plate where the K wire was removed and the oblong hole was filled using a drill guide. Measurement was taken and after the appropriate size screw was placed, if the fracture needed to be lengthened traction was undertaken while the screw was tightened down. After this the remaining 2 shaft holes were filled with locking screws. Final images were taken in appropriate position of the fracture as well as alignment of the distal radius and wrist joint were assessed on fluoroscopy. Appropriate screw length was also assessed and changed as needed.   The wound was copiously irrigated and quarter percent Marcaine plain mixed with Exparel was injected into the deep tissues, the periosteum surrounding the fracture, and the subcutaneous tissues. The tourniquet was let down and electrocautery was used for hemostasis of any remaining bleeders. The wound was closed with 3-0 Monocryl followed by 4-0 Monocryl and Dermabond in the skin. The patient was placed into a volar splint. She was sent to recovery in stable condition. She was given appropriate wound care instructions, follow-up with me in the outpatient setting, and a prescription for pain medicine. Postoperative outlook: This was a very distal fracture with incomplete stability noted with final imaging. There was gross stability noted, and alignment was significantly improved compared to preoperatively. However given the lack of complete stability, the patient will be placed in a cast for up to 4 weeks at her first postoperative visit. Estimated Blood Loss: Less than 50 mL    Tourniquet Time:   Total Tourniquet Time Documented:  Upper Arm (Left) - 86 minutes  Total: Upper Arm (Left) - 86 minutes        Implants:   Implant Name Type Inv. Item Serial No.  Lot No. LRB No. Used Action   PLATE BNE STD L DST RAD WRST SANCHEZ ACU LOC 2 - [id number]  PLATE BNE STD L DST RAD WRST SANCHEZ ACU LOC 2  [employer]WD NA Left 1 Implanted   SCR BNE JUAN LCK DENIS 2.3X20MM --  - [id number]  SCR BNE JUAN LCK DENIS 2.3X20MM --   [employer] NA Left 3 Implanted   SCREW BNE L22MM DIA2. 3MM JUAN DST VOLAR RAD TI SANCHEZ FOR - [id number]  SCREW BNE L22MM DIA2. 3MM JUAN DST VOLAR RAD TI SANCHEZ FOR  [employer] NA Left 1 Implanted   SCR BNE JUAN LCK DENIS 2.3X18MM --  - [id number]  SCR BNE JUAN LCK DENIS 2.3X18MM --   [employer] NA Left 2 Implanted   SCR BNE LCK HEXALOBE 3.5X12MM -- F/ELBOW PLT SYS - [id number]  SCR BNE LCK HEXALOBE 3.5X12MM -- F/ELBOW PLT SYS  [employer] NA Left 2 Implanted   SCREW BNE HEXALOBE 3.5X12 MM VA SM FRAG BASE SET NS - [id number]  SCREW BNE HEXALOBE 3.5X12 MM VA  FRAG BASE SET NS  [employer] NA Left 1 Implanted               Specimens: * No specimens in log *        Drains: None                Complications: None    Counts: Sponge and needle counts were correct times two.     Signed By:  Jillian Hull DO     December 21, 2021

## 2021-12-21 NOTE — ANESTHESIA PREPROCEDURE EVALUATION
Relevant Problems   ENDOCRINE   (+) Obesity, morbid ([facility] [address]       Anesthetic History   No history of anesthetic complications            Review of Systems / Medical History  Patient summary reviewed and pertinent labs reviewed    Pulmonary            Asthma : well controlled       Neuro/Psych   Within defined limits           Cardiovascular    Hypertension        Dysrhythmias : atrial fibrillation           GI/Hepatic/Renal                Endo/Other        Morbid obesity and arthritis     Other Findings              Physical Exam    Airway  Mallampati: II  TM Distance: 4 - 6 cm  Neck ROM: normal range of motion   Mouth opening: Normal     Cardiovascular  Regular rate and rhythm,  S1 and S2 normal,  no murmur, click, rub, or gallop             Dental    Dentition: Poor dentition     Pulmonary  Breath sounds clear to auscultation               Abdominal  GI exam deferred       Other Findings            Anesthetic Plan    ASA: 3  Anesthesia type: general      Post-op pain plan if not by surgeon: peripheral nerve block single    Induction: Intravenous  Anesthetic plan and risks discussed with: Patient

## 2021-12-21 NOTE — PERIOP NOTES
Patient discharged via own personal wheelchair and assisted to transportation [place] safely. Caregiver to ride with patient in [place].

## 2021-12-21 NOTE — H&P
Update History & Physical    The Patient's History and Physical of December 13, 2021 was reviewed with the patient and I examined the patient. There was no change. The surgical site was confirmed by the patient and me. Plan:  The risk, benefits, expected outcome, and alternative to the recommended procedure have been discussed with the patient. Patient understands and wants to proceed with the procedure.     Electronically signed by Maximilian Joseph DO on 12/21/2021 at 11:11 AM

## 2021-12-21 NOTE — H&P
Zuleima Moreira is a 77 y.o. female right handed retiree. Worker's Compensation and legal considerations: none filed.         Vitals:     12/13/21 0945   BP: 128/74   Pulse: 91   Temp: 96.8 °F (36 °C)   TempSrc: Temporal   SpO2: 97%   PainSc:   8   PainLoc: Wrist                    Chief Complaint   Patient presents with    Wrist Pain       left          HPI: Patient returns today for follow-up of left wrist fracture. She has been wearing her brace except for occasionally to ice     Initial HPI: Patient presents today for ER follow-up for right distal radius fracture. She comes from an assisted living facility.     Date of onset: 11/17/2021     Injury: Yes: Comment: Fall     Prior Treatment:  Yes: Comment: ED splint     Numbness/ Tingling:  No        ROS: Review of Systems - General ROS: negative  Psychological ROS: negative  ENT ROS: negative  Allergy and Immunology ROS: negative  Hematological and Lymphatic ROS: negative  Respiratory ROS: no cough, shortness of breath, or wheezing  Cardiovascular ROS: no chest pain or dyspnea on exertion  Gastrointestinal ROS: no abdominal pain, change in bowel habits, or black or bloody stools  Musculoskeletal ROS: negative  Neurological ROS: negative  Dermatological ROS: negative          Past Medical History:   Diagnosis Date    A-fib ([address]      Arthritis      Childhood asthma      History of seasonal allergies      Hypertension      MSSA (methicillin susceptible Staphylococcus aureus) 2016     bone    Neuropathy       lower extremities    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       TREATED 2002 AND 2015    Rheumatoid arthritis [facility])           Surgical History         Past Surgical History:   Procedure Laterality Date    COLONOSCOPY N/A 5/15/2018     COLONOSCOPY, DIAGNOSTIC  performed by Katherine Green MD at [place] [address] N/A 6/11/2021     COLONOSCOPY performed by Andrew Zendejas MD at [facility]COPY    HX APPENDECTOMY        HX DILATION AND CURETTAGE        HX HEENT         Ear tubes    HX HYSTERECTOMY        HX OOPHORECTOMY        NEUROLOGICAL PROCEDURE UNLISTED   04/27/2016     T2-L2 Decomprssion and Fusion/T12 Corpetomy                   Current Outpatient Medications   Medication Sig Dispense Refill    oxybutynin chloride XL (Ditropan XL) 10 mg CR tablet Take 10 mg by mouth daily.        LORazepam (ATIVAN) 0.5 mg tablet Take  by mouth as needed.        montelukast (SINGULAIR) 10 mg tablet Take 10 mg by mouth daily as needed.        DULoxetine (CYMBALTA) 60 mg capsule Take 60 mg by mouth nightly.        loperamide (IMMODIUM) 2 mg tablet Take 2 mg by mouth four (4) times daily as needed for Diarrhea.        furosemide (LASIX) 40 mg tablet Take  by mouth daily.        cloNIDine HCL (CATAPRES) 0.1 mg tablet Take  by mouth two (2) times a day.        acetaminophen (TYLENOL) 325 mg tablet Take  by mouth every four (4) hours as needed for Pain.        ondansetron (ZOFRAN ODT) 8 mg disintegrating tablet Take 1 Tab by mouth every eight (8) hours as needed for Nausea for up to 12 doses. 20 Tab 0    albuterol (PROVENTIL VENTOLIN) 2.5 mg /3 mL (0.083 %) nebulizer solution 3 mL by Nebulization route every four (4) hours as needed for Wheezing. 24 Each 0    Cetirizine (ZYRTEC) 10 mg cap Take  by mouth daily as needed.        gabapentin (NEURONTIN) 400 mg capsule Take 400 mg by mouth four (4) times daily.        atorvastatin (LIPITOR) 40 mg tablet Take 1 Tab by mouth nightly. 30 Tab 1    bisacodyl (DULCOLAX) 10 mg suppository Insert 10 mg into rectum as needed. 30 Suppository 1    nystatin (MYCOSTATIN) powder Apply  to affected area two (2) times a day. 1 Bottle 3    polyethylene glycol (MIRALAX) 17 gram packet Take 1 Packet by mouth daily. (Patient taking differently: Take 17 g by mouth daily as needed.) 30 Packet 1    senna-docusate (PERICOLACE) 8.6-50 mg per tablet Take 2 Tabs by mouth daily.  60 Tab 1  rivaroxaban (XARELTO) 10 mg tablet Take 1 Tab by mouth daily (with breakfast). (Patient not taking: Reported on 2021) 30 Tab 3    ferrous sulfate 325 mg (65 mg iron) tablet Take 1 Tab by mouth two (2) times daily (with meals). (Patient not taking: Reported on 2021) 60 Tab 1               Allergies   Allergen Reactions    Iodinated Contrast Media Other (comments)       Patient states will pass out.               PE:      Physical Exam  Vitals and nursing note reviewed. Constitutional:       General: She is not in acute distress. Appearance: Normal appearance. She is not ill-appearing, toxic-appearing or diaphoretic. HENT:      Head: Normocephalic and atraumatic. Nose: Nose normal.      Mouth/Throat:      Mouth: Mucous membranes are moist.   Eyes:      Extraocular Movements: Extraocular movements intact. Pupils: Pupils are equal, round, and reactive to light. Cardiovascular:      Pulses: Normal pulses. Pulmonary:      Effort: Pulmonary effort is normal. No respiratory distress. Abdominal:      General: Abdomen is flat. There is no distension. Musculoskeletal:         General: Swelling and tenderness present. No deformity or signs of injury. Cervical back: Normal range of motion and neck supple. Right lower leg: No edema. Left lower leg: No edema. Skin:     General: Skin is warm and dry. Capillary Refill: Capillary refill takes less than 2 seconds. Findings: No bruising or erythema. Neurological:      General: No focal deficit present. Mental Status: She is alert and oriented to person, place, and time. Psychiatric:         Mood and Affect: Mood normal.         Behavior: Behavior normal.               Left wrist: Gross deformity noted about the dorsal wrist.  Neurovascularly intact. Range of motion limited.   Tenderness over dorsal wrist.        Imagin/13/2021 3 views of left wrist shows significant displacement and dorsal angulation of the previously seen fracture. Minimal callus formation noted.     External injury films of left wrist shows a mildly angulated distal radius fracture in the dorsal direction. There is minimal shortening and inclination is largely maintained.            ICD-10-CM ICD-9-CM     1. Closed Colles' fracture of left radius with routine healing, subsequent encounter  S52.532D V54.12 AMB POC XRAY, WRIST; COMPLETE, 3+ VIE         SCHEDULE SURGERY            Plan:      Schedule left distal radius open reduction internal fixation.     Continue brace until surgery.     I had a thorough discussion with the patient regarding operative versus nonoperative treatment and she would like to move forward with fixation.     The patient was counseled at length about the risks of en Covid-19 during their perioperative period and any recovery window from their procedure.  The patient was made aware that en Covid-19  may worsen their prognosis for recovering from their procedure and lend to a higher morbidity and/or mortality risk.  All material risks, benefits, and reasonable alternatives including postponing the procedure were discussed. The patient does  wish to proceed with the procedure at this time.        This procedure has been fully reviewed with the patient and written informed consent has been obtained.     Follow-up and Dispositions    · Return for postop.   Follow-up and Disposition History            Plan was reviewed with patient, who verbalized agreement and understanding of the plan

## 2021-12-22 ENCOUNTER — TRANSCRIBE ORDER (OUTPATIENT)
Dept: SCHEDULING | Age: 66
End: 2021-12-22

## 2021-12-22 DIAGNOSIS — R42 DIZZINESS AND GIDDINESS: Primary | ICD-10-CM

## 2021-12-22 NOTE — ANESTHESIA POSTPROCEDURE EVALUATION
Procedure(s):  OPEN REDUCTION INTERNAL FIXATION LEFT DISTAL RADIUS/MINI C-ARM/ACUMED/BLOCK.    general    Anesthesia Post Evaluation      Multimodal analgesia: multimodal analgesia used between 6 hours prior to anesthesia start to PACU discharge  Patient location during evaluation: PACU  Patient participation: complete - patient participated  Level of consciousness: awake and alert  Pain management: adequate  Airway patency: patent  Anesthetic complications: no  Cardiovascular status: acceptable  Respiratory status: acceptable  Hydration status: acceptable  Post anesthesia nausea and vomiting:  none  Final Post Anesthesia Temperature Assessment:  Normothermia (36.0-37.5 degrees C)      INITIAL Post-op Vital signs:   Vitals Value Taken Time   /58 12/21/21 1510   Temp 36.6 °C (97.8 °F) 12/21/21 1414   Pulse 78 12/21/21 1510   Resp 21 12/21/21 1510   SpO2 98 % 12/21/21 1510

## 2022-01-03 ENCOUNTER — OFFICE VISIT (OUTPATIENT)
Dept: ORTHOPEDIC SURGERY | Age: 67
End: 2022-01-03
Payer: MEDICARE

## 2022-01-03 VITALS — TEMPERATURE: 96.6 F

## 2022-01-03 DIAGNOSIS — Z87.81 S/P ORIF (OPEN REDUCTION INTERNAL FIXATION) FRACTURE: ICD-10-CM

## 2022-01-03 DIAGNOSIS — Z98.890 S/P ORIF (OPEN REDUCTION INTERNAL FIXATION) FRACTURE: ICD-10-CM

## 2022-01-03 DIAGNOSIS — S52.532D CLOSED COLLES' FRACTURE OF LEFT RADIUS WITH ROUTINE HEALING, SUBSEQUENT ENCOUNTER: Primary | ICD-10-CM

## 2022-01-03 PROCEDURE — 73110 X-RAY EXAM OF WRIST: CPT | Performed by: ORTHOPAEDIC SURGERY

## 2022-01-03 PROCEDURE — 99024 POSTOP FOLLOW-UP VISIT: CPT | Performed by: ORTHOPAEDIC SURGERY

## 2022-01-03 NOTE — PROGRESS NOTES
Salbador Hernandez is a 74 y.o. female right handed retiree. Worker's Compensation and legal considerations: none filed. Vitals:    01/03/22 1053   Temp: (!) 96.6 °F (35.9 °C)   TempSrc: Temporal   PainSc:   7   PainLoc: Hand           Chief Complaint   Patient presents with    Wrist Pain     left       HPI: Patient presents today 2 weeks status post left distal radius open reduction internal fixation. She reports minimal pain. Preop HPI: Patient returns today for follow-up of left wrist fracture. She has been wearing her brace except for occasionally to ice    Initial HPI: Patient presents today for ER follow-up for right distal radius fracture. She comes from an assisted living facility.     Date of onset: 11/17/2021    Injury: Yes: Comment: Fall    Prior Treatment:  Yes: Comment: ED splint    Numbness/ Tingling: No      ROS: Review of Systems - General ROS: negative  Psychological ROS: negative  ENT ROS: negative  Allergy and Immunology ROS: negative  Hematological and Lymphatic ROS: negative  Respiratory ROS: no cough, shortness of breath, or wheezing  Cardiovascular ROS: no chest pain or dyspnea on exertion  Gastrointestinal ROS: no abdominal pain, change in bowel habits, or black or bloody stools  Musculoskeletal ROS: negative  Neurological ROS: negative  Dermatological ROS: negative    Past Medical History:   Diagnosis Date    A-fib ([address]     Arthritis     Childhood asthma     History of seasonal allergies     Hypertension     MSSA (methicillin susceptible Staphylococcus aureus) 2016    bone    Neuropathy     lower extremities    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     TREATED 2002 AND 2015    Rheumatoid arthritis [facility])        Past Surgical History:   Procedure Laterality Date    COLONOSCOPY N/A 5/15/2018    COLONOSCOPY, DIAGNOSTIC  performed by Gm Naik MD at [address] [place] [address] N/A 6/11/2021    COLONOSCOPY performed by Virginie Lopez Tyrone Chang MD at [facility] HAND/FINGER SURGERY UNLISTED      HX APPENDECTOMY      HX DILATION AND CURETTAGE      HX HEENT      Ear tubes    HX HYSTERECTOMY      HX OOPHORECTOMY      NEUROLOGICAL PROCEDURE UNLISTED  04/27/2016    T2-L2 Decomprssion and Fusion/T12 Corpetomy       Current Outpatient Medications   Medication Sig Dispense Refill    cholecalciferol (VITAMIN D3) (50,000 UNITS /1250 MCG) capsule Take  by mouth every seven (7) days.  albuterol-ipratropium (DUO-NEB) 2.5 mg-0.5 mg/3 ml nebu 3 mL by Nebulization route every six (6) hours as needed for Wheezing.  lidocaine (Lidocaine Pain Relief) 4 % patch by TransDERmal route every twenty-four (24) hours.  meclizine (ANTIVERT) 12.5 mg tablet Take  by mouth daily. Indications: dizziness      famotidine (PEPCID) 20 mg tablet       oxyCODONE IR (ROXICODONE) 10 mg tab immediate release tablet       potassium chloride SR (K-TAB) 20 mEq tablet       promethazine (PHENERGAN) 12.5 mg tablet       nitroglycerin (NITROSTAT) 0.4 mg SL tablet 0.4 mg by SubLINGual route every five (5) minutes as needed for Chest Pain. Up to 3 doses.  oxybutynin chloride XL (Ditropan XL) 10 mg CR tablet Take 10 mg by mouth daily.  LORazepam (ATIVAN) 0.5 mg tablet Take  by mouth as needed.  montelukast (SINGULAIR) 10 mg tablet Take 10 mg by mouth daily as needed.  DULoxetine (CYMBALTA) 60 mg capsule Take 60 mg by mouth nightly.  loperamide (IMMODIUM) 2 mg tablet Take 2 mg by mouth four (4) times daily as needed for Diarrhea.  furosemide (LASIX) 40 mg tablet Take  by mouth daily.  cloNIDine HCL (CATAPRES) 0.1 mg tablet Take  by mouth two (2) times a day.  acetaminophen (TYLENOL) 325 mg tablet Take  by mouth every four (4) hours as needed for Pain.  ondansetron (ZOFRAN ODT) 8 mg disintegrating tablet Take 1 Tab by mouth every eight (8) hours as needed for Nausea for up to 12 doses.  20 Tab 0    albuterol (PROVENTIL VENTOLIN) 2.5 mg /3 mL (0.083 %) nebulizer solution 3 mL by Nebulization route every four (4) hours as needed for Wheezing. 24 Each 0    Cetirizine (ZYRTEC) 10 mg cap Take  by mouth daily as needed.  gabapentin (NEURONTIN) 400 mg capsule Take 300 mg by mouth three (3) times daily.  atorvastatin (LIPITOR) 40 mg tablet Take 1 Tab by mouth nightly. 30 Tab 1    bisacodyl (DULCOLAX) 10 mg suppository Insert 10 mg into rectum as needed. 30 Suppository 1    ferrous sulfate 325 mg (65 mg iron) tablet Take 1 Tab by mouth two (2) times daily (with meals). 60 Tab 1    nystatin (MYCOSTATIN) powder Apply  to affected area two (2) times a day. 1 Bottle 3    polyethylene glycol (MIRALAX) 17 gram packet Take 1 Packet by mouth daily. (Patient taking differently: Take 17 g by mouth daily as needed.) 30 Packet 1    senna-docusate (PERICOLACE) 8.6-50 mg per tablet Take 2 Tabs by mouth daily. 60 Tab 1       Allergies   Allergen Reactions    Iodinated Contrast Media Other (comments)     Patient states will pass out. PE:     Physical Exam  Vitals and nursing note reviewed. Constitutional:       General: She is not in acute distress. Appearance: Normal appearance. She is not ill-appearing, toxic-appearing or diaphoretic. Cardiovascular:      Pulses: Normal pulses. Pulmonary:      Effort: Pulmonary effort is normal. No respiratory distress. Abdominal:      General: Abdomen is flat. There is no distension. Musculoskeletal:         General: Swelling and tenderness present. No deformity or signs of injury. Normal range of motion. Cervical back: Normal range of motion and neck supple. Right lower leg: No edema. Left lower leg: No edema. Skin:     General: Skin is warm and dry. Capillary Refill: Capillary refill takes less than 2 seconds. Findings: No bruising or erythema. Neurological:      General: No focal deficit present.       Mental Status: She is alert and oriented to person, place, and time. Psychiatric:         Mood and Affect: Mood normal.         Behavior: Behavior normal.            Left wrist: Incision clean dry and intact with no drainage breakdown erythema or any signs of infection. Neurovascularly intact distally. Range of motion of the fingers is near full. Range of motion of the wrist is approximately 30 degrees in both extension and flexion. Imagin/3/2022 3 views of left wrist does not show any interval displacement of the previously seen fracture with appropriate hardware positioning and no loosening. There is no callus formation noted today. 2021 3 views of left wrist shows significant displacement and dorsal angulation of the previously seen fracture. Minimal callus formation noted. External injury films of left wrist shows a mildly angulated distal radius fracture in the dorsal direction. There is minimal shortening and inclination is largely maintained. ICD-10-CM ICD-9-CM    1. Closed Colles' fracture of left radius with routine healing, subsequent encounter  S52.532D V54.12 AMB POC XRAY, WRIST; COMPLETE, 3+ VIE   2. S/P ORIF (open reduction internal fixation) fracture  Z98.890 V45.89 AMB POC XRAY, WRIST; COMPLETE, 3+ VIE    Z87.81 V15.51          Plan:     Begin a progressive range of motion program at facility with therapist.    Specifically the first 2 to 4 weeks to focus on range of motion of the digits followed by increased range of motion of the wrist.  No strengthening until 4 to 6 weeks postop. Follow-up and Dispositions    · Return in about 6 weeks (around 2022) for Reevaluation, facility PT follow-up, and x-rays.           Plan was reviewed with patient, who verbalized agreement and understanding of the plan

## 2022-01-20 ENCOUNTER — HOSPITAL ENCOUNTER (OUTPATIENT)
Dept: CT IMAGING | Age: 67
Discharge: HOME | End: 2022-01-20
Attending: NURSE PRACTITIONER

## 2022-01-20 DIAGNOSIS — R42 DIZZINESS AND GIDDINESS: ICD-10-CM

## 2022-01-28 ENCOUNTER — HOSPITAL ENCOUNTER (OUTPATIENT)
Dept: CT IMAGING | Age: 67
Discharge: HOME | End: 2022-01-28
Attending: NURSE PRACTITIONER
Payer: MEDICARE

## 2022-01-28 LAB — CREAT UR-MCNC: 1 MG/DL (ref 0.6–1.3)

## 2022-01-28 PROCEDURE — 70498 CT ANGIOGRAPHY NECK: CPT

## 2022-01-28 PROCEDURE — 74011000636 HC RX REV CODE- 636

## 2022-01-28 PROCEDURE — 82565 ASSAY OF CREATININE: CPT

## 2022-01-28 RX ADMIN — IOPAMIDOL 80 ML: 755 INJECTION, SOLUTION INTRAVENOUS at 09:48

## 2022-03-10 ENCOUNTER — OFFICE VISIT (OUTPATIENT)
Dept: ORTHOPEDIC SURGERY | Age: 67
End: 2022-03-10
Payer: MEDICARE

## 2022-03-10 VITALS — TEMPERATURE: 98.7 F

## 2022-03-10 DIAGNOSIS — Z87.81 S/P ORIF (OPEN REDUCTION INTERNAL FIXATION) FRACTURE: ICD-10-CM

## 2022-03-10 DIAGNOSIS — S52.532D CLOSED COLLES' FRACTURE OF LEFT RADIUS WITH ROUTINE HEALING, SUBSEQUENT ENCOUNTER: Primary | ICD-10-CM

## 2022-03-10 DIAGNOSIS — Z98.890 S/P ORIF (OPEN REDUCTION INTERNAL FIXATION) FRACTURE: ICD-10-CM

## 2022-03-10 PROCEDURE — 73110 X-RAY EXAM OF WRIST: CPT | Performed by: ORTHOPAEDIC SURGERY

## 2022-03-10 PROCEDURE — 99024 POSTOP FOLLOW-UP VISIT: CPT | Performed by: ORTHOPAEDIC SURGERY

## 2022-03-10 NOTE — PROGRESS NOTES
Burak Viera is a 74 y.o. female right handed retiree. Worker's Compensation and legal considerations: none filed. Vitals:    03/10/22 0909   Temp: 98.7 °F (37.1 °C)   TempSrc: Temporal   PainSc:   6   PainLoc: Wrist           Chief Complaint   Patient presents with    Wrist Pain     POP LT Wrist       HPI: Patient presents today 2-1/2 months status post left distal radius open reduction internal fixation. She has completed therapy at the facility she is at and is doing well with minimal pain. She does report that they would like to know if she should continue further therapy. 2 weeks HPI: Patient presents today 2 weeks status post left distal radius open reduction internal fixation. She reports minimal pain. Preop HPI: Patient returns today for follow-up of left wrist fracture. She has been wearing her brace except for occasionally to ice    Initial HPI: Patient presents today for ER follow-up for right distal radius fracture. She comes from an assisted living facility. Date of onset: 11/17/2021    Injury: Yes: Comment: Fall    Prior Treatment:  Yes: Comment: Left distal radius open reduction internal fixation.     Numbness/ Tingling: No      ROS: Review of Systems - General ROS: negative  Psychological ROS: negative  ENT ROS: negative  Allergy and Immunology ROS: negative  Hematological and Lymphatic ROS: negative  Respiratory ROS: no cough, shortness of breath, or wheezing  Cardiovascular ROS: no chest pain or dyspnea on exertion  Gastrointestinal ROS: no abdominal pain, change in bowel habits, or black or bloody stools  Musculoskeletal ROS: negative  Neurological ROS: negative  Dermatological ROS: negative    Past Medical History:   Diagnosis Date    A-fib ([facility] [address]     Arthritis     Childhood asthma     History of seasonal allergies     Hypertension     MSSA (methicillin susceptible Staphylococcus aureus) 2016    bone    Neuropathy     lower extremities    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     TREATED 2002 AND 2015    Rheumatoid arthritis [facility])        Past Surgical History:   Procedure Laterality Date    COLONOSCOPY N/A 5/15/2018    COLONOSCOPY, DIAGNOSTIC  performed by Keily Izaguirre MD at [facility] 6/11/2021    COLONOSCOPY performed by David Stern MD at [address] [facility] HAND/FINGER SURGERY UNLISTED      HX APPENDECTOMY      HX DILATION AND CURETTAGE      HX HEENT      Ear tubes    HX HYSTERECTOMY      HX OOPHORECTOMY      NEUROLOGICAL PROCEDURE UNLISTED  04/27/2016    T2-L2 Decomprssion and Fusion/T12 Corpetomy       Current Outpatient Medications   Medication Sig Dispense Refill    cholecalciferol (VITAMIN D3) (50,000 UNITS /1250 MCG) capsule Take  by mouth every seven (7) days.  albuterol-ipratropium (DUO-NEB) 2.5 mg-0.5 mg/3 ml nebu 3 mL by Nebulization route every six (6) hours as needed for Wheezing.  lidocaine (Lidocaine Pain Relief) 4 % patch by TransDERmal route every twenty-four (24) hours.  meclizine (ANTIVERT) 12.5 mg tablet Take  by mouth daily. Indications: dizziness      famotidine (PEPCID) 20 mg tablet       oxyCODONE IR (ROXICODONE) 10 mg tab immediate release tablet       potassium chloride SR (K-TAB) 20 mEq tablet       promethazine (PHENERGAN) 12.5 mg tablet       nitroglycerin (NITROSTAT) 0.4 mg SL tablet 0.4 mg by SubLINGual route every five (5) minutes as needed for Chest Pain. Up to 3 doses.  oxybutynin chloride XL (Ditropan XL) 10 mg CR tablet Take 10 mg by mouth daily.  LORazepam (ATIVAN) 0.5 mg tablet Take  by mouth as needed.  montelukast (SINGULAIR) 10 mg tablet Take 10 mg by mouth daily as needed.  DULoxetine (CYMBALTA) 60 mg capsule Take 60 mg by mouth nightly.  loperamide (IMMODIUM) 2 mg tablet Take 2 mg by mouth four (4) times daily as needed for Diarrhea.  furosemide (LASIX) 40 mg tablet Take  by mouth daily.       cloNIDine HCL (CATAPRES) 0.1 mg tablet Take  by mouth two (2) times a day.  acetaminophen (TYLENOL) 325 mg tablet Take  by mouth every four (4) hours as needed for Pain.  ondansetron (ZOFRAN ODT) 8 mg disintegrating tablet Take 1 Tab by mouth every eight (8) hours as needed for Nausea for up to 12 doses. 20 Tab 0    albuterol (PROVENTIL VENTOLIN) 2.5 mg /3 mL (0.083 %) nebulizer solution 3 mL by Nebulization route every four (4) hours as needed for Wheezing. 24 Each 0    Cetirizine (ZYRTEC) 10 mg cap Take  by mouth daily as needed.  gabapentin (NEURONTIN) 400 mg capsule Take 300 mg by mouth three (3) times daily.  atorvastatin (LIPITOR) 40 mg tablet Take 1 Tab by mouth nightly. 30 Tab 1    bisacodyl (DULCOLAX) 10 mg suppository Insert 10 mg into rectum as needed. 30 Suppository 1    ferrous sulfate 325 mg (65 mg iron) tablet Take 1 Tab by mouth two (2) times daily (with meals). 60 Tab 1    nystatin (MYCOSTATIN) powder Apply  to affected area two (2) times a day. 1 Bottle 3    polyethylene glycol (MIRALAX) 17 gram packet Take 1 Packet by mouth daily. (Patient taking differently: Take 17 g by mouth daily as needed.) 30 Packet 1    senna-docusate (PERICOLACE) 8.6-50 mg per tablet Take 2 Tabs by mouth daily. 60 Tab 1       Allergies   Allergen Reactions    Iodinated Contrast Media Other (comments)     Patient states will pass out. PE:     Physical Exam  Vitals and nursing note reviewed. Constitutional:       General: She is not in acute distress. Appearance: Normal appearance. She is not ill-appearing. Cardiovascular:      Pulses: Normal pulses. Pulmonary:      Effort: Pulmonary effort is normal. No respiratory distress. Musculoskeletal:         General: No swelling, tenderness, deformity or signs of injury. Normal range of motion. Cervical back: Normal range of motion and neck supple. Right lower leg: No edema. Left lower leg: No edema.    Skin:     General: Skin is warm and dry. Capillary Refill: Capillary refill takes less than 2 seconds. Findings: No bruising or erythema. Neurological:      General: No focal deficit present. Mental Status: She is alert and oriented to person, place, and time. Psychiatric:         Mood and Affect: Mood normal.         Behavior: Behavior normal.            Left wrist: Incision healed with no hypertrophy or breakdown. Neurovascularly intact distally. Range of motion near full with pain in the soft tissues with terminal flexion and extension of the digits. Imaging:     3/10/2022 3 views of left wrist shows interval healing of the distal radius fracture with no evidence of hardware loosening or fracture displacement. 1/3/2022 3 views of left wrist does not show any interval displacement of the previously seen fracture with appropriate hardware positioning and no loosening. There is no callus formation noted today. 12/13/2021 3 views of left wrist shows significant displacement and dorsal angulation of the previously seen fracture. Minimal callus formation noted. External injury films of left wrist shows a mildly angulated distal radius fracture in the dorsal direction. There is minimal shortening and inclination is largely maintained. ICD-10-CM ICD-9-CM    1. Closed Colles' fracture of left radius with routine healing, subsequent encounter  S52.532D V54.12 AMB POC XRAY, WRIST; COMPLETE, 3+ VIE   2. S/P ORIF (open reduction internal fixation) fracture  Z98.890 V45.89 AMB POC XRAY, WRIST; COMPLETE, 3+ VIE    Z87.81 V15.51          Plan: At this point patient may continue home exercise program and no need for further physical therapy. I have instructed her on continuing range of motion exercises of both her wrist and fingers daily. Follow-up and Dispositions    · Return if symptoms worsen or fail to improve.           Plan was reviewed with patient, who verbalized agreement and understanding of the plan

## 2022-03-18 PROBLEM — E78.2 MIXED HYPERLIPIDEMIA: Status: ACTIVE | Noted: 2018-06-01

## 2022-03-18 PROBLEM — Z86.718 HISTORY OF DEEP VEIN THROMBOSIS (DVT) OF LOWER EXTREMITY: Status: ACTIVE | Noted: 2018-06-01

## 2022-03-18 PROBLEM — E66.01 OBESITY, MORBID (HCC): Status: ACTIVE | Noted: 2020-02-14

## 2022-03-18 PROBLEM — G62.9 NEUROPATHY: Status: ACTIVE | Noted: 2018-06-01

## 2022-03-19 PROBLEM — S42.309A FRACTURE, HUMERUS, SHAFT: Status: ACTIVE | Noted: 2020-02-18

## 2022-03-19 PROBLEM — K92.2 ACUTE LOWER GI HEMORRHAGE: Status: ACTIVE | Noted: 2018-05-31

## 2022-03-19 PROBLEM — S42.301A CLOSED FRACTURE OF SHAFT OF RIGHT HUMERUS: Status: ACTIVE | Noted: 2020-02-17

## 2022-03-20 PROBLEM — Z79.01 CHRONIC ANTICOAGULATION: Status: ACTIVE | Noted: 2018-06-01

## 2022-03-20 PROBLEM — S52.532D CLOSED COLLES' FRACTURE OF LEFT RADIUS WITH ROUTINE HEALING: Status: ACTIVE | Noted: 2021-12-21

## 2022-07-05 ENCOUNTER — OFFICE VISIT (OUTPATIENT)
Dept: CARDIOLOGY CLINIC | Age: 67
End: 2022-07-05
Payer: MEDICARE

## 2022-07-05 VITALS
BODY MASS INDEX: 41.48 KG/M2 | DIASTOLIC BLOOD PRESSURE: 72 MMHG | OXYGEN SATURATION: 92 % | SYSTOLIC BLOOD PRESSURE: 109 MMHG | HEIGHT: 65 IN | HEART RATE: 89 BPM | WEIGHT: 249 LBS

## 2022-07-05 DIAGNOSIS — R42 VERTIGO: ICD-10-CM

## 2022-07-05 DIAGNOSIS — I10 ESSENTIAL HYPERTENSION: ICD-10-CM

## 2022-07-05 DIAGNOSIS — E66.01 SEVERE OBESITY (BMI >= 40) (HCC): ICD-10-CM
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
## 2022-07-05 DIAGNOSIS — E78.00 HYPERCHOLESTEREMIA: ICD-10-CM

## 2022-07-05 DIAGNOSIS — R55 SYNCOPE, UNSPECIFIED SYNCOPE TYPE: Primary | ICD-10-CM

## 2022-07-05 PROCEDURE — 1090F PRES/ABSN URINE INCON ASSESS: CPT | Performed by: INTERNAL MEDICINE

## 2022-07-05 PROCEDURE — 1123F ACP DISCUSS/DSCN MKR DOCD: CPT | Performed by: INTERNAL MEDICINE

## 2022-07-05 PROCEDURE — 3017F COLORECTAL CA SCREEN DOC REV: CPT | Performed by: INTERNAL MEDICINE

## 2022-07-05 PROCEDURE — G8417 CALC BMI ABV UP PARAM F/U: HCPCS | Performed by: INTERNAL MEDICINE

## 2022-07-05 PROCEDURE — G8427 DOCREV CUR MEDS BY ELIG CLIN: HCPCS | Performed by: INTERNAL MEDICINE

## 2022-07-05 PROCEDURE — 99214 OFFICE O/P EST MOD 30 MIN: CPT | Performed by: INTERNAL MEDICINE

## 2022-07-05 PROCEDURE — G8536 NO DOC ELDER MAL SCRN: HCPCS | Performed by: INTERNAL MEDICINE

## 2022-07-05 PROCEDURE — 93228 REMOTE 30 DAY ECG REV/REPORT: CPT | Performed by: INTERNAL MEDICINE

## 2022-07-05 PROCEDURE — G8432 DEP SCR NOT DOC, RNG: HCPCS | Performed by: INTERNAL MEDICINE

## 2022-07-05 RX ORDER — MECLIZINE HYDROCHLORIDE 25 MG/1
25 TABLET ORAL
Qty: 60 TABLET | Refills: 0 | Status: SHIPPED | OUTPATIENT
Start: 2022-07-05 | End: 2022-07-15

## 2022-07-05 NOTE — PROGRESS NOTES
HISTORY OF PRESENT ILLNESS  Kevin Yang is a 74 y.o. female. 7/22 seen as new patient for dizziness. Frequent dizziness, 2-3 times a day and frequent falls, 2-3 times a month. She has sustained injuries during these falls with broken teeth and broken wrist recently. Things in room spin when she turns in any position. . Falls and LOC is sudden without relation to posture. Dizziness present after the episode. Sometimes she also gets severely dizzy while sitting on the toilet. This episode starts with feeling hot, nauseous and feeling severely dizzy. She has to pull emergency change in the toilet to call the nurse. No CP/SOB. Grandkids inform her that she had fallen many years ago also. Dizziness  The history is provided by the patient (see above). Pertinent negatives include no chest pain, no headaches and no shortness of breath. Leg Swelling  The history is provided by the patient. This is a chronic problem. The current episode started more than 1 week ago (yrs). The problem occurs daily. Pertinent negatives include no chest pain, no headaches and no shortness of breath. The symptoms are aggravated by standing. The symptoms are relieved by sleep and medications. Allergies   Allergen Reactions    Iodinated Contrast Media Other (comments)     Patient states will pass out.        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     TREATED 2002 AND 2015    Rheumatoid arthritis (HCC)     Seizures (HCC)     History of eclampsia    Syncope        Family History   Problem Relation Age of Onset    Hypertension Mother    Blayne Perry Diabetes Mother  Heart Disease Mother     Stroke Mother 72    Heart Disease Father     Diabetes Maternal Aunt     Cancer Maternal Uncle         preostate cancer    Diabetes Maternal Uncle     Heart Attack Neg Hx        Social History     Tobacco Use    Smoking status: Former Smoker     Years: 0.00     Quit date: 2016     Years since quittin.5    Smokeless tobacco: Never Used   Vaping Use    Vaping Use: Never used   Substance Use Topics    Alcohol use: Never    Drug use: No        Current Outpatient Medications   Medication Sig    cholecalciferol (VITAMIN D3) (50,000 UNITS /1250 MCG) capsule Take  by mouth every seven (7) days.  albuterol-ipratropium (DUO-NEB) 2.5 mg-0.5 mg/3 ml nebu 3 mL by Nebulization route every six (6) hours as needed for Wheezing.  lidocaine (Lidocaine Pain Relief) 4 % patch by TransDERmal route every twenty-four (24) hours.  meclizine (ANTIVERT) 12.5 mg tablet Take  by mouth daily. Indications: dizziness    famotidine (PEPCID) 20 mg tablet     oxyCODONE IR (ROXICODONE) 10 mg tab immediate release tablet     potassium chloride SR (K-TAB) 20 mEq tablet     promethazine (PHENERGAN) 12.5 mg tablet     nitroglycerin (NITROSTAT) 0.4 mg SL tablet 0.4 mg by SubLINGual route every five (5) minutes as needed for Chest Pain. Up to 3 doses.  oxybutynin chloride XL (Ditropan XL) 10 mg CR tablet Take 10 mg by mouth daily.  LORazepam (ATIVAN) 0.5 mg tablet Take  by mouth as needed.  montelukast (SINGULAIR) 10 mg tablet Take 10 mg by mouth daily as needed.  DULoxetine (CYMBALTA) 60 mg capsule Take 60 mg by mouth nightly.  loperamide (IMMODIUM) 2 mg tablet Take 2 mg by mouth four (4) times daily as needed for Diarrhea.  furosemide (LASIX) 40 mg tablet Take  by mouth daily.  cloNIDine HCL (CATAPRES) 0.1 mg tablet Take  by mouth two (2) times a day.  acetaminophen (TYLENOL) 325 mg tablet Take  by mouth every four (4) hours as needed for Pain.     ondansetron [facility] ODT) 8 mg disintegrating tablet Take 1 Tab by mouth every eight (8) hours as needed for Nausea for up to 12 doses.  albuterol (PROVENTIL VENTOLIN) 2.5 mg /3 mL (0.083 %) nebulizer solution 3 mL by Nebulization route every four (4) hours as needed for Wheezing.  Cetirizine (ZYRTEC) 10 mg cap Take  by mouth daily as needed.  gabapentin (NEURONTIN) 400 mg capsule Take 300 mg by mouth three (3) times daily.  atorvastatin (LIPITOR) 40 mg tablet Take 1 Tab by mouth nightly.  bisacodyl (DULCOLAX) 10 mg suppository Insert 10 mg into rectum as needed.  ferrous sulfate 325 mg (65 mg iron) tablet Take 1 Tab by mouth two (2) times daily (with meals).  nystatin (MYCOSTATIN) powder Apply  to affected area two (2) times a day.  polyethylene glycol (MIRALAX) 17 gram packet Take 1 Packet by mouth daily. (Patient taking differently: Take 17 g by mouth daily as needed.)    senna-docusate (PERICOLACE) 8.6-50 mg per tablet Take 2 Tabs by mouth daily. No current facility-administered medications for this visit. Past Surgical History:   Procedure Laterality Date    COLONOSCOPY N/A 5/15/2018    COLONOSCOPY, DIAGNOSTIC  performed by Sierra Dunn MD at [place] [address] N/A 6/11/2021    COLONOSCOPY performed by Dick Reddy MD at [address] [place] [address] HAND/FINGER SURGERY UNLISTED      HX APPENDECTOMY      HX DILATION AND CURETTAGE      HX HEENT      Ear tubes    HX HYSTERECTOMY      HX OOPHORECTOMY Bilateral     NEUROLOGICAL PROCEDURE UNLISTED  04/27/2016    T2-L2 Decomprssion and Fusion/T12 Corpetomy       Visit Vitals  /70 (BP 1 Location: Left upper arm, BP Patient Position: Sitting, BP Cuff Size: Adult)   Pulse 84   Ht 5' 4.5\" (1.638 m)   Wt 112.9 kg (249 lb)   SpO2 97%   BMI 42.08 kg/m²       Diagnostic Studies:  I have reviewed the relevant tests done on the patient and show as follows  EKG tracings reviewed by me today.     EKG Results     None        XR Results (most recent):  Results from Hospital Encounter encounter on 11/17/21    XR CHEST PORT    Narrative  XR CHEST PORT    Indication: Dizzy    Comparison: 2/17/2020    Findings:  Lung volumes are mildly diminished. No effusion or pneumothorax. Heart is  borderline enlarged stable. Stable postsurgical changes in the spine. Impression  1. Low lung volumes, otherwise no acute process. Ms. Jossie Childs has a reminder for a \"due or due soon\" health maintenance. I have asked that she contact her primary care provider for follow-up on this health maintenance. Review of Systems   Constitutional: Negative for chills, fever, malaise/fatigue and weight loss. HENT: Negative for nosebleeds. Eyes: Negative for discharge. Respiratory: Negative for cough, shortness of breath and wheezing. Cardiovascular: Negative for chest pain, palpitations, orthopnea, claudication, leg swelling and PND. Gastrointestinal: Negative for diarrhea, nausea and vomiting. Genitourinary: Negative for dysuria and hematuria. Musculoskeletal: Negative for joint pain. Skin: Negative for rash. Neurological: Positive for dizziness and loss of consciousness. Negative for seizures and headaches. Endo/Heme/Allergies: Negative for polydipsia. Does not bruise/bleed easily. Psychiatric/Behavioral: Negative for depression and substance abuse. The patient does not have insomnia. Physical Exam  Constitutional:       General: She is not in acute distress. Appearance: She is well-developed. She is obese. HENT:      Head: Normocephalic and atraumatic. Mouth/Throat:      Dentition: Normal dentition. Eyes:      General: No scleral icterus. Right eye: No discharge. Left eye: No discharge. Neck:      Thyroid: No thyromegaly. Vascular: No carotid bruit or JVD. Cardiovascular:      Rate and Rhythm: Normal rate and regular rhythm. Pulses: Intact distal pulses.       Heart sounds: Normal heart sounds, S1 normal and S2 normal. No murmur heard. No friction rub. No gallop. Pulmonary:      Effort: Pulmonary effort is normal.      Breath sounds: Normal breath sounds. No wheezing or rales. Abdominal:      Palpations: Abdomen is soft. There is no mass. Tenderness: There is no abdominal tenderness. Musculoskeletal:      Cervical back: Neck supple. Right lower leg: No edema. Left lower leg: No edema. Lymphadenopathy:      Cervical:      Right cervical: No superficial cervical adenopathy. Left cervical: No superficial cervical adenopathy. Skin:     General: Skin is warm and dry. Findings: No rash. Neurological:      Mental Status: She is alert and oriented to person, place, and time. Psychiatric:         Behavior: Behavior normal.     4/16 echo    SUMMARY:  Left ventricle: Systolic function was normal. Ejection fraction was  estimated to be 65 %. There were no regional wall motion abnormalities.     Right ventricle: Estimated peak pressure was 35 mmHg.     Left atrium: Size was normal. LA volume index was 30 ml/m squared. ASSESSMENT and PLAN      Diagnoses and all orders for this visit:    1. Syncope, unspecified syncope type  -     ECHO ADULT COMPLETE; Future  -     OH EXTERNAL MOBILE CV TELEMETRY W/I&REPORT UP TO 30 DAYS  -     METABOLIC PANEL, BASIC; Future  -     MAGNESIUM; Future    2. Essential hypertension  -     METABOLIC PANEL, BASIC; Future  -     MAGNESIUM; Future    3. Hypercholesteremia    4. Severe obesity (BMI >= 40) ([facility])    5. Fall, sequela  -     Compression Socks, Large misc; 2 Packets by Does Not Apply route daily. May remove when laying down in the bed    6. Vertigo  -     meclizine (ANTIVERT) 25 mg tablet; Take 1 Tablet by mouth two (2) times daily as needed for Dizziness for up to 10 days. Pertinent laboratory and test data reviewed and discussed with patient.   See patient instructions also for other medical advice given    Medications Discontinued During This Encounter   Medication Reason    furosemide (LASIX) 40 mg tablet Side Effects    meclizine (ANTIVERT) 12.5 mg tablet DOSE ADJUSTMENT    promethazine (PHENERGAN) 12.5 mg tablet Therapy Completed    ondansetron (ZOFRAN ODT) 8 mg disintegrating tablet Therapy Completed    Cetirizine (ZYRTEC) 10 mg cap Therapy Completed    potassium chloride SR (K-TAB) 20 mEq tablet Therapy Completed       Follow-up and Dispositions    · Return in about 6 weeks (around 8/16/2022), or if symptoms worsen or fail to improve, for post test.       7/5/2022 patient has true syncope as well as possibly vertigo. There are minor EKG blood pressure changes with orthostasis. Will hold Lasix and use compression stockings for edema in case she develops it. Not controlled blood pressure very tightly for now at least.  Check echo and event monitoring for 30 days. Follow-up electrolytes as diuretics are discontinued along with potassium. Increase the dose of meclizine to 25 mg twice a day as needed. Follow-up with neurology as scheduled.

## 2022-07-05 NOTE — PATIENT INSTRUCTIONS
Medications Discontinued During This Encounter   Medication Reason    furosemide (LASIX) 40 mg tablet Side Effects    meclizine (ANTIVERT) 12.5 mg tablet DOSE ADJUSTMENT    promethazine (PHENERGAN) 12.5 mg tablet Therapy Completed    ondansetron (ZOFRAN ODT) 8 mg disintegrating tablet Therapy Completed    Cetirizine (ZYRTEC) 10 mg cap Therapy Completed    potassium chloride SR (K-TAB) 20 mEq tablet Therapy Completed          Learning About the Mediterranean Diet  What is the Mediterranean diet? The Mediterranean diet is a style of eating rather than a diet plan. It features foods eaten in [place], [place], [place] and [place], and other countries along the [facility]. It emphasizes eating foods like fish, fruits, vegetables, beans, high-fiber breads and whole grains, nuts, and olive oil. This style of eating includes limited red meat, cheese, and sweets. Why choose the Mediterranean diet? A Mediterranean-style diet may improve heart health. It contains more fat than other heart-healthy diets. But the fats are mainly from nuts, unsaturated oils (such as fish oils and olive oil), and certain nut or seed oils (such as canola, soybean, or flaxseed oil). These fats may help protect the heart and blood vessels. How can you get started on the Mediterranean diet? Here are some things you can do to switch to a more Mediterranean way of eating. What to eat  · Eat a variety of fruits and vegetables each day, such as grapes, blueberries, tomatoes, broccoli, peppers, figs, olives, spinach, eggplant, beans, lentils, and chickpeas. · Eat a variety of whole-grain foods each day, such as oats, brown rice, and whole wheat bread, pasta, and couscous. · Eat fish at least 2 times a week. Try tuna, salmon, mackerel, lake trout, herring, or sardines. · Eat moderate amounts of low-fat dairy products, such as milk, cheese, or yogurt. · Eat moderate amounts of poultry and eggs.   · Choose healthy (unsaturated) fats, such as nuts, olive oil, and certain nut or seed oils like canola, soybean, and flaxseed. · Limit unhealthy (saturated) fats, such as butter, palm oil, and coconut oil. And limit fats found in animal products, such as meat and dairy products made with whole milk. Try to eat red meat only a few times a month in very small amounts. · Limit sweets and desserts to only a few times a week. This includes sugar-sweetened drinks like soda. The Mediterranean diet may also include red wine with your meal--1 glass each day for women and up to 2 glasses a day for men. Tips for eating at home  · Use herbs, spices, garlic, lemon zest, and citrus juice instead of salt to add flavor to foods. · Add avocado slices to your sandwich instead of orona. · Have fish for lunch or dinner instead of red meat. Brush the fish with olive oil, and broil or grill it. · Sprinkle your salad with seeds or nuts instead of cheese. · Cook with olive or canola oil instead of butter or oils that are high in saturated fat. · Switch from 2% milk or whole milk to 1% or fat-free milk. · Dip raw vegetables in a vinaigrette dressing or hummus instead of dips made from mayonnaise or sour cream.  · Have a piece of fruit for dessert instead of a piece of cake. Try baked apples, or have some dried fruit. Tips for eating out  · Try broiled, grilled, baked, or poached fish instead of having it fried or breaded. · Ask your  to have your meals prepared with olive oil instead of butter. · Order dishes made with marinara sauce or sauces made from olive oil. Avoid sauces made from cream or mayonnaise. · Choose whole-grain breads, whole wheat pasta and pizza crust, brown rice, beans, and lentils. · Cut back on butter or margarine on bread. Instead, you can dip your bread in a small amount of olive oil. · Ask for a side salad or grilled vegetables instead of french fries or chips. Where can you learn more?   Go to http://www.gray.com/  Enter O407 in the search box to learn more about \"Learning About the Mediterranean Diet. \"  Current as of: September 8, 2021               Content Version: 13.2  © 1902-6568 [employer]. Care instructions adapted under license by [employer] (which disclaims liability or warranty for this information). If you have questions about a medical condition or this instruction, always ask your healthcare professional. [place] [address] any warranty or liability for your use of this information.

## 2022-07-12 ENCOUNTER — APPOINTMENT (OUTPATIENT)
Dept: PHYSICAL THERAPY | Age: 67
End: 2022-07-12

## 2022-09-14 ENCOUNTER — OFFICE VISIT (OUTPATIENT)
Dept: CARDIOLOGY CLINIC | Age: 67
End: 2022-09-14
Payer: MEDICARE

## 2022-09-14 VITALS
WEIGHT: 245 LBS | OXYGEN SATURATION: 96 % | SYSTOLIC BLOOD PRESSURE: 153 MMHG | BODY MASS INDEX: 40.82 KG/M2 | DIASTOLIC BLOOD PRESSURE: 84 MMHG | HEART RATE: 92 BPM | HEIGHT: 65 IN

## 2022-09-14 DIAGNOSIS — I10 ESSENTIAL HYPERTENSION: Primary | ICD-10-CM

## 2022-09-14 DIAGNOSIS — R42 DIZZINESS: ICD-10-CM

## 2022-09-14 DIAGNOSIS — E78.00 HYPERCHOLESTEREMIA: ICD-10-CM

## 2022-09-14 DIAGNOSIS — E66.01 SEVERE OBESITY (BMI >= 40) (HCC): ICD-10-CM

## 2022-09-14 PROCEDURE — 1123F ACP DISCUSS/DSCN MKR DOCD: CPT | Performed by: INTERNAL MEDICINE

## 2022-09-14 PROCEDURE — G8536 NO DOC ELDER MAL SCRN: HCPCS | Performed by: INTERNAL MEDICINE

## 2022-09-14 PROCEDURE — 1090F PRES/ABSN URINE INCON ASSESS: CPT | Performed by: INTERNAL MEDICINE

## 2022-09-14 PROCEDURE — 3017F COLORECTAL CA SCREEN DOC REV: CPT | Performed by: INTERNAL MEDICINE

## 2022-09-14 PROCEDURE — 99213 OFFICE O/P EST LOW 20 MIN: CPT | Performed by: INTERNAL MEDICINE

## 2022-09-14 PROCEDURE — G8432 DEP SCR NOT DOC, RNG: HCPCS | Performed by: INTERNAL MEDICINE

## 2022-09-14 PROCEDURE — G8417 CALC BMI ABV UP PARAM F/U: HCPCS | Performed by: INTERNAL MEDICINE

## 2022-09-14 PROCEDURE — 1101F PT FALLS ASSESS-DOCD LE1/YR: CPT | Performed by: INTERNAL MEDICINE

## 2022-09-14 PROCEDURE — G8427 DOCREV CUR MEDS BY ELIG CLIN: HCPCS | Performed by: INTERNAL MEDICINE

## 2022-09-14 RX ORDER — POTASSIUM CHLORIDE 1500 MG/1
TABLET, FILM COATED, EXTENDED RELEASE ORAL
COMMUNITY
Start: 2022-08-28

## 2022-09-14 RX ORDER — LOSARTAN POTASSIUM AND HYDROCHLOROTHIAZIDE 12.5; 5 MG/1; MG/1
TABLET ORAL
COMMUNITY
Start: 2022-08-21

## 2022-09-14 RX ORDER — PREDNISOLONE ACETATE 10 MG/ML
SUSPENSION/ DROPS OPHTHALMIC
COMMUNITY
Start: 2022-09-02

## 2022-09-14 RX ORDER — CLONIDINE HYDROCHLORIDE 0.2 MG/1
0.2 TABLET ORAL 2 TIMES DAILY
Qty: 180 TABLET | Refills: 1 | Status: SHIPPED | OUTPATIENT
Start: 2022-09-14

## 2022-09-14 NOTE — PROGRESS NOTES
HISTORY OF PRESENT ILLNESS  Bob Durham is a 74 y.o. female. 7/22 seen as new patient for dizziness. Frequent dizziness, 2-3 times a day and frequent falls, 2-3 times a month. She has sustained injuries during these falls with broken teeth and broken wrist recently. Things in room spin when she turns in any position. . Falls and LOC is sudden without relation to posture. Dizziness present after the episode. Sometimes she also gets severely dizzy while sitting on the toilet. This episode starts with feeling hot, nauseous and feeling severely dizzy. She has to pull emergency change in the toilet to call the nurse. No CP/SOB. Grandkids inform her that she had fallen many years ago also. 9/22 still getting dizzy but no falls since last visit. Dizziness  The history is provided by the Patient (see above). The current episode started more than 1 week ago. The problem has been rapidly improving. Pertinent negatives include no chest pain, no headaches and no shortness of breath. Leg Swelling  The history is provided by the Patient. This is a chronic problem. The current episode started more than 1 week ago (yrs). The problem occurs daily. The problem has not changed since onset. Pertinent negatives include no chest pain, no headaches and no shortness of breath. The symptoms are aggravated by standing. The symptoms are relieved by sleep and medications. Follow-up  Pertinent negatives include no chest pain, no headaches and no shortness of breath. Allergies   Allergen Reactions    Iodinated Contrast Media Other (comments)     Patient states will pass out.        Past Medical History:   Diagnosis Date    A-fib [facility])     Arthritis     Childhood asthma     Childhood    Clotting disorder ([facility] [address]     DVT 2016- rt leg    History of seasonal allergies     Hyperlipidemia     Hypertension     MSSA (methicillin susceptible Staphylococcus aureus) 2016    bone    Neuropathy     lower extremities    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     TREATED  AND     Rheumatoid arthritis ([facility] [address]     Seizures ([facility] [address]     History of eclampsia    Syncope        Family History   Problem Relation Age of Onset    Hypertension Mother     Diabetes Mother     Heart Disease Mother     Stroke Mother 72    Heart Disease Father     Diabetes Maternal Aunt     Cancer Maternal Uncle         preostate cancer    Diabetes Maternal Uncle     Heart Attack Neg Hx        Social History     Tobacco Use    Smoking status: Former     Years: 0.00     Types: Cigarettes     Quit date: 2016     Years since quittin.7    Smokeless tobacco: Never   Vaping Use    Vaping Use: Never used   Substance Use Topics    Alcohol use: Never    Drug use: No        Current Outpatient Medications   Medication Sig    potassium chloride SR (K-TAB) 20 mEq tablet     prednisoLONE acetate (PRED FORTE) 1 % ophthalmic suspension     Compression Socks, Large misc 2 Packets by Does Not Apply route daily. May remove when laying down in the bed    cholecalciferol (VITAMIN D3) (50,000 UNITS /1250 MCG) capsule Take  by mouth every seven (7) days. albuterol-ipratropium (DUO-NEB) 2.5 mg-0.5 mg/3 ml nebu 3 mL by Nebulization route every six (6) hours as needed for Wheezing. lidocaine 4 % patch by TransDERmal route every twenty-four (24) hours. famotidine (PEPCID) 20 mg tablet     oxyCODONE IR (ROXICODONE) 10 mg tab immediate release tablet     nitroglycerin (NITROSTAT) 0.4 mg SL tablet 0.4 mg by SubLINGual route every five (5) minutes as needed for Chest Pain. Up to 3 doses. oxybutynin chloride XL (DITROPAN XL) 10 mg CR tablet Take 10 mg by mouth daily. LORazepam (ATIVAN) 0.5 mg tablet Take  by mouth as needed. montelukast (SINGULAIR) 10 mg tablet Take 10 mg by mouth daily as needed. DULoxetine (CYMBALTA) 60 mg capsule Take 60 mg by mouth nightly.     loperamide (IMMODIUM) 2 mg tablet Take 2 mg by mouth four (4) times daily as needed for Diarrhea. cloNIDine HCL (CATAPRES) 0.1 mg tablet Take  by mouth two (2) times a day. acetaminophen (TYLENOL) 325 mg tablet Take  by mouth every four (4) hours as needed for Pain. albuterol (PROVENTIL VENTOLIN) 2.5 mg /3 mL (0.083 %) nebulizer solution 3 mL by Nebulization route every four (4) hours as needed for Wheezing. gabapentin (NEURONTIN) 400 mg capsule Take 300 mg by mouth three (3) times daily. atorvastatin (LIPITOR) 40 mg tablet Take 1 Tab by mouth nightly. bisacodyl (DULCOLAX) 10 mg suppository Insert 10 mg into rectum as needed. ferrous sulfate 325 mg (65 mg iron) tablet Take 1 Tab by mouth two (2) times daily (with meals). nystatin (MYCOSTATIN) powder Apply  to affected area two (2) times a day. polyethylene glycol (MIRALAX) 17 gram packet Take 1 Packet by mouth daily. (Patient taking differently: Take 17 g by mouth daily as needed.)    senna-docusate (PERICOLACE) 8.6-50 mg per tablet Take 2 Tabs by mouth daily. losartan-hydroCHLOROthiazide (HYZAAR) 50-12.5 mg per tablet      No current facility-administered medications for this visit.         Past Surgical History:   Procedure Laterality Date    COLONOSCOPY N/A 5/15/2018    COLONOSCOPY, DIAGNOSTIC  performed by Courtney Arndt MD at [place] [address] N/A 6/11/2021    COLONOSCOPY performed by Maddi Gurrola MD at [facility]    HAND/FINGER SURGERY UNLISTED      HX APPENDECTOMY      HX DILATION AND CURETTAGE      HX HEENT      Ear tubes    HX HYSTERECTOMY      HX OOPHORECTOMY Bilateral     NEUROLOGICAL PROCEDURE UNLISTED  04/27/2016    T2-L2 Decomprssion and Fusion/T12 Corpetomy       Visit Vitals  BP (!) 153/84 (BP 1 Location: Left upper arm, BP Patient Position: Sitting, BP Cuff Size: Adult long)   Pulse 92   Ht 5' 5\" (1.651 m)   Wt 111.1 kg (245 lb)   SpO2 96%   BMI 40.77 kg/m²       Diagnostic Studies:  I have reviewed the relevant tests done on the patient and show as follows  EKG tracings reviewed by me today. EKG Results       None          XR Results (most recent):  Results from Hospital Encounter encounter on 11/17/21    XR CHEST PORT    Narrative  XR CHEST PORT    Indication: Dizzy    Comparison: 2/17/2020    Findings:  Lung volumes are mildly diminished. No effusion or pneumothorax. Heart is  borderline enlarged stable. Stable postsurgical changes in the spine. Impression  1. Low lung volumes, otherwise no acute process. Ms. Samantha Day has a reminder for a \"due or due soon\" health maintenance. I have asked that she contact her primary care provider for follow-up on this health maintenance. Review of Systems   Constitutional:  Negative for chills, fever, malaise/fatigue and weight loss. HENT:  Negative for nosebleeds. Eyes:  Negative for discharge. Respiratory:  Negative for cough, shortness of breath and wheezing. Cardiovascular:  Negative for chest pain, palpitations, orthopnea, claudication, leg swelling and PND. Gastrointestinal:  Negative for diarrhea, nausea and vomiting. Genitourinary:  Negative for dysuria and hematuria. Musculoskeletal:  Negative for joint pain. Skin:  Negative for rash. Neurological:  Positive for dizziness and loss of consciousness. Negative for seizures and headaches. Endo/Heme/Allergies:  Negative for polydipsia. Does not bruise/bleed easily. Psychiatric/Behavioral:  Negative for depression and substance abuse. The patient does not have insomnia. Physical Exam  Constitutional:       General: She is not in acute distress. Appearance: She is well-developed. She is obese. HENT:      Head: Normocephalic and atraumatic. Mouth/Throat:      Dentition: Normal dentition. Eyes:      General: No scleral icterus. Right eye: No discharge. Left eye: No discharge. Neck:      Thyroid: No thyromegaly. Vascular: No carotid bruit or JVD. Cardiovascular:      Rate and Rhythm: Normal rate and regular rhythm. Pulses: Intact distal pulses. Heart sounds: Normal heart sounds, S1 normal and S2 normal. No murmur heard. No friction rub. No gallop. Pulmonary:      Effort: Pulmonary effort is normal.      Breath sounds: Normal breath sounds. No wheezing or rales. Abdominal:      Palpations: Abdomen is soft. There is no mass. Tenderness: There is no abdominal tenderness. Musculoskeletal:      Cervical back: Neck supple. Right lower leg: No edema. Left lower leg: No edema. Lymphadenopathy:      Cervical:      Right cervical: No superficial cervical adenopathy. Left cervical: No superficial cervical adenopathy. Skin:     General: Skin is warm and dry. Findings: No rash. Neurological:      Mental Status: She is alert and oriented to person, place, and time. Psychiatric:         Behavior: Behavior normal.   4/16 echo    SUMMARY:  Left ventricle: Systolic function was normal. Ejection fraction was  estimated to be 65 %. There were no regional wall motion abnormalities. Right ventricle: Estimated peak pressure was 35 mmHg. Left atrium: Size was normal. LA volume index was 30 ml/m squared. 08/09/22    ECHO ADULT COMPLETE 08/09/2022 8/9/2022    Interpretation Summary  Formatting of this result is different from the original.      Left Ventricle: Normal left ventricular systolic function with a visually estimated EF of 55 - 60%. Left ventricle size is normal. Moderately increased wall thickness. Findings consistent with moderate concentric hypertrophy. Normal wall motion. Diastolic dysfunction present with normal LV EF. Right Ventricle: Not well visualized. Interatrial Septum: Bubble study was attempted and was inconclusive. Signed by: Claudia Velazquez MD on 8/9/2022  1:17 PM      ASSESSMENT and PLAN    6/22 lipids LDL 66, HDL 51, ;    7/5/2022 patient has true syncope as well as possibly vertigo. There are minor EKG blood pressure changes with orthostasis.   Will hold Lasix and use compression stockings for edema in case she develops it. Not controlled blood pressure very tightly for now at least.  Check echo and event monitoring for 30 days. Follow-up electrolytes as diuretics are discontinued along with potassium. Increase the dose of meclizine to 25 mg twice a day as needed. Follow-up with neurology as scheduled. Diagnoses and all orders for this visit:    1. Essential hypertension  -     cloNIDine HCL (CATAPRES) 0.2 mg tablet; Take 1 Tablet by mouth two (2) times a day. 2. Dizziness    3. Severe obesity (BMI >= 40) ([facility])    4. Hypercholesteremia        Pertinent laboratory and test data reviewed and discussed with patient. See patient instructions also for other medical advice given    Medications Discontinued During This Encounter   Medication Reason    cloNIDine HCL (CATAPRES) 0.1 mg tablet REORDER       Follow-up and Dispositions    Return in about 6 months (around 3/14/2023), or if symptoms worsen or fail to improve, for BP log x 4-5 days post med changes. 9/14/2022 dizziness has significantly improved as she is not falling anymore. It was likely related to hypovolemia secondary to Lasix but other neurology work-up is in progress. Blood pressure is mildly elevated. Increase clonidine and follow the home chart. Diet and weight discussed. Mediterranean diet guidelines given. Lipids are well controlled.

## 2022-09-14 NOTE — PROGRESS NOTES
1. Have you been to the ER, urgent care clinic since your last visit? Hospitalized since your last visit? No    2. Have you seen or consulted any other health care providers outside of the [address] [place] [address] since your last visit? Include any pap smears or colon screening. Yes Where: ENT      3. Since your last visit, have you had any of the following symptoms?      dizziness and swelling in legs/arms. 4.  Have you had any blood work, X-rays or cardiac testing? Yes Where: nursing home     Requested: YES     In [facility]: NO    5. Where do you normally have your labs drawn? Nursing home    6. Do you need any refills today?    No

## 2022-09-14 NOTE — PATIENT INSTRUCTIONS
Medications Discontinued During This Encounter   Medication Reason    cloNIDine HCL (CATAPRES) 0.1 mg tablet REORDER    After the recommended changes have been made in blood pressure medicines, patient advised to keep BP/HR(pulse rate) chart twice daily and bring us results in next 4 to 5 days. Patient may send the results via \"My Chart\" if desired. Please rest for 5-10 minutes before checking blood pressure. Sit on a comfortable chair without crossing the legs and put your arm on a table. We recommend that you use an upper arm cuff. Check the blood pressure 3 times each time you check the blood pressure and record the lowest reading. If you check the blood pressure in both arms, use the higher reading. Learning About the [address] [facility] Diet  What is the Mediterranean diet? The Mediterranean diet is a style of eating rather than a diet plan. It features foods eaten in [place], [place], [place] and [place], and other countries along the [facility]. It emphasizes eating foods like fish, fruits, vegetables, beans, high-fiber breads and whole grains, nuts, and olive oil. This style of eating includes limited red meat, cheese, and sweets. Why choose the Mediterranean diet? A Mediterranean-style diet may improve heart health. It contains more fat than other heart-healthy diets. But the fats are mainly from nuts, unsaturated oils (such as fish oils and olive oil), and certain nut or seed oils (such as canola, soybean, or flaxseed oil). These fats may help protect the heart and blood vessels. How can you get started on the Mediterranean diet? Here are some things you can do to switch to a more Mediterranean way of eating. What to eat  Eat a variety of fruits and vegetables each day, such as grapes, blueberries, tomatoes, broccoli, peppers, figs, olives, spinach, eggplant, beans, lentils, and chickpeas.   Eat a variety of whole-grain foods each day, such as oats, brown rice, and whole wheat bread, pasta, and couscous. Eat fish at least 2 times a week. Try tuna, salmon, mackerel, lake trout, herring, or sardines. Eat moderate amounts of low-fat dairy products, such as milk, cheese, or yogurt. Eat moderate amounts of poultry and eggs. Choose healthy (unsaturated) fats, such as nuts, olive oil, and certain nut or seed oils like canola, soybean, and flaxseed. Limit unhealthy (saturated) fats, such as butter, palm oil, and coconut oil. And limit fats found in animal products, such as meat and dairy products made with whole milk. Try to eat red meat only a few times a month in very small amounts. Limit sweets and desserts to only a few times a week. This includes sugar-sweetened drinks like soda. The Mediterranean diet may also include red wine with your meal--1 glass each day for women and up to 2 glasses a day for men. Tips for eating at home  Use herbs, spices, garlic, lemon zest, and citrus juice instead of salt to add flavor to foods. Add avocado slices to your sandwich instead of orona. Have fish for lunch or dinner instead of red meat. Brush the fish with olive oil, and broil or grill it. Sprinkle your salad with seeds or nuts instead of cheese. Cook with olive or canola oil instead of butter or oils that are high in saturated fat. Switch from 2% milk or whole milk to 1% or fat-free milk. Dip raw vegetables in a vinaigrette dressing or hummus instead of dips made from mayonnaise or sour cream.  Have a piece of fruit for dessert instead of a piece of cake. Try baked apples, or have some dried fruit. Tips for eating out  Try broiled, grilled, baked, or poached fish instead of having it fried or breaded. Ask your  to have your meals prepared with olive oil instead of butter. Order dishes made with marinara sauce or sauces made from olive oil. Avoid sauces made from cream or mayonnaise. Choose whole-grain breads, whole wheat pasta and pizza crust, brown rice, beans, and lentils.   Cut back on butter or margarine on bread. Instead, you can dip your bread in a small amount of olive oil. Ask for a side salad or grilled vegetables instead of french fries or chips. Where can you learn more? Go to http://www.herndon.com/  Enter O407 in the search box to learn more about \"Learning About the Mediterranean Diet. \"  Current as of: September 8, 2021               Content Version: 13.2  © 2006-2022 Healthwise, [employer]. Care instructions adapted under license by [employer] (which disclaims liability or warranty for this information). If you have questions about a medical condition or this instruction, always ask your healthcare professional. [place] [address] any warranty or liability for your use of this information.

## 2022-11-04 NOTE — PROGRESS NOTES
1. Have you been to the ER, urgent care clinic since your last visit? Hospitalized since your last visit? No    2. Have you seen or consulted any other health care providers outside of the [facility] since your last visit? Include any pap smears or colon screening.  No
HISTORY OF PRESENT ILLNESS:  Vasquez Carson returns for history and physical in preparation for upcoming direct admission secondary to a spiral right humeral fracture, comminuted, which requires intramedullary nailing date February 17, 2020. The patient is being direct admitted to [facility]. Dr. Nico Grullon will follow her for general medical clearance. Today, all the patient's questions were answered to her satisfaction.
dry
warm/dry
warm/dry

## 2022-12-24 ENCOUNTER — TRANSCRIBE ORDER (OUTPATIENT)
Dept: SCHEDULING | Age: 67
End: 2022-12-24

## 2022-12-24 DIAGNOSIS — I63.033 CEREBRAL INFARCTION DUE TO BILATERAL THROMBOSIS OF CAROTID ARTERIES (HCC): ICD-10-CM

## 2022-12-24 DIAGNOSIS — R55 SYNCOPE AND COLLAPSE: Primary | ICD-10-CM

## 2022-12-24 DIAGNOSIS — G40.019 BENIGN ROLANDIC EPILEPSY, INTRACTABLE (HCC): ICD-10-CM

## 2023-04-19 ENCOUNTER — TRANSCRIBE ORDERS (OUTPATIENT)
Facility: HOSPITAL | Age: 68
End: 2023-04-19

## 2023-04-19 DIAGNOSIS — R55 SYNCOPE AND COLLAPSE: ICD-10-CM

## 2023-04-19 DIAGNOSIS — I63.033 CEREBRAL INFARCTION DUE TO BILATERAL THROMBOSIS OF CAROTID ARTERIES (HCC): Primary | ICD-10-CM

## 2023-04-25 ENCOUNTER — OFFICE VISIT (OUTPATIENT)
Age: 68
End: 2023-04-25
Payer: MEDICARE

## 2023-04-25 VITALS
HEART RATE: 95 BPM | HEIGHT: 65 IN | OXYGEN SATURATION: 96 % | SYSTOLIC BLOOD PRESSURE: 110 MMHG | BODY MASS INDEX: 41.99 KG/M2 | WEIGHT: 252 LBS | DIASTOLIC BLOOD PRESSURE: 71 MMHG

## 2023-04-25 DIAGNOSIS — R42 DIZZINESS AND GIDDINESS: Primary | ICD-10-CM

## 2023-04-25 DIAGNOSIS — E78.00 PURE HYPERCHOLESTEROLEMIA, UNSPECIFIED: ICD-10-CM

## 2023-04-25 DIAGNOSIS — E66.01 MORBID (SEVERE) OBESITY DUE TO EXCESS CALORIES (HCC): ICD-10-CM

## 2023-04-25 DIAGNOSIS — I10 ESSENTIAL (PRIMARY) HYPERTENSION: ICD-10-CM

## 2023-04-25 PROCEDURE — 3078F DIAST BP <80 MM HG: CPT | Performed by: INTERNAL MEDICINE

## 2023-04-25 PROCEDURE — 1123F ACP DISCUSS/DSCN MKR DOCD: CPT | Performed by: INTERNAL MEDICINE

## 2023-04-25 PROCEDURE — 99214 OFFICE O/P EST MOD 30 MIN: CPT | Performed by: INTERNAL MEDICINE

## 2023-04-25 PROCEDURE — 93000 ELECTROCARDIOGRAM COMPLETE: CPT | Performed by: INTERNAL MEDICINE

## 2023-04-25 PROCEDURE — 3074F SYST BP LT 130 MM HG: CPT | Performed by: INTERNAL MEDICINE

## 2023-04-25 RX ORDER — BUSPIRONE HYDROCHLORIDE 5 MG/1
TABLET ORAL
COMMUNITY
Start: 2023-04-01

## 2023-04-25 RX ORDER — RIVAROXABAN 10 MG/1
TABLET, FILM COATED ORAL
COMMUNITY
Start: 2023-04-17 | End: 2023-04-25 | Stop reason: ALTCHOICE

## 2023-04-25 RX ORDER — LOSARTAN POTASSIUM 50 MG/1
50 TABLET ORAL DAILY
Qty: 90 TABLET | Refills: 1 | Status: SHIPPED | OUTPATIENT
Start: 2023-04-25

## 2023-04-25 ASSESSMENT — ENCOUNTER SYMPTOMS
EYES NEGATIVE: 1
RESPIRATORY NEGATIVE: 1
GASTROINTESTINAL NEGATIVE: 1

## 2023-04-25 ASSESSMENT — PATIENT HEALTH QUESTIONNAIRE - PHQ9
SUM OF ALL RESPONSES TO PHQ QUESTIONS 1-9: 0
SUM OF ALL RESPONSES TO PHQ QUESTIONS 1-9: 0
DEPRESSION UNABLE TO ASSESS: FUNCTIONAL CAPACITY MOTIVATION LIMITS ACCURACY
SUM OF ALL RESPONSES TO PHQ QUESTIONS 1-9: 0
1. LITTLE INTEREST OR PLEASURE IN DOING THINGS: 0
2. FEELING DOWN, DEPRESSED OR HOPELESS: 0
SUM OF ALL RESPONSES TO PHQ9 QUESTIONS 1 & 2: 0
SUM OF ALL RESPONSES TO PHQ QUESTIONS 1-9: 0

## 2023-04-25 NOTE — PROGRESS NOTES
1. Have you been to the ER, urgent care clinic since your last visit? Hospitalized since your last visit? No      2. Where do you normally have your labs drawn? Nursing Home    3. Do you need any refills today? No    4. Which local pharmacy do you use (enter pharmacy)? [facility]    5. Which mail order pharmacy do you use (enter pharmacy)? No     6. Are you here for surgical clearance and if so who will be doing your     procedure/surgery (care team)?    No
DIAGNOSTIC  performed by Gloria Estrada MD at [facility]      HAND/FINGER SURGERY UNLISTED      HEENT      Ear tubes    HYSTERECTOMY (CERVIX STATUS UNKNOWN)      NEUROLOGICAL SURGERY  04/27/2016    T2-L2 Decomprssion and Fusion/T12 Corpetomy    OVARY REMOVAL Bilateral        Vitals:    04/25/23 0917   BP: 110/71   Site: Left Upper Arm   Position: Sitting   Cuff Size: Large Adult   Pulse: 95   SpO2: 96%   Weight: 252 lb (114.3 kg)   Height: 5' 5\" (1.651 m)          Diagnostic Studies:  I have reviewed the relevant tests done on the patient and show as follows  EKG tracings reviewed by me today. Ms. Alma Paez has a reminder for a \"due or due soon\" health maintenance. I have asked that she contact her primary care provider for follow-up on this health maintenance. 4/16 echo    SUMMARY:  Left ventricle: Systolic function was normal. Ejection fraction was  estimated to be 65 %. There were no regional wall motion abnormalities. Right ventricle: Estimated peak pressure was 35 mmHg. Left atrium: Size was normal. LA volume index was 30 ml/m squared. 08/09/22    ECHO ADULT COMPLETE 08/09/2022 8/9/2022    Interpretation Summary  Formatting of this result is different from the original.      Left Ventricle: Normal left ventricular systolic function with a visually estimated EF of 55 - 60%. Left ventricle size is normal. Moderately increased wall thickness. Findings consistent with moderate concentric hypertrophy. Normal wall motion. Diastolic dysfunction present with normal LV EF. Right Ventricle: Not well visualized. Interatrial Septum: Bubble study was attempted and was inconclusive. Signed by: Eduard Rosenbaum MD on 8/9/2022  1:17 PM      ASSESSMENT and PLAN    6/22 lipids LDL 66, HDL 51, ;    7/5/2022 patient has true syncope as well as possibly vertigo. There are minor EKG blood pressure changes with orthostasis.   Will hold Lasix and use compression

## 2023-04-25 NOTE — PATIENT INSTRUCTIONS
The medication list included in this document is our record of what you are currently taking, including any changes that were made at today's visit. If you find any differences when compared to your medications at home, or have any questions that were not answered at your visit, please contact the office. After the recommended changes have been made in blood pressure medicines, patient advised to keep BP/HR(pulse rate) chart twice daily and bring us results in next 4 to 5 days. Patient may send the results via \"My Chart\" if desired. Please rest for 5-10 minutes before checking blood pressure. Check blood pressure lying down initially and then stand up for 3 minutes and check again. Do this once or twice a day every day for a week. We recommend that you use an upper arm cuff. Check the blood pressure 3 times each time you check the blood pressure and record the lowest reading. If you check the blood pressure in both arms, use the higher reading.

## 2023-05-15 ENCOUNTER — HOSPITAL ENCOUNTER (OUTPATIENT)
Facility: HOSPITAL | Age: 68
Discharge: HOME | End: 2023-05-17
Payer: MEDICARE

## 2023-05-15 ENCOUNTER — HOSPITAL ENCOUNTER (OUTPATIENT)
Facility: HOSPITAL | Age: 68
Discharge: HOME | End: 2023-05-18
Payer: MEDICARE

## 2023-05-15 ENCOUNTER — APPOINTMENT (OUTPATIENT)
Facility: HOSPITAL | Age: 68
End: 2023-05-15
Payer: MEDICARE

## 2023-05-15 DIAGNOSIS — R55 SYNCOPE AND COLLAPSE: ICD-10-CM

## 2023-05-15 DIAGNOSIS — I63.033 CEREBRAL INFARCTION DUE TO BILATERAL THROMBOSIS OF CAROTID ARTERIES (HCC): ICD-10-CM

## 2023-05-15 DIAGNOSIS — G40.019 BENIGN ROLANDIC EPILEPSY, INTRACTABLE (HCC): ICD-10-CM

## 2023-05-15 PROCEDURE — 93880 EXTRACRANIAL BILAT STUDY: CPT

## 2023-05-15 PROCEDURE — 93225 XTRNL ECG REC<48 HRS REC: CPT

## 2023-05-15 PROCEDURE — 95816 EEG AWAKE AND DROWSY: CPT

## 2023-05-16 LAB
VAS LEFT BULB EDV: 14.1 CM/S
VAS LEFT BULB PSV: 52.9 CM/S
VAS LEFT CCA DIST EDV: 15 CM/S
VAS LEFT CCA DIST PSV: 73.2 CM/S
VAS LEFT CCA MID EDV: 11.13 CM/S
VAS LEFT CCA MID PSV: 57.72 CM/S
VAS LEFT CCA PROX EDV: 16.5 CM/S
VAS LEFT CCA PROX PSV: 99.2 CM/S
VAS LEFT ECA EDV: 10.88 CM/S
VAS LEFT ECA PSV: 40.6 CM/S
VAS LEFT ICA DIST EDV: 25.4 CM/S
VAS LEFT ICA DIST PSV: 80.8 CM/S
VAS LEFT ICA MID EDV: 22.3 CM/S
VAS LEFT ICA MID PSV: 59.8 CM/S
VAS LEFT ICA PROX EDV: 16.7 CM/S
VAS LEFT ICA PROX PSV: 43.9 CM/S
VAS LEFT ICA/CCA PSV: 1.1 NO UNITS
VAS LEFT SUBCLAVIAN PROX EDV: 0 CM/S
VAS LEFT SUBCLAVIAN PROX PSV: 77.4 CM/S
VAS LEFT VERTEBRAL EDV: 10.23 CM/S
VAS LEFT VERTEBRAL PSV: 41.3 CM/S
VAS RIGHT BULB EDV: 17.6 CM/S
VAS RIGHT BULB PSV: 61.6 CM/S
VAS RIGHT CCA DIST EDV: 16.3 CM/S
VAS RIGHT CCA DIST PSV: 56.4 CM/S
VAS RIGHT CCA MID EDV: 21.97 CM/S
VAS RIGHT CCA MID PSV: 86.57 CM/S
VAS RIGHT CCA PROX EDV: 18.7 CM/S
VAS RIGHT CCA PROX PSV: 78.5 CM/S
VAS RIGHT ECA EDV: 12.76 CM/S
VAS RIGHT ECA PSV: 85.5 CM/S
VAS RIGHT ICA DIST EDV: 21.5 CM/S
VAS RIGHT ICA DIST PSV: 50 CM/S
VAS RIGHT ICA MID EDV: 21.5 CM/S
VAS RIGHT ICA MID PSV: 64.2 CM/S
VAS RIGHT ICA PROX EDV: 16.3 CM/S
VAS RIGHT ICA PROX PSV: 60.3 CM/S
VAS RIGHT ICA/CCA PSV: 1.1 NO UNITS
VAS RIGHT SUBCLAVIAN PROX EDV: 0 CM/S
VAS RIGHT SUBCLAVIAN PROX PSV: 87.9 CM/S
VAS RIGHT VERTEBRAL EDV: 17.61 CM/S
VAS RIGHT VERTEBRAL PSV: 46.7 CM/S

## 2023-06-15 ENCOUNTER — HOSPITAL ENCOUNTER (OUTPATIENT)
Facility: HOSPITAL | Age: 68
Discharge: HOME | End: 2023-06-18
Payer: MEDICARE

## 2023-06-15 PROCEDURE — 70551 MRI BRAIN STEM W/O DYE: CPT

## 2024-05-20 ENCOUNTER — OFFICE VISIT (OUTPATIENT)
Age: 69
End: 2024-05-20
Payer: COMMERCIAL

## 2024-05-20 VITALS
HEIGHT: 65 IN | BODY MASS INDEX: 41.99 KG/M2 | HEART RATE: 76 BPM | OXYGEN SATURATION: 98 % | DIASTOLIC BLOOD PRESSURE: 77 MMHG | SYSTOLIC BLOOD PRESSURE: 135 MMHG | WEIGHT: 252 LBS

## 2024-05-20 DIAGNOSIS — R55 SYNCOPE AND COLLAPSE: Primary | ICD-10-CM

## 2024-05-20 DIAGNOSIS — I10 ESSENTIAL (PRIMARY) HYPERTENSION: ICD-10-CM

## 2024-05-20 DIAGNOSIS — E66.01 MORBID (SEVERE) OBESITY DUE TO EXCESS CALORIES (HCC): ICD-10-CM

## 2024-05-20 DIAGNOSIS — E78.00 PURE HYPERCHOLESTEROLEMIA, UNSPECIFIED: ICD-10-CM

## 2024-05-20 PROCEDURE — 3078F DIAST BP <80 MM HG: CPT | Performed by: INTERNAL MEDICINE

## 2024-05-20 PROCEDURE — 3075F SYST BP GE 130 - 139MM HG: CPT | Performed by: INTERNAL MEDICINE

## 2024-05-20 PROCEDURE — 99214 OFFICE O/P EST MOD 30 MIN: CPT | Performed by: INTERNAL MEDICINE

## 2024-05-20 PROCEDURE — 1123F ACP DISCUSS/DSCN MKR DOCD: CPT | Performed by: INTERNAL MEDICINE

## 2024-05-20 RX ORDER — FUROSEMIDE 40 MG/1
80 TABLET ORAL DAILY
COMMUNITY
Start: 2024-04-22

## 2024-05-20 RX ORDER — TAMSULOSIN HYDROCHLORIDE 0.4 MG/1
0.4 CAPSULE ORAL DAILY
COMMUNITY

## 2024-05-20 RX ORDER — RIVAROXABAN 10 MG/1
10 TABLET, FILM COATED ORAL DAILY
COMMUNITY

## 2024-05-20 RX ORDER — LOSARTAN POTASSIUM 25 MG/1
25 TABLET ORAL DAILY
Qty: 90 TABLET | Refills: 1 | Status: SHIPPED | OUTPATIENT
Start: 2024-05-20

## 2024-05-20 ASSESSMENT — ENCOUNTER SYMPTOMS
EYES NEGATIVE: 1
RESPIRATORY NEGATIVE: 1
GASTROINTESTINAL NEGATIVE: 1

## 2024-05-20 ASSESSMENT — PATIENT HEALTH QUESTIONNAIRE - PHQ9
SUM OF ALL RESPONSES TO PHQ QUESTIONS 1-9: 0
2. FEELING DOWN, DEPRESSED OR HOPELESS: NOT AT ALL
1. LITTLE INTEREST OR PLEASURE IN DOING THINGS: NOT AT ALL
SUM OF ALL RESPONSES TO PHQ QUESTIONS 1-9: 0
SUM OF ALL RESPONSES TO PHQ QUESTIONS 1-9: 0
SUM OF ALL RESPONSES TO PHQ9 QUESTIONS 1 & 2: 0
SUM OF ALL RESPONSES TO PHQ QUESTIONS 1-9: 0
DEPRESSION UNABLE TO ASSESS: FUNCTIONAL CAPACITY MOTIVATION LIMITS ACCURACY

## 2024-05-20 NOTE — PROGRESS NOTES
Camilla Mello is a 68 y.o. year old female.    Follow-up hypertension, hyperlipidemia, obesity    7/22 seen as new patient for dizziness.  Frequent dizziness, 2-3 times a day and frequent falls, 2-3 times a month.  She has sustained injuries during these falls with broken teeth and broken wrist recently. Things in room spin when she turns in any position.. Falls and LOC is sudden without relation to posture. Dizziness present after the episode.  Sometimes she also gets severely dizzy while sitting on the toilet.  This episode starts with feeling hot, nauseous and feeling severely dizzy.  She has to pull emergency change in the toilet to call the nurse.  No CP/SOB.  Grandkids inform her that she had fallen many years ago also.  9/22 still getting dizzy but no falls since last visit.  4/25/2023 still getting dizzy.  Has fallen twice since last visit in 9/22.  Last fall couple of weeks ago.  Did not lose consciousness.  She lives in the nursing home.  Dizziness resolved as people in the nursing home laid her down.  She was aware of the situation throughout.  No pains or palpitations.          Review of Systems   Constitutional: Negative.    HENT: Negative.     Eyes: Negative.    Respiratory: Negative.     Cardiovascular:  Positive for leg swelling.   Gastrointestinal: Negative.    Endocrine: Negative.    Genitourinary: Negative.    Musculoskeletal: Negative.    Neurological:  Positive for dizziness.   Psychiatric/Behavioral: Negative.     All other systems reviewed and are negative.        Physical Exam  Vitals and nursing note reviewed.   Constitutional:       Appearance: Normal appearance. She is obese.   HENT:      Head: Normocephalic and atraumatic.      Nose: Nose normal.   Eyes:      Conjunctiva/sclera: Conjunctivae normal.   Cardiovascular:      Rate and Rhythm: Normal rate and regular rhythm.      Pulses: Normal pulses.      Heart sounds: Normal heart sounds.   Pulmonary:      Effort: Pulmonary effort is

## 2024-05-20 NOTE — PROGRESS NOTES
1. Have you been to the ER, urgent care clinic since your last visit?  Hospitalized since your last visit?     No      2.  Where do you normally have your labs drawn?   [facility]    3. Do you need any refills today?   No    4. Which local pharmacy do you use (enter pharmacy)?   [facility]    5. Which mail order pharmacy do you use (enter pharmacy)?   No     6. Are you here for surgical clearance and if so who will be doing your     procedure/surgery (care team)?   No

## 2024-12-09 ENCOUNTER — APPOINTMENT (OUTPATIENT)
Facility: HOSPITAL | Age: 69
DRG: 300 | End: 2024-12-09
Attending: STUDENT IN AN ORGANIZED HEALTH CARE EDUCATION/TRAINING PROGRAM
Payer: MEDICARE

## 2024-12-09 ENCOUNTER — HOSPITAL ENCOUNTER (INPATIENT)
Facility: HOSPITAL | Age: 69
LOS: 2 days | Discharge: ANOTHER HOSPITAL | DRG: 300 | End: 2024-12-11
Attending: STUDENT IN AN ORGANIZED HEALTH CARE EDUCATION/TRAINING PROGRAM | Admitting: INTERNAL MEDICINE
Payer: MEDICARE

## 2024-12-09 ENCOUNTER — APPOINTMENT (OUTPATIENT)
Facility: HOSPITAL | Age: 69
DRG: 300 | End: 2024-12-09
Payer: MEDICARE

## 2024-12-09 DIAGNOSIS — N17.9 AKI (ACUTE KIDNEY INJURY) (HCC): ICD-10-CM

## 2024-12-09 DIAGNOSIS — I82.413 ACUTE DEEP VEIN THROMBOSIS (DVT) OF FEMORAL VEIN OF BOTH LOWER EXTREMITIES (HCC): ICD-10-CM

## 2024-12-09 DIAGNOSIS — R60.9 1+ PITTING EDEMA: Primary | ICD-10-CM

## 2024-12-09 PROBLEM — I82.403 ACUTE DEEP VEIN THROMBOSIS (DVT) OF BOTH LOWER EXTREMITIES, UNSPECIFIED VEIN (HCC): Status: ACTIVE | Noted: 2024-12-09

## 2024-12-09 LAB
ALBUMIN SERPL-MCNC: 3.5 G/DL (ref 3.4–5)
ALBUMIN/GLOB SERPL: 0.8 (ref 0.8–1.7)
ALP SERPL-CCNC: 90 U/L (ref 45–117)
ALT SERPL-CCNC: 9 U/L (ref 13–56)
ANION GAP SERPL CALC-SCNC: 7 MMOL/L (ref 3–18)
APTT PPP: 33.5 SEC (ref 23–36.4)
APTT PPP: >180 SEC (ref 23–36.4)
AST SERPL-CCNC: 11 U/L (ref 10–38)
B PERT DNA SPEC QL NAA+PROBE: NOT DETECTED
BASOPHILS # BLD: 0 K/UL (ref 0–0.1)
BASOPHILS NFR BLD: 0 % (ref 0–2)
BILIRUB DIRECT SERPL-MCNC: 0.3 MG/DL (ref 0–0.2)
BILIRUB SERPL-MCNC: 1.2 MG/DL (ref 0.2–1)
BORDETELLA PARAPERTUSSIS BY PCR: NOT DETECTED
BUN SERPL-MCNC: 22 MG/DL (ref 7–18)
BUN/CREAT SERPL: 13 (ref 12–20)
C PNEUM DNA SPEC QL NAA+PROBE: NOT DETECTED
CALCIUM SERPL-MCNC: 10.4 MG/DL (ref 8.5–10.1)
CHLORIDE SERPL-SCNC: 100 MMOL/L (ref 100–111)
CK SERPL-CCNC: 26 U/L (ref 26–192)
CO2 SERPL-SCNC: 28 MMOL/L (ref 21–32)
CREAT SERPL-MCNC: 1.69 MG/DL (ref 0.6–1.3)
DIFFERENTIAL METHOD BLD: ABNORMAL
ECHO BSA: 2.29 M2
EOSINOPHIL # BLD: 0.1 K/UL (ref 0–0.4)
EOSINOPHIL NFR BLD: 1 % (ref 0–5)
ERYTHROCYTE [DISTWIDTH] IN BLOOD BY AUTOMATED COUNT: 15.6 % (ref 11.6–14.5)
FLUAV SUBTYP SPEC NAA+PROBE: NOT DETECTED
FLUBV RNA SPEC QL NAA+PROBE: NOT DETECTED
GLOBULIN SER CALC-MCNC: 4.5 G/DL (ref 2–4)
GLUCOSE SERPL-MCNC: 112 MG/DL (ref 74–99)
HADV DNA SPEC QL NAA+PROBE: NOT DETECTED
HCOV 229E RNA SPEC QL NAA+PROBE: NOT DETECTED
HCOV HKU1 RNA SPEC QL NAA+PROBE: NOT DETECTED
HCOV NL63 RNA SPEC QL NAA+PROBE: NOT DETECTED
HCOV OC43 RNA SPEC QL NAA+PROBE: NOT DETECTED
HCT VFR BLD AUTO: 35.8 % (ref 35–45)
HGB BLD-MCNC: 11 G/DL (ref 12–16)
HMPV RNA SPEC QL NAA+PROBE: NOT DETECTED
HPIV1 RNA SPEC QL NAA+PROBE: NOT DETECTED
HPIV2 RNA SPEC QL NAA+PROBE: NOT DETECTED
HPIV3 RNA SPEC QL NAA+PROBE: NOT DETECTED
HPIV4 RNA SPEC QL NAA+PROBE: NOT DETECTED
IMM GRANULOCYTES # BLD AUTO: 0.1 K/UL (ref 0–0.04)
IMM GRANULOCYTES NFR BLD AUTO: 1 % (ref 0–0.5)
LYMPHOCYTES # BLD: 1.9 K/UL (ref 0.9–3.6)
LYMPHOCYTES NFR BLD: 15 % (ref 21–52)
M PNEUMO DNA SPEC QL NAA+PROBE: NOT DETECTED
MAGNESIUM SERPL-MCNC: 2 MG/DL (ref 1.6–2.6)
MCH RBC QN AUTO: 25.6 PG (ref 24–34)
MCHC RBC AUTO-ENTMCNC: 30.7 G/DL (ref 31–37)
MCV RBC AUTO: 83.3 FL (ref 78–100)
MONOCYTES # BLD: 0.9 K/UL (ref 0.05–1.2)
MONOCYTES NFR BLD: 7 % (ref 3–10)
NEUTS SEG # BLD: 9.8 K/UL (ref 1.8–8)
NEUTS SEG NFR BLD: 77 % (ref 40–73)
NRBC # BLD: 0 K/UL (ref 0–0.01)
NRBC BLD-RTO: 0 PER 100 WBC
PLATELET # BLD AUTO: 131 K/UL (ref 135–420)
PMV BLD AUTO: 10.5 FL (ref 9.2–11.8)
POTASSIUM SERPL-SCNC: 2.9 MMOL/L (ref 3.5–5.5)
PROT SERPL-MCNC: 8 G/DL (ref 6.4–8.2)
RBC # BLD AUTO: 4.3 M/UL (ref 4.2–5.3)
RSV RNA SPEC QL NAA+PROBE: NOT DETECTED
RV+EV RNA SPEC QL NAA+PROBE: NOT DETECTED
SARS-COV-2 RNA RESP QL NAA+PROBE: NOT DETECTED
SODIUM SERPL-SCNC: 135 MMOL/L (ref 136–145)
T4 FREE SERPL-MCNC: 1.5 NG/DL (ref 0.7–1.5)
TROPONIN I SERPL HS-MCNC: 76 NG/L (ref 0–54)
TSH SERPL DL<=0.05 MIU/L-ACNC: 1.36 UIU/ML (ref 0.36–3.74)
WBC # BLD AUTO: 12.7 K/UL (ref 4.6–13.2)

## 2024-12-09 PROCEDURE — 6360000002 HC RX W HCPCS: Performed by: STUDENT IN AN ORGANIZED HEALTH CARE EDUCATION/TRAINING PROGRAM

## 2024-12-09 PROCEDURE — 85025 COMPLETE CBC W/AUTO DIFF WBC: CPT

## 2024-12-09 PROCEDURE — 1100000000 HC RM PRIVATE

## 2024-12-09 PROCEDURE — 2580000003 HC RX 258: Performed by: INTERNAL MEDICINE

## 2024-12-09 PROCEDURE — 84484 ASSAY OF TROPONIN QUANT: CPT

## 2024-12-09 PROCEDURE — 99223 1ST HOSP IP/OBS HIGH 75: CPT | Performed by: INTERNAL MEDICINE

## 2024-12-09 PROCEDURE — 99285 EMERGENCY DEPT VISIT HI MDM: CPT

## 2024-12-09 PROCEDURE — 6370000000 HC RX 637 (ALT 250 FOR IP): Performed by: INTERNAL MEDICINE

## 2024-12-09 PROCEDURE — 6370000000 HC RX 637 (ALT 250 FOR IP): Performed by: STUDENT IN AN ORGANIZED HEALTH CARE EDUCATION/TRAINING PROGRAM

## 2024-12-09 PROCEDURE — 83735 ASSAY OF MAGNESIUM: CPT

## 2024-12-09 PROCEDURE — 94761 N-INVAS EAR/PLS OXIMETRY MLT: CPT

## 2024-12-09 PROCEDURE — 93005 ELECTROCARDIOGRAM TRACING: CPT | Performed by: STUDENT IN AN ORGANIZED HEALTH CARE EDUCATION/TRAINING PROGRAM

## 2024-12-09 PROCEDURE — 6360000002 HC RX W HCPCS: Performed by: INTERNAL MEDICINE

## 2024-12-09 PROCEDURE — 84439 ASSAY OF FREE THYROXINE: CPT

## 2024-12-09 PROCEDURE — 0202U NFCT DS 22 TRGT SARS-COV-2: CPT

## 2024-12-09 PROCEDURE — 84443 ASSAY THYROID STIM HORMONE: CPT

## 2024-12-09 PROCEDURE — 74176 CT ABD & PELVIS W/O CONTRAST: CPT

## 2024-12-09 PROCEDURE — 80076 HEPATIC FUNCTION PANEL: CPT

## 2024-12-09 PROCEDURE — 80048 BASIC METABOLIC PNL TOTAL CA: CPT

## 2024-12-09 PROCEDURE — 82550 ASSAY OF CK (CPK): CPT

## 2024-12-09 PROCEDURE — 93971 EXTREMITY STUDY: CPT

## 2024-12-09 PROCEDURE — 96365 THER/PROPH/DIAG IV INF INIT: CPT

## 2024-12-09 PROCEDURE — 93970 EXTREMITY STUDY: CPT | Performed by: INTERNAL MEDICINE

## 2024-12-09 PROCEDURE — 85730 THROMBOPLASTIN TIME PARTIAL: CPT

## 2024-12-09 PROCEDURE — 93970 EXTREMITY STUDY: CPT

## 2024-12-09 RX ORDER — DULOXETIN HYDROCHLORIDE 60 MG/1
60 CAPSULE, DELAYED RELEASE ORAL DAILY
Status: DISCONTINUED | OUTPATIENT
Start: 2024-12-09 | End: 2024-12-12 | Stop reason: HOSPADM

## 2024-12-09 RX ORDER — IPRATROPIUM BROMIDE AND ALBUTEROL SULFATE 2.5; .5 MG/3ML; MG/3ML
1 SOLUTION RESPIRATORY (INHALATION) EVERY 4 HOURS PRN
Status: DISCONTINUED | OUTPATIENT
Start: 2024-12-09 | End: 2024-12-12 | Stop reason: HOSPADM

## 2024-12-09 RX ORDER — HEPARIN SODIUM 1000 [USP'U]/ML
80 INJECTION, SOLUTION INTRAVENOUS; SUBCUTANEOUS PRN
Status: DISCONTINUED | OUTPATIENT
Start: 2024-12-09 | End: 2024-12-12 | Stop reason: HOSPADM

## 2024-12-09 RX ORDER — ACETAMINOPHEN 325 MG/1
650 TABLET ORAL EVERY 6 HOURS PRN
Status: DISCONTINUED | OUTPATIENT
Start: 2024-12-09 | End: 2024-12-12 | Stop reason: HOSPADM

## 2024-12-09 RX ORDER — ONDANSETRON 2 MG/ML
4 INJECTION INTRAMUSCULAR; INTRAVENOUS EVERY 6 HOURS PRN
Status: DISCONTINUED | OUTPATIENT
Start: 2024-12-09 | End: 2024-12-12 | Stop reason: HOSPADM

## 2024-12-09 RX ORDER — SODIUM CHLORIDE 9 MG/ML
INJECTION, SOLUTION INTRAVENOUS PRN
Status: DISCONTINUED | OUTPATIENT
Start: 2024-12-09 | End: 2024-12-12 | Stop reason: HOSPADM

## 2024-12-09 RX ORDER — SODIUM CHLORIDE 0.9 % (FLUSH) 0.9 %
5-40 SYRINGE (ML) INJECTION EVERY 12 HOURS SCHEDULED
Status: DISCONTINUED | OUTPATIENT
Start: 2024-12-09 | End: 2024-12-12 | Stop reason: HOSPADM

## 2024-12-09 RX ORDER — HEPARIN SODIUM 1000 [USP'U]/ML
80 INJECTION, SOLUTION INTRAVENOUS; SUBCUTANEOUS ONCE
Status: COMPLETED | OUTPATIENT
Start: 2024-12-09 | End: 2024-12-09

## 2024-12-09 RX ORDER — POTASSIUM CHLORIDE 1500 MG/1
40 TABLET, EXTENDED RELEASE ORAL
Status: DISCONTINUED | OUTPATIENT
Start: 2024-12-09 | End: 2024-12-09

## 2024-12-09 RX ORDER — ACETAMINOPHEN 160 MG
2000 TABLET,DISINTEGRATING ORAL DAILY
Status: ON HOLD | COMMUNITY
End: 2024-12-09

## 2024-12-09 RX ORDER — HEPARIN SODIUM 1000 [USP'U]/ML
40 INJECTION, SOLUTION INTRAVENOUS; SUBCUTANEOUS PRN
Status: DISCONTINUED | OUTPATIENT
Start: 2024-12-09 | End: 2024-12-12 | Stop reason: HOSPADM

## 2024-12-09 RX ORDER — LOSARTAN POTASSIUM 25 MG/1
25 TABLET ORAL DAILY
Status: DISCONTINUED | OUTPATIENT
Start: 2024-12-09 | End: 2024-12-12 | Stop reason: HOSPADM

## 2024-12-09 RX ORDER — POLYETHYLENE GLYCOL 3350 17 G/17G
17 POWDER, FOR SOLUTION ORAL DAILY PRN
Status: DISCONTINUED | OUTPATIENT
Start: 2024-12-09 | End: 2024-12-12 | Stop reason: HOSPADM

## 2024-12-09 RX ORDER — ATORVASTATIN CALCIUM 40 MG/1
40 TABLET, FILM COATED ORAL NIGHTLY
Status: DISCONTINUED | OUTPATIENT
Start: 2024-12-09 | End: 2024-12-12 | Stop reason: HOSPADM

## 2024-12-09 RX ORDER — HEPARIN SODIUM 10000 [USP'U]/100ML
5-30 INJECTION, SOLUTION INTRAVENOUS CONTINUOUS
Status: DISCONTINUED | OUTPATIENT
Start: 2024-12-09 | End: 2024-12-12 | Stop reason: HOSPADM

## 2024-12-09 RX ORDER — TAMSULOSIN HYDROCHLORIDE 0.4 MG/1
0.4 CAPSULE ORAL DAILY
Status: DISCONTINUED | OUTPATIENT
Start: 2024-12-09 | End: 2024-12-12 | Stop reason: HOSPADM

## 2024-12-09 RX ORDER — SODIUM CHLORIDE 0.9 % (FLUSH) 0.9 %
5-40 SYRINGE (ML) INJECTION PRN
Status: DISCONTINUED | OUTPATIENT
Start: 2024-12-09 | End: 2024-12-12 | Stop reason: HOSPADM

## 2024-12-09 RX ORDER — ACETAMINOPHEN 650 MG/1
650 SUPPOSITORY RECTAL EVERY 6 HOURS PRN
Status: DISCONTINUED | OUTPATIENT
Start: 2024-12-09 | End: 2024-12-12 | Stop reason: HOSPADM

## 2024-12-09 RX ORDER — POTASSIUM CHLORIDE 7.45 MG/ML
10 INJECTION INTRAVENOUS PRN
Status: DISCONTINUED | OUTPATIENT
Start: 2024-12-09 | End: 2024-12-12 | Stop reason: HOSPADM

## 2024-12-09 RX ORDER — POTASSIUM CHLORIDE 1500 MG/1
40 TABLET, EXTENDED RELEASE ORAL PRN
Status: DISCONTINUED | OUTPATIENT
Start: 2024-12-09 | End: 2024-12-12 | Stop reason: HOSPADM

## 2024-12-09 RX ORDER — ONDANSETRON 4 MG/1
4 TABLET, ORALLY DISINTEGRATING ORAL EVERY 8 HOURS PRN
Status: DISCONTINUED | OUTPATIENT
Start: 2024-12-09 | End: 2024-12-12 | Stop reason: HOSPADM

## 2024-12-09 RX ORDER — ACETAMINOPHEN 500 MG
1000 TABLET ORAL
Status: COMPLETED | OUTPATIENT
Start: 2024-12-09 | End: 2024-12-09

## 2024-12-09 RX ORDER — LANOLIN ALCOHOL/MO/W.PET/CERES
400 CREAM (GRAM) TOPICAL ONCE
Status: COMPLETED | OUTPATIENT
Start: 2024-12-09 | End: 2024-12-09

## 2024-12-09 RX ORDER — BISACODYL 10 MG
10 SUPPOSITORY, RECTAL RECTAL PRN
Status: DISCONTINUED | OUTPATIENT
Start: 2024-12-09 | End: 2024-12-12 | Stop reason: HOSPADM

## 2024-12-09 RX ORDER — FAMOTIDINE 20 MG/1
20 TABLET, FILM COATED ORAL DAILY
Status: DISCONTINUED | OUTPATIENT
Start: 2024-12-09 | End: 2024-12-12 | Stop reason: HOSPADM

## 2024-12-09 RX ORDER — MAGNESIUM SULFATE IN WATER 40 MG/ML
2000 INJECTION, SOLUTION INTRAVENOUS PRN
Status: DISCONTINUED | OUTPATIENT
Start: 2024-12-09 | End: 2024-12-12 | Stop reason: HOSPADM

## 2024-12-09 RX ADMIN — FAMOTIDINE 20 MG: 20 TABLET ORAL at 19:31

## 2024-12-09 RX ADMIN — HEPARIN SODIUM 18 UNITS/KG/HR: 10000 INJECTION, SOLUTION INTRAVENOUS at 18:05

## 2024-12-09 RX ADMIN — HEPARIN SODIUM 9100 UNITS: 1000 INJECTION INTRAVENOUS; SUBCUTANEOUS at 17:39

## 2024-12-09 RX ADMIN — POTASSIUM CHLORIDE 10 MEQ: 7.45 INJECTION INTRAVENOUS at 21:42

## 2024-12-09 RX ADMIN — SODIUM CHLORIDE, PRESERVATIVE FREE 10 ML: 5 INJECTION INTRAVENOUS at 21:43

## 2024-12-09 RX ADMIN — Medication 400 MG: at 19:31

## 2024-12-09 RX ADMIN — TAMSULOSIN HYDROCHLORIDE 0.4 MG: 0.4 CAPSULE ORAL at 19:31

## 2024-12-09 RX ADMIN — DULOXETINE HYDROCHLORIDE 60 MG: 60 CAPSULE, DELAYED RELEASE ORAL at 19:31

## 2024-12-09 RX ADMIN — ACETAMINOPHEN 1000 MG: 500 TABLET ORAL at 17:41

## 2024-12-09 RX ADMIN — POTASSIUM CHLORIDE 10 MEQ: 7.45 INJECTION INTRAVENOUS at 22:53

## 2024-12-09 RX ADMIN — POTASSIUM CHLORIDE 10 MEQ: 7.45 INJECTION INTRAVENOUS at 23:53

## 2024-12-09 RX ADMIN — ATORVASTATIN CALCIUM 40 MG: 40 TABLET, FILM COATED ORAL at 19:31

## 2024-12-09 ASSESSMENT — LIFESTYLE VARIABLES
HOW MANY STANDARD DRINKS CONTAINING ALCOHOL DO YOU HAVE ON A TYPICAL DAY: PATIENT DOES NOT DRINK
HOW OFTEN DO YOU HAVE A DRINK CONTAINING ALCOHOL: NEVER

## 2024-12-09 ASSESSMENT — PAIN SCALES - GENERAL: PAINLEVEL_OUTOF10: 0

## 2024-12-09 NOTE — ED TRIAGE NOTES
Pt arrived to ED via EMS with cc generalized weakness and generalized pain x3 weeks. Pt reports increased leg swelling which is not normal for her. Pt also reports tingling form bilateral knees down to feet.     HX of fall, last being 2016, HTN, Neuropathy     per EMS   Pt is A&Ox4, VSS   Pt is on xarelto

## 2024-12-10 LAB
ANION GAP SERPL CALC-SCNC: 5 MMOL/L (ref 3–18)
APPEARANCE UR: ABNORMAL
APTT PPP: >180 SEC (ref 23–36.4)
APTT PPP: >180 SEC (ref 23–36.4)
BACTERIA URNS QL MICRO: ABNORMAL /HPF
BASOPHILS # BLD: 0 K/UL (ref 0–0.1)
BASOPHILS NFR BLD: 0 % (ref 0–2)
BILIRUB UR QL: NEGATIVE
BUN SERPL-MCNC: 22 MG/DL (ref 7–18)
BUN/CREAT SERPL: 13 (ref 12–20)
CALCIUM SERPL-MCNC: 10.3 MG/DL (ref 8.5–10.1)
CHLORIDE SERPL-SCNC: 102 MMOL/L (ref 100–111)
CO2 SERPL-SCNC: 26 MMOL/L (ref 21–32)
COLOR UR: ABNORMAL
CREAT SERPL-MCNC: 1.63 MG/DL (ref 0.6–1.3)
DIFFERENTIAL METHOD BLD: ABNORMAL
EKG ATRIAL RATE: 86 BPM
EKG DIAGNOSIS: NORMAL
EKG P AXIS: 26 DEGREES
EKG P-R INTERVAL: 134 MS
EKG Q-T INTERVAL: 358 MS
EKG QRS DURATION: 90 MS
EKG QTC CALCULATION (BAZETT): 428 MS
EKG R AXIS: -18 DEGREES
EKG T AXIS: 6 DEGREES
EKG VENTRICULAR RATE: 86 BPM
EOSINOPHIL # BLD: 0.2 K/UL (ref 0–0.4)
EOSINOPHIL NFR BLD: 1 % (ref 0–5)
EPITH CASTS URNS QL MICRO: ABNORMAL /LPF (ref 0–5)
ERYTHROCYTE [DISTWIDTH] IN BLOOD BY AUTOMATED COUNT: 15.6 % (ref 11.6–14.5)
GLUCOSE SERPL-MCNC: 115 MG/DL (ref 74–99)
GLUCOSE UR STRIP.AUTO-MCNC: NEGATIVE MG/DL
HCT VFR BLD AUTO: 33.6 % (ref 35–45)
HGB BLD-MCNC: 10.3 G/DL (ref 12–16)
HGB UR QL STRIP: NEGATIVE
IMM GRANULOCYTES # BLD AUTO: 0.1 K/UL (ref 0–0.04)
IMM GRANULOCYTES NFR BLD AUTO: 1 % (ref 0–0.5)
KETONES UR QL STRIP.AUTO: ABNORMAL MG/DL
LEUKOCYTE ESTERASE UR QL STRIP.AUTO: ABNORMAL
LYMPHOCYTES # BLD: 2 K/UL (ref 0.9–3.6)
LYMPHOCYTES NFR BLD: 17 % (ref 21–52)
MCH RBC QN AUTO: 25.7 PG (ref 24–34)
MCHC RBC AUTO-ENTMCNC: 30.7 G/DL (ref 31–37)
MCV RBC AUTO: 83.8 FL (ref 78–100)
MONOCYTES # BLD: 0.9 K/UL (ref 0.05–1.2)
MONOCYTES NFR BLD: 8 % (ref 3–10)
NEUTS SEG # BLD: 8.9 K/UL (ref 1.8–8)
NEUTS SEG NFR BLD: 74 % (ref 40–73)
NITRITE UR QL STRIP.AUTO: POSITIVE
NRBC # BLD: 0 K/UL (ref 0–0.01)
NRBC BLD-RTO: 0 PER 100 WBC
PH UR STRIP: 5.5 (ref 5–8)
PLATELET # BLD AUTO: 130 K/UL (ref 135–420)
PMV BLD AUTO: 11.3 FL (ref 9.2–11.8)
POTASSIUM SERPL-SCNC: 3.6 MMOL/L (ref 3.5–5.5)
PROT UR STRIP-MCNC: 30 MG/DL
RBC # BLD AUTO: 4.01 M/UL (ref 4.2–5.3)
RBC #/AREA URNS HPF: ABNORMAL /HPF (ref 0–5)
SODIUM SERPL-SCNC: 133 MMOL/L (ref 136–145)
SP GR UR REFRACTOMETRY: 1.02 (ref 1–1.03)
UROBILINOGEN UR QL STRIP.AUTO: 1 EU/DL (ref 0.2–1)
WBC # BLD AUTO: 12.1 K/UL (ref 4.6–13.2)
WBC URNS QL MICRO: ABNORMAL /HPF (ref 0–4)

## 2024-12-10 PROCEDURE — 87186 SC STD MICRODIL/AGAR DIL: CPT

## 2024-12-10 PROCEDURE — 97535 SELF CARE MNGMENT TRAINING: CPT

## 2024-12-10 PROCEDURE — 94761 N-INVAS EAR/PLS OXIMETRY MLT: CPT

## 2024-12-10 PROCEDURE — 1100000000 HC RM PRIVATE

## 2024-12-10 PROCEDURE — 2580000003 HC RX 258: Performed by: INTERNAL MEDICINE

## 2024-12-10 PROCEDURE — 2580000003 HC RX 258: Performed by: HOSPITALIST

## 2024-12-10 PROCEDURE — 80048 BASIC METABOLIC PNL TOTAL CA: CPT

## 2024-12-10 PROCEDURE — 97166 OT EVAL MOD COMPLEX 45 MIN: CPT

## 2024-12-10 PROCEDURE — 81001 URINALYSIS AUTO W/SCOPE: CPT

## 2024-12-10 PROCEDURE — 85025 COMPLETE CBC W/AUTO DIFF WBC: CPT

## 2024-12-10 PROCEDURE — 85730 THROMBOPLASTIN TIME PARTIAL: CPT

## 2024-12-10 PROCEDURE — 87086 URINE CULTURE/COLONY COUNT: CPT

## 2024-12-10 PROCEDURE — 36415 COLL VENOUS BLD VENIPUNCTURE: CPT

## 2024-12-10 PROCEDURE — 87088 URINE BACTERIA CULTURE: CPT

## 2024-12-10 PROCEDURE — 99232 SBSQ HOSP IP/OBS MODERATE 35: CPT | Performed by: HOSPITALIST

## 2024-12-10 PROCEDURE — 6360000002 HC RX W HCPCS: Performed by: INTERNAL MEDICINE

## 2024-12-10 PROCEDURE — 6370000000 HC RX 637 (ALT 250 FOR IP): Performed by: HOSPITALIST

## 2024-12-10 PROCEDURE — 97162 PT EVAL MOD COMPLEX 30 MIN: CPT

## 2024-12-10 PROCEDURE — 6370000000 HC RX 637 (ALT 250 FOR IP): Performed by: INTERNAL MEDICINE

## 2024-12-10 PROCEDURE — 97530 THERAPEUTIC ACTIVITIES: CPT

## 2024-12-10 PROCEDURE — 93010 ELECTROCARDIOGRAM REPORT: CPT | Performed by: INTERNAL MEDICINE

## 2024-12-10 RX ORDER — MIDODRINE HYDROCHLORIDE 10 MG/1
10 TABLET ORAL
Status: DISCONTINUED | OUTPATIENT
Start: 2024-12-10 | End: 2024-12-12 | Stop reason: HOSPADM

## 2024-12-10 RX ORDER — SODIUM CHLORIDE 9 MG/ML
INJECTION, SOLUTION INTRAVENOUS CONTINUOUS
Status: DISCONTINUED | OUTPATIENT
Start: 2024-12-10 | End: 2024-12-12 | Stop reason: HOSPADM

## 2024-12-10 RX ADMIN — TAMSULOSIN HYDROCHLORIDE 0.4 MG: 0.4 CAPSULE ORAL at 09:30

## 2024-12-10 RX ADMIN — POTASSIUM CHLORIDE 10 MEQ: 7.45 INJECTION INTRAVENOUS at 02:07

## 2024-12-10 RX ADMIN — DULOXETINE HYDROCHLORIDE 60 MG: 60 CAPSULE, DELAYED RELEASE ORAL at 09:27

## 2024-12-10 RX ADMIN — SODIUM CHLORIDE: 9 INJECTION, SOLUTION INTRAVENOUS at 12:24

## 2024-12-10 RX ADMIN — ATORVASTATIN CALCIUM 40 MG: 40 TABLET, FILM COATED ORAL at 20:40

## 2024-12-10 RX ADMIN — POTASSIUM CHLORIDE 10 MEQ: 7.45 INJECTION INTRAVENOUS at 03:02

## 2024-12-10 RX ADMIN — SODIUM CHLORIDE, PRESERVATIVE FREE 10 ML: 5 INJECTION INTRAVENOUS at 20:41

## 2024-12-10 RX ADMIN — MIDODRINE HYDROCHLORIDE 10 MG: 10 TABLET ORAL at 12:22

## 2024-12-10 RX ADMIN — POTASSIUM CHLORIDE 10 MEQ: 7.45 INJECTION INTRAVENOUS at 00:57

## 2024-12-10 RX ADMIN — FAMOTIDINE 20 MG: 20 TABLET ORAL at 09:27

## 2024-12-10 RX ADMIN — MIDODRINE HYDROCHLORIDE 10 MG: 10 TABLET ORAL at 16:59

## 2024-12-10 RX ADMIN — HEPARIN SODIUM 12 UNITS/KG/HR: 10000 INJECTION, SOLUTION INTRAVENOUS at 11:50

## 2024-12-10 RX ADMIN — SODIUM CHLORIDE, PRESERVATIVE FREE 10 ML: 5 INJECTION INTRAVENOUS at 09:26

## 2024-12-10 ASSESSMENT — PAIN SCALES - GENERAL
PAINLEVEL_OUTOF10: 0

## 2024-12-10 NOTE — ED PROVIDER NOTES
EMERGENCY DEPARTMENT HISTORY AND PHYSICAL EXAM      Date: 12/9/2024  Patient Name: Camilla Mello    History of Presenting Illness     Chief Complaint   Patient presents with    Fatigue       History (Context): Camilla Mello is a 68 y.o. female  presents to the ED today with chief complaint of fatigue, weakness, and right lower extremity pain.  Patient states symptoms have been ongoing for the past few days and have worsened since onset.  Denies any inciting event for her symptoms.  Also states that her right lower extremity appears to be \"more swollen\" when compared to the left.  Denies previous history of VTE and currently not on any anticoagulation.  Further denies any fever, chills, chest pain, or dyspnea associated with her symptoms.      PCP: Koko Watson MD    Current Facility-Administered Medications   Medication Dose Route Frequency Provider Last Rate Last Admin    heparin (porcine) injection 9,100 Units  80 Units/kg IntraVENous PRN Gabriel Field MD        heparin (porcine) injection 4,600 Units  40 Units/kg IntraVENous PRN Gabriel Field MD        heparin 25,000 units in dextrose 5% 250 mL (premix) infusion  5-30 Units/kg/hr IntraVENous Continuous Gabriel Field MD 20.6 mL/hr at 12/09/24 1805 18 Units/kg/hr at 12/09/24 1805    atorvastatin (LIPITOR) tablet 40 mg  40 mg Oral Nightly Gabriel Field MD   40 mg at 12/09/24 1931    bisacodyl (DULCOLAX) suppository 10 mg  10 mg Rectal PRN Gabriel Field MD        DULoxetine (CYMBALTA) extended release capsule 60 mg  60 mg Oral Daily Gabriel Field MD   60 mg at 12/09/24 1931    famotidine (PEPCID) tablet 20 mg  20 mg Oral Daily Gabriel Field MD   20 mg at 12/09/24 1931    ipratropium 0.5 mg-albuterol 2.5 mg (DUONEB) nebulizer solution 1 Dose  1 Dose Inhalation Q4H PRN Gabriel Field MD        [Held by provider] losartan (COZAAR) tablet 25 mg  25 mg Oral Daily Gabriel Field MD        tamsulosin (FLOMAX) capsule 0.4 mg  0.4 mg Oral

## 2024-12-10 NOTE — ED NOTES
TRANSFER - OUT REPORT:    Verbal report given to Aditi Mello  being transferred to  for routine progression of patient care       Report consisted of patient's Situation, Background, Assessment and   Recommendations(SBAR).     Information from the following report(s) Nurse Handoff Report, ED Encounter Summary, ED SBAR, MAR, and Recent Results was reviewed with the receiving nurse.    [place] Fall Assessment:    Presents to emergency department  because of falls (Syncope, seizure, or loss of consciousness): No  Age > 70: No  Altered Mental Status, Intoxication with alcohol or substance confusion (Disorientation, impaired judgment, poor safety awaremess, or inability to follow instructions): No  Impaired Mobility: Ambulates or transfers with assistive devices or assistance; Unable to ambulate or transer.: Yes  Nursing Judgement: Yes          Lines:   Peripheral IV 12/09/24 Distal;Right;Anterior Cephalic (Active)       Peripheral IV 12/09/24 Posterior;Left Hand (Active)   Site Assessment Clean, dry & intact 12/09/24 2136   Line Status Blood return noted 12/09/24 2136   Phlebitis Assessment No symptoms 12/09/24 2136   Infiltration Assessment 0 12/09/24 2136        Opportunity for questions and clarification was provided.      Patient transported with:  Monitor and Registered Nurse

## 2024-12-10 NOTE — PROGRESS NOTES
4 Eyes Skin Assessment     NAME:  Camilla Mello  YOB: 1955  MEDICAL RECORD NUMBER:  [id number]    The patient is being assessed for  Shift Handoff    I agree that at least one RN has performed a thorough Head to Toe Skin Assessment on the patient. ALL assessment sites listed below have been assessed.      Areas assessed by both nurses:    Head, Face, Ears, Shoulders, Back, Chest, Arms, Elbows, Hands, Sacrum. Buttock, Coccyx, Ischium, Legs. Feet and Heels, and Under Medical Devices         Does the Patient have a Wound? Yes wound(s) were present on assessment. LDA wound assessment was Initiated and completed by RN       Sylvester Prevention initiated by RN: Yes  Wound Care Orders initiated by RN: No    Pressure Injury (Stage 3,4, Unstageable, DTI, NWPT, and Complex wounds) if present, place Wound referral order by RN under : No    New Ostomies, if present place, Ostomy referral order under : No     Nurse 1 eSignature: Electronically signed by Bronwyn Day RN on 12/10/24 at 6:45 PM EST    **SHARE this note so that the co-signing nurse can place an eSignature**    Nurse 2 eSignature: Electronically signed by Damaris Montana RN on 12/11/24 at 7:38 AM EST

## 2024-12-10 NOTE — PROGRESS NOTES
ARU/IPR REFERRAL CONTACT NOTE  [facility]      Thank you for the opportunity to review this patient's case for admission to [facility].    Referral received: 12/10/2024 time:2:00pm    Based on our pre-admission screening:                          [x ] Our Team/Medical Director is following this case.   Comments: Referral received. Will review with team and follow along for therapy recommendations.       Again, Thank you for this referral. Should you have any questions please do not hesitate to call.     Sincerely,  Jayne Flores    Clinical Liaison  [facility]  [phone]

## 2024-12-10 NOTE — ED NOTES
Bedside and Verbal shift change report given to Meghan (oncoming nurse) by Elif (offgoing nurse). Report included the following information Nurse Handoff Report, ED Encounter Summary, ED SBAR, MAR, and Recent Results.

## 2024-12-10 NOTE — ED NOTES
Pt brought to CT by this RN. She was attached to cardiac monitoring during transport. Pt was in NAD.

## 2024-12-10 NOTE — ED NOTES
Pt back from CT and placed back on bedside cardiac monitoring. 2nd IV placement completed. APTT drawn. Pt was medicated per MAR with potassium IV.

## 2024-12-10 NOTE — PROGRESS NOTES
[facility] Hospitalist Group  Progress Note    Patient: Camilla Mello Age: 68 y.o. : 1955 MR#: [id number] SSN: [id number]  Date/Time: 12/10/2024     Subjective:     Patient seen evaluated, lying in bed, no acute distress.    68-year-old female with a past medical history of hypertension, hyperlipidemia, morbid obesity, longstanding persistent atrial fibrillation on Xarelto as outpatient presents to the emergency room complaining of bilateral leg pain and swelling.  Ultrasound lower extremity shows extensive DVTs in bilateral lower extremities.  Patient started on heparin gtt. and admitted to the hospital for further evaluation.  CAIN-continue IV fluids      Assessment/Plan:     Extensive bilateral lower extremity DVT-continue heparin gtt.  Will reach out to vascular surgery to see if patient is a candidate for IVC filter placement given the fact that she was on Xarelto prior.  Unclear if she has been compliant with her Xarelto or not.  Awaiting VQ scan.  CAIN-continue gentle hydration.  History of chronic L-xkj-uavffpna heparin gtt. holding beta-blockers given low blood pressure.  Soft blood pressures-add midodrine  History of hyperlipidemia-continue Lipitor  DVT prophylaxis-heparin GTT  Full code            Anticipated Discharge and Disposition:    -TBD    Juan Carlos Kc MD  12/10/24      Case discussed with:  [x]Patient  []Family  [x]Nursing  []Case Management  DVT Prophylaxis:  []Lovenox  []Hep SQ  []SCDs  []Coumadin   [x]On Heparin gtt    Objective:   VS:   Vitals:    12/10/24 1128   BP: 99/79   Pulse: (!) 106   Resp: 18   Temp: 97.7 °F (36.5 °C)   SpO2: 100%        Intake/Output Summary (Last 24 hours) at 12/10/2024 1205  Last data filed at 12/10/2024 0950  Gross per 24 hour   Intake 230 ml   Output --   Net 230 ml       General:  Alert, cooperative, no acute distress    Pulmonary:  CTA Bilaterally. No Wheezing/Rhonchi/Rales.  Cardiovascular: Regular rate and

## 2024-12-10 NOTE — PROGRESS NOTES
met patient at bedside for an initial visit.    Patient said she came to the hospital because she had lost her energy and could not walk. Patient said she was not any better at the time of the visit. Patient thanked  for the visit.     provided presence and support for patient.    Chaplains will provide follow-up care for patient and family as needed.        Spiritual Health History and Assessment/Progress Note  [facility]    Spiritual/Emotional Needs,  ,  ,      Name: Camilla Mello MRN: [id number]    Age: 68 y.o.     Sex: female   Language: English   Rastafarian: Mandaen   Acute deep vein thrombosis (DVT) of both lower extremities, unspecified vein (HCC)     Date: 12/10/2024            Total Time Calculated: 5 min              Spiritual Assessment began in [address] MEDICAL            Encounter Overview/Reason: Spiritual/Emotional Needs  Service Provided For: Patient    Amy, Belief, Meaning:   Patient identifies as spiritual, is connected with a amy tradition or spiritual practice, and has beliefs or practices that help with coping during difficult times  Family/Friends No family/friends present      Importance and Influence:  Patient has spiritual/personal beliefs that influence decisions regarding their health  Family/Friends No family/friends present    Community:  Patient is connected with a spiritual community and feels well-supported. Support system includes: Amy Community and Friends  Family/Friends No family/friends present    Assessment and Plan of Care:     Patient Interventions include: Facilitated expression of thoughts and feelings, Explored spiritual coping/struggle/distress, and Affirmed coping skills/support systems  Family/Friends Interventions include: No family/friends present    Patient Plan of Care: No spiritual needs identified for follow-up  Family/Friends Plan of Care: No family/friends present    Electronically signed by Chaplain Ruel on

## 2024-12-11 ENCOUNTER — APPOINTMENT (OUTPATIENT)
Facility: HOSPITAL | Age: 69
DRG: 300 | End: 2024-12-11
Attending: HOSPITALIST
Payer: MEDICARE

## 2024-12-11 VITALS
BODY MASS INDEX: 41.99 KG/M2 | HEIGHT: 65 IN | OXYGEN SATURATION: 100 % | DIASTOLIC BLOOD PRESSURE: 80 MMHG | RESPIRATION RATE: 18 BRPM | WEIGHT: 252 LBS | HEART RATE: 88 BPM | TEMPERATURE: 98.2 F | SYSTOLIC BLOOD PRESSURE: 120 MMHG

## 2024-12-11 LAB
ANION GAP SERPL CALC-SCNC: 8 MMOL/L (ref 3–18)
APTT PPP: 131.1 SEC (ref 23–36.4)
APTT PPP: 70.9 SEC (ref 23–36.4)
APTT PPP: 78.4 SEC (ref 23–36.4)
BUN SERPL-MCNC: 20 MG/DL (ref 7–18)
BUN/CREAT SERPL: 12 (ref 12–20)
CALCIUM SERPL-MCNC: 10.3 MG/DL (ref 8.5–10.1)
CHLORIDE SERPL-SCNC: 101 MMOL/L (ref 100–111)
CO2 SERPL-SCNC: 25 MMOL/L (ref 21–32)
CREAT SERPL-MCNC: 1.7 MG/DL (ref 0.6–1.3)
ECHO AO ASC DIAM: 3.3 CM
ECHO AO ASCENDING AORTA INDEX: 1.51 CM/M2
ECHO AO ROOT DIAM: 3.2 CM
ECHO AO ROOT INDEX: 1.47 CM/M2
ECHO AV AREA PEAK VELOCITY: 3.5 CM2
ECHO AV AREA VTI: 3.7 CM2
ECHO AV AREA/BSA PEAK VELOCITY: 1.6 CM2/M2
ECHO AV AREA/BSA VTI: 1.7 CM2/M2
ECHO AV MEAN GRADIENT: 2 MMHG
ECHO AV MEAN VELOCITY: 0.7 M/S
ECHO AV PEAK GRADIENT: 4 MMHG
ECHO AV PEAK VELOCITY: 1 M/S
ECHO AV VELOCITY RATIO: 0.8
ECHO AV VTI: 13.6 CM
ECHO BSA: 2.29 M2
ECHO LA DIAMETER INDEX: 1.74 CM/M2
ECHO LA DIAMETER: 3.8 CM
ECHO LA TO AORTIC ROOT RATIO: 1.19
ECHO LV E' LATERAL VELOCITY: 7.97 CM/S
ECHO LV E' SEPTAL VELOCITY: 3.9 CM/S
ECHO LV EF PHYSICIAN: 55 %
ECHO LV FRACTIONAL SHORTENING: 26 % (ref 28–44)
ECHO LV INTERNAL DIMENSION DIASTOLE INDEX: 2.16 CM/M2
ECHO LV INTERNAL DIMENSION DIASTOLIC: 4.7 CM (ref 3.9–5.3)
ECHO LV INTERNAL DIMENSION SYSTOLIC INDEX: 1.61 CM/M2
ECHO LV INTERNAL DIMENSION SYSTOLIC: 3.5 CM
ECHO LV IVSD: 1.1 CM (ref 0.6–0.9)
ECHO LV MASS 2D: 187.5 G (ref 67–162)
ECHO LV MASS INDEX 2D: 86 G/M2 (ref 43–95)
ECHO LV POSTERIOR WALL DIASTOLIC: 1.1 CM (ref 0.6–0.9)
ECHO LV RELATIVE WALL THICKNESS RATIO: 0.47
ECHO LVOT AREA: 4.2 CM2
ECHO LVOT AV VTI INDEX: 0.86
ECHO LVOT DIAM: 2.3 CM
ECHO LVOT MEAN GRADIENT: 1 MMHG
ECHO LVOT PEAK GRADIENT: 3 MMHG
ECHO LVOT PEAK VELOCITY: 0.8 M/S
ECHO LVOT STROKE VOLUME INDEX: 22.3 ML/M2
ECHO LVOT SV: 48.6 ML
ECHO LVOT VTI: 11.7 CM
ECHO MV A VELOCITY: 0.63 M/S
ECHO MV E DECELERATION TIME (DT): 243.9 MS
ECHO MV E VELOCITY: 0.34 M/S
ECHO MV E/A RATIO: 0.54
ECHO MV E/E' LATERAL: 4.27
ECHO MV E/E' RATIO (AVERAGED): 6.49
ECHO MV E/E' SEPTAL: 8.72
ECHO PV MAX VELOCITY: 0.9 M/S
ECHO PV PEAK GRADIENT: 4 MMHG
ECHO RA AREA 4C: 13.2 CM2
ECHO RV BASAL DIMENSION: 4 CM
ECHO RV TAPSE: 2.2 CM (ref 1.7–?)
ERYTHROCYTE [DISTWIDTH] IN BLOOD BY AUTOMATED COUNT: 15.5 % (ref 11.6–14.5)
GLUCOSE SERPL-MCNC: 103 MG/DL (ref 74–99)
HCT VFR BLD AUTO: 30.4 % (ref 35–45)
HGB BLD-MCNC: 9.6 G/DL (ref 12–16)
MCH RBC QN AUTO: 26.2 PG (ref 24–34)
MCHC RBC AUTO-ENTMCNC: 31.6 G/DL (ref 31–37)
MCV RBC AUTO: 82.8 FL (ref 78–100)
NRBC # BLD: 0 K/UL (ref 0–0.01)
NRBC BLD-RTO: 0 PER 100 WBC
PLATELET # BLD AUTO: 174 K/UL (ref 135–420)
PMV BLD AUTO: 11.2 FL (ref 9.2–11.8)
POTASSIUM SERPL-SCNC: 3.7 MMOL/L (ref 3.5–5.5)
RBC # BLD AUTO: 3.67 M/UL (ref 4.2–5.3)
SODIUM SERPL-SCNC: 134 MMOL/L (ref 136–145)
WBC # BLD AUTO: 9.1 K/UL (ref 4.6–13.2)

## 2024-12-11 PROCEDURE — 93306 TTE W/DOPPLER COMPLETE: CPT

## 2024-12-11 PROCEDURE — 99239 HOSP IP/OBS DSCHRG MGMT >30: CPT | Performed by: HOSPITALIST

## 2024-12-11 PROCEDURE — 85730 THROMBOPLASTIN TIME PARTIAL: CPT

## 2024-12-11 PROCEDURE — 6370000000 HC RX 637 (ALT 250 FOR IP): Performed by: HOSPITALIST

## 2024-12-11 PROCEDURE — 6360000002 HC RX W HCPCS: Performed by: INTERNAL MEDICINE

## 2024-12-11 PROCEDURE — 80048 BASIC METABOLIC PNL TOTAL CA: CPT

## 2024-12-11 PROCEDURE — 2580000003 HC RX 258: Performed by: HOSPITALIST

## 2024-12-11 PROCEDURE — 85027 COMPLETE CBC AUTOMATED: CPT

## 2024-12-11 PROCEDURE — 51798 US URINE CAPACITY MEASURE: CPT

## 2024-12-11 PROCEDURE — 2580000003 HC RX 258: Performed by: INTERNAL MEDICINE

## 2024-12-11 PROCEDURE — 94761 N-INVAS EAR/PLS OXIMETRY MLT: CPT

## 2024-12-11 PROCEDURE — 36415 COLL VENOUS BLD VENIPUNCTURE: CPT

## 2024-12-11 PROCEDURE — 6370000000 HC RX 637 (ALT 250 FOR IP): Performed by: INTERNAL MEDICINE

## 2024-12-11 RX ORDER — MIDODRINE HYDROCHLORIDE 10 MG/1
10 TABLET ORAL
Qty: 30 TABLET | Refills: 0
Start: 2024-12-11

## 2024-12-11 RX ORDER — NEPHROCAP 1 MG
1 CAP ORAL DAILY
Status: DISCONTINUED | OUTPATIENT
Start: 2024-12-11 | End: 2024-12-12 | Stop reason: HOSPADM

## 2024-12-11 RX ADMIN — SODIUM CHLORIDE, PRESERVATIVE FREE 10 ML: 5 INJECTION INTRAVENOUS at 20:25

## 2024-12-11 RX ADMIN — SODIUM CHLORIDE: 9 INJECTION, SOLUTION INTRAVENOUS at 08:41

## 2024-12-11 RX ADMIN — MIDODRINE HYDROCHLORIDE 10 MG: 10 TABLET ORAL at 16:25

## 2024-12-11 RX ADMIN — Medication 1 MG: at 15:05

## 2024-12-11 RX ADMIN — MIDODRINE HYDROCHLORIDE 10 MG: 10 TABLET ORAL at 08:37

## 2024-12-11 RX ADMIN — FAMOTIDINE 20 MG: 20 TABLET ORAL at 08:38

## 2024-12-11 RX ADMIN — HEPARIN SODIUM 7 UNITS/KG/HR: 10000 INJECTION, SOLUTION INTRAVENOUS at 15:08

## 2024-12-11 RX ADMIN — ATORVASTATIN CALCIUM 40 MG: 40 TABLET, FILM COATED ORAL at 20:25

## 2024-12-11 RX ADMIN — TAMSULOSIN HYDROCHLORIDE 0.4 MG: 0.4 CAPSULE ORAL at 08:38

## 2024-12-11 RX ADMIN — DULOXETINE HYDROCHLORIDE 60 MG: 60 CAPSULE, DELAYED RELEASE ORAL at 20:25

## 2024-12-11 RX ADMIN — SODIUM CHLORIDE, PRESERVATIVE FREE 10 ML: 5 INJECTION INTRAVENOUS at 08:39

## 2024-12-11 ASSESSMENT — PAIN SCALES - GENERAL
PAINLEVEL_OUTOF10: 0

## 2024-12-11 NOTE — PROGRESS NOTES
Patient seen evaluated, lying in bed, no acute distress.  Patient is morbidly obese, bedbound at baseline.  Patient has a history of chronic A-fib and is on Xarelto.  Patient mentions she has been compliant with medications.  Ultrasound lower extremity shows bilateral extensive DVTs.  CT abdomen pelvis show the presence of clot in her IVC.  Vascular surgery curb sided, recommended that given her clot burden she should be transferred to a higher level of care.  Transfer center contacted, reached out to [facility], patient has been accepted by , awaiting acceptance by hospitalist, will be transferred later today pending bed availability.    Patient has been accepted at [facility], continue heparin gtt.

## 2024-12-11 NOTE — PROGRESS NOTES
4 Eyes Skin Assessment     NAME:  Camilla Mello  YOB: 1955  MEDICAL RECORD NUMBER:  [id number]    The patient is being assessed for  Shift Handoff    I agree that at least one RN has performed a thorough Head to Toe Skin Assessment on the patient. ALL assessment sites listed below have been assessed.      Areas assessed by both nurses:    Head, Face, Ears, Shoulders, Back, Chest, Arms, Elbows, Hands, Sacrum. Buttock, Coccyx, Ischium, and Legs. Feet and Heels        Does the Patient have a Wound? Yes wound(s) were present on assessment. LDA wound assessment was Initiated and completed by RN       Sylvester Prevention initiated by RN: Yes  Wound Care Orders initiated by RN: No    Pressure Injury (Stage 3,4, Unstageable, DTI, NWPT, and Complex wounds) if present, place Wound referral order by RN under : No    New Ostomies, if present place, Ostomy referral order under : No     Nurse 1 eSignature: Electronically signed by Bronwyn Day RN on 12/11/24 at 6:33 PM EST    **SHARE this note so that the co-signing nurse can place an eSignature**    Nurse 2 eSignature: {Esignature:[id number]}

## 2024-12-11 NOTE — DISCHARGE SUMMARY
Hospitalist Discharge Summary      Patient: Camilla Mello MRN: [id number]  CSN: [id number]    YOB: 1955  Age: 68 y.o.  Sex: female    DOA: 12/9/2024 LOS:  LOS: 2 days   Discharge Date:     Admission Diagnoses: CAIN (acute kidney injury) (HCC) [N17.9]  Acute deep vein thrombosis (DVT) of femoral vein of both lower extremities (HCC) [I82.413]  Acute deep vein thrombosis (DVT) of both lower extremities, unspecified vein (HCC) [I82.403]  1+ pitting edema [R60.9]    Discharge Diagnoses:    Extensive bilateral lower extremity DVT  CAIN  Anemia of chronic disease  History of hyperlipidemia  Morbid obesity  Bedbound at baseline  History of chronic A-fib on Xarelto    Discharge Condition: Fair    Discharge To: Transfer to [facility]    CODE STATUS: Full Code         PHYSICAL EXAM  Visit Vitals  /76   Pulse 93   Temp 98.2 °F (36.8 °C) (Oral)   Resp 20   Ht 1.651 m (5' 5\")   Wt 114.3 kg (252 lb)   SpO2 97%   BMI 41.93 kg/m²       General: Alert, cooperative, no acute distress    Lungs:  CTA Bilaterally. No Wheezing/Rhonchi/Rales.  Heart:  Regular rate and Rhythm.  Abdomen: Soft, Non distended, Non tender. + Bowel sounds.  Extremities: Bilateral lower extremity swelling-chronic, worsened by bilateral extensive DVT  Neurologic:  AA oriented X 3. Moves all extremities.                                HPI:    Camilla Mello is a 68 y.o. female with PMH of hypertension hyperlipidemia morbid obesity, longstanding persistent atrial fibrillation on Xarelto as an outpatient?  Patient was taking, history of dizziness and falls with broken teeth from the fall in the past, now being admitted after she complaining of bilateral leg pain and swelling.  DVT study lower leg was positive for bilateral extensive DVT.     Patient denies any shortness of breath chest pain palpitation no nausea vomiting diarrhea fever no history of recent surgery but patient appears sedentary.    Hospital Course:     68-year-old female with a past

## 2024-12-11 NOTE — PLAN OF CARE
Problem: ABCDS Injury Assessment  Goal: Absence of physical injury  Flowsheets (Taken 12/10/2024 0213)  Absence of Physical Injury: Implement safety measures based on patient assessment  Note: Bed in lowest position and call button within reach of patient.      Problem: Chronic Conditions and Co-morbidities  Goal: Patient's chronic conditions and co-morbidity symptoms are monitored and maintained or improved  Flowsheets (Taken 12/10/2024 0213)  Care Plan - Patient's Chronic Conditions and Co-Morbidity Symptoms are Monitored and Maintained or Improved:   Monitor and assess patient's chronic conditions and comorbid symptoms for stability, deterioration, or improvement   Collaborate with multidisciplinary team to address chronic and comorbid conditions and prevent exacerbation or deterioration  Note: Patient monitored for aptt and potassium levels.     
  Problem: Occupational Therapy - Adult  Goal: By Discharge: Performs self-care activities at highest level of function for planned discharge setting.  See evaluation for individualized goals.  Description: Occupational Therapy Goals:  Initiated 12/10/2024 to be met within 7-10 days.    1.  Patient will perform grooming with modified independence.   2.  Patient will perform bathing with modified independence using AE.  3.  Patient will perform lower body dressing  with modified independence using AE.  4.  Patient will perform bedside commode transfers with supervision/set-up using RW.  5.  Patient will perform all aspects of toileting with supervision/set-up.  6.  Patient will participate in upper extremity therapeutic exercise/activities with supervision/set-up for 8-10 minutes to increase strength/endurance for ADLs.    7.  Patient will utilize energy conservation techniques during functional activities with min verbal cues.    PLOF: Mod I with ADLs, used AE for LB ADLs, Mo I functional mobility using rollator, pt reports ~ 4 falls this ear     Outcome: Progressing   OCCUPATIONAL THERAPY EVALUATION    Patient: Camilla Mello (68 y.o. female)  Date: 12/10/2024  Primary Diagnosis: CAIN (acute kidney injury) ([facility]) [N17.9]  Acute deep vein thrombosis (DVT) of femoral vein of both lower extremities ([facility]) [I82.413]  Acute deep vein thrombosis (DVT) of both lower extremities, unspecified vein ([facility]) [I82.403]  1+ pitting edema [R60.9]       Precautions: Fall Risk, Bed Alarm, General Precautions,  ,  ,  ,  ,  ,  ,      ASSESSMENT :  Pt encouraged to increase po and liquid intake, able to perform self feeding Mod I. Bed mobility: cga supine -> sit edge of bed with additional time, bed modified with HOB elevated and use of bed rails. Following multiple attempts, pt unable to achieve STS. Bed mobility: Mod A BLE mgmt, Dep via [employer] system to scoot p in bed. Pt laying semi-reclined in bed at end of tx session, call bell 
  Problem: Pain  Goal: Verbalizes/displays adequate comfort level or baseline comfort level  12/11/2024 0520 by Damaris Montana RN  Outcome: Progressing  Flowsheets (Taken 12/11/2024 0520)  Verbalizes/displays adequate comfort level or baseline comfort level:   Assess pain using appropriate pain scale   Administer analgesics based on type and severity of pain and evaluate response   Implement non-pharmacological measures as appropriate and evaluate response     Problem: Safety - Adult  Goal: Free from fall injury  12/11/2024 0520 by Damaris Montana RN  Outcome: Progressing  Flowsheets (Taken 12/11/2024 0520)  Free From Fall Injury: Instruct family/caregiver on patient safety     Problem: Chronic Conditions and Co-morbidities  Goal: Patient's chronic conditions and co-morbidity symptoms are monitored and maintained or improved  12/11/2024 0520 by Damaris Montana RN  Outcome: Progressing  Flowsheets (Taken 12/11/2024 0520)  Care Plan - Patient's Chronic Conditions and Co-Morbidity Symptoms are Monitored and Maintained or Improved: Monitor and assess patient's chronic conditions and comorbid symptoms for stability, deterioration, or improvement     Problem: Musculoskeletal - Adult  Goal: Return mobility to safest level of function  12/11/2024 0520 by Damaris Montana RN  Outcome: Progressing  Flowsheets (Taken 12/11/2024 0520)  Return Mobility to Safest Level of Function:   Assess patient stability and activity tolerance for standing, transferring and ambulating with or without assistive devices   Assist with transfers and ambulation using safe patient handling equipment as needed   Ensure adequate protection for wounds/incisions during mobilization     Problem: Skin/Tissue Integrity  Goal: Absence of new skin breakdown  Description: 1.  Monitor for areas of redness and/or skin breakdown  2.  Assess vascular access sites hourly  3.  Every 4-6 hours minimum:  Change oxygen saturation probe site  4.  Every 4-6 hours:  
  Problem: Pain  Goal: Verbalizes/displays adequate comfort level or baseline comfort level  12/11/2024 1241 by Bronwyn Day RN  Outcome: Progressing  Flowsheets (Taken 12/11/2024 0520 by Damaris Montana, RN)  Verbalizes/displays adequate comfort level or baseline comfort level:   Assess pain using appropriate pain scale   Administer analgesics based on type and severity of pain and evaluate response   Implement non-pharmacological measures as appropriate and evaluate response     Problem: Safety - Adult  Goal: Free from fall injury  12/11/2024 1241 by Bronwyn Day RN  Outcome: Progressing  Note: Educate patient for fall prevention.     Problem: ABCDS Injury Assessment  Goal: Absence of physical injury  Outcome: Progressing  Flowsheets (Taken 12/11/2024 1241)  Absence of Physical Injury: Implement safety measures based on patient assessment     Problem: Musculoskeletal - Adult  Goal: Return mobility to safest level of function  12/11/2024 1241 by Bronwyn Day RN  Outcome: Progressing  Flowsheets (Taken 12/11/2024 0825)  Return Mobility to Safest Level of Function: Assess patient stability and activity tolerance for standing, transferring and ambulating with or without assistive devices     Problem: Musculoskeletal - Adult  Goal: Return ADL status to a safe level of function  Outcome: Progressing  Flowsheets  Taken 12/11/2024 1241  Return ADL Status to a Safe Level of Function: Assess activities of daily living deficits and provide assistive devices as needed  Taken 12/11/2024 0825  Return ADL Status to a Safe Level of Function: Administer medication as ordered     Problem: Skin/Tissue Integrity  Goal: Absence of new skin breakdown  Description: 1.  Monitor for areas of redness and/or skin breakdown  2.  Assess vascular access sites hourly  3.  Every 4-6 hours minimum:  Change oxygen saturation probe site  4.  Every 4-6 hours:  If on nasal continuous positive airway pressure, respiratory therapy assess nares 
  Problem: Pain  Goal: Verbalizes/displays adequate comfort level or baseline comfort level  Outcome: Progressing  Flowsheets (Taken 12/10/2024 1128)  Verbalizes/displays adequate comfort level or baseline comfort level:   Encourage patient to monitor pain and request assistance   Assess pain using appropriate pain scale   Administer analgesics based on type and severity of pain and evaluate response   Implement non-pharmacological measures as appropriate and evaluate response   Consider cultural and social influences on pain and pain management     Problem: Safety - Adult  Goal: Free from fall injury  12/10/2024 1610 by Bronwyn Day RN  Outcome: Progressing  Note: Educate patient about fall precaution.      Problem: ABCDS Injury Assessment  Goal: Absence of physical injury  12/10/2024 1610 by Bronwyn Day RN  Outcome: Progressing  Flowsheets (Taken 12/10/2024 1610)  Absence of Physical Injury: Implement safety measures based on patient assessment     Problem: Chronic Conditions and Co-morbidities  Goal: Patient's chronic conditions and co-morbidity symptoms are monitored and maintained or improved  12/10/2024 1610 by Bronwyn Day RN  Outcome: Progressing  Flowsheets (Taken 12/10/2024 0213 by Aditi Lucia, RN)  Care Plan - Patient's Chronic Conditions and Co-Morbidity Symptoms are Monitored and Maintained or Improved:   Monitor and assess patient's chronic conditions and comorbid symptoms for stability, deterioration, or improvement   Collaborate with multidisciplinary team to address chronic and comorbid conditions and prevent exacerbation or deterioration     Problem: Occupational Therapy - Adult  Goal: By Discharge: Performs self-care activities at highest level of function for planned discharge setting.  See evaluation for individualized goals.  Description: Occupational Therapy Goals:  Initiated 12/10/2024 to be met within 7-10 days.    1.  Patient will perform grooming with modified independence. 
  Problem: Skin/Tissue Integrity  Goal: Absence of new skin breakdown  Description: 1.  Monitor for areas of redness and/or skin breakdown  2.  Assess vascular access sites hourly  3.  Every 4-6 hours minimum:  Change oxygen saturation probe site  4.  Every 4-6 hours:  If on nasal continuous positive airway pressure, respiratory therapy assess nares and determine need for appliance change or resting period.  Outcome: Progressing  Note: To apply wound prevention dressing and assess skin every shift.     
skilled intervention to address the above impairments.  Patient's rehabilitation potential/Therapy Prognosis: Good.  Factors which may influence rehabilitation potential include:   []         None noted  []         Mental ability/status  [x]         Medical condition  [x]         Home/family situation and support systems  []         Safety awareness  []         Pain tolerance/management  []         Other:      PLAN :  Recommendations and Planned Interventions:   [x]           Bed Mobility Training             [x]    Neuromuscular Re-Education  [x]           Transfer Training                   []    Orthotic/Prosthetic Training  [x]           Gait Training                          []    Modalities  [x]           Therapeutic Exercises           []    Edema Management/Control  [x]           Therapeutic Activities            [x]    Family Training/Education  [x]           Patient Education  []           Other (comment):    Frequency/Duration: Patient will be followed by physical therapy to address goals, 1-2 times per day/3-5 days per week to address goals.    Further Equipment Recommendations for Discharge: [facility]: AM-PAC Inpatient Mobility Raw Score : 16      Current research shows that an AM-PAC score of 17 (13 without stairs) or less is not associated with a discharge to the patient's home setting and this patient demonstrates the potential endurance and/or tolerance for 3 hours of therapy each day at discharge.    This AMPAC score should be considered in conjunction with interdisciplinary team recommendations to determine the most appropriate discharge setting. Patient's social support, diagnosis, medical stability, and prior level of function should also be taken into consideration.     SUBJECTIVE:   Patient stated “I feel weak.”    OBJECTIVE DATA SUMMARY:     Past Medical History:   Diagnosis Date    A-fib (HCC)     Arthritis     Childhood asthma     Childhood    Clotting disorder (HCC)     DVT 2016- rt leg

## 2024-12-11 NOTE — PROGRESS NOTES
Comprehensive Nutrition Assessment    Type and Reason for Visit:  Initial, Positive nutrition screen    Nutrition Recommendations/Plan:   Continue current diet as tolerated.  Order Ensure Plus High Protein (each provides 350 kcal, 20g protein) BID  Recommend/order virt-caps supplementation daily, Daily wts.  Continue to monitor tolerance of PO, compliance of oral supplements, weight, labs, and plan of care during admission.     Malnutrition Assessment:  Malnutrition Status:  Mild malnutrition (12/11/24 1305)    Context:  Acute Illness     Findings of the 6 clinical characteristics of malnutrition:  Energy Intake:  75% or less of estimated energy requirements for 7 or more days  Weight Loss:  No weight loss     Body Fat Loss:  No body fat loss Triceps, Buccal region   Muscle Mass Loss:  No muscle mass loss Temples (temporalis), Clavicles (pectoralis & deltoids), Thigh (quadriceps), Scapula (trapezius), Hand (interosseous)  Fluid Accumulation:  No fluid accumulation     Strength:  Not Performed    Nutrition History and Allergies:      Past Medical History:   Diagnosis Date    A-fib (HCC)     Arthritis     Childhood asthma     Childhood    Clotting disorder (HCC)     DVT 2016- rt leg    History of seasonal allergies     Hyperlipidemia     Hypertension     MSSA (methicillin susceptible Staphylococcus aureus) 2016    bone    Neuropathy     lower extremities    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     TREATED 2002 AND 2015    Rheumatoid arthritis (HCC)     Seizures (HCC)     History of eclampsia    Syncope      PTA: Per pt decreased appetite x3 weeks pta; consuming 2 meals/d. Pt reports  lbs, with unintentional weight loss of an unknown amount in the past x2 months.     Weight Hx: 252 lbs Wt change: 0 lb (0%) x 12 months - not significant. Vs Pt reported; Wt Hx 263 lbs Wt change: -11 lbs (-4.2%) x 2 months.   Wt Readings from Last 10 Encounters:   12/09/24 114.3 kg (252

## 2024-12-11 NOTE — CARE COORDINATION
12/11/24 0835   Service Assessment   Patient Orientation Alert and Oriented   Cognition Alert   History Provided By Patient   Primary Caregiver Self   Accompanied By/Relationship no one at bedside   Support Systems Family Members   Patient's Healthcare Decision Maker is: Patient Declined (Legal Next of Kin Remains as Decision Maker)   PCP Verified by CM Yes   Last Visit to PCP Within last 3 months   Prior Functional Level Independent in ADLs/IADLs   Current Functional Level Assistance with the following:;Mobility   Can patient return to prior living arrangement Yes   Ability to make needs known: Good   Family able to assist with home care needs: Yes   Would you like for me to discuss the discharge plan with any other family members/significant others, and if so, who? Yes  (Eric marin)   Financial Resources Medicaid;Medicare   Community Resources None   CM/SW Referral Other (see comment)  (not applicable)   Social/Functional History   Lives With Alone   Type of Home Apartment   Home Layout One level  (patient lives on the 3rd floor)   Home Access Elevator   Bathroom Shower/Tub Tub/Shower unit   Bathroom Toilet Handicap height   Bathroom Equipment Toilet raiser;Safety frame   Bathroom Accessibility Walker accessible   Home Equipment Sock aid;Adjustable bed;Rollator;Reacher   Receives Help From Family;Friend(s)   Prior Level of Assist for ADLs Independent   Prior Level of Assist for Homemaking Needs assistance   Homemaking Responsibilities Yes   Ambulation Assistance Independent   Prior Level of Assist for Transfers Independent   Active  No   Patient's  Info Grandson   Mode of Transportation Car   Education   (not applicable)   Occupation Retired   Type of Occupation   (not applicable)   Discharge Planning   Type of Residence House   Living Arrangements Alone   Current Services Prior To Admission None   Potential Assistance Needed Home Care;Skilled Nursing Facility   DME Ordered? No

## 2024-12-11 NOTE — DISCHARGE INSTRUCTIONS
DISCHARGE SUMMARY from Nurse    PATIENT INSTRUCTIONS:    After general anesthesia or intravenous sedation, for 24 hours or while taking prescription Narcotics:  Limit your activities  Do not drive and operate hazardous machinery  Do not make important personal or business decisions  Do  not drink alcoholic beverages  If you have not urinated within 8 hours after discharge, please contact your surgeon on call.    Report the following to your surgeon:  Excessive pain, swelling, redness or odor of or around the surgical area  Temperature over 100.5  Nausea and vomiting lasting longer than 4 hours or if unable to take medications  Any signs of decreased circulation or nerve impairment to extremity: change in color, persistent  numbness, tingling, coldness or increase pain  Any questions    What to do at Home:  Recommended activity: activity as tolerated, with assistance    If you experience any of the following symptoms Refer to discharge summary, please follow up with Dr Watson.    *  Please give a list of your current medications to your Primary Care Provider.    *  Please update this list whenever your medications are discontinued, doses are      changed, or new medications (including over-the-counter products) are added.    *  Please carry medication information at all times in case of emergency situations.    These are general instructions for a healthy lifestyle:    No smoking/ No tobacco products/ Avoid exposure to second hand smoke  Surgeon General's Warning:  Quitting smoking now greatly reduces serious risk to your health.    Obesity, smoking, and sedentary lifestyle greatly increases your risk for illness    A healthy diet, regular physical exercise & weight monitoring are important for maintaining a healthy lifestyle    You may be retaining fluid if you have a history of heart failure or if you experience any of the following symptoms:  Weight gain of 3 pounds or more overnight or 5 pounds in a week,

## 2024-12-11 NOTE — PROGRESS NOTES
Urine output of 150 ml for 12 hours. Bladder scan done with 173 ml. Informed . to kept IV fluid of 50ml/hour and to do a blood test (BPM).

## 2024-12-12 LAB
BACTERIA SPEC CULT: ABNORMAL
BACTERIA SPEC CULT: ABNORMAL
CC UR VC: ABNORMAL
SERVICE CMNT-IMP: ABNORMAL

## 2025-02-01 ENCOUNTER — APPOINTMENT (OUTPATIENT)
Facility: HOSPITAL | Age: 70
End: 2025-02-01
Payer: MEDICARE

## 2025-02-01 ENCOUNTER — HOSPITAL ENCOUNTER (EMERGENCY)
Facility: HOSPITAL | Age: 70
Discharge: HOME | End: 2025-02-02
Attending: EMERGENCY MEDICINE
Payer: MEDICARE
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
## 2025-02-01 DIAGNOSIS — I89.0 LYMPHEDEMA: ICD-10-CM

## 2025-02-01 DIAGNOSIS — N39.0 ACUTE UTI: ICD-10-CM

## 2025-02-01 DIAGNOSIS — R53.83 OTHER FATIGUE: ICD-10-CM

## 2025-02-01 LAB
ALBUMIN SERPL-MCNC: 4.2 G/DL (ref 3.4–5)
ALBUMIN/GLOB SERPL: 0.8 (ref 0.8–1.7)
ALP SERPL-CCNC: 91 U/L (ref 45–117)
ALT SERPL-CCNC: 11 U/L (ref 13–56)
ANION GAP SERPL CALC-SCNC: 5 MMOL/L (ref 3–18)
APPEARANCE UR: ABNORMAL
AST SERPL-CCNC: 20 U/L (ref 10–38)
BACTERIA URNS QL MICRO: ABNORMAL /HPF
BASOPHILS # BLD: 0.04 K/UL (ref 0–0.1)
BASOPHILS NFR BLD: 0.3 % (ref 0–2)
BILIRUB SERPL-MCNC: 0.8 MG/DL (ref 0.2–1)
BILIRUB UR QL: NEGATIVE
BUN SERPL-MCNC: 20 MG/DL (ref 7–18)
BUN/CREAT SERPL: 12 (ref 12–20)
CALCIUM SERPL-MCNC: 11.1 MG/DL (ref 8.5–10.1)
CHLORIDE SERPL-SCNC: 104 MMOL/L (ref 100–111)
CO2 SERPL-SCNC: 27 MMOL/L (ref 21–32)
COLOR UR: ABNORMAL
CREAT SERPL-MCNC: 1.64 MG/DL (ref 0.6–1.3)
DIFFERENTIAL METHOD BLD: ABNORMAL
EOSINOPHIL # BLD: 0.07 K/UL (ref 0–0.4)
EOSINOPHIL NFR BLD: 0.5 % (ref 0–5)
EPITH CASTS URNS QL MICRO: ABNORMAL /LPF (ref 0–5)
ERYTHROCYTE [DISTWIDTH] IN BLOOD BY AUTOMATED COUNT: 17.7 % (ref 11.6–14.5)
GLOBULIN SER CALC-MCNC: 5.2 G/DL (ref 2–4)
GLUCOSE SERPL-MCNC: 110 MG/DL (ref 74–99)
GLUCOSE UR STRIP.AUTO-MCNC: NEGATIVE MG/DL
HCT VFR BLD AUTO: 38.7 % (ref 35–45)
HGB BLD-MCNC: 12.1 G/DL (ref 12–16)
HGB UR QL STRIP: NEGATIVE
IMM GRANULOCYTES # BLD AUTO: 0.04 K/UL (ref 0–0.04)
IMM GRANULOCYTES NFR BLD AUTO: 0.3 % (ref 0–0.5)
KETONES UR QL STRIP.AUTO: ABNORMAL MG/DL
LEUKOCYTE ESTERASE UR QL STRIP.AUTO: ABNORMAL
LIPASE SERPL-CCNC: 19 U/L (ref 13–75)
LYMPHOCYTES # BLD: 1.69 K/UL (ref 0.9–3.6)
LYMPHOCYTES NFR BLD: 12.4 % (ref 21–52)
MAGNESIUM SERPL-MCNC: 2.2 MG/DL (ref 1.6–2.6)
MCH RBC QN AUTO: 26.6 PG (ref 24–34)
MCHC RBC AUTO-ENTMCNC: 31.3 G/DL (ref 31–37)
MCV RBC AUTO: 85.1 FL (ref 78–100)
MONOCYTES # BLD: 0.73 K/UL (ref 0.05–1.2)
MONOCYTES NFR BLD: 5.4 % (ref 3–10)
NEUTS SEG # BLD: 11.04 K/UL (ref 1.8–8)
NEUTS SEG NFR BLD: 81.1 % (ref 40–73)
NITRITE UR QL STRIP.AUTO: POSITIVE
NRBC # BLD: 0 K/UL (ref 0–0.01)
NRBC BLD-RTO: 0 PER 100 WBC
PH UR STRIP: 5.5 (ref 5–8)
PLATELET # BLD AUTO: 261 K/UL (ref 135–420)
PMV BLD AUTO: 9.6 FL (ref 9.2–11.8)
POTASSIUM SERPL-SCNC: 4.7 MMOL/L (ref 3.5–5.5)
PROT SERPL-MCNC: 9.4 G/DL (ref 6.4–8.2)
PROT UR STRIP-MCNC: 300 MG/DL
RBC # BLD AUTO: 4.55 M/UL (ref 4.2–5.3)
RBC #/AREA URNS HPF: ABNORMAL /HPF (ref 0–5)
SODIUM SERPL-SCNC: 136 MMOL/L (ref 136–145)
SP GR UR REFRACTOMETRY: 1.03 (ref 1–1.03)
TROPONIN I SERPL HS-MCNC: 23 NG/L (ref 0–54)
TROPONIN I SERPL HS-MCNC: 27 NG/L (ref 0–54)
UROBILINOGEN UR QL STRIP.AUTO: 1 EU/DL (ref 0.2–1)
WBC # BLD AUTO: 13.6 K/UL (ref 4.6–13.2)
WBC URNS QL MICRO: ABNORMAL /HPF (ref 0–4)

## 2025-02-01 PROCEDURE — 84484 ASSAY OF TROPONIN QUANT: CPT

## 2025-02-01 PROCEDURE — 80053 COMPREHEN METABOLIC PANEL: CPT

## 2025-02-01 PROCEDURE — 6360000002 HC RX W HCPCS: Performed by: EMERGENCY MEDICINE

## 2025-02-01 PROCEDURE — 72125 CT NECK SPINE W/O DYE: CPT

## 2025-02-01 PROCEDURE — 87088 URINE BACTERIA CULTURE: CPT

## 2025-02-01 PROCEDURE — 93005 ELECTROCARDIOGRAM TRACING: CPT | Performed by: EMERGENCY MEDICINE

## 2025-02-01 PROCEDURE — 87186 SC STD MICRODIL/AGAR DIL: CPT

## 2025-02-01 PROCEDURE — 87040 BLOOD CULTURE FOR BACTERIA: CPT

## 2025-02-01 PROCEDURE — 87086 URINE CULTURE/COLONY COUNT: CPT

## 2025-02-01 PROCEDURE — 96374 THER/PROPH/DIAG INJ IV PUSH: CPT

## 2025-02-01 PROCEDURE — 83690 ASSAY OF LIPASE: CPT

## 2025-02-01 PROCEDURE — 83735 ASSAY OF MAGNESIUM: CPT

## 2025-02-01 PROCEDURE — 70450 CT HEAD/BRAIN W/O DYE: CPT

## 2025-02-01 PROCEDURE — 2500000003 HC RX 250 WO HCPCS: Performed by: EMERGENCY MEDICINE

## 2025-02-01 PROCEDURE — 81001 URINALYSIS AUTO W/SCOPE: CPT

## 2025-02-01 PROCEDURE — 71250 CT THORAX DX C-: CPT

## 2025-02-01 PROCEDURE — 85025 COMPLETE CBC W/AUTO DIFF WBC: CPT

## 2025-02-01 PROCEDURE — 99284 EMERGENCY DEPT VISIT MOD MDM: CPT

## 2025-02-01 RX ORDER — CEFDINIR 300 MG/1
300 CAPSULE ORAL 2 TIMES DAILY
Qty: 14 CAPSULE | Refills: 0 | Status: SHIPPED | OUTPATIENT
Start: 2025-02-01 | End: 2025-02-08

## 2025-02-01 RX ADMIN — WATER 1000 MG: 1 INJECTION INTRAMUSCULAR; INTRAVENOUS; SUBCUTANEOUS at 19:59

## 2025-02-01 ASSESSMENT — PAIN - FUNCTIONAL ASSESSMENT: PAIN_FUNCTIONAL_ASSESSMENT: 0-10

## 2025-02-01 ASSESSMENT — PAIN SCALES - GENERAL: PAINLEVEL_OUTOF10: 10

## 2025-02-01 ASSESSMENT — PAIN DESCRIPTION - LOCATION: LOCATION: BACK

## 2025-02-01 NOTE — ED TRIAGE NOTES
Patient presents to ER via stretcher with [facility] from [facility] with 10/10 chronic back.

## 2025-02-02 VITALS
RESPIRATION RATE: 12 BRPM | WEIGHT: 228 LBS | BODY MASS INDEX: 38.93 KG/M2 | HEART RATE: 80 BPM | TEMPERATURE: 97.4 F | OXYGEN SATURATION: 100 % | SYSTOLIC BLOOD PRESSURE: 122 MMHG | HEIGHT: 64 IN | DIASTOLIC BLOOD PRESSURE: 68 MMHG

## 2025-02-02 LAB
EKG ATRIAL RATE: 91 BPM
EKG DIAGNOSIS: NORMAL
EKG P AXIS: 36 DEGREES
EKG P-R INTERVAL: 118 MS
EKG Q-T INTERVAL: 368 MS
EKG QRS DURATION: 76 MS
EKG QTC CALCULATION (BAZETT): 452 MS
EKG R AXIS: -25 DEGREES
EKG T AXIS: -36 DEGREES
EKG VENTRICULAR RATE: 91 BPM

## 2025-02-02 PROCEDURE — 93010 ELECTROCARDIOGRAM REPORT: CPT | Performed by: INTERNAL MEDICINE

## 2025-02-02 NOTE — ED NOTES
RN spoke to the grandson of this patient and made aware of the transport arrival time and discharge instructions.

## 2025-02-02 NOTE — ED PROVIDER NOTES
10:09 AM :Pt care assumed from Dr. Lin , ED provider. Pt complaint(s), current treatment plan, progression and available diagnostic results have been discussed thoroughly. The patient was seen and evaluated on my shift.   Rounding occurred: Yes  Intended Disposition: discharge  Pending diagnostic reports and/or labs (please list): none    Patient was already discharged on the previous shift but was unable to go home secondary to no one being there.  Patient does live at a senior facility.  Since nursing staff spoke to patient's grandsons in [place] and the other 1 that lives in [place], with the pt.  They both agree that patient will be able to come home today.  Address has been confirmed as a location in [place].  Patient has a lock box and the COVID is on the facesheet and updated.  Patient will be discharged today and family member will be home     Tulio Morales MD  02/02/25 3970    
(HCC)     Seizures (HCC)     History of eclampsia    Syncope        Past Surgical History:  Past Surgical History:   Procedure Laterality Date    APPENDECTOMY      COLONOSCOPY N/A 2021    COLONOSCOPY performed by Aristides Austin MD at [facility] ENDOSCOPY    COLONOSCOPY N/A 5/15/2018    COLONOSCOPY, DIAGNOSTIC  performed by Harinder Lizarraga MD at [facility] ENDOSCOPY    DILATION AND CURETTAGE OF UTERUS      HAND/FINGER SURGERY UNLISTED      HEENT      Ear tubes    HYSTERECTOMY (CERVIX STATUS UNKNOWN)      NEUROLOGICAL SURGERY  2016    T2-L2 Decomprssion and Fusion/T12 Corpetomy    OVARY REMOVAL Bilateral        Family History:  Family History   Problem Relation Age of Onset    Heart Attack Neg Hx     Diabetes Maternal Uncle     Cancer Maternal Uncle         preostate cancer    Diabetes Maternal Aunt     Hypertension Mother     Stroke Mother 65    Heart Disease Mother     Diabetes Mother     Heart Disease Father        Social History:  Social History     Tobacco Use    Smoking status: Former     Current packs/day: 0.00     Types: Cigarettes     Quit date: 2016     Years since quittin.0    Smokeless tobacco: Never   Substance Use Topics    Alcohol use: Never    Drug use: No       Allergies:  Allergies   Allergen Reactions    Iodinated Contrast Media Other (See Comments)     Patient states will pass out.         Review of Systems       Review of Systems   Constitutional:  Positive for activity change and fatigue.   HENT:  Negative for congestion.    Eyes: Negative.    Respiratory:  Negative for chest tightness.    Cardiovascular:  Negative for chest pain.   Gastrointestinal:  Negative for abdominal pain.   Genitourinary:  Negative for dysuria.   Musculoskeletal:  Positive for back pain, gait problem and myalgias. Negative for arthralgias.   Skin: Negative.    Neurological:  Negative for weakness.   Hematological: Negative.          Physical Exam   BP (!) 136/92   Pulse 81   Temp 97.4 °F (36.3 °C) (Oral)   Resp

## 2025-02-02 NOTE — ED NOTES
Spoke with patient's primary emergency contact (Melinda Saravia)'s daughter. She was unable to direct this nurse to the patient's grandson who lives with patient. Attempted to call Patient's lay caregiver, shona silva, no answer.

## 2025-02-02 NOTE — ED NOTES
Report given to [facility] and grandson. Discharge provided to family with verbalized understanding.

## 2025-02-02 NOTE — ED NOTES
Patient's grandson Светлана who resides in [facility] called and informed me that patient's other grandson Babak [phone] lives with patient but works during the day and that she will not have help until Monday morning when her CNA from [facility] comes. He says she CNA whose name is Mary ([phone]) helps her during the week from 9:30 am-330pm and the grandson babak comes home from work, \"around 5\".  Светлана states, \"My grandmother's NP said she needs to stay in the hospital until Monday when she gets help\". Dr. Lin made aware. Case management order placed in chart.

## 2025-02-02 NOTE — DISCHARGE INSTRUCTIONS
Your workup shows that you have a urinary tract infection we will put you on antibiotic for a week and your first dose was given in the ER so your next dose is not due until 2/2/2025.  Make sure to stay well-hydrated, take your home medications, and make sure to get up out of bed as tolerated.  Please return if you are at all worsened or concerned and avoid falls.  The CT scan of your head, neck, chest abdomen pelvis, showed no evidence of injury from the reported fall.  Be sure to return if you are at all worsened or concerned.

## 2025-02-08 LAB
BACTERIA SPEC CULT: NORMAL
BACTERIA SPEC CULT: NORMAL
SERVICE CMNT-IMP: NORMAL
SERVICE CMNT-IMP: NORMAL

## 2025-05-01 ENCOUNTER — TELEPHONE (OUTPATIENT)
Facility: HOSPITAL | Age: 70
End: 2025-05-01

## 2025-05-01 NOTE — TELEPHONE ENCOUNTER
patient is currently having home health services, unable to schedule outpatient due to being active HH, was advised to schedule outpatient once HH is DC

## 2025-05-02 ENCOUNTER — TELEPHONE (OUTPATIENT)
Facility: HOSPITAL | Age: 70
End: 2025-05-02

## (undated) DEVICE — GOWN,REINFORCE,POLY,SIRUS,SETSLV,XLARGE: Brand: MEDLINE

## (undated) DEVICE — BIPOLAR FORCEPS CORD: Brand: VALLEYLAB

## (undated) DEVICE — BNDG CMPR ELAS KNT VEL STD 3IN -- MEDICHOICE

## (undated) DEVICE — BLANKET WRM AD W50XL85.8IN PACU FULL BODY FORC AIR

## (undated) DEVICE — STRETCH BANDAGE ROLL: Brand: DERMACEA

## (undated) DEVICE — BLADE OPHTH MINI BEAV SHRP --

## (undated) DEVICE — 2.5MM KIRSCHNER WIRE W/TROCAR POINT 285MM

## (undated) DEVICE — ENDOSCOPY PUMP TUBING/ CAP SET: Brand: ERBE

## (undated) DEVICE — CATHETER SUCT TR FL TIP 14FR W/ O CTRL

## (undated) DEVICE — SUTURE MCRYL SZ 3-0 L27IN ABSRB UD L26MM SH 1/2 CIR Y416H

## (undated) DEVICE — SNARE POLYP M W27MMXL240CM OVL STIFF DISP CAPTIVATOR

## (undated) DEVICE — STERILE POLYISOPRENE POWDER-FREE SURGICAL GLOVES: Brand: PROTEXIS

## (undated) DEVICE — THREE-QUARTER SHEET: Brand: CONVERTORS

## (undated) DEVICE — 3.5MM X 14MM NON-LOCKING HEXALOBE SCREW
Type: IMPLANTABLE DEVICE | Site: RADIUS | Status: NON-FUNCTIONAL
Brand: ACUMED
Removed: 2021-12-21

## (undated) DEVICE — SOLUTION IRRIG 1000ML H2O STRL BLT

## (undated) DEVICE — 3.5MM X 12MM NON-LOCKING HEXALOBE SCREW
Type: IMPLANTABLE DEVICE | Site: WRIST | Status: NON-FUNCTIONAL
Brand: ACUMED
Removed: 2021-12-21

## (undated) DEVICE — BANDAGE,ELASTIC,ESMARK,STERILE,4"X9',LF: Brand: MEDLINE

## (undated) DEVICE — 1.5MM HEX DRIVER TIP, LOCKING GROOVE: Brand: ACUMED

## (undated) DEVICE — SYR 50ML SLIP TIP NSAF LF STRL --

## (undated) DEVICE — SPIROMETER INCENT 2500ML W ONE W VLV

## (undated) DEVICE — FLEX ADVANTAGE 3000CC: Brand: FLEX ADVANTAGE

## (undated) DEVICE — SOLUTION IV 1000ML 0.9% SOD CHL

## (undated) DEVICE — SYR 10ML LUER LOK 1/5ML GRAD --

## (undated) DEVICE — CANNULA ORIG TL CLR W FOAM CUSHIONS AND 14FT SUPL TB 3 CHN

## (undated) DEVICE — MEDI-VAC NON-CONDUCTIVE SUCTION TUBING: Brand: CARDINAL HEALTH

## (undated) DEVICE — 2.3MM X 22MM NON-TOGGLING CORTICAL SCREW
Type: IMPLANTABLE DEVICE | Site: WRIST | Status: NON-FUNCTIONAL
Brand: ACUMED
Removed: 2021-12-21

## (undated) DEVICE — IMMOBILIZER SHLDR L L18IN D9IN UNIV POLY COT W/ FOAM STRP

## (undated) DEVICE — BANDAGE COMPR EXSANGUATION SGL LAYERED NO CLSR 9FT LEN 4IN W

## (undated) DEVICE — FLUFF AND POLYMER UNDERPAD,EXTRA HEAVY: Brand: WINGS

## (undated) DEVICE — BASIN EMESIS 500CC ROSE 250/CS 60/PLT: Brand: MEDEGEN MEDICAL PRODUCTS, LLC

## (undated) DEVICE — GARMENT,MEDLINE,DVT,INT,CALF,MED, GEN2: Brand: MEDLINE

## (undated) DEVICE — HOOK LOCK LATEX FREE ELASTIC BANDAGE 3INX5YD

## (undated) DEVICE — GAUZE,SPONGE,4"X4",16PLY,STRL,LF,10/TRAY: Brand: MEDLINE

## (undated) DEVICE — KIT CLN UP BON SECOURS MARYV

## (undated) DEVICE — PADDING CAST COHESIVE 4 YDX3 IN HND TEARABLE COTTON SPEC 100

## (undated) DEVICE — PACK PROCEDURE SURG TOT HIP BSHR LF

## (undated) DEVICE — GOWN ISOL IMPERV UNIV, DISP, OPEN BACK, BLUE --

## (undated) DEVICE — ROD RMR L950MM DIA2.5MM W/ EXTN BALL TIP

## (undated) DEVICE — NEEDLE NRV BLK 22GA L2IN 30DEG INSUL BVL EXTN SET STIMUPLEX

## (undated) DEVICE — 7MM TI CANNULATED HUMERAL NAIL-EX/240MM-STERILE: Brand: EXPERT NAIL

## (undated) DEVICE — MEDI-VAC SUCTION HIGH CAPACITY: Brand: CARDINAL HEALTH

## (undated) DEVICE — 3M™ IOBAN™ 2 ANTIMICROBIAL INCISE DRAPE 6648EZ: Brand: IOBAN™ 2

## (undated) DEVICE — DRAPE,HAND,STERILE: Brand: MEDLINE

## (undated) DEVICE — BIT DRL L165MM DIA4.5MM JCBS CHK L135MM STP CANN

## (undated) DEVICE — GARMENT,MEDLINE,DVT,INT,CALF,LG, GEN2: Brand: MEDLINE

## (undated) DEVICE — 2.8MM QR DRILL, W/ DEPTH MARKS: Brand: ACUMED

## (undated) DEVICE — .054" X 6" GUIDE WIRE: Brand: ACUMED

## (undated) DEVICE — GUIDEWIRE SURG L240MM DIA2MM S STL TRCR TIP FOR HUM NAIL

## (undated) DEVICE — PREP SKN CHLRAPRP APL 26ML STR --

## (undated) DEVICE — NEEDLE NRV BLK 21GA L4IN 30DEG INSUL BVL EXTN SET STIMUPLEX

## (undated) DEVICE — BIT DRL L145MM DIA3.2MM 3 FLUT QUIK CPL FOR EXPERT TIB

## (undated) DEVICE — REM POLYHESIVE ADULT PATIENT RETURN ELECTRODE: Brand: VALLEYLAB

## (undated) DEVICE — FCPS RAD JAW 4LC 240CM W/NDL -- BX/20 RADIAL JAW 4

## (undated) DEVICE — SUT MCRYL + 4-0 18IN PS-2 UND --

## (undated) DEVICE — SYRINGE MED 25GA 3ML L5/8IN SUBQ PLAS W/ DETACH NDL SFTY

## (undated) DEVICE — AIRLIFE™ NASAL OXYGEN CANNULA CURVED, FLARED TIP WITH 14 FOOT (4.3 M) CRUSH-RESISTANT TUBING, OVER-THE-EAR STYLE: Brand: AIRLIFE™

## (undated) DEVICE — GOWN,REINFORCED,POLY,AURORA,XLARGE,STRL: Brand: MEDLINE

## (undated) DEVICE — INTENDED FOR TISSUE SEPARATION, AND OTHER PROCEDURES THAT REQUIRE A SHARP SURGICAL BLADE TO PUNCTURE OR CUT.: Brand: BARD-PARKER SAFETY BLADES SIZE 15, STERILE

## (undated) DEVICE — HEX-LOCKING BLADE ELECTRODE: Brand: EDGE

## (undated) DEVICE — TABLE COVER: Brand: CONVERTORS

## (undated) DEVICE — PAD,ABDOMINAL,8"X10",ST,LF: Brand: MEDLINE

## (undated) DEVICE — BITE BLOCK ENDOSCP UNIV AD 6 TO 9.4 MM

## (undated) DEVICE — AIRLIFE™ NASAL OXYGEN CANNULA CURVED, NONFLARED TIP WITH 14 FOOT (4.3 M) CRUSH-RESISTANT TUBING, OVER-THE-EAR STYLE: Brand: AIRLIFE™

## (undated) DEVICE — 2.0MM QUICK RELEASE DRILL: Brand: ACUMED

## (undated) DEVICE — BANDAGE,GAUZE,BULKEE II,4.5"X4.1YD,STRL: Brand: MEDLINE

## (undated) DEVICE — DRAPE EQUIP CARM MINI STRL

## (undated) DEVICE — T15 STICK FIT HEXALOBE DRIVER: Brand: ACUMED

## (undated) DEVICE — SYR 20ML LL STRL LF --

## (undated) DEVICE — PREP CHLORAPREP 10.5 ML ORG --